# Patient Record
Sex: FEMALE | Race: WHITE | NOT HISPANIC OR LATINO | Employment: OTHER | URBAN - METROPOLITAN AREA
[De-identification: names, ages, dates, MRNs, and addresses within clinical notes are randomized per-mention and may not be internally consistent; named-entity substitution may affect disease eponyms.]

---

## 2017-01-27 RX ORDER — OMEPRAZOLE 20 MG/1
CAPSULE, DELAYED RELEASE ORAL
Qty: 30 CAP | Refills: 0 | Status: SHIPPED | OUTPATIENT
Start: 2017-01-27 | End: 2017-02-26 | Stop reason: SDUPTHER

## 2017-02-09 ENCOUNTER — APPOINTMENT (OUTPATIENT)
Dept: NEUROLOGY | Facility: MEDICAL CENTER | Age: 67
End: 2017-02-09
Payer: MEDICARE

## 2017-02-28 ENCOUNTER — HOSPITAL ENCOUNTER (EMERGENCY)
Facility: MEDICAL CENTER | Age: 67
End: 2017-02-28
Attending: EMERGENCY MEDICINE
Payer: MEDICARE

## 2017-02-28 VITALS
TEMPERATURE: 97.4 F | HEART RATE: 60 BPM | OXYGEN SATURATION: 95 % | WEIGHT: 221.78 LBS | BODY MASS INDEX: 40.81 KG/M2 | SYSTOLIC BLOOD PRESSURE: 137 MMHG | HEIGHT: 62 IN | RESPIRATION RATE: 12 BRPM | DIASTOLIC BLOOD PRESSURE: 75 MMHG

## 2017-02-28 DIAGNOSIS — L73.2 HIDRADENITIS SUPPURATIVA OF LEFT AXILLA: ICD-10-CM

## 2017-02-28 DIAGNOSIS — R42 VERTIGO: ICD-10-CM

## 2017-02-28 PROCEDURE — 99283 EMERGENCY DEPT VISIT LOW MDM: CPT

## 2017-02-28 PROCEDURE — 303977 HCHG I & D

## 2017-02-28 PROCEDURE — A9270 NON-COVERED ITEM OR SERVICE: HCPCS | Performed by: EMERGENCY MEDICINE

## 2017-02-28 PROCEDURE — 700102 HCHG RX REV CODE 250 W/ 637 OVERRIDE(OP): Performed by: EMERGENCY MEDICINE

## 2017-02-28 RX ORDER — MECLIZINE HYDROCHLORIDE 25 MG/1
25 TABLET ORAL ONCE
Status: COMPLETED | OUTPATIENT
Start: 2017-02-28 | End: 2017-02-28

## 2017-02-28 RX ORDER — MECLIZINE HYDROCHLORIDE 25 MG/1
25 TABLET ORAL 3 TIMES DAILY PRN
Qty: 30 TAB | Refills: 0 | Status: SHIPPED | OUTPATIENT
Start: 2017-02-28 | End: 2017-03-23

## 2017-02-28 RX ADMIN — MECLIZINE HYDROCHLORIDE 25 MG: 25 TABLET ORAL at 21:10

## 2017-02-28 NOTE — ED AVS SNAPSHOT
2/28/2017          Thuy Albert  [address]  [facility] [address]    Dear Thuy Wilson:    [facility] wants to ensure your discharge home is safe and you or your loved ones have had all your questions answered regarding your care after you leave the hospital.    You may receive a telephone call within two days of your discharge.  This call is to make certain you understand your discharge instructions as well as ensure we provided you with the best care possible during your stay with us.     The call will only last approximately 3-5 minutes and will be done by a nurse.    Once again, we want to ensure your discharge home is safe and that you have a clear understanding of any next steps in your care.  If you have any questions or concerns, please do not hesitate to contact us, we are here for you.  Thank you for choosing [facility] for your healthcare needs.    Sincerely,    Dima Triplett    [facility]

## 2017-02-28 NOTE — ED AVS SNAPSHOT
Concurrent Thinking Access Code: Activation code not generated  Current Concurrent Thinking Status: Patient Declined    Your email address is not on file at [facility].  Email Addresses are required for you to sign up for Concurrent Thinking, please contact [phone] to verify your personal information and to provide your email address prior to attempting to register for Concurrent Thinking.    Thuy Wilson [place]  [address] [place] [address]    Streemiot  A secure, online tool to manage your health information     [facility]’s Concurrent Thinking® is a secure, online tool that connects you to your personalized health information from the privacy of your home -- day or night - making it very easy for you to manage your healthcare. Once the activation process is completed, you can even access your medical information using the Concurrent Thinking vick, which is available for free in the Apple Vick store or Google Play store.     To learn more about Concurrent Thinking, visit [email or web address]    There are two levels of access available (as shown below):   My Chart Features  [facility] Primary Care Doctor [facility]  Specialists [facility]  Urgent  Care Non-[facility] Primary Care Doctor   Email your healthcare team securely and privately 24/7 X X X    Manage appointments: schedule your next appointment; view details of past/upcoming appointments X      Request prescription refills. X      View recent personal medical records, including lab and immunizations X X X X   View health record, including health history, allergies, medications X X X X   Read reports about your outpatient visits, procedures, consult and ER notes X X X X   See your discharge summary, which is a recap of your hospital and/or ER visit that includes your diagnosis, lab results, and care plan X X  X     How to register for Streemiot:  Once your e-mail address has been verified, follow the following steps to sign up for Streemiot.     1. Go to  [email or web address]  2. Click on the Sign Up Now box, which takes you to  the New Member Sign Up page. You will need to provide the following information:  a. Enter your [email or web address] Access Code exactly as it appears at the top of this page. (You will not need to use this code after you’ve completed the sign-up process. If you do not sign up before the expiration date, you must request a new code.)   b. Enter your date of birth.   c. Enter your home email address.   d. Click Submit, and follow the next screen’s instructions.  3. Create a [email or web address] ID. This will be your [email or web address] login ID and cannot be changed, so think of one that is secure and easy to remember.  4. Create a [email or web address] password. You can change your password at any time.  5. Enter your Password Reset Question and Answer. This can be used at a later time if you forget your password.   6. Enter your e-mail address. This allows you to receive e-mail notifications when new information is available in [email or web address].  7. Click Sign Up. You can now view your health information.    For assistance activating your [email or web address] account, call [phone]

## 2017-02-28 NOTE — ED AVS SNAPSHOT
Home Care Instructions                                                                                                                Thuy Albert   MRN: [id number]    Department:  [facility], Emergency Dept   Date of Visit:  2/28/2017            [facility], Emergency Dept    [address]    [place] [address]    Phone:  [phone]      You were seen by     Justice Francisco M.D.      Your Diagnosis Was     Hidradenitis suppurativa of left axilla     L73.2       These are the medications you received during your hospitalization from 02/28/2017 1750 to 02/28/2017 2124     Date/Time Order Dose Route Action    02/28/2017 2110 meclizine (ANTIVERT) tablet 25 mg 25 mg Oral Given      Follow-up Information     1. Follow up with Lorna Uribe M.D. In 1 day.    Specialty:  Family Medicine    Contact information    [address] [place] [address] [phone]        Medication Information     Review all of your home medications and newly ordered medications with your primary doctor and/or pharmacist as soon as possible. Follow medication instructions as directed by your doctor and/or pharmacist.     Please keep your complete medication list with you and share with your physician. Update the information when medications are discontinued, doses are changed, or new medications (including over-the-counter products) are added; and carry medication information at all times in the event of emergency situations.               Medication List      START taking these medications        Instructions    meclizine 25 MG Tabs   Commonly known as:  ANTIVERT    Take 1 Tab by mouth 3 times a day as needed.   Dose:  25 mg         ASK your doctor about these medications        Instructions    acetaminophen-codeine #3 300-30 MG Tabs   Commonly known as:  TYLENOL #3    Take 1-2 Tabs by mouth every four hours as needed.   Dose:  1-2 Tab       omeprazole 20 MG delayed-release capsule      Commonly known as:  PRILOSEC    Take 1 Cap by mouth every day.   Dose:  20 mg       thyroid 15 MG Tabs   Commonly known as:  ARMOUR THYROID    Take 2 Tabs by mouth every day.   Dose:  30 mg       VENTOLIN  (90 BASE) MCG/ACT Aers inhalation aerosol   Generic drug:  albuterol    INHALE 2 PUFFS BY MOUTH EVERY 6 HOURS AS NEEDED FOR SHORTNESS OF BREATH.                 Discharge Instructions       Hidradenitis Suppurativa  Hidradenitis suppurativa is a long-term (chronic) skin disease that starts with blocked sweat glands or hair follicles. Bacteria may grow in these blocked openings of your skin. Hidradenitis suppurativa is like a severe form of acne that develops in areas of your body where acne would be unusual. It is most likely to affect the areas of your body where skin rubs against skin and becomes moist. This includes your:  · Underarms.  · Groin.  · Genital areas.  · Buttocks.  · Upper thighs.  · Breasts.  Hidradenitis suppurativa may start out with small pimples. The pimples can develop into deep sores that break open (rupture) and drain pus. Over time your skin may thicken and become scarred. Hidradenitis suppurativa cannot be passed from person to person.   CAUSES   The exact cause of hidradenitis suppurativa is not known. This condition may be due to:  · Male and female hormones. The condition is rare before and after puberty.  · An overactive body defense system (immune system). Your immune system may overreact to the blocked hair follicles or sweat glands and cause swelling and pus-filled sores.  RISK FACTORS  You may have a higher risk of hidradenitis suppurativa if you:  · Are a woman.  · Are between ages 11 and 55.  · Have a family history of hidradenitis suppurativa.  · Have a personal history of acne.  · Are overweight.  · Smoke.  · Take the drug lithium.  SIGNS AND SYMPTOMS   The first signs of an outbreak are usually painful skin bumps that look like pimples. As the condition  progresses:  · Skin bumps may get bigger and grow deeper into the skin.  · Bumps under the skin may rupture and drain smelly pus.  · Skin may become itchy and infected.  · Skin may thicken and scar.  · Drainage may continue through tunnels under the skin (fistulas).  · Walking and moving your arms can become painful.  DIAGNOSIS   Your health care provider may diagnose hidradenitis suppurativa based on your medical history and your signs and symptoms. A physical exam will also be done. You may need to see a health care provider who specializes in skin diseases (dermatologist). You may also have tests done to confirm the diagnosis. These can include:  · Swabbing a sample of pus or drainage from your skin so it can be sent to the lab and tested for infection.  · Blood tests to check for infection.  TREATMENT   The same treatment will not work for everybody with hidradenitis suppurativa. Your treatment will depend on how severe your symptoms are. You may need to try several treatments to find what works best for you. Part of your treatment may include cleaning and bandaging (dressing) your wounds. You may also have to take medicines, such as the following:  · Antibiotics.  · Acne medicines.  · Medicines to block or suppress the immune system.  · A diabetes medicine (metformin) is sometimes used to treat this condition.  · For women, birth control pills can sometimes help relieve symptoms.  You may need surgery if you have a severe case of hidradenitis suppurativa that does not respond to medicine. Surgery may involve:   · Using a laser to clear the skin and remove hair follicles.  · Opening and draining deep sores.  · Removing the areas of skin that are diseased and scarred.  HOME CARE INSTRUCTIONS  · Learn as much as you can about your disease, and work closely with your health care providers.  · Take medicines only as directed by your health care provider.  · If you were prescribed an antibiotic medicine, finish it  all even if you start to feel better.  · If you are overweight, losing weight may be very helpful. Try to reach and maintain a healthy weight.  · Do not use any tobacco products, including cigarettes, chewing tobacco, or electronic cigarettes. If you need help quitting, ask your health care provider.  · Do not shave the areas where you get hidradenitis suppurativa.  · Do not wear deodorant.  · Wear loose-fitting clothes.  · Try not to overheat and get sweaty.  · Take a daily bleach bath as directed by your health care provider.  ¨ Fill your bathtub shelter with water.  ¨ Pour in ½ cup of unscented household bleach.  ¨ Soak for 5-10 minutes.  · Cover sore areas with a warm, clean washcloth (compress) for 5-10 minutes.  SEEK MEDICAL CARE IF:   · You have a flare-up of hidradenitis suppurativa.  · You have chills or a fever.  · You are having trouble controlling your symptoms at home.     This information is not intended to replace advice given to you by your health care provider. Make sure you discuss any questions you have with your health care provider.     Document Released: 08/01/2005 Document Revised: 01/08/2016 Document Reviewed: 03/20/2015  Narr8 Interactive Patient Education ©2016 [employer]        Vertigo  Vertigo means you feel like you or your surroundings are moving when they are not. Vertigo can be dangerous if it occurs when you are at work, driving, or performing difficult activities.   CAUSES   Vertigo occurs when there is a conflict of signals sent to your brain from the visual and sensory systems in your body. There are many different causes of vertigo, including:  · Infections, especially in the inner ear.  · A bad reaction to a drug or misuse of alcohol and medicines.  · Withdrawal from drugs or alcohol.  · Rapidly changing positions, such as lying down or rolling over in bed.  · A migraine headache.  · Decreased blood flow to the brain.  · Increased pressure in the brain from a head injury,  infection, tumor, or bleeding.  SYMPTOMS   You may feel as though the world is spinning around or you are falling to the ground. Because your balance is upset, vertigo can cause nausea and vomiting. You may have involuntary eye movements (nystagmus).  DIAGNOSIS   Vertigo is usually diagnosed by physical exam. If the cause of your vertigo is unknown, your caregiver may perform imaging tests, such as an MRI scan (magnetic resonance imaging).  TREATMENT   Most cases of vertigo resolve on their own, without treatment. Depending on the cause, your caregiver may prescribe certain medicines. If your vertigo is related to body position issues, your caregiver may recommend movements or procedures to correct the problem. In rare cases, if your vertigo is caused by certain inner ear problems, you may need surgery.  HOME CARE INSTRUCTIONS   · Follow your caregiver's instructions.  · Avoid driving.  · Avoid operating heavy machinery.  · Avoid performing any tasks that would be dangerous to you or others during a vertigo episode.  · Tell your caregiver if you notice that certain medicines seem to be causing your vertigo. Some of the medicines used to treat vertigo episodes can actually make them worse in some people.  SEEK IMMEDIATE MEDICAL CARE IF:   · Your medicines do not relieve your vertigo or are making it worse.  · You develop problems with talking, walking, weakness, or using your arms, hands, or legs.  · You develop severe headaches.  · Your nausea or vomiting continues or gets worse.  · You develop visual changes.  · A family member notices behavioral changes.  · Your condition gets worse.  MAKE SURE YOU:  · Understand these instructions.  · Will watch your condition.  · Will get help right away if you are not doing well or get worse.     This information is not intended to replace advice given to you by your health care provider. Make sure you discuss any questions you have with your health care provider.     Document  Released: 09/27/2006 Document Revised: 03/11/2013 Document Reviewed: 04/11/2016  Elsevier Interactive Patient Education ©2016 Elsevier Inc.            Patient Information     Patient Information    Following emergency treatment: all patient requiring follow-up care must return either to a private physician or a clinic if your condition worsens before you are able to obtain further medical attention, please return to the emergency room.     Billing Information    At [facility], we work to make the billing process streamlined for our patients.  Our Representatives are here to answer any questions you may have regarding your hospital bill.  If you have insurance coverage and have supplied your insurance information to us, we will submit a claim to your insurer on your behalf.  Should you have any questions regarding your bill, we can be reached online or by phone as follows:  Online: You are able pay your bills online or live chat with our representatives about any billing questions you may have. We are here to help Monday - Friday from 8:00am to 7:30pm and 9:00am - 12:00pm on Saturdays.  Please visit [email or web address]  for more information.   Phone:  [phone] or [phone]    Please note that your emergency physician, surgeon, pathologist, radiologist, anesthesiologist, and other specialists are not employed by [facility] and will therefore bill separately for their services.  Please contact them directly for any questions concerning their bills at the numbers below:     Emergency Physician Services:  [phone]  [facility]:  [phone]  [facility]:  [phone]  [facility]:  [phone]    1. Your final bill may vary from the amount quoted upon discharge if all procedures are not complete at that time, or if your doctor has additional procedures of which we are not aware. You will receive an additional bill if you  return to the Emergency Department at [facility] for suture removal regardless of the facility of which the sutures were placed.     2. Please arrange for settlement of this account at the emergency registration.    3. All self-pay accounts are due in full at the time of treatment.  If you are unable to meet this obligation then payment is expected within 4-5 days.     4. If you have had radiology studies (CT, X-ray, Ultrasound, MRI), you have received a preliminary result during your emergency department visit. Please contact the radiology department [phone] to receive a copy of your final result. Please discuss the Final result with your primary physician or with the follow up physician provided.     Crisis Hotline:  [place] Crisis Hotline:  8-583-MZYNYFN or 1-932.386.9063  [place] Crisis Hotline:    1-845.929.9595 or 679-235-9789         ED Discharge Follow Up Questions    1. In order to provide you with very good care, we would like to follow up with a phone call in the next few days.  May we have your permission to contact you?     YES /  NO    2. What is the best phone number to call you? (       )_____-__________    3. What is the best time to call you?      Morning  /  Afternoon  /  Evening                   Patient Signature:  ____________________________________________________________    Date:  ____________________________________________________________

## 2017-03-01 NOTE — ED NOTES
"Pt states has felt dizzy over the last couple of weeks, stated \"I'm too stubborn to be seen.\"  Pt denies c/o CP or HA at this time.  "

## 2017-03-01 NOTE — ED PROVIDER NOTES
ED Provider Note    CHIEF COMPLAINT  Chief Complaint   Patient presents with   • Abscess     Left axillary   • Dizziness     for a couple of weeks       HPI  Thuy Albert is a 66 y.o. female who presents for evaluation of a painful swelling under the left armpit with some discharge. This been going on for couple of days. She denies fever, she says she's been squeezing at home and it has been draining. No weakness or numbness in the arm distally. Additionally, she reports feeling dizzy which she describes a spinning sensation. Initially she reported to the nursing staff that and present for couple weeks but to me she admits is been well over a year, she's been evaluated by a neurologist and had multiple tests. She was prescribed medication that she states she did not take and she expressed displeasure with the care she received by the neurologist. She states the dizziness is brought on by head movement, she says that she lays still it doesn't occur. It is associated with some visual difficulty as well. No focal weakness or numbness or tingling. She has no other complaints at this time.    REVIEW OF SYSTEMS  Negative for fever, rash, chest pain, dyspnea, abdominal pain.    PAST MEDICAL HISTORY   has a past medical history of GERD (gastroesophageal reflux disease); Arthritis; DVT (deep venous thrombosis) (CMS-HCC) (2003); and Asthma.    SOCIAL HISTORY  Social History     Social History Main Topics   • Smoking status: Former Smoker   • Smokeless tobacco: Not on file      Comment: QUIT 2006,  USED TO TO SMOKE 2-3 PPD FOR 40 YRS.   • Alcohol Use: No   • Drug Use: No   • Sexual Activity: Not on file       SURGICAL HISTORY   has past surgical history that includes other abdominal surgery; appendectomy laparoscopic (8/1/2011); cholecystectomy; and appendectomy.    CURRENT MEDICATIONS  I personally reviewed the medication list in the charting documentation.     ALLERGIES  Allergies   Allergen Reactions   •  "Corticosteroids      Swelling     • Nabumetone      rash   • Other Drug Swelling     ALL STEROIDS    • Prednisone      Swelling     • Synthroid [Levothroid]      Nausea     • Tetracycline Swelling       PHYSICAL EXAM  VITAL SIGNS: /75 mmHg  Pulse 65  Temp(Src) 36.3 °C (97.4 °F)  Resp 15  Ht 1.575 m (5' 2.01\")  Wt 100.6 kg (221 lb 12.5 oz)  BMI 40.55 kg/m2  SpO2 97%  Constitutional: Alert in no apparent distress.  HENT: No signs of trauma.   Eyes: Conjunctiva normal, Non-icteric.   Chest: Normal nonlabored respirations  Skin: No erythema, No rash. There is a 2 cm area of indurated erythematous skin in the left axilla with purulent drainage upon palpation. This is fluctuant.  Musculoskeletal: Good range of motion in all major joints.   Neurologic: Alert, No focal deficits noted.   Psychiatric: Affect normal, Judgment normal.    DIAGNOSTIC STUDIES / PROCEDURES    Incision and Drainage Procedure Note    Indication: axillary abscess    Procedure: The patient was positioned appropriately and the skin over the incision site was prepped with alcohol. Local anesthesia was obtained by infiltration using 1% Lidocaine with epinephrine.  An incision was then made over the apex of the lesion and approximately 1 cc of thick and purulent material was expressed. Loculations were not present. The drainage cavity was then left open.    The patient tolerated the procedure well.    Complications: None      COURSE & MEDICAL DECISION MAKING  Pertinent Labs & Imaging studies reviewed. (See chart for details)    Encounter Summary: This is a 66 y.o. female with left-sided axillary abscess, likely hidradenitis suppurativa, I&D performed. Additionally, she has chronic vertigo, has been under the care of a neurologist and is very unhappy with the care she received, she did not take the medicine prescribed. She is in search of a new neurologist unfortunately we don't have any here on call for us. She will seek out a new neurologist " but in the meantime I'll prescribe her some meclizine and hopefully give her some relief. Otherwise at this time she is stable and appropriate for discharge to strict return instructions given.      DISPOSITION: Discharge Home      FINAL IMPRESSION  1. Hidradenitis suppurativa of left axilla    2. Vertigo        This dictation was created using voice recognition software. The accuracy of the dictation is limited to the abilities of the software. I expect there may be some errors of grammar and possibly content. The nursing notes were reviewed and certain aspects of this information were incorporated into this note.    Electronically signed by: Justice Francisco, 2/28/2017 8:51 PM

## 2017-03-01 NOTE — ED NOTES
Discharged in good condition after discharge instructions reviewed with pt in detail, pt verbalizes understanding of all. Ambulated out with steady gait accompanied by  with follow up instructions in hand.

## 2017-03-01 NOTE — DISCHARGE INSTRUCTIONS
Hidradenitis Suppurativa  Hidradenitis suppurativa is a long-term (chronic) skin disease that starts with blocked sweat glands or hair follicles. Bacteria may grow in these blocked openings of your skin. Hidradenitis suppurativa is like a severe form of acne that develops in areas of your body where acne would be unusual. It is most likely to affect the areas of your body where skin rubs against skin and becomes moist. This includes your:  · Underarms.  · Groin.  · Genital areas.  · Buttocks.  · Upper thighs.  · Breasts.  Hidradenitis suppurativa may start out with small pimples. The pimples can develop into deep sores that break open (rupture) and drain pus. Over time your skin may thicken and become scarred. Hidradenitis suppurativa cannot be passed from person to person.   CAUSES   The exact cause of hidradenitis suppurativa is not known. This condition may be due to:  · Male and female hormones. The condition is rare before and after puberty.  · An overactive body defense system (immune system). Your immune system may overreact to the blocked hair follicles or sweat glands and cause swelling and pus-filled sores.  RISK FACTORS  You may have a higher risk of hidradenitis suppurativa if you:  · Are a woman.  · Are between ages 11 and 55.  · Have a family history of hidradenitis suppurativa.  · Have a personal history of acne.  · Are overweight.  · Smoke.  · Take the drug lithium.  SIGNS AND SYMPTOMS   The first signs of an outbreak are usually painful skin bumps that look like pimples. As the condition progresses:  · Skin bumps may get bigger and grow deeper into the skin.  · Bumps under the skin may rupture and drain smelly pus.  · Skin may become itchy and infected.  · Skin may thicken and scar.  · Drainage may continue through tunnels under the skin (fistulas).  · Walking and moving your arms can become painful.  DIAGNOSIS   Your health care provider may diagnose hidradenitis suppurativa based on your medical  history and your signs and symptoms. A physical exam will also be done. You may need to see a health care provider who specializes in skin diseases (dermatologist). You may also have tests done to confirm the diagnosis. These can include:  · Swabbing a sample of pus or drainage from your skin so it can be sent to the lab and tested for infection.  · Blood tests to check for infection.  TREATMENT   The same treatment will not work for everybody with hidradenitis suppurativa. Your treatment will depend on how severe your symptoms are. You may need to try several treatments to find what works best for you. Part of your treatment may include cleaning and bandaging (dressing) your wounds. You may also have to take medicines, such as the following:  · Antibiotics.  · Acne medicines.  · Medicines to block or suppress the immune system.  · A diabetes medicine (metformin) is sometimes used to treat this condition.  · For women, birth control pills can sometimes help relieve symptoms.  You may need surgery if you have a severe case of hidradenitis suppurativa that does not respond to medicine. Surgery may involve:   · Using a laser to clear the skin and remove hair follicles.  · Opening and draining deep sores.  · Removing the areas of skin that are diseased and scarred.  HOME CARE INSTRUCTIONS  · Learn as much as you can about your disease, and work closely with your health care providers.  · Take medicines only as directed by your health care provider.  · If you were prescribed an antibiotic medicine, finish it all even if you start to feel better.  · If you are overweight, losing weight may be very helpful. Try to reach and maintain a healthy weight.  · Do not use any tobacco products, including cigarettes, chewing tobacco, or electronic cigarettes. If you need help quitting, ask your health care provider.  · Do not shave the areas where you get hidradenitis suppurativa.  · Do not wear deodorant.  · Wear loose-fitting  clothes.  · Try not to overheat and get sweaty.  · Take a daily bleach bath as directed by your health care provider.  ¨ Fill your bathtub MCC with water.  ¨ Pour in ½ cup of unscented household bleach.  ¨ Soak for 5-10 minutes.  · Cover sore areas with a warm, clean washcloth (compress) for 5-10 minutes.  SEEK MEDICAL CARE IF:   · You have a flare-up of hidradenitis suppurativa.  · You have chills or a fever.  · You are having trouble controlling your symptoms at home.     This information is not intended to replace advice given to you by your health care provider. Make sure you discuss any questions you have with your health care provider.     Document Released: 08/01/2005 Document Revised: 01/08/2016 Document Reviewed: 03/20/2015  See[employer] Interactive Patient Education ©2016 [employer]        Vertigo  Vertigo means you feel like you or your surroundings are moving when they are not. Vertigo can be dangerous if it occurs when you are at work, driving, or performing difficult activities.   CAUSES   Vertigo occurs when there is a conflict of signals sent to your brain from the visual and sensory systems in your body. There are many different causes of vertigo, including:  · Infections, especially in the inner ear.  · A bad reaction to a drug or misuse of alcohol and medicines.  · Withdrawal from drugs or alcohol.  · Rapidly changing positions, such as lying down or rolling over in bed.  · A migraine headache.  · Decreased blood flow to the brain.  · Increased pressure in the brain from a head injury, infection, tumor, or bleeding.  SYMPTOMS   You may feel as though the world is spinning around or you are falling to the ground. Because your balance is upset, vertigo can cause nausea and vomiting. You may have involuntary eye movements (nystagmus).  DIAGNOSIS   Vertigo is usually diagnosed by physical exam. If the cause of your vertigo is unknown, your caregiver may perform imaging tests, such as an MRI scan  (magnetic resonance imaging).  TREATMENT   Most cases of vertigo resolve on their own, without treatment. Depending on the cause, your caregiver may prescribe certain medicines. If your vertigo is related to body position issues, your caregiver may recommend movements or procedures to correct the problem. In rare cases, if your vertigo is caused by certain inner ear problems, you may need surgery.  HOME CARE INSTRUCTIONS   · Follow your caregiver's instructions.  · Avoid driving.  · Avoid operating heavy machinery.  · Avoid performing any tasks that would be dangerous to you or others during a vertigo episode.  · Tell your caregiver if you notice that certain medicines seem to be causing your vertigo. Some of the medicines used to treat vertigo episodes can actually make them worse in some people.  SEEK IMMEDIATE MEDICAL CARE IF:   · Your medicines do not relieve your vertigo or are making it worse.  · You develop problems with talking, walking, weakness, or using your arms, hands, or legs.  · You develop severe headaches.  · Your nausea or vomiting continues or gets worse.  · You develop visual changes.  · A family member notices behavioral changes.  · Your condition gets worse.  MAKE SURE YOU:  · Understand these instructions.  · Will watch your condition.  · Will get help right away if you are not doing well or get worse.     This information is not intended to replace advice given to you by your health care provider. Make sure you discuss any questions you have with your health care provider.     Document Released: 09/27/2006 Document Revised: 03/11/2013 Document Reviewed: 04/11/2016  [employer] Interactive Patient Education ©2016 [employer] Inc.

## 2017-03-01 NOTE — ED NOTES
"Chief Complaint   Patient presents with   • Abscess     Left axillary   • Dizziness     for a couple of weeks     /75 mmHg  Pulse 75  Temp(Src) 36.3 °C (97.4 °F)  Resp 16  Ht 1.575 m (5' 2.01\")  Wt 100.6 kg (221 lb 12.5 oz)  BMI 40.55 kg/m2  SpO2 98%    "

## 2017-03-23 ENCOUNTER — OFFICE VISIT (OUTPATIENT)
Dept: MEDICAL GROUP | Age: 67
End: 2017-03-23
Payer: MEDICARE

## 2017-03-23 ENCOUNTER — HOSPITAL ENCOUNTER (OUTPATIENT)
Dept: LAB | Facility: MEDICAL CENTER | Age: 67
End: 2017-03-23
Attending: FAMILY MEDICINE
Payer: MEDICARE

## 2017-03-23 VITALS
SYSTOLIC BLOOD PRESSURE: 124 MMHG | DIASTOLIC BLOOD PRESSURE: 80 MMHG | OXYGEN SATURATION: 96 % | HEART RATE: 72 BPM | BODY MASS INDEX: 41.72 KG/M2 | WEIGHT: 221 LBS | HEIGHT: 61 IN | TEMPERATURE: 98.2 F

## 2017-03-23 DIAGNOSIS — T85.848A DENTAL IMPLANT PAIN, INITIAL ENCOUNTER: ICD-10-CM

## 2017-03-23 DIAGNOSIS — M79.672 FOOT PAIN, BILATERAL: ICD-10-CM

## 2017-03-23 DIAGNOSIS — R42 DIZZINESS: Primary | ICD-10-CM

## 2017-03-23 DIAGNOSIS — L02.414 ABSCESS OF ARM, LEFT: ICD-10-CM

## 2017-03-23 DIAGNOSIS — M79.671 FOOT PAIN, BILATERAL: ICD-10-CM

## 2017-03-23 DIAGNOSIS — R41.3 MEMORY PROBLEM: ICD-10-CM

## 2017-03-23 DIAGNOSIS — E03.8 OTHER SPECIFIED HYPOTHYROIDISM: Chronic | ICD-10-CM

## 2017-03-23 DIAGNOSIS — J30.9 ALLERGIC RHINITIS, UNSPECIFIED ALLERGIC RHINITIS TRIGGER, UNSPECIFIED RHINITIS SEASONALITY: ICD-10-CM

## 2017-03-23 LAB — TSH SERPL DL<=0.005 MIU/L-ACNC: 5.37 UIU/ML (ref 0.3–3.7)

## 2017-03-23 PROCEDURE — 1036F TOBACCO NON-USER: CPT | Performed by: FAMILY MEDICINE

## 2017-03-23 PROCEDURE — 4040F PNEUMOC VAC/ADMIN/RCVD: CPT | Mod: 8P | Performed by: FAMILY MEDICINE

## 2017-03-23 PROCEDURE — 84443 ASSAY THYROID STIM HORMONE: CPT

## 2017-03-23 PROCEDURE — 0518F FALL PLAN OF CARE DOCD: CPT | Mod: 8P | Performed by: FAMILY MEDICINE

## 2017-03-23 PROCEDURE — 36415 COLL VENOUS BLD VENIPUNCTURE: CPT

## 2017-03-23 PROCEDURE — 3014F SCREEN MAMMO DOC REV: CPT | Performed by: FAMILY MEDICINE

## 2017-03-23 PROCEDURE — G8432 DEP SCR NOT DOC, RNG: HCPCS | Performed by: FAMILY MEDICINE

## 2017-03-23 PROCEDURE — 3288F FALL RISK ASSESSMENT DOCD: CPT | Performed by: FAMILY MEDICINE

## 2017-03-23 PROCEDURE — G8484 FLU IMMUNIZE NO ADMIN: HCPCS | Performed by: FAMILY MEDICINE

## 2017-03-23 PROCEDURE — 99214 OFFICE O/P EST MOD 30 MIN: CPT | Performed by: FAMILY MEDICINE

## 2017-03-23 PROCEDURE — 1100F PTFALLS ASSESS-DOCD GE2>/YR: CPT | Performed by: FAMILY MEDICINE

## 2017-03-23 PROCEDURE — G8419 CALC BMI OUT NRM PARAM NOF/U: HCPCS | Performed by: FAMILY MEDICINE

## 2017-03-23 RX ORDER — LORATADINE 10 MG/1
10 TABLET ORAL DAILY
Qty: 30 TAB | Refills: 0 | Status: SHIPPED | OUTPATIENT
Start: 2017-03-23 | End: 2017-04-21 | Stop reason: SDUPTHER

## 2017-03-23 RX ORDER — TRIAMCINOLONE ACETONIDE 40 MG/ML
40 INJECTION, SUSPENSION INTRA-ARTICULAR; INTRAMUSCULAR ONCE
Status: COMPLETED | OUTPATIENT
Start: 2017-03-23 | End: 2017-03-23

## 2017-03-23 RX ADMIN — TRIAMCINOLONE ACETONIDE 40 MG: 40 INJECTION, SUSPENSION INTRA-ARTICULAR; INTRAMUSCULAR at 13:31

## 2017-03-23 NOTE — MR AVS SNAPSHOT
"Thuy Albert   3/23/2017 11:00 AM   Office Visit   MRN: [id number]    Department:  [address] [place] [address]   Dept Phone:  [phone]    Description:  Female : 1950   Provider:  Lorna Uribe M.D.           Reason for Visit     Hypothyroidism wants to know if she still needs to be on medication    Cyst underneath left arm pit that she would like to have checked    Referral Needed to a new neurologist for second opinion, and podiatrist     Dizziness pt has been having dizzy spells since last time she cam in, pt went to [facility] ER and was given meclizine but it is not working  pt has light sensitivity due to dizzy spells       Allergies as of 3/23/2017     Allergen Noted Reactions    Nabumetone 2015       rash    Other Drug 2012   Swelling    ALL STEROIDS     Synthroid [Levothroid] 10/28/2014       Nausea      Tetracycline 2011   Swelling      You were diagnosed with     Other specified hypothyroidism   [3351256]       Abscess of arm, left   [674465]       Memory problem   [987708]       Foot pain, bilateral   [359417]       Allergic rhinitis, unspecified allergic rhinitis trigger, unspecified rhinitis seasonality   [2899569]         Vital Signs     Blood Pressure Pulse Temperature Height Weight Body Mass Index    124/80 mmHg 72 36.8 °C (98.2 °F) 1.549 m (5' 0.98\") 100.245 kg (221 lb) 41.78 kg/m2    Oxygen Saturation Smoking Status                96% Former Smoker          Basic Information     Date Of Birth Sex Race Ethnicity Preferred Language    1950 Female White Non- English      Your appointments     2017 10:20 AM   Established Patient with Lorna Uribe M.D.   [address])    [address] [place] [id number] [phone]           You will be receiving a confirmation call a few days before your appointment from our automated call confirmation system.              Problem List              ICD-10-CM Priority Class Noted - " Resolved    GERD (gastroesophageal reflux disease) K21.9   Unknown - Present    Chronic renal insufficiency, stage III (moderate) N18.3   5/3/2012 - Present    Hypothyroidism (Chronic) E03.9   5/3/2012 - Present    Dyslipidemia E78.5   5/3/2012 - Present    DDD (degenerative disc disease), lumbar M51.36   6/18/2014 - Present    Low back pain with sciatica M54.40   6/18/2014 - Present    Asthma, moderate persistent J45.40   10/28/2014 - Present    Vitamin D deficiency E55.9   1/22/2015 - Present    Elevated C-reactive protein (CRP) R79.82   6/5/2015 - Present    Prediabetes R73.03   7/12/2015 - Present    Memory problem R41.3   7/12/2015 - Present    Gastroesophageal reflux disease without esophagitis K21.9   7/12/2015 - Present    Foot pain, bilateral M79.671, M79.672   7/12/2015 - Present    Visual disturbance H53.9   7/12/2015 - Present    Dizziness R42   7/12/2015 - Present    H/O Hashimoto thyroiditis Z86.39   8/21/2015 - Present    Cataract H26.9   7/31/2016 - Present    Plantar fasciitis, bilateral M72.2   11/4/2016 - Present      Health Maintenance        Date Due Completion Dates    IMM DTaP/Tdap/Td Vaccine (1 - Tdap) 7/30/1969 ---    COLONOSCOPY 7/30/2000 ---    IMM ZOSTER VACCINE 7/30/2010 ---    IMM PNEUMOCOCCAL 65+ (ADULT) LOW/MEDIUM RISK SERIES (1 of 2 - PCV13) 7/30/2015 ---    IMM INFLUENZA (1) 9/1/2016 ---    MAMMOGRAM 4/7/2017 4/7/2016, 8/20/2015 (Done), 7/25/2014, 7/8/2004    Override on 8/20/2015: Done    PAP SMEAR 7/2/2017 7/2/2014, 6/18/2004 (Prv Comp)    Override on 6/18/2004: Previously completed    BONE DENSITY 7/25/2019 7/25/2014            Current Immunizations     No immunizations on file.      Below and/or attached are the medications your provider expects you to take. Review all of your home medications and newly ordered medications with your provider and/or pharmacist. Follow medication instructions as directed by your provider and/or pharmacist. Please keep your medication list with you  and share with your provider. Update the information when medications are discontinued, doses are changed, or new medications (including over-the-counter products) are added; and carry medication information at all times in the event of emergency situations     Allergies:  NABUMETONE - (reactions not documented)     OTHER DRUG - Swelling     SYNTHROID - (reactions not documented)     TETRACYCLINE - Swelling               Medications  Valid as of: March 23, 2017 - 12:29 PM    Generic Name Brand Name Tablet Size Instructions for use    Acetaminophen-Codeine (Tab) TYLENOL #3 300-30 MG Take 1-2 Tabs by mouth every four hours as needed.        Albuterol Sulfate (Aero Soln) VENTOLIN  (90 BASE) MCG/ACT INHALE 2 PUFFS BY MOUTH EVERY 6 HOURS AS NEEDED FOR SHORTNESS OF BREATH.        Loratadine (Tab) CLARITIN 10 MG Take 1 Tab by mouth every day.        Omeprazole (CAPSULE DELAYED RELEASE) PRILOSEC 20 MG Take 1 Cap by mouth every day.        Thyroid (Tab) ARMOUR THYROID 15 MG Take 2 Tabs by mouth every day.        .                 Medicines prescribed today were sent to:     [facility]/PH[facility]ACY #[id number] [address] [id number] [facility] [address]    Phone: [phone] Fax: [phone]    Open 24 Hours?: No      Medication refill instructions:       If your prescription bottle indicates you have medication refills left, it is not necessary to call your provider’s office. Please contact your pharmacy and they will refill your medication.    If your prescription bottle indicates you do not have any refills left, you may request refills at any time through one of the following ways: The online [employer] system (except Urgent Care), by calling your provider’s office, or by asking your pharmacy to contact your provider’s office with a refill request. Medication refills are processed only during regular business hours and may not be available until the next business day. Your provider may request additional  information or to have a follow-up visit with you prior to refilling your medication.   *Please Note: Medication refills are assigned a new Rx number when refilled electronically. Your pharmacy may indicate that no refills were authorized even though a new prescription for the same medication is available at the pharmacy. Please request the medicine by name with the pharmacy before contacting your provider for a refill.        Your To Do List     Future Labs/Procedures Complete By Expires    TSH  As directed 3/24/2018      Referral     A referral request has been sent to our patient care coordination department. Please allow 3-5 business days for us to process this request and contact you either by phone or mail. If you do not hear from us by the 5th business day, please call us at [phone].           MyChart Status: Patient Declined

## 2017-03-24 ENCOUNTER — TELEPHONE (OUTPATIENT)
Dept: MEDICAL GROUP | Age: 67
End: 2017-03-24

## 2017-03-24 DIAGNOSIS — E03.8 OTHER SPECIFIED HYPOTHYROIDISM: ICD-10-CM

## 2017-03-24 RX ORDER — THYROID 15 MG/1
45 TABLET ORAL DAILY
Qty: 90 TAB | Refills: 2 | Status: SHIPPED | OUTPATIENT
Start: 2017-03-24 | End: 2017-07-04 | Stop reason: SDUPTHER

## 2017-03-24 NOTE — TELEPHONE ENCOUNTER
----- Message from Lorna Uribe M.D. sent at 3/24/2017  7:58 AM PDT -----  Please call pt & inform the following:   We need to increase the dose of [place] thyroid as it appears that the current dose is inadequate.    I sent new Rx to your pharmacy, please  and follow instructions on the bottle.   We need to repeat thyroid level in 6 weeks. Please go to any [facility] lab for blood draw in 6 weeks.   Thanks.   Lorna Uribe M.D.

## 2017-03-24 NOTE — TELEPHONE ENCOUNTER
Phone Number Called: [phone]    Message: Spoke with pt's spouse, informed him of Dr. Uribe's note as stated below regarding TSH results and dosage change. He acknowledged.    Left Message for patient to call back: N\A
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
## 2017-03-27 ENCOUNTER — TELEPHONE (OUTPATIENT)
Dept: MEDICAL GROUP | Age: 67
End: 2017-03-27
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
## 2017-03-28 RX ORDER — OMEPRAZOLE 20 MG/1
CAPSULE, DELAYED RELEASE ORAL
Qty: 30 CAP | Refills: 1 | Status: SHIPPED | OUTPATIENT
Start: 2017-03-28 | End: 2017-06-08 | Stop reason: SDUPTHER

## 2017-03-29 NOTE — TELEPHONE ENCOUNTER
Message  Received: Today       Jennifer Ellis, Med Ass't  Chela Will, Med Ass't       Caller: Unspecified (Yesterday, 3:31 PM)                     If the patient can find a provider that will accept her let me know. Those providers are the only ones that will see medicaid. Medicaid doesn't cover patient's over 21 so is hard for patients to be seen.     Thanks   Jennifer

## 2017-03-31 NOTE — TELEPHONE ENCOUNTER
Patient is requesting a new referral to neurology for 2nd opinion Patient would like to see someone outside of [facility]

## 2017-04-17 ENCOUNTER — TELEPHONE (OUTPATIENT)
Dept: MEDICAL GROUP | Age: 67
End: 2017-04-17

## 2017-04-17 NOTE — TELEPHONE ENCOUNTER
Phone Number Called: [phone] (home)     Message: pt notified that referral change request has been forwarded to our referral department.     Left Message for patient to call back: no

## 2017-04-17 NOTE — TELEPHONE ENCOUNTER
----- Message from Cortney Gates sent at 4/14/2017 11:57 AM PDT -----  Contact: [phone]  Pt wanted to give you the 2 [facility] That they would like a referral sent to.      neuro opthalmologic Dr núñez ph. [phone]    pediatrist Dr. Layne ph. [phone]

## 2017-04-25 RX ORDER — LORATADINE 10 MG/1
TABLET ORAL
Qty: 90 TAB | Refills: 1 | Status: SHIPPED | OUTPATIENT
Start: 2017-04-25 | End: 2018-03-29

## 2017-06-09 RX ORDER — OMEPRAZOLE 20 MG/1
20 CAPSULE, DELAYED RELEASE ORAL
Qty: 30 CAP | Refills: 1 | Status: SHIPPED | OUTPATIENT
Start: 2017-06-09 | End: 2017-08-31 | Stop reason: SDUPTHER

## 2017-06-23 ENCOUNTER — HOSPITAL ENCOUNTER (OUTPATIENT)
Dept: RADIOLOGY | Facility: MEDICAL CENTER | Age: 67
End: 2017-06-23
Attending: FAMILY MEDICINE
Payer: MEDICARE

## 2017-06-23 ENCOUNTER — TELEPHONE (OUTPATIENT)
Dept: MEDICAL GROUP | Age: 67
End: 2017-06-23

## 2017-06-23 ENCOUNTER — OFFICE VISIT (OUTPATIENT)
Dept: MEDICAL GROUP | Age: 67
End: 2017-06-23
Payer: MEDICARE

## 2017-06-23 VITALS
HEIGHT: 61 IN | OXYGEN SATURATION: 94 % | BODY MASS INDEX: 41.16 KG/M2 | DIASTOLIC BLOOD PRESSURE: 68 MMHG | HEART RATE: 76 BPM | TEMPERATURE: 98.1 F | SYSTOLIC BLOOD PRESSURE: 117 MMHG | WEIGHT: 218 LBS

## 2017-06-23 DIAGNOSIS — M79.672 FOOT PAIN, BILATERAL: ICD-10-CM

## 2017-06-23 DIAGNOSIS — M79.671 RIGHT FOOT PAIN: ICD-10-CM

## 2017-06-23 DIAGNOSIS — Z12.11 COLON CANCER SCREENING: ICD-10-CM

## 2017-06-23 DIAGNOSIS — E66.01 MORBID OBESITY WITH BMI OF 40.0-44.9, ADULT (HCC): ICD-10-CM

## 2017-06-23 DIAGNOSIS — G43.009 MIGRAINE WITHOUT AURA AND WITHOUT STATUS MIGRAINOSUS, NOT INTRACTABLE: ICD-10-CM

## 2017-06-23 DIAGNOSIS — B35.1 FUNGAL TOENAIL INFECTION: ICD-10-CM

## 2017-06-23 DIAGNOSIS — H53.9 VISUAL DISTURBANCE: ICD-10-CM

## 2017-06-23 DIAGNOSIS — M79.671 FOOT PAIN, BILATERAL: ICD-10-CM

## 2017-06-23 PROCEDURE — 99215 OFFICE O/P EST HI 40 MIN: CPT | Performed by: FAMILY MEDICINE

## 2017-06-23 PROCEDURE — 73630 X-RAY EXAM OF FOOT: CPT | Mod: RT

## 2017-06-23 RX ORDER — AMITRIPTYLINE HYDROCHLORIDE 25 MG/1
25 TABLET, FILM COATED ORAL NIGHTLY PRN
Qty: 90 TAB | Refills: 0 | Status: SHIPPED | OUTPATIENT
Start: 2017-06-23 | End: 2017-07-20

## 2017-06-23 RX ORDER — TERBINAFINE HYDROCHLORIDE 250 MG/1
250 TABLET ORAL DAILY
Qty: 30 TAB | Refills: 3 | Status: SHIPPED | OUTPATIENT
Start: 2017-06-23 | End: 2018-03-29

## 2017-06-23 RX ORDER — MELOXICAM 7.5 MG/1
7.5 TABLET ORAL DAILY
Qty: 30 TAB | Refills: 0 | Status: SHIPPED | OUTPATIENT
Start: 2017-06-23 | End: 2017-07-20 | Stop reason: SDUPTHER

## 2017-06-23 NOTE — Clinical Note
June 26, 2017        Thuy Albert  [address] [facility] [address]  [place] [address]        Dear Thuy Wilson:    Dr. Uribe's office has been unable to reach you. Please call our office at [phone]. Thank you.      If you have any questions or concerns, please don't hesitate to call.        Sincerely,        Jonathan Parsons Ass't      Electronically Signed

## 2017-06-23 NOTE — MR AVS SNAPSHOT
"Thuy Albert   2017 10:20 AM   Office Visit   MRN: [id number]    Department:  [address] [place] [address]   Dept Phone:  [phone]    Description:  Female : 1950   Provider:  Lorna Uribe M.D.           Reason for Visit     Foot Pain Right - x 1 day - Tripped on carpet    Dizziness follow--up      Allergies as of 2017     Allergen Noted Reactions    Nabumetone 2015       rash    Other Drug 2012   Swelling    ALL STEROIDS     Synthroid [Levothroid] 10/28/2014       Nausea      Tetracycline 2011   Swelling      You were diagnosed with     Fungal toenail infection   [144321]       Right foot pain   [774020]       Migraine without aura and without status migrainosus, not intractable   [026872]       Foot pain, bilateral   [197857]       Visual disturbance   [752604]       Morbid obesity with BMI of 40.0-44.9, adult (CMS-[facility])   [045400]       Colon cancer screening   [118219]         Vital Signs     Blood Pressure Pulse Temperature Height Weight Body Mass Index    117/68 mmHg 76 36.7 °C (98.1 °F) 1.537 m (5' 0.51\") 98.884 kg (218 lb) 41.86 kg/m2    Oxygen Saturation Smoking Status                94% Former Smoker          Basic Information     Date Of Birth Sex Race Ethnicity Preferred Language    1950 Female White Non- English      Problem List              ICD-10-CM Priority Class Noted - Resolved    GERD (gastroesophageal reflux disease) K21.9   Unknown - Present    Chronic renal insufficiency, stage III (moderate) N18.3   5/3/2012 - Present    Hypothyroidism (Chronic) E03.9   5/3/2012 - Present    Dyslipidemia E78.5   5/3/2012 - Present    DDD (degenerative disc disease), lumbar M51.36   2014 - Present    Low back pain with sciatica M54.40   2014 - Present    Asthma, moderate persistent J45.40   10/28/2014 - Present    Vitamin D deficiency E55.9   2015 - Present    Elevated C-reactive protein (CRP) R79.82   2015 - Present    Prediabetes " R73.03   7/12/2015 - Present    Memory problem R41.3   7/12/2015 - Present    Foot pain, bilateral M79.671, M79.672   7/12/2015 - Present    Visual disturbance H53.9   7/12/2015 - Present    Dizziness R42   7/12/2015 - Present    H/O Hashimoto thyroiditis Z86.39   8/21/2015 - Present    Cataract H26.9   7/31/2016 - Present    Plantar fasciitis, bilateral M72.2   11/4/2016 - Present    Morbid obesity with BMI of 40.0-44.9, adult ([facility]) E66.01, Z68.41   6/23/2017 - Present      Health Maintenance        Date Due Completion Dates    IMM DTaP/Tdap/Td Vaccine (1 - Tdap) 7/30/1969 ---    COLONOSCOPY 7/30/2000 ---    IMM ZOSTER VACCINE 7/30/2010 ---    IMM PNEUMOCOCCAL 65+ (ADULT) LOW/MEDIUM RISK SERIES (1 of 2 - PCV13) 7/30/2015 ---    MAMMOGRAM 4/7/2017 4/7/2016, 8/20/2015 (Done), 7/25/2014, 7/8/2004    Override on 8/20/2015: Done    PAP SMEAR 7/2/2017 7/2/2014, 6/18/2004 (Prv Comp)    Override on 6/18/2004: Previously completed    BONE DENSITY 7/25/2019 7/25/2014            Current Immunizations     No immunizations on file.      Below and/or attached are the medications your provider expects you to take. Review all of your home medications and newly ordered medications with your provider and/or pharmacist. Follow medication instructions as directed by your provider and/or pharmacist. Please keep your medication list with you and share with your provider. Update the information when medications are discontinued, doses are changed, or new medications (including over-the-counter products) are added; and carry medication information at all times in the event of emergency situations     Allergies:  NABUMETONE - (reactions not documented)     OTHER DRUG - Swelling     SYNTHROID - (reactions not documented)     TETRACYCLINE - Swelling               Medications  Valid as of: June 23, 2017 - 12:26 PM    Generic Name Brand Name Tablet Size Instructions for use    Amitriptyline HCl (Tab) ELAVIL 25 MG Take 1 Tab by mouth at bedtime  as needed for Other.        Loratadine (Tab) CLARITIN 10 MG TAKE 1 TAB BY MOUTH EVERY DAY.        Meloxicam (Tab) MOBIC 7.5 MG Take 1 Tab by mouth every day.        Omeprazole (CAPSULE DELAYED RELEASE) PRILOSEC 20 MG Take 1 Cap by mouth every day. TAKE 1 CAP BY MOUTH EVERY DAY.        Terbinafine HCl (Tab) LAMISIL 250 MG Take 1 Tab by mouth every day.        Thyroid (Tab) ARMOUR THYROID 15 MG Take 3 Tabs by mouth every day.        .                 Medicines prescribed today were sent to:     [id number] - [place], [place] - [address]    [address] [place] [address]    Phone: [phone] Fax: [phone]    Open 24 Hours?: No      Medication refill instructions:       If your prescription bottle indicates you have medication refills left, it is not necessary to call your provider’s office. Please contact your pharmacy and they will refill your medication.    If your prescription bottle indicates you do not have any refills left, you may request refills at any time through one of the following ways: The online [employer] system (except Urgent Care), by calling your provider’s office, or by asking your pharmacy to contact your provider’s office with a refill request. Medication refills are processed only during regular business hours and may not be available until the next business day. Your provider may request additional information or to have a follow-up visit with you prior to refilling your medication.   *Please Note: Medication refills are assigned a new Rx number when refilled electronically. Your pharmacy may indicate that no refills were authorized even though a new prescription for the same medication is available at the pharmacy. Please request the medicine by name with the pharmacy before contacting your provider for a refill.        Your To Do List     Future Labs/Procedures Complete By Expires    COMP METABOLIC PANEL  As directed 6/24/2018    DX-FOOT-COMPLETE 3+ RIGHT  As directed 6/23/2018     OCCULT BLOOD FECES IMMUNOASSAY  As directed 6/23/2018      Referral     A referral request has been sent to our patient care coordination department. Please allow 3-5 business days for us to process this request and contact you either by phone or mail. If you do not hear from us by the 5th business day, please call us at [phone].           Lifeables Access Code: [email or web address]  Expires: 7/23/2017 12:26 PM    Lifeables  A secure, online tool to manage your health information     [employer]’s Lifeables® is a secure, online tool that connects you to your personalized health information from the privacy of your home -- day or night - making it very easy for you to manage your healthcare. Once the activation process is completed, you can even access your medical information using the Lifeables vick, which is available for free in the Apple Vick store or Google Play store.     Lifeables provides the following levels of access (as shown below):   My Chart Features   [facility] Primary Care Doctor [facility]  Specialists [facility]  Urgent  Care Non-[facility]  Primary Care  Doctor   Email your healthcare team securely and privately 24/7 X X X    Manage appointments: schedule your next appointment; view details of past/upcoming appointments X      Request prescription refills. X      View recent personal medical records, including lab and immunizations X X X X   View health record, including health history, allergies, medications X X X X   Read reports about your outpatient visits, procedures, consult and ER notes X X X X   See your discharge summary, which is a recap of your hospital and/or ER visit that includes your diagnosis, lab results, and care plan. X X       How to register for Lifeables:  1. Go to  [email or web address].  2. Click on the Sign Up Now box, which takes you to the New Member Sign Up page. You will need to provide the following information:  a. Enter your Lifeables Access Code exactly as it appears at the top of  this page. (You will not need to use this code after you’ve completed the sign-up process. If you do not sign up before the expiration date, you must request a new code.)   b. Enter your date of birth.   c. Enter your home email address.   d. Click Submit, and follow the next screen’s instructions.  3. Create a to be ID. This will be your to be login ID and cannot be changed, so think of one that is secure and easy to remember.  4. Create a to be password. You can change your password at any time.  5. Enter your Password Reset Question and Answer. This can be used at a later time if you forget your password.   6. Enter your e-mail address. This allows you to receive e-mail notifications when new information is available in to be.  7. Click Sign Up. You can now view your health information.    For assistance activating your to be account, call [phone]

## 2017-06-23 NOTE — PROGRESS NOTES
Subjective:   CC: right foot pain    HPI:     Thuy Albert is a 66 y.o. female, established patient of the clinic, presents with the following concerns:     1. Fungal toenail infection  Pt c/o discoloration and brittleness of the right toenails. Symptoms have been present for quite some time.   Denies hx of trauma to affected toenail or hx of excessive sweating.     2. Right foot pain  Pt has hx of chronic bilateral foot pain. She is being referred to podiatrist but having trouble with referral.   Yesterday, she tripped over the carpet and suffers pain at the dorsum of the right foot.   Pain is described as sharp, 4/10 in severity, non-radiating, worse with ambulation or weight bearing, better with rest.   She is still able to ambulate without assisted device.     3. Chronic dizziness and visual disturbance.   Pt has hx of chronic dizziness and visual disturbance (blurry vision) for quite some time.   Dizziness occur with rapid change of body positions.   She was seen by neurology, Dr. Bender. MRI and EEG of the brain were done. Results were inconclusive.   Dr. Bender recommended Keppra due to concern of possible subtle seizure.   She was also seen by ophthalmology. Her eye exam was normal except for mild cataracts.   However, pt is afraid of side effects from online research, so she did not take Keppra.   A friend of her, who is a physician, thought that she might have occipital neuralgia.   She and her  insist that this diagnosis makes sense. They feel frustrated that none of the specialists agree with them.   They request to be referred to neuro-ophthalmologist.     4. Foot pain, bilateral  Need new referral to Dr. Layne      Current medicines (including changes today)  Current Outpatient Prescriptions   Medication Sig Dispense Refill   • terbinafine (LAMISIL) 250 MG Tab Take 1 Tab by mouth every day. 30 Tab 3   • meloxicam (MOBIC) 7.5 MG Tab Take 1 Tab by mouth every day. 30 Tab 0   • amitriptyline  "(ELAVIL) 25 MG Tab Take 1 Tab by mouth at bedtime as needed for Other. 90 Tab 0   • omeprazole (PRILOSEC) 20 MG delayed-release capsule Take 1 Cap by mouth every day. TAKE 1 CAP BY MOUTH EVERY DAY. 30 Cap 1   • loratadine (CLARITIN) 10 MG Tab TAKE 1 TAB BY MOUTH EVERY DAY. 90 Tab 1   • thyroid (ARMOUR THYROID) 15 MG Tab Take 3 Tabs by mouth every day. 90 Tab 2     No current facility-administered medications for this visit.     She  has a past medical history of GERD (gastroesophageal reflux disease); Arthritis; DVT (deep venous thrombosis) (CMS-[facility]) (2003); and Asthma.    I personally reviewed patient's problem list, allergies, medications, family hx, social hx with patient and update EPIC.     REVIEW OF SYSTEMS:  CONSTITUTIONAL:  Denies night sweats, fatigue, malaise, lethargy, fever or chills.  RESPIRATORY:  Denies cough, wheeze, hemoptysis, or shortness of breath.  CARDIOVASCULAR:  Denies chest pains, palpitations, pedal edema  GASTROINTESTINAL:  Denies abdominal pain, nausea or vomiting, diarrhea, constipation, hematemesis, hematochezia, melena.  GENITOURINARY:  Denies urinary urgency, frequency, dysuria, or hematuria.         Objective:     Blood pressure 117/68, pulse 76, temperature 36.7 °C (98.1 °F), height 1.537 m (5' 0.51\"), weight 98.884 kg (218 lb), SpO2 94 %. Body mass index is 41.86 kg/(m^2).    Physical Exam:  Constitutional: awake, alert, in no distress, morbidly obese.   Skin: Warm, dry, good turgor, no rashes, bruises, ulcers in visible areas.  Eye: conjunctiva clear, lids neg for edema or lesions.  Neck: Trachea midline, no masses, no thyromegaly. No cervical or supraclavicular lymphadenopathy  Respiratory: Unlabored respiratory effort, lungs clear to auscultation, no wheezes, no rhonchi.  Cardiovascular: Normal S1, S2, no murmur, no pedal edema.  Abdomen: Soft, non-tender to palpation, no hernia, no hepatosplenomegaly.  Neuro: CN2-12 grossly intact.     Psych: Oriented x3, affect and mood wnl, " intact judgement and insight.   Foot exam:   Right foot: warm and well perfused, ankle ROM within normal limits, mild tenderness to palpation along the distal 3rd/4th metatarsals.  Trace edema and mild erythema of the same area.   Left foot: 3rd, 4th, 5th toenails are raised, brittle with yellowish discoloration.    Assessment and Plan:   The following treatment plan was discussed    1. Fungal toenail infection  Hx and exam consistent with fungal toenail infection. Plan:   - terbinafine (LAMISIL) 250 MG Tab; Take 1 Tab by mouth every day.  Dispense: 30 Tab; Refill: 3  - COMP METABOLIC PANEL; in 6 weeks    2. Right foot pain  Acute right foot pain after tripping over home carpet 2 days ago. Will do Xray to r/o fracture. Plan:   - DX-FOOT-COMPLETE 3+ LEFT; Future  - meloxicam (MOBIC) 7.5 MG Tab; Take 1 Tab by mouth every day.  Dispense: 30 Tab; Refill: 0  - icing, rest , proper footwear, avoid activities that exacerbate symptoms.     3. Chronic dizziness & visual disturbance  Chronic dizziness with visual disturbance. Dizziness is likely secondary to orthostatic hypotension per history. Pt was seen by neurology, MRI note for age-related atrophy, EEG noted for nonspecific cortical dysfunction. Keppra was prescribed but pt does not take due to fear of side effects. Pt was also seen by ophthalmology, eye exam were normal except for mild cataract.   The cause(s) of her presentation is unclear.  Neurology note reviewed, ddx include but not limited to: subtle seizure, psychogenic, atypical migraine, or TIA.   Pt's barrier to care is the excessive concerns about medication side effects. Pt's  usually does extensive online research about medications & diagnosis. He frequently disputes or challenges medical decisions from PCP/specialists. In the end, pt has never followed through with any treatment plans and usually lost follow up with specialist.   Today, she, however, agrees to try treatment for migraine.   Plan:   -  Trial of amitriptyline/Advil for migraine. Declines triptans.   - referral to neuro-ophthalmology.     4. Foot pain, bilateral  - REFERRAL TO PODIATRY    5. Morbid obesity with BMI of 40.0-44.9, adult (CMS-[facility])  - Patient identified as having weight management issue.  Appropriate orders and counseling given.    6. Colon cancer screening  - OCCULT BLOOD FECES IMMUNOASSAY; Future    Total 40 minutes face-to-face time spent with patient, with greater than 50% of the total time discussing patient's issues and symptoms as listed above in assessment and plan, as well as managing coordination of care for future evaluation and treatment.      Lorna Uribe M.D.      Followup: Return if symptoms worsen or fail to improve.    Please note that this dictation was created using voice recognition software. I have made every reasonable attempt to correct obvious errors, but I expect that there are errors of grammar and possibly content that I did not discover before finalizing the note.

## 2017-06-23 NOTE — TELEPHONE ENCOUNTER
----- Message from Lorna Uribe M.D. sent at 6/23/2017  1:01 PM PDT -----  Please call pt & inform the following:   There is no evidence of fracture on X-ray. Please continue icing, rest, and taking Meloxicam.  Thanks.   Lorna Uribe M.D.

## 2017-06-23 NOTE — TELEPHONE ENCOUNTER
Phone Number Called: [phone] (home)     Message: Left VM for patient to return our call to inform of right foot x-ray results    Left Message for patient to call back: yes

## 2017-06-26 NOTE — TELEPHONE ENCOUNTER
Phone Number Called: [phone] (home)     Message: Left message for the patient to call us back regarding the note below.2nd attempt. Letter mailed to the pt.    Left Message for patient to call back: yes

## 2017-06-29 NOTE — TELEPHONE ENCOUNTER
1. Caller Name: Art                                          Call Back Number: [phone]      Patient approves a detailed voicemail message: N\A    Spoke to Art and gave him message regarding results below.

## 2017-06-29 NOTE — TELEPHONE ENCOUNTER
Phone Number Called: .[phone]    Message: Returned call and LM on  to call back regarding the note below.  Caller called on behalf of patient but did not leave his name.    Left Message for patient to call back: yes

## 2017-07-04 NOTE — Clinical Note
July 12, 2017        Thuy Albert  [address]  [place] [address]        Dear Thuy Wilson:    Dr. Uribe office has been unable to reach you. Please call our office at [phone]. Thank you.    If you have any questions or concerns, please don't hesitate to call.        Sincerely,        Sabrina [facility] , [place]    Electronically Signed

## 2017-07-05 ENCOUNTER — PATIENT OUTREACH (OUTPATIENT)
Dept: HEALTH INFORMATION MANAGEMENT | Facility: OTHER | Age: 67
End: 2017-07-05

## 2017-07-05 NOTE — PROGRESS NOTES
7/5/17  WebIZ Checked & Epic Updated: No records found  HealthConnect Verified: no  Verify PCP: yes    Communication Preference Obtained: yes     Review Care Team: yes    Annual Wellness Visit Scheduling  1. Scheduling Status:Scheduled/ Appt scheduled with PCP because needs to be when partner is available and his schedule doesn't match with nurse schedule.       Care Gap Scheduling (Attempt to Schedule EACH Overdue Care Gap!)     Health Maintenance Due   Topic Date Due   • Annual Wellness Visit  Scheduled   • IMM DTaP/Tdap/Td Vaccine (1 - Tdap) Relative scheduled appt and said that they are going to speak with PCP first about Immunizations   • COLONOSCOPY  Already has a kit for a fit tets   • IMM ZOSTER VACCINE     • IMM PNEUMOCOCCAL 65+ (ADULT) LOW/MEDIUM RISK SERIES (1 of 2 - PCV13)    • MAMMOGRAM  Is going to wait to schedule appt for a Pap Smear and Mammogram   • PAP SMEAR           MyChart Activation: declined

## 2017-07-06 RX ORDER — THYROID,PORK 15 MG
TABLET ORAL
Qty: 90 TAB | Refills: 0 | Status: SHIPPED | OUTPATIENT
Start: 2017-07-06 | End: 2017-08-11 | Stop reason: SDUPTHER

## 2017-07-06 NOTE — TELEPHONE ENCOUNTER
Please call and remind patient to have thyroid level recheck due to recent increase in dose of Levothyroxine. The order is in Epic. Thanks.   Lorna Uribe M.D.

## 2017-07-10 NOTE — TELEPHONE ENCOUNTER
Phone Number Called: [phone] (home)     Message: called pt left message for pt to call back.     Left Message for patient to call back: no

## 2017-07-11 NOTE — TELEPHONE ENCOUNTER
Phone Number Called: [phone] (home)     Message: called pt left message for pt to call back.     Left Message for patient to call back: yes

## 2017-07-13 ENCOUNTER — TELEPHONE (OUTPATIENT)
Dept: MEDICAL GROUP | Age: 67
End: 2017-07-13

## 2017-07-13 NOTE — TELEPHONE ENCOUNTER
ANNUAL WELLNESS VISIT PRE-VISIT PLANNING     1.  Reviewed note from last office visit with PCP: YES    2.  If any orders were placed at last visit, do we have Results/Consult Notes?        •  Labs - Labs were not ordered at last office visit.       •  Imaging - Imaging was not ordered at last office visit.       •  Referrals - Referral ordered, patient was seen and consult notes are in chart. Care Teams updated  YES.    3.  Immunizations were updated in Epic using WebIZ?: No WebIZ record       •  WebIZ Recommendations:        •  Is patient due for Tdap? YES. Patient was notified of copay.       •  Is patient due for Shingles? YES. Patient was notified of copay.     4.  Patient is due for the following Health Maintenance Topics:   Health Maintenance Due   Topic Date Due   • Annual Wellness Visit  1950   • IMM DTaP/Tdap/Td Vaccine (1 - Tdap) 07/30/1969   • COLONOSCOPY  07/30/2000   • IMM ZOSTER VACCINE  07/30/2010   • IMM PNEUMOCOCCAL 65+ (ADULT) LOW/MEDIUM RISK SERIES (1 of 2 - PCV13) 07/30/2015   • MAMMOGRAM  04/07/2017   • PAP SMEAR  07/02/2017           5.  Reviewed/Updated the following with patient:       •   Preferred Pharmacy? YES       •   Preferred Lab? YES       •   Medications? YES. Was Abstract Encounter opened and chart updated? YES       •   Social History? YES. Was Abstract Encounter opened and chart updated? YES       •   Family History? YES. Was Abstract Encounter opened and chart updated? YES    6.  Care Team Updated:       •   DME Company (gait device, O2, CPAP, etc.): N\A       •   Other Specialists (eye doctor, derm, GYN, cardiology, endo, etc): YES    7.  Patient has the following Care Path diagnoses on Problem List:  NONE    8.  Specialty Comments was updated with diagnosis information provided by [facility]: N\A    9.  Patient was advised: “This is a free wellness visit. The provider will screen for medical conditions to help you stay healthy. If you have other concerns to address you may be  asked to discuss these at a separate visit or there may be an additional fee.”     6.  Patient was informed to arrive 15 min prior to their scheduled appointment and bring in their medication bottles.

## 2017-07-14 NOTE — TELEPHONE ENCOUNTER
Phone Number Called: [phone] (home)     Message: called pt left message on machine with information below, pt to call back if she has any questions.     Left Message for patient to call back: no

## 2017-07-14 NOTE — TELEPHONE ENCOUNTER
Message  Call patient  Received: Yesterday       Cortney Will, Med Ass't       Phone Number: [phone]                     pts significant other called wanting to know why you called. They would like you to call them back. They said if you call and they do not  that you can leave a message about why you are calling.

## 2017-07-17 ENCOUNTER — HOSPITAL ENCOUNTER (OUTPATIENT)
Dept: LAB | Facility: MEDICAL CENTER | Age: 67
End: 2017-07-17
Attending: FAMILY MEDICINE
Payer: MEDICARE

## 2017-07-17 DIAGNOSIS — E03.8 OTHER SPECIFIED HYPOTHYROIDISM: ICD-10-CM

## 2017-07-17 LAB — TSH SERPL DL<=0.005 MIU/L-ACNC: 0.79 UIU/ML (ref 0.3–3.7)

## 2017-07-17 PROCEDURE — 84443 ASSAY THYROID STIM HORMONE: CPT

## 2017-07-17 PROCEDURE — 36415 COLL VENOUS BLD VENIPUNCTURE: CPT

## 2017-07-20 ENCOUNTER — OFFICE VISIT (OUTPATIENT)
Dept: MEDICAL GROUP | Age: 67
End: 2017-07-20
Payer: MEDICARE

## 2017-07-20 VITALS
HEART RATE: 74 BPM | WEIGHT: 215 LBS | OXYGEN SATURATION: 96 % | TEMPERATURE: 97.9 F | BODY MASS INDEX: 39.56 KG/M2 | DIASTOLIC BLOOD PRESSURE: 78 MMHG | HEIGHT: 62 IN | SYSTOLIC BLOOD PRESSURE: 118 MMHG

## 2017-07-20 DIAGNOSIS — H53.9 VISUAL DISTURBANCE: ICD-10-CM

## 2017-07-20 DIAGNOSIS — E55.9 VITAMIN D DEFICIENCY: ICD-10-CM

## 2017-07-20 DIAGNOSIS — E66.01 MORBID OBESITY WITH BMI OF 40.0-44.9, ADULT (HCC): ICD-10-CM

## 2017-07-20 DIAGNOSIS — M79.671 FOOT PAIN, BILATERAL: ICD-10-CM

## 2017-07-20 DIAGNOSIS — J45.20 MILD INTERMITTENT ASTHMA WITHOUT COMPLICATION: ICD-10-CM

## 2017-07-20 DIAGNOSIS — R41.3 MEMORY PROBLEM: ICD-10-CM

## 2017-07-20 DIAGNOSIS — M79.672 FOOT PAIN, BILATERAL: ICD-10-CM

## 2017-07-20 DIAGNOSIS — R42 DIZZINESS: ICD-10-CM

## 2017-07-20 DIAGNOSIS — Z23 NEED FOR PNEUMOCOCCAL VACCINATION: ICD-10-CM

## 2017-07-20 DIAGNOSIS — M72.2 PLANTAR FASCIITIS, BILATERAL: ICD-10-CM

## 2017-07-20 DIAGNOSIS — R73.03 PREDIABETES: ICD-10-CM

## 2017-07-20 DIAGNOSIS — Z00.00 MEDICARE ANNUAL WELLNESS VISIT, SUBSEQUENT: ICD-10-CM

## 2017-07-20 DIAGNOSIS — E78.5 DYSLIPIDEMIA: ICD-10-CM

## 2017-07-20 PROCEDURE — 90670 PCV13 VACCINE IM: CPT | Performed by: FAMILY MEDICINE

## 2017-07-20 PROCEDURE — G0009 ADMIN PNEUMOCOCCAL VACCINE: HCPCS | Performed by: FAMILY MEDICINE

## 2017-07-20 PROCEDURE — G0438 PPPS, INITIAL VISIT: HCPCS | Mod: 25 | Performed by: FAMILY MEDICINE

## 2017-07-20 RX ORDER — MELOXICAM 7.5 MG/1
TABLET ORAL
Qty: 60 TAB | Refills: 0 | Status: SHIPPED | OUTPATIENT
Start: 2017-07-20 | End: 2017-09-17 | Stop reason: SDUPTHER

## 2017-07-20 ASSESSMENT — PATIENT HEALTH QUESTIONNAIRE - PHQ9: CLINICAL INTERPRETATION OF PHQ2 SCORE: 0

## 2017-07-20 NOTE — PROGRESS NOTES
Chief Complaint   Patient presents with   • Annual Wellness Visit         HPI:  Thuy Wilson is a 66 y.o. here for Medicare Annual Wellness Visit    Pt is doing well, no specific concerns today.     Patient Active Problem List    Diagnosis Date Noted   • Morbid obesity with BMI of 40.0-44.9, adult (HCC) 06/23/2017   • Plantar fasciitis, bilateral 11/04/2016   • Cataract 07/31/2016   • H/O Hashimoto thyroiditis 08/21/2015   • Prediabetes 07/12/2015   • Memory problem 07/12/2015   • Foot pain, bilateral 07/12/2015   • Visual disturbance 07/12/2015   • Dizziness 07/12/2015   • Elevated C-reactive protein (CRP) 06/05/2015   • Vitamin D deficiency 01/22/2015   • Asthma, moderate persistent 10/28/2014   • DDD (degenerative disc disease), lumbar 06/18/2014   • Low back pain with sciatica 06/18/2014   • Chronic renal insufficiency, stage III (moderate) 05/03/2012   • Hypothyroidism 05/03/2012   • Dyslipidemia 05/03/2012   • GERD (gastroesophageal reflux disease)        Current Outpatient Prescriptions   Medication Sig Dispense Refill   • meloxicam (MOBIC) 7.5 MG Tab TAKE 1 TAB BY MOUTH EVERY DAY. 60 Tab 0   • ARMOUR THYROID 15 MG Tab TAKE 3 TABS BY MOUTH EVERY DAY. 90 Tab 0   • terbinafine (LAMISIL) 250 MG Tab Take 1 Tab by mouth every day. 30 Tab 3   • omeprazole (PRILOSEC) 20 MG delayed-release capsule Take 1 Cap by mouth every day. TAKE 1 CAP BY MOUTH EVERY DAY. 30 Cap 1   • loratadine (CLARITIN) 10 MG Tab TAKE 1 TAB BY MOUTH EVERY DAY. 90 Tab 1   • amitriptyline (ELAVIL) 25 MG Tab Take 1 Tab by mouth at bedtime as needed for Other. (Patient not taking: Reported on 7/20/2017) 90 Tab 0     No current facility-administered medications for this visit.        Patient is taking medications as noted in medication list.  Current supplements as per medication list.     Allergies: Nabumetone; Other drug; Synthroid; and Tetracycline    Current social contact/activities: goes to [employer], goes to park to feed ducks.    Is  patient current with immunizations? No, due for PREVNAR (PCV13) , TDAP and ZOSTAVAX (Shingles). Patient is interested in receiving NONE today.    She  reports that she quit smoking about 11 years ago. Her smoking use included Cigarettes. She has a 60 pack-year smoking history. She has never used smokeless tobacco. She reports that she does not drink alcohol or use illicit drugs.  Counseling given: Not Answered        DPA/Advanced directive: Patient does not have an Advanced Directive.  A packet and workshop information was given on Advanced Directives.    ROS:    Gait: Uses a wheelchair   Ostomy: no   Other tubes: no   Amputations: no   Chronic oxygen use no   Last eye exam 11/2016   Wears hearing aids: no   : Denies incontinence.     Depression Screening    Little interest or pleasure in doing things?  0 - not at all  Feeling down, depressed, or hopeless? 0 - not at all  Patient Health Questionnaire Score:  0  No depressive symptoms identified      Screening for Cognitive Impairment    Three Minute Recall (apple, watch, carol ann)  0/3    Draw clock face with all 12 numbers set to the hand to show 10 minutes past 11 o'clock  0 Unable to draw clock do to dimensioning eye site.  No cognitive concerns identified     Fall Risk Assessment    Has the patient had two or more falls in the last year or any fall with injury in the last year?  No  No fall risk identified       Safety Assessment    Throw rugs on floor.  Yes  Handrails on all stairs.  Yes  Good lighting in all hallways.  Yes  Difficulty hearing.  Yes  Patient counseled about all safety risks that were identified.    Functional Assessment ADLs    Are there any barriers preventing you from cooking for yourself or meeting nutritional needs?  No.    Are there any barriers preventing you from driving safely or obtaining transportation?  Yes. Can't see or hear.  Spouse does all the driving.  Are there any barriers preventing you from using a telephone or calling for  help?  Yes. Has problems seeing. Can only answer phone.  Are there any barriers preventing you from shopping?  No. Spouse does all the shopping.   Are there any barriers preventing you from taking care of your own finances?  No. Spouse taking care of finances.  Are there any barriers preventing you from managing your medications?  No. Spouse keeps track of medication.  Are you currently engaging any exercise or physical activity?  Yes.  Walking stairs everyday.    Health Maintenance Summary                Annual Wellness Visit Overdue 1950     IMM DTaP/Tdap/Td Vaccine Overdue 7/30/1969     COLONOSCOPY Overdue 7/30/2000     IMM ZOSTER VACCINE Overdue 7/30/2010     IMM PNEUMOCOCCAL 65+ (ADULT) LOW/MEDIUM RISK SERIES Overdue 7/30/2015     MAMMOGRAM Overdue 4/7/2017      Done 4/7/2016 MA-SCREEN MAMMO W/CAD-BILAT     Patient has more history with this topic...    PAP SMEAR Overdue 7/2/2017      Done 7/2/2014 PATHOLOGY GYN SPECIMEN     Patient has more history with this topic...    IMM INFLUENZA Next Due 9/1/2017     BONE DENSITY Next Due 7/25/2019      Done 7/25/2014 DS-BONE DENSITY STUDY (DEXA)          Patient Care Team:  Lorna Uribe M.D. as PCP - General (Family Medicine)  Isac Kaiser M.D. as Consulting Physician (Phys Med and Rehab)  Jeremie Layne D.P.M. as Consulting Physician (Podiatry)  Michel Barillas D.O. as Consulting Physician (Ophthalmology)  Davian Jenkins M.D. as Consulting Physician (Ophthalmology)      Social History   Substance Use Topics   • Smoking status: Former Smoker -- 1.50 packs/day for 40 years     Types: Cigarettes     Quit date: 01/01/2006   • Smokeless tobacco: Never Used      Comment: QUIT 2006,  USED TO TO SMOKE 2-3 PPD FOR 40 YRS.   • Alcohol Use: No     Family History   Problem Relation Age of Onset   • Hypertension Sister    • Diabetes Sister    • Heart Disease Sister    • Other Sister      loss vision complete   • Heart Disease Brother 55     MI   • Genetic Mother    •  "Stroke Father      She  has a past medical history of GERD (gastroesophageal reflux disease); Arthritis; DVT (deep venous thrombosis) (CMS-[facility]) (2003); Asthma; and Thyroid disease.   Past Surgical History   Procedure Laterality Date   • Other abdominal surgery       ksenia   • Appendectomy laparoscopic  8/1/2011     Performed by JJ QUINTANILLA at [facility] ORS   • Cholecystectomy     • Appendectomy     • Tonsillectomy           Exam:     Blood pressure 118/78, pulse 74, temperature 36.6 °C (97.9 °F), height 1.562 m (5' 1.5\"), weight 97.523 kg (215 lb), SpO2 96 %. Body mass index is 39.97 kg/(m^2).    Hearing excellent.    Dentition good  Alert, oriented in no acute distress.  Eye contact is good, speech goal directed, affect calm      Assessment and Plan. The following treatment and monitoring plan is recommended:    1. Memory problem:   Chronic, pt was referred to neurology, dale second opionion REFERRAL TO NEUROLOGY   2. Dizziness   Chronic, cause(s) unclear, pt was referred to neurology, dale second opinion REFERRAL TO NEUROLOGY   3. Visual disturbance:   Chronic blurry vision, cause(s) unclear, pt was seen multiple time by ophthalmology, she was referred to ophtho-neurologist, but he declines to see patient.  REFERRAL TO NEUROLOGY   4. Plantar fasciitis, bilateral  REFERRAL TO PODIATRY   5. Foot pain, bilateral  REFERRAL TO PODIATRY   6. Prediabetes:   Discussed dietary modification, exercise, weight loss.  COMP METABOLIC PANEL   7. Dyslipidemia:   Chronic, not on medication, working on dietary modification LIPID PROFILE   8. Vitamin D deficiency  VITAMIN D,25 HYDROXY   9. Mild intermittent asthma without complication:   Chronic, in excellent control, has not needed inhaler for quite some time.     10. Morbid obesity with BMI of 40.0-44.9, adult ([facility])   Discussed dietary modification, exercise, weight loss. Declines meds or [facility]    11. Need for pneumococcal vaccination  PNEUMOCOCCAL CONJUGATE " VACCINE 13-VALENT   12. Medicare annual wellness visit, subsequent  Initial Wellness Visit - Includes PPPS ()         Services suggested: No services needed at this time  Health Care Screening recommendations as per orders if indicated.  Referrals offered: PT/OT/Nutrition counseling/Behavioral Health/Smoking cessation as per orders if indicated.    Discussion today about general wellness and lifestyle habits:    · Prevent falls and reduce trip hazards; Cautioned about securing or removing rugs.  · Have a working fire alarm and carbon monoxide detector;   · Engage in regular physical activity and social activities       Follow-up: Return in about 6 months (around 1/20/2018) for Long, Multiple issues.

## 2017-07-20 NOTE — MR AVS SNAPSHOT
"Thuy Albert   2017 11:00 AM   Office Visit   MRN: [id number]    Department:  [address] [place] [address]   Dept Phone:  [phone]    Description:  Female : 1950   Provider:  Lorna Uribe M.D.; [facility]            Reason for Visit     Annual Wellness Visit           Allergies as of 2017     Allergen Noted Reactions    Nabumetone 2015       rash    Other Drug 2012   Swelling    ALL STEROIDS     Synthroid [Levothroid] 10/28/2014       Nausea      Tetracycline 2011   Swelling      You were diagnosed with     Memory problem   [169782]       Dizziness   [846355]       Visual disturbance   [308709]       Plantar fasciitis, bilateral   [423534]       Foot pain, bilateral   [414853]       Prediabetes   [314276]       Dyslipidemia   [611320]       Vitamin D deficiency   [1389518]       Mild intermittent asthma without complication   [870241]       Morbid obesity with BMI of 40.0-44.9, adult (CMS-[facility])   [610711]       Need for pneumococcal vaccination   [063572]       Medicare annual wellness visit, subsequent   [273579]         Vital Signs     Blood Pressure Pulse Temperature Height Weight Body Mass Index    118/78 mmHg 74 36.6 °C (97.9 °F) 1.562 m (5' 1.5\") 97.523 kg (215 lb) 39.97 kg/m2    Oxygen Saturation Smoking Status                96% Former Smoker          Basic Information     Date Of Birth Sex Race Ethnicity Preferred Language    1950 Female White Non- English      Problem List              ICD-10-CM Priority Class Noted - Resolved    GERD (gastroesophageal reflux disease) K21.9   Unknown - Present    Chronic renal insufficiency, stage III (moderate) N18.3   5/3/2012 - Present    Hypothyroidism (Chronic) E03.9   5/3/2012 - Present    Dyslipidemia E78.5   5/3/2012 - Present    DDD (degenerative disc disease), lumbar M51.36   2014 - Present    Mild intermittent asthma without complication J45.20   10/28/2014 - Present    Vitamin D deficiency " E55.9   1/22/2015 - Present    Elevated C-reactive protein (CRP) R79.82   6/5/2015 - Present    Prediabetes R73.03   7/12/2015 - Present    Memory problem R41.3   7/12/2015 - Present    Foot pain, bilateral M79.671, M79.672   7/12/2015 - Present    Visual disturbance H53.9   7/12/2015 - Present    Dizziness R42   7/12/2015 - Present    Cataract H26.9   7/31/2016 - Present    Plantar fasciitis, bilateral M72.2   11/4/2016 - Present    Morbid obesity with BMI of 40.0-44.9, adult ([facility]) E66.01, Z68.41   6/23/2017 - Present      Health Maintenance        Date Due Completion Dates    IMM DTaP/Tdap/Td Vaccine (1 - Tdap) 7/30/1969 ---    COLONOSCOPY 7/30/2000 ---    IMM ZOSTER VACCINE 7/30/2010 ---    MAMMOGRAM 4/7/2017 4/7/2016, 8/20/2015 (Done), 7/25/2014, 7/8/2004    Override on 8/20/2015: Done    IMM INFLUENZA (1) 9/1/2017 ---    IMM PNEUMOCOCCAL 65+ (ADULT) LOW/MEDIUM RISK SERIES (2 of 2 - PPSV23) 7/20/2018 7/20/2017    BONE DENSITY 7/25/2019 7/25/2014            Current Immunizations     13-VALENT PCV PREVNAR 7/20/2017 12:31 PM      Below and/or attached are the medications your provider expects you to take. Review all of your home medications and newly ordered medications with your provider and/or pharmacist. Follow medication instructions as directed by your provider and/or pharmacist. Please keep your medication list with you and share with your provider. Update the information when medications are discontinued, doses are changed, or new medications (including over-the-counter products) are added; and carry medication information at all times in the event of emergency situations     Allergies:  NABUMETONE - (reactions not documented)     OTHER DRUG - Swelling     SYNTHROID - (reactions not documented)     TETRACYCLINE - Swelling               Medications  Valid as of: July 20, 2017 -  1:56 PM    Generic Name Brand Name Tablet Size Instructions for use    Loratadine (Tab) CLARITIN 10 MG TAKE 1 TAB BY MOUTH EVERY  DAY.        Meloxicam (Tab) MOBIC 7.5 MG TAKE 1 TAB BY MOUTH EVERY DAY.        Omeprazole (CAPSULE DELAYED RELEASE) PRILOSEC 20 MG Take 1 Cap by mouth every day. TAKE 1 CAP BY MOUTH EVERY DAY.        Terbinafine HCl (Tab) LAMISIL 250 MG Take 1 Tab by mouth every day.        Thyroid (Tab) ARMOUR THYROID 15 MG TAKE 3 TABS BY MOUTH EVERY DAY.        .                 Medicines prescribed today were sent to:     [facility]/PHARMACY #[id number] [place], [place] - [address]    [address] [place] [address]    Phone: [phone] Fax: [phone]    Open 24 Hours?: No      Medication refill instructions:       If your prescription bottle indicates you have medication refills left, it is not necessary to call your provider’s office. Please contact your pharmacy and they will refill your medication.    If your prescription bottle indicates you do not have any refills left, you may request refills at any time through one of the following ways: The online [email or web address] system (except Urgent Care), by calling your provider’s office, or by asking your pharmacy to contact your provider’s office with a refill request. Medication refills are processed only during regular business hours and may not be available until the next business day. Your provider may request additional information or to have a follow-up visit with you prior to refilling your medication.   *Please Note: Medication refills are assigned a new Rx number when refilled electronically. Your pharmacy may indicate that no refills were authorized even though a new prescription for the same medication is available at the pharmacy. Please request the medicine by name with the pharmacy before contacting your provider for a refill.        Your To Do List     Future Labs/Procedures Complete By Expires    COMP METABOLIC PANEL  As directed 7/21/2018    LIPID PROFILE  As directed 7/21/2018    VITAMIN D,25 HYDROXY  As directed 7/21/2018      Referral     A referral request has been sent to  our patient care coordination department. Please allow 3-5 business days for us to process this request and contact you either by phone or mail. If you do not hear from us by the 5th business day, please call us at [phone].           SetuServ Access Code: [email or web address]  Expires: 7/23/2017 12:26 PM    SetuServ  A secure, online tool to manage your health information     [employer]’s SetuServ® is a secure, online tool that connects you to your personalized health information from the privacy of your home -- day or night - making it very easy for you to manage your healthcare. Once the activation process is completed, you can even access your medical information using the SetuServ vick, which is available for free in the Apple Vick store or Google Play store.     SetuServ provides the following levels of access (as shown below):   My Chart Features   [facility] Primary Care Doctor [facility]  Specialists [facility]  Urgent  [facility]  Primary Care  Doctor   Email your healthcare team securely and privately 24/7 X X X    Manage appointments: schedule your next appointment; view details of past/upcoming appointments X      Request prescription refills. X      View recent personal medical records, including lab and immunizations X X X X   View health record, including health history, allergies, medications X X X X   Read reports about your outpatient visits, procedures, consult and ER notes X X X X   See your discharge summary, which is a recap of your hospital and/or ER visit that includes your diagnosis, lab results, and care plan. X X       How to register for SetuServ:  1. Go to  [email or web address].  2. Click on the Sign Up Now box, which takes you to the New Member Sign Up page. You will need to provide the following information:  a. Enter your SetuServ Access Code exactly as it appears at the top of this page. (You will not need to use this code after you’ve completed the sign-up process. If you do not sign  up before the expiration date, you must request a new code.)   b. Enter your date of birth.   c. Enter your home email address.   d. Click Submit, and follow the next screen’s instructions.  3. Create a [employer] Lynk ID. This will be your [employer] Lynk login ID and cannot be changed, so think of one that is secure and easy to remember.  4. Create a PlusFourSixt password. You can change your password at any time.  5. Enter your Password Reset Question and Answer. This can be used at a later time if you forget your password.   6. Enter your e-mail address. This allows you to receive e-mail notifications when new information is available in [employer]k.  7. Click Sign Up. You can now view your health information.    For assistance activating your [employer] account, call [phone]

## 2017-08-09 RX ORDER — THYROID,PORK 15 MG
TABLET ORAL
Refills: 0 | OUTPATIENT
Start: 2017-08-09

## 2017-08-11 RX ORDER — THYROID 15 MG/1
45 TABLET ORAL DAILY
Qty: 180 TAB | Refills: 1 | Status: SHIPPED | OUTPATIENT
Start: 2017-08-11 | End: 2017-12-04 | Stop reason: SDUPTHER

## 2017-08-11 RX ORDER — THYROID,PORK 15 MG
TABLET ORAL
Refills: 0 | OUTPATIENT
Start: 2017-08-11

## 2017-08-11 RX ORDER — THYROID 90 MG/1
90 TABLET ORAL DAILY
Qty: 90 TAB | Refills: 1 | Status: SHIPPED | OUTPATIENT
Start: 2017-08-11 | End: 2017-08-11 | Stop reason: SDUPTHER

## 2017-08-31 RX ORDER — OMEPRAZOLE 20 MG/1
20 CAPSULE, DELAYED RELEASE ORAL
Qty: 30 CAP | Refills: 0 | Status: SHIPPED | OUTPATIENT
Start: 2017-08-31 | End: 2017-09-30 | Stop reason: SDUPTHER

## 2017-09-18 DIAGNOSIS — G43.009 MIGRAINE WITHOUT AURA AND WITHOUT STATUS MIGRAINOSUS, NOT INTRACTABLE: ICD-10-CM

## 2017-09-20 RX ORDER — MELOXICAM 7.5 MG/1
TABLET ORAL
Qty: 60 TAB | Refills: 0 | Status: SHIPPED | OUTPATIENT
Start: 2017-09-20 | End: 2018-03-29

## 2017-09-20 RX ORDER — AMITRIPTYLINE HYDROCHLORIDE 25 MG/1
25 TABLET, FILM COATED ORAL NIGHTLY PRN
Qty: 90 TAB | Refills: 0 | Status: SHIPPED | OUTPATIENT
Start: 2017-09-20 | End: 2018-03-29

## 2017-09-20 NOTE — TELEPHONE ENCOUNTER
Phone Number Called: [phone] (home)     Message: LM for pt stating her medication was sent to her pharmacy.    Left Message for patient to call back: no

## 2017-10-03 RX ORDER — OMEPRAZOLE 20 MG/1
20 CAPSULE, DELAYED RELEASE ORAL
Qty: 30 CAP | Refills: 2 | Status: SHIPPED | OUTPATIENT
Start: 2017-10-03 | End: 2018-01-26 | Stop reason: SDUPTHER

## 2017-10-10 ENCOUNTER — TELEPHONE (OUTPATIENT)
Dept: MEDICAL GROUP | Age: 67
End: 2017-10-10

## 2017-11-17 ENCOUNTER — HOSPITAL ENCOUNTER (OUTPATIENT)
Dept: LAB | Facility: MEDICAL CENTER | Age: 67
End: 2017-11-17
Attending: PSYCHIATRY & NEUROLOGY
Payer: MEDICARE

## 2017-11-17 LAB
ALBUMIN SERPL BCP-MCNC: 3.3 G/DL (ref 3.2–4.9)
ALBUMIN/GLOB SERPL: 0.8 G/DL
ALP SERPL-CCNC: 132 U/L (ref 30–99)
ALT SERPL-CCNC: 23 U/L (ref 2–50)
AMMONIA PLAS-SCNC: 33 UMOL/L (ref 11–45)
ANION GAP SERPL CALC-SCNC: 13 MMOL/L (ref 0–11.9)
AST SERPL-CCNC: 27 U/L (ref 12–45)
BASOPHILS # BLD AUTO: 0.6 % (ref 0–1.8)
BASOPHILS # BLD: 0.05 K/UL (ref 0–0.12)
BILIRUB SERPL-MCNC: 0.5 MG/DL (ref 0.1–1.5)
BUN SERPL-MCNC: 12 MG/DL (ref 8–22)
CALCIUM SERPL-MCNC: 9.5 MG/DL (ref 8.5–10.5)
CHLORIDE SERPL-SCNC: 103 MMOL/L (ref 96–112)
CO2 SERPL-SCNC: 23 MMOL/L (ref 20–33)
CREAT SERPL-MCNC: 0.88 MG/DL (ref 0.5–1.4)
CRP SERPL HS-MCNC: 3.02 MG/DL (ref 0–0.75)
EOSINOPHIL # BLD AUTO: 0.1 K/UL (ref 0–0.51)
EOSINOPHIL NFR BLD: 1.2 % (ref 0–6.9)
ERYTHROCYTE [DISTWIDTH] IN BLOOD BY AUTOMATED COUNT: 44.5 FL (ref 35.9–50)
ERYTHROCYTE [SEDIMENTATION RATE] IN BLOOD BY WESTERGREN METHOD: 30 MM/HOUR (ref 0–30)
FOLATE SERPL-MCNC: 4.7 NG/ML
GFR SERPL CREATININE-BSD FRML MDRD: >60 ML/MIN/1.73 M 2
GLOBULIN SER CALC-MCNC: 4.1 G/DL (ref 1.9–3.5)
GLUCOSE SERPL-MCNC: 102 MG/DL (ref 65–99)
HCT VFR BLD AUTO: 41.8 % (ref 37–47)
HGB BLD-MCNC: 13.8 G/DL (ref 12–16)
IMM GRANULOCYTES # BLD AUTO: 0.03 K/UL (ref 0–0.11)
IMM GRANULOCYTES NFR BLD AUTO: 0.4 % (ref 0–0.9)
LYMPHOCYTES # BLD AUTO: 2.8 K/UL (ref 1–4.8)
LYMPHOCYTES NFR BLD: 34.4 % (ref 22–41)
MCH RBC QN AUTO: 30 PG (ref 27–33)
MCHC RBC AUTO-ENTMCNC: 33 G/DL (ref 33.6–35)
MCV RBC AUTO: 90.9 FL (ref 81.4–97.8)
MONOCYTES # BLD AUTO: 0.44 K/UL (ref 0–0.85)
MONOCYTES NFR BLD AUTO: 5.4 % (ref 0–13.4)
NEUTROPHILS # BLD AUTO: 4.73 K/UL (ref 2–7.15)
NEUTROPHILS NFR BLD: 58 % (ref 44–72)
NRBC # BLD AUTO: 0 K/UL
NRBC BLD AUTO-RTO: 0 /100 WBC
PLATELET # BLD AUTO: 247 K/UL (ref 164–446)
PMV BLD AUTO: 9.2 FL (ref 9–12.9)
POTASSIUM SERPL-SCNC: 3.7 MMOL/L (ref 3.6–5.5)
PROT SERPL-MCNC: 7.4 G/DL (ref 6–8.2)
RBC # BLD AUTO: 4.6 M/UL (ref 4.2–5.4)
SODIUM SERPL-SCNC: 139 MMOL/L (ref 135–145)
T4 FREE SERPL-MCNC: 0.71 NG/DL (ref 0.53–1.43)
TSH SERPL DL<=0.005 MIU/L-ACNC: 2.12 UIU/ML (ref 0.3–3.7)
VIT B12 SERPL-MCNC: 280 PG/ML (ref 211–911)
WBC # BLD AUTO: 8.2 K/UL (ref 4.8–10.8)

## 2017-11-17 PROCEDURE — 82746 ASSAY OF FOLIC ACID SERUM: CPT

## 2017-11-17 PROCEDURE — 85025 COMPLETE CBC W/AUTO DIFF WBC: CPT

## 2017-11-17 PROCEDURE — 84445 ASSAY OF TSI GLOBULIN: CPT

## 2017-11-17 PROCEDURE — 36415 COLL VENOUS BLD VENIPUNCTURE: CPT

## 2017-11-17 PROCEDURE — 82140 ASSAY OF AMMONIA: CPT

## 2017-11-17 PROCEDURE — 86140 C-REACTIVE PROTEIN: CPT

## 2017-11-17 PROCEDURE — 80053 COMPREHEN METABOLIC PANEL: CPT

## 2017-11-17 PROCEDURE — 85652 RBC SED RATE AUTOMATED: CPT

## 2017-11-17 PROCEDURE — 84443 ASSAY THYROID STIM HORMONE: CPT

## 2017-11-17 PROCEDURE — 82607 VITAMIN B-12: CPT

## 2017-11-17 PROCEDURE — 84439 ASSAY OF FREE THYROXINE: CPT

## 2017-11-17 PROCEDURE — 82164 ANGIOTENSIN I ENZYME TEST: CPT

## 2017-11-19 LAB — ACE SERPL-CCNC: 52 U/L (ref 9–67)

## 2017-11-21 LAB — TSI ACT/NOR SER: 93 %

## 2017-12-04 ENCOUNTER — HOSPITAL ENCOUNTER (OUTPATIENT)
Dept: RADIOLOGY | Facility: MEDICAL CENTER | Age: 67
End: 2017-12-04
Attending: PSYCHIATRY & NEUROLOGY
Payer: MEDICARE

## 2017-12-04 DIAGNOSIS — F02.82 DEMENTIA OF THE ALZHEIMER'S TYPE, WITH EARLY ONSET, WITH DELUSIONS (HCC): ICD-10-CM

## 2017-12-04 DIAGNOSIS — G30.0 DEMENTIA OF THE ALZHEIMER'S TYPE, WITH EARLY ONSET, WITH DELUSIONS (HCC): ICD-10-CM

## 2017-12-04 PROCEDURE — 700117 HCHG RX CONTRAST REV CODE 255: Performed by: PSYCHIATRY & NEUROLOGY

## 2017-12-04 PROCEDURE — A9579 GAD-BASE MR CONTRAST NOS,1ML: HCPCS | Performed by: PSYCHIATRY & NEUROLOGY

## 2017-12-04 PROCEDURE — 70553 MRI BRAIN STEM W/O & W/DYE: CPT

## 2017-12-04 RX ADMIN — GADODIAMIDE 20 ML: 287 INJECTION INTRAVENOUS at 08:52

## 2017-12-05 RX ORDER — THYROID,PORK 15 MG
TABLET ORAL
Qty: 180 TAB | Refills: 2 | Status: SHIPPED | OUTPATIENT
Start: 2017-12-05 | End: 2018-05-02 | Stop reason: SDUPTHER

## 2017-12-10 ENCOUNTER — APPOINTMENT (OUTPATIENT)
Dept: RADIOLOGY | Facility: MEDICAL CENTER | Age: 67
End: 2017-12-10
Attending: EMERGENCY MEDICINE
Payer: MEDICARE

## 2017-12-10 ENCOUNTER — HOSPITAL ENCOUNTER (EMERGENCY)
Facility: MEDICAL CENTER | Age: 67
End: 2017-12-10
Attending: EMERGENCY MEDICINE
Payer: MEDICARE

## 2017-12-10 VITALS
HEIGHT: 62 IN | WEIGHT: 212.08 LBS | DIASTOLIC BLOOD PRESSURE: 68 MMHG | RESPIRATION RATE: 16 BRPM | SYSTOLIC BLOOD PRESSURE: 132 MMHG | OXYGEN SATURATION: 94 % | TEMPERATURE: 99.2 F | BODY MASS INDEX: 39.03 KG/M2 | HEART RATE: 74 BPM

## 2017-12-10 DIAGNOSIS — L03.113 CELLULITIS OF RIGHT UPPER EXTREMITY: ICD-10-CM

## 2017-12-10 PROCEDURE — 700102 HCHG RX REV CODE 250 W/ 637 OVERRIDE(OP): Performed by: EMERGENCY MEDICINE

## 2017-12-10 PROCEDURE — 73130 X-RAY EXAM OF HAND: CPT | Mod: RT

## 2017-12-10 PROCEDURE — 99284 EMERGENCY DEPT VISIT MOD MDM: CPT

## 2017-12-10 PROCEDURE — A9270 NON-COVERED ITEM OR SERVICE: HCPCS | Performed by: EMERGENCY MEDICINE

## 2017-12-10 RX ORDER — CEPHALEXIN 250 MG/1
500 CAPSULE ORAL ONCE
Status: COMPLETED | OUTPATIENT
Start: 2017-12-10 | End: 2017-12-10

## 2017-12-10 RX ORDER — CEPHALEXIN 500 MG/1
500 CAPSULE ORAL 4 TIMES DAILY
Qty: 28 CAP | Refills: 0 | Status: SHIPPED | OUTPATIENT
Start: 2017-12-10 | End: 2018-03-29

## 2017-12-10 RX ADMIN — CEPHALEXIN 500 MG: 250 CAPSULE ORAL at 17:10

## 2017-12-10 ASSESSMENT — PAIN SCALES - GENERAL
PAINLEVEL_OUTOF10: 5
PAINLEVEL_OUTOF10: 5

## 2017-12-10 NOTE — ED NOTES
"Chief Complaint   Patient presents with   • Hand Pain     Pt bumped right hand on wooden shelf last night, thinks she has a splinter. Increased swelling and redness.      CMS intact    /63   Pulse 87   Temp 37.3 °C (99.2 °F)   Resp 18   Ht 1.575 m (5' 2\")   Wt 96.2 kg (212 lb 1.3 oz)   SpO2 95%   BMI 38.79 kg/m²     "

## 2017-12-11 NOTE — ED PROVIDER NOTES
ED Provider Note    CHIEF COMPLAINT  Chief Complaint   Patient presents with   • Hand Pain     Pt bumped right hand on wooden shelf last night, thinks she has a splinter. Increased swelling and redness.        HPI  Thuy Albert is a 67 y.o. female who presentsFor evaluation of hand pain. Last night she was at the grocery store and bumped her right hand. She doesn't know she has a splinter in there or not but states that the area is starting to get red and more painful. She's had no fevers.    REVIEW OF SYSTEMS  See HPI for further details. All other systems are negative.     PAST MEDICAL HISTORY  Past Medical History:   Diagnosis Date   • Arthritis    • Asthma    • DVT (deep venous thrombosis) (CMS-[facility]) 2003    DVT LEFT LEG-- STATES SHE HAD TAKEN COUMADIN FOR 8 WEEKS IN 2003.   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   • Alcohol use No   • Drug use: No   • Sexual activity: No     Other Topics Concern   • Not on file     Social History Narrative   • No narrative on file       SURGICAL HISTORY  Past Surgical History:   Procedure Laterality Date   • APPENDECTOMY LAPAROSCOPIC  8/1/2011    Performed by JJ [facility]ILLA at [facility]  "[facility]   • APPENDECTOMY     • CHOLECYSTECTOMY     • OTHER ABDOMINAL SURGERY      ksenia   • TONSILLECTOMY         CURRENT MEDICATIONS  Home Medications    **Home medications have not yet been reviewed for this encounter**         ALLERGIES  Allergies   Allergen Reactions   • Nabumetone      rash   • Other Drug Swelling     ALL STEROIDS    • Synthroid [Levothroid]      Nausea     • Tetracycline Swelling       PHYSICAL EXAM  VITAL SIGNS: /63   Pulse 87   Temp 37.3 °C (99.2 °F)   Resp 18   Ht 1.575 m (5' 2\")   Wt 96.2 kg (212 lb 1.3 oz)   SpO2 95%   BMI 38.79 kg/m²     Constitutional: Well developed, Well nourished, No acute distress, Non-toxic appearance.   HENT: Normocephalic, Atraumatic.   Cardiovascular: Normal heart rate.   Thorax & Lungs:No respiratory distress.  Skin: Warm, Dry.   Musculoskeletal: Right hand exam demonstrates erythema and swelling to the ulnar aspect starting at the distal 5th metacarpal region and extending proximally. I see no puncture wounds. I do not appreciate any foreign body. She does have some diffuse tenderness.  Neurologic: Awake alert.    RADIOLOGY/PROCEDURES  DX-HAND 3+ RIGHT    (Results Pending)         COURSE & MEDICAL DECISION MAKING  Pertinent Labs & Imaging studies reviewed. (See chart for details)  This is a 67-year-old here for evaluation of right hand pain. She didn't shelf yesterday and is concerned she might have a splinter. On exam I see no puncture wound cannot palpate any sort of splinter or foreign body. X-rays obtained and shows no evidence of foreign body. I explained to the patient that I do not think I would be able to localize this or even if she had one that in her hand. At this point I will treat her for a cellulitis and start her on Keflex. She is taken that in the past and has had no difficulties with it. I will have her follow-up with her primary care provider. She is reevaluated sooner if she is having fevers or she has extension of the " redness up her arm or if she is just not well. She is given a discharge instruction sheet on cellulitis. She is discharged home in stable condition.    FINAL IMPRESSION  1. Right hand cellulitis  2.   3.         Electronically signed by: Jamar Giordano, 12/10/2017 4:12 PM

## 2017-12-27 ENCOUNTER — HOSPITAL ENCOUNTER (EMERGENCY)
Facility: MEDICAL CENTER | Age: 67
End: 2017-12-27
Attending: EMERGENCY MEDICINE
Payer: MEDICARE

## 2017-12-27 VITALS
WEIGHT: 217.15 LBS | SYSTOLIC BLOOD PRESSURE: 143 MMHG | DIASTOLIC BLOOD PRESSURE: 65 MMHG | TEMPERATURE: 98.5 F | HEART RATE: 62 BPM | BODY MASS INDEX: 41 KG/M2 | RESPIRATION RATE: 18 BRPM | OXYGEN SATURATION: 97 % | HEIGHT: 61 IN

## 2017-12-27 DIAGNOSIS — R21 RASH: ICD-10-CM

## 2017-12-27 PROCEDURE — 99283 EMERGENCY DEPT VISIT LOW MDM: CPT

## 2017-12-28 ENCOUNTER — PATIENT OUTREACH (OUTPATIENT)
Dept: HEALTH INFORMATION MANAGEMENT | Facility: OTHER | Age: 67
End: 2017-12-28

## 2017-12-28 ASSESSMENT — ENCOUNTER SYMPTOMS
HEADACHES: 0
SHORTNESS OF BREATH: 0
BACK PAIN: 0
BRUISES/BLEEDS EASILY: 0
FEVER: 0
NERVOUS/ANXIOUS: 1
ABDOMINAL PAIN: 0
NECK PAIN: 0

## 2017-12-28 ASSESSMENT — LIFESTYLE VARIABLES: SUBSTANCE_ABUSE: 0

## 2017-12-28 NOTE — DISCHARGE INSTRUCTIONS
The rash in her arm appears most consistent with a bruise. However her continue have this reassessed within the next 1-2 weeks. If you develop significant swelling of your arm, weakness in her arm, shortness of breath, chest pains, high fevers please return to the emergency department. At this time it does not appear consistent with a skin infection. I do not recommend antibiotics. If the rash itches you can take Benadryl. We have called our schedulers and they will contact you for an appointment to have your arm reexamined.    Rash  A rash is a change in the color or texture of the skin. There are many different types of rashes. You may have other problems that accompany your rash.  CAUSES   · Infections.  · Allergic reactions. This can include allergies to pets or foods.  · Certain medicines.  · Exposure to certain chemicals, soaps, or cosmetics.  · Heat.  · Exposure to poisonous plants.  · Tumors, both cancerous and noncancerous.  SYMPTOMS   · Redness.  · Scaly skin.  · Itchy skin.  · Dry or cracked skin.  · Bumps.  · Blisters.  · Pain.  DIAGNOSIS   Your caregiver may do a physical exam to determine what type of rash you have. A skin sample (biopsy) may be taken and examined under a microscope.  TREATMENT   Treatment depends on the type of rash you have. Your caregiver may prescribe certain medicines. For serious conditions, you may need to see a skin doctor (dermatologist).  HOME CARE INSTRUCTIONS   · Avoid the substance that caused your rash.  · Do not scratch your rash. This can cause infection.  · You may take cool baths to help stop itching.  · Only take over-the-counter or prescription medicines as directed by your caregiver.  · Keep all follow-up appointments as directed by your caregiver.  SEEK IMMEDIATE MEDICAL CARE IF:  · You have increasing pain, swelling, or redness.  · You have a fever.  · You have new or severe symptoms.  · You have body aches, diarrhea, or vomiting.  · Your rash is not better after  3 days.  MAKE SURE YOU:  · Understand these instructions.  · Will watch your condition.  · Will get help right away if you are not doing well or get worse.     This information is not intended to replace advice given to you by your health care provider. Make sure you discuss any questions you have with your health care provider.     Document Released: 12/08/2003 Document Revised: 01/08/2016 Document Reviewed: 10/01/2012  [employer] Interactive Patient Education ©2016 [employer]

## 2017-12-28 NOTE — ED NOTES
Dc instructions given-pt and spouse aware of pending call from pcp for f/u appointment. To return for worsening s/s

## 2017-12-28 NOTE — ED PROVIDER NOTES
ED Provider Note    12/27/2017  7:49 PM    Means of Arrival: walk in  History obtained by: patient and   Limitations: none    CHIEF COMPLAINT  Chief Complaint   Patient presents with   • Rash       HPI  Thuy Albert is a 67 y.o. female who presents with concerns of rash at right upper extremity. She says she was treated with Keflex on 12/10/17. She believes there was a splinter stuck in her hand. She reports xray being normal. That redness improved but over the past   week she has noticed small areas of redness on anterior arm. These areas 1cm diameter, red, not raised. Not tender. Approximately 3 lesions on forearm. Changing color over past few days. She is concerned a splinter is still stuck in her hand. No fevers. No weakness.     REVIEW OF SYSTEMS  Review of Systems   Constitutional: Negative for fever and malaise/fatigue.   Respiratory: Negative for shortness of breath.    Cardiovascular: Negative for chest pain.   Gastrointestinal: Negative for abdominal pain.   Musculoskeletal: Negative for back pain and neck pain.   Skin: Positive for rash. Negative for itching.   Neurological: Negative for headaches.   Endo/Heme/Allergies: Does not bruise/bleed easily.   Psychiatric/Behavioral: Negative for substance abuse. The patient is nervous/anxious.    All other systems reviewed and are negative.    See HPI for further details.     PAST MEDICAL HISTORY   has a past medical history of Arthritis; Asthma; DVT (deep venous thrombosis) (CMS-HCC) (2003); GERD (gastroesophageal reflux disease); and Thyroid disease.    SOCIAL HISTORY  Social History     Social History Main Topics   • Smoking status: Former Smoker     Packs/day: 1.50     Years: 40.00     Types: Cigarettes     Quit date: 1/1/2006   • Smokeless tobacco: Never Used      Comment: QUIT 2006,  USED TO TO SMOKE 2-3 PPD FOR 40 YRS.   • Alcohol use No   • Drug use: No   • Sexual activity: No       SURGICAL HISTORY   has a past surgical history that  "includes other abdominal surgery; appendectomy laparoscopic (8/1/2011); cholecystectomy; appendectomy; and tonsillectomy.    CURRENT MEDICATIONS  Home Medications    **Home medications have not yet been reviewed for this encounter**         ALLERGIES  Allergies   Allergen Reactions   • Nabumetone      rash   • Other Drug Swelling     ALL STEROIDS    • Synthroid [Levothroid]      Nausea     • Tetracycline Swelling       PHYSICAL EXAM  VITAL SIGNS: /44   Pulse 75   Temp 36.9 °C (98.5 °F)   Resp 18   Ht 1.549 m (5' 1\")   Wt 98.5 kg (217 lb 2.5 oz)   SpO2 97%   BMI 41.03 kg/m²    Pulse ox interpretation: I interpret this pulse ox as normal.  Constitutional: Alert in no apparent distress. Well nourished 67 year old woman.   HENT: Normocephalic, Atraumatic, Bilateral external ears normal. Nose normal.    Eyes: Pupils are equal and reactive. Conjunctiva normal, non-icteric.   Heart: Regular rate and rythm, no murmurs.  No peripheral edema.   Lungs: Clear to auscultation bilaterally.  Skin: Warm, Dry, No erythema, At right anterior forearm there are three areas of concern. Each area is approximately 1cm diameter area of nonblanching, nonpalpable redness. Different stages. One clearly looks like resolving bruise. No similar lesions on torso or lower extremities. She also points out areas of \"blue.\" Multiple spider veins. She did not notice that she has these on multiple other areas of skin. I  Neurologic: Alert, Grossly non-focal.   Psychiatric: Affect normal, Judgment normal, Mood normal, Appears appropriate and not intoxicated.           COURSE & MEDICAL DECISION MAKING  Pertinent Labs & Imaging studies reviewed. (See chart for details)    7:49 PM This is an emergent evaluation of a 67 y.o., female who presents with rash at anterior right forearm and the differential diagnosis includes but is not limited to traumatic bruises, insect bite, erythema nodosum, not consistent with abscess or cellulitis.  I used " linear high frequency probe at bedside to look at arm and there were no signs of FB or abscess. I do not recommend antibiotics. I recommend if any change in appearance, worsening, other symptoms to return for evaluation. Lesions seem very benign at this point.       FINAL IMPRESSION  1. Rash

## 2017-12-28 NOTE — PROGRESS NOTES
Placed discharge outreach phone call to patient s/p ER discharge 12/27/17.  Left voicemail providing my contact information and instructions to call with any questions or concerns.

## 2017-12-29 ENCOUNTER — HOSPITAL ENCOUNTER (OUTPATIENT)
Dept: LAB | Facility: MEDICAL CENTER | Age: 67
End: 2017-12-29
Attending: PSYCHIATRY & NEUROLOGY
Payer: MEDICARE

## 2017-12-29 LAB — AMMONIA PLAS-SCNC: 29 UMOL/L (ref 11–45)

## 2017-12-29 PROCEDURE — 82140 ASSAY OF AMMONIA: CPT

## 2017-12-29 PROCEDURE — 36415 COLL VENOUS BLD VENIPUNCTURE: CPT

## 2018-01-28 RX ORDER — OMEPRAZOLE 20 MG/1
20 CAPSULE, DELAYED RELEASE ORAL
Qty: 30 CAP | Refills: 0 | Status: SHIPPED | OUTPATIENT
Start: 2018-01-28 | End: 2018-03-05 | Stop reason: SDUPTHER

## 2018-03-06 RX ORDER — OMEPRAZOLE 20 MG/1
20 CAPSULE, DELAYED RELEASE ORAL
Qty: 90 CAP | Refills: 0 | Status: SHIPPED | OUTPATIENT
Start: 2018-03-06 | End: 2018-05-04 | Stop reason: SDUPTHER

## 2018-03-29 ENCOUNTER — OFFICE VISIT (OUTPATIENT)
Dept: MEDICAL GROUP | Age: 68
End: 2018-03-29
Payer: MEDICARE

## 2018-03-29 VITALS
SYSTOLIC BLOOD PRESSURE: 116 MMHG | HEIGHT: 61 IN | WEIGHT: 198 LBS | OXYGEN SATURATION: 96 % | TEMPERATURE: 98 F | BODY MASS INDEX: 37.38 KG/M2 | HEART RATE: 70 BPM | DIASTOLIC BLOOD PRESSURE: 74 MMHG

## 2018-03-29 DIAGNOSIS — E66.01 MORBID OBESITY WITH BMI OF 40.0-44.9, ADULT (HCC): ICD-10-CM

## 2018-03-29 DIAGNOSIS — Z12.31 ENCOUNTER FOR SCREENING MAMMOGRAM FOR BREAST CANCER: ICD-10-CM

## 2018-03-29 DIAGNOSIS — H53.9 VISUAL DISTURBANCE: ICD-10-CM

## 2018-03-29 DIAGNOSIS — E03.8 OTHER SPECIFIED HYPOTHYROIDISM: Chronic | ICD-10-CM

## 2018-03-29 DIAGNOSIS — R73.03 PREDIABETES: ICD-10-CM

## 2018-03-29 DIAGNOSIS — Z12.11 COLON CANCER SCREENING: ICD-10-CM

## 2018-03-29 DIAGNOSIS — M72.2 PLANTAR FASCIITIS, BILATERAL: ICD-10-CM

## 2018-03-29 DIAGNOSIS — E78.2 MIXED HYPERLIPIDEMIA: ICD-10-CM

## 2018-03-29 DIAGNOSIS — E55.9 VITAMIN D DEFICIENCY: ICD-10-CM

## 2018-03-29 DIAGNOSIS — G31.84 MCI (MILD COGNITIVE IMPAIRMENT): ICD-10-CM

## 2018-03-29 PROCEDURE — 20550 NJX 1 TENDON SHEATH/LIGAMENT: CPT | Performed by: FAMILY MEDICINE

## 2018-03-29 PROCEDURE — 99214 OFFICE O/P EST MOD 30 MIN: CPT | Mod: 25 | Performed by: FAMILY MEDICINE

## 2018-03-29 RX ORDER — DONEPEZIL HYDROCHLORIDE 5 MG/1
5 TABLET, FILM COATED ORAL EVERY EVENING
Qty: 90 TAB | Refills: 0
Start: 2018-03-29 | End: 2019-02-22

## 2018-03-29 RX ORDER — DONEPEZIL HYDROCHLORIDE 5 MG/1
5 TABLET, FILM COATED ORAL
Refills: 4 | COMMUNITY
Start: 2018-03-05 | End: 2018-03-29

## 2018-03-29 NOTE — PROGRESS NOTES
Subjective:   CC: Plantar fasciitis follow-up    HPI:     Thuy Albert is a 67 y.o. female, established patient of the clinic, presents with the following concerns:     1. Plantar fasciitis, bilateral  Chronic, right worse than left, affecting gait and ambulation, status post steroid injection 20+ years ago, does not remember if steroid injection worked. Patient is interested in steroid injection again today. Patient is working on dietary modification to lose weight. However, she complains of difficulty with exercise due to chronic heel pain. Patient does not do stretching exercises or wear splints at night.    2. hypothyroidism  Chronic, taking [place] thyroid 15 mg 3 times a day as directed. Denies heat/cold intolerance, abnormal weight gain, hair loss, chest palpitation. She has normal thyroid function tests in November 2017.    3. Mixed hyperlipidemia  Chronic, diet control, resistant to medication, 10 year CVD risks is 6.1%, patient has not had lipid panel done since 2016.    Current medicines (including changes today)  Current Outpatient Prescriptions   Medication Sig Dispense Refill   • donepezil (ARICEPT) 5 MG Tab Take 1 Tab by mouth every evening. 90 Tab 0   • omeprazole (PRILOSEC) 20 MG delayed-release capsule TAKE 1 CAP BY MOUTH EVERY DAY. 90 Cap 0   • ARMOUR THYROID 15 MG Tab TAKE 3 TABLETS BY MOUTH EVERY  Tab 2     No current facility-administered medications for this visit.      She  has a past medical history of Arthritis; Asthma; DVT (deep venous thrombosis) (CMS-[facility]) (2003); GERD (gastroesophageal reflux disease); and Thyroid disease.    I personally reviewed patient's problem list, allergies, medications, family hx, social hx with patient and update EPIC.     REVIEW OF SYSTEMS:  CONSTITUTIONAL:  Denies night sweats, fatigue, malaise, lethargy, fever or chills.  RESPIRATORY:  Denies cough, wheeze, hemoptysis, or shortness of breath.  CARDIOVASCULAR:  Denies chest pains, palpitations,  "pedal edema     Objective:     Blood pressure 116/74, pulse 70, temperature 36.7 °C (98 °F), height 1.549 m (5' 1\"), weight 89.8 kg (198 lb), SpO2 96 %. Body mass index is 37.41 kg/m².    Physical Exam:  Constitutional: awake, alert, in no distress, obese.  Skin: Warm, dry, good turgor, no rashes, bruises, ulcers in visible areas.  Neck: Trachea midline, no masses, no thyromegaly. No cervical or supraclavicular lymphadenopathy  Respiratory: Unlabored respiratory effort, lungs clear to auscultation, no wheezes, no rhonchi, no rales.  Cardiovascular: Normal S1, S2, no murmur, no pedal edema.  Psych: Oriented x3, affect and mood wnl, intact judgement and insight.   Foot exam: feet are warm and well perfused, negative concerning lesions or e/o trauma, ankle ROM within normal limits, point tenderness to palpation of the origin of the plantar fascia on both feet, worse with dorsiflexion, intact neuromuscular exam, pes planus noted bilaterally.     Assessment and Plan:   The following treatment plan was discussed    1. Plantar fasciitis, bilateral  Chronic, right worse than left, s/p steroid injection 20+ years ago, pain is interfering with qualify of life and weight gain. Plans:   - stretching exercise  - weight loss  - NSAID PRN for pain  - night splint  - REFERRAL TO PODIATRY  - pt is interested in steroid injection of the right foot, see procedure note below    2. hypothyroidism  Chronic, unable to tolerated Levothyroxine, pt is taking [place] thyroid 15 mg TiD, denies any side effects, normal TSH couple months ago. Plans:   - continue [place] thyroid 15 mg TiD.     3. Mixed hyperlipidemia  Chronic, diet control, resistant to medication. Pt is working on dietary modification. She is having trouble with exercise due to chronic bilateral foot pain. Plans:   - LIPID PROFILE; Future  - discussed dietary modification, regular exercise as tolerated.     4. Morbid obesity with BMI of 40.0-44.9, adult (CMS-HCC)  - Patient " identified as having weight management issue.  Appropriate orders and counseling given.    5. Colon cancer screening  - COLOGUARD (FIT DNA)    6. Encounter for screening mammogram for breast cancer  - MA-SCREEN MAMMO W/CAD-BILAT; Future    Plantar Fasciitis Procedure note:   Indication: plantar fasciitis, failed non-invasive conservative management.   Location: R foot  Solution: 2 ml of 1% lidocaine w/o EPI + 1ml of 40 mg/ml Solu-Medrol     PARQ obtained. Pt consents to the procedure after thorough review risks/benefits with provider.     Insertion site was identified and cleansed with alcohol swab. Local anesthesia was achieved by using topical vapocoolant spray. Position the needle and syringe perpendicular to the skin. Using the no-touch technique, the needle is advanced to the origin of plantar fascia, and the steroid-lidocaine mixture was injected without resistance. The needle is then withdrawn. Sterile adhesive bandage is applied. Pt tolerates the procedure well, no immediate complications noted. Pt endorses symptom relief after 5 minutes.     Lorna Uribe M.D.      Followup: Return in about 6 months (around 9/29/2018) for Annual wellness visit.    Please note that this dictation was created using voice recognition software. I have made every reasonable attempt to correct obvious errors, but I expect that there are errors of grammar and possibly content that I did not discover before finalizing the note.

## 2018-05-03 RX ORDER — THYROID,PORK 15 MG
TABLET ORAL
Qty: 180 TAB | Refills: 2 | Status: SHIPPED | OUTPATIENT
Start: 2018-05-03 | End: 2018-11-14 | Stop reason: SDUPTHER

## 2018-06-12 ENCOUNTER — TELEPHONE (OUTPATIENT)
Dept: MEDICAL GROUP | Age: 68
End: 2018-06-12

## 2018-06-12 NOTE — TELEPHONE ENCOUNTER
Phone Number Called: [phone] (home)       Message: Called LVM for patient on home and cell.   Dr. Uribe received form from [facility] patient has not performed test.   Does patient want to still have cologuard done?     Left Message for patient to call back: yes

## 2018-06-13 ENCOUNTER — TELEPHONE (OUTPATIENT)
Dept: MEDICAL GROUP | Age: 68
End: 2018-06-13

## 2018-06-13 NOTE — LETTER
June 13, 2018        Thuy Albert      We have been trying to get in contact with you at our office. Please call the office at your nearest convience for cologaurd questions. Monday thru Friday 8 am to 5pm                          Thank you    Viviane Chopra

## 2018-06-14 NOTE — TELEPHONE ENCOUNTER
Phone Number Called: [phone] (home)       Message: please call office   Need to know if patient still wants cologaurd test preformed    Left Message for patient to call back: yes

## 2018-06-15 NOTE — TELEPHONE ENCOUNTER
Phone Number Called:[phone]    Message: Spoke with patient  Art, provided Cologuard number to patient.   They are wanting to do test, will contact company so they can send out kit to patient.     Left Message for patient to call back: N\A

## 2018-06-18 ENCOUNTER — HOSPITAL ENCOUNTER (OUTPATIENT)
Dept: RADIOLOGY | Facility: MEDICAL CENTER | Age: 68
End: 2018-06-18
Attending: PSYCHIATRY & NEUROLOGY
Payer: MEDICARE

## 2018-06-18 DIAGNOSIS — H47.611: ICD-10-CM

## 2018-06-18 DIAGNOSIS — H47.612: ICD-10-CM

## 2018-06-18 PROCEDURE — A9521 TC99M EXAMETAZIME: HCPCS

## 2018-08-13 ENCOUNTER — APPOINTMENT (OUTPATIENT)
Dept: RADIOLOGY | Facility: MEDICAL CENTER | Age: 68
End: 2018-08-13
Attending: EMERGENCY MEDICINE
Payer: MEDICARE

## 2018-08-13 ENCOUNTER — HOSPITAL ENCOUNTER (EMERGENCY)
Facility: MEDICAL CENTER | Age: 68
End: 2018-08-14
Attending: EMERGENCY MEDICINE
Payer: MEDICARE

## 2018-08-13 DIAGNOSIS — M77.8 TENDINITIS OF LEFT SHOULDER: ICD-10-CM

## 2018-08-13 LAB — EKG IMPRESSION: NORMAL

## 2018-08-13 PROCEDURE — 93005 ELECTROCARDIOGRAM TRACING: CPT

## 2018-08-13 PROCEDURE — 93005 ELECTROCARDIOGRAM TRACING: CPT | Performed by: EMERGENCY MEDICINE

## 2018-08-13 PROCEDURE — 99284 EMERGENCY DEPT VISIT MOD MDM: CPT

## 2018-08-13 PROCEDURE — 73030 X-RAY EXAM OF SHOULDER: CPT | Mod: LT

## 2018-08-13 RX ORDER — IBUPROFEN 600 MG/1
600 TABLET ORAL ONCE
Status: COMPLETED | OUTPATIENT
Start: 2018-08-14 | End: 2018-08-14

## 2018-08-13 RX ORDER — OXYCODONE HYDROCHLORIDE AND ACETAMINOPHEN 5; 325 MG/1; MG/1
1 TABLET ORAL ONCE
Status: COMPLETED | OUTPATIENT
Start: 2018-08-14 | End: 2018-08-14

## 2018-08-13 ASSESSMENT — PAIN SCALES - GENERAL: PAINLEVEL_OUTOF10: 10

## 2018-08-14 VITALS
HEART RATE: 88 BPM | BODY MASS INDEX: 39.92 KG/M2 | WEIGHT: 211.42 LBS | TEMPERATURE: 98.3 F | OXYGEN SATURATION: 96 % | HEIGHT: 61 IN | RESPIRATION RATE: 20 BRPM | DIASTOLIC BLOOD PRESSURE: 71 MMHG | SYSTOLIC BLOOD PRESSURE: 144 MMHG

## 2018-08-14 PROCEDURE — A9270 NON-COVERED ITEM OR SERVICE: HCPCS | Performed by: EMERGENCY MEDICINE

## 2018-08-14 PROCEDURE — 700102 HCHG RX REV CODE 250 W/ 637 OVERRIDE(OP): Performed by: EMERGENCY MEDICINE

## 2018-08-14 RX ADMIN — IBUPROFEN 600 MG: 600 TABLET ORAL at 00:03

## 2018-08-14 RX ADMIN — OXYCODONE HYDROCHLORIDE AND ACETAMINOPHEN 1 TABLET: 5; 325 TABLET ORAL at 00:03

## 2018-08-14 NOTE — ED NOTES
Chief Complaint   Patient presents with   • Shoulder Pain     Left shoulder pain for about 1 month; worse tonight     Pt BIB  via wheelchair, with c/o left shoulder pain for 1 month that is worse tonight. Denies any trauma.

## 2018-08-14 NOTE — ED NOTES
Pt dc'd home with instructions for arm pain, encouraged to f/u with ortho, provided list of clinics as pt's  expressed difficulty in getting appointments with their established pcp, pt ambulated out of ed with steady gait with .

## 2018-08-14 NOTE — ED PROVIDER NOTES
"ED Provider Note    CHIEF COMPLAINT  Chief Complaint   Patient presents with   • Shoulder Pain     Left shoulder pain for about 1 month; worse tonight       HPI  Thuy Albert is a 68 y.o. female who presents with left shoulder pain. The patient states she's had periodic pain and left shoulder for approximately a month. Over the last 24 hours and seems to be acutely worse. It is worse with movement at the left shoulder. She does not have any elbow or wrist pain. She does not have any functional loss of the arm but does have severe pain with any movement at the shoulder. The patient does not have any associated chest pain or difficulty breathing. Besides utilizing the arm she is unaware of any exacerbating factors. She has been taking some Aleve with mild relief.    REVIEW OF SYSTEMS  No other muscle skeletal complaints, no fevers    PHYSICAL EXAM  VITAL SIGNS: /71   Pulse 85   Temp 36.8 °C (98.3 °F)   Resp 19   Ht 1.549 m (5' 1\")   Wt 95.9 kg (211 lb 6.7 oz)   SpO2 94%   BMI 39.95 kg/m²   In general the patient does not appear toxic  Extremities the patient has diffuse tenderness throughout the left shoulder without obvious deformities. She does have full range of motion although significant discomfort. She has a normal left elbow and left wrist exam.  Skin no erythema nor induration  Neurovascular examination is intact left upper extremity  Pulmonary chest clear to auscultation bilaterally  Cardiovascular S1 and S2 with a regular rate and rhythm      EKG interpretation  EKG performed in triage via protocol shows a normal sinus rhythm with ventricular rate 89, normal QRS, normal intervals, no ST segment elevation or depression, normal T waves.    RADIOLOGY/PROCEDURES  DX-SHOULDER 2+ LEFT   Final Result      1.  No acute findings.      2.  Mild degenerative change in the left acromioclavicular joint.            COURSE & MEDICAL DECISION MAKING  Pertinent Labs & Imaging studies reviewed. (See " chart for details)  This a 68-year-old female who presents with left shoulder discomfort. The patient has reproducible pain and I suspect this is from a tendinitis. The patient will receive one half a tablet of Percocet for acute pain control as well as Motrin. She'll start taking Motrin every 8 hours. I'll place the patient in a sling and have her follow-up with [facility] in 48-72 hours.    FINAL IMPRESSION  1. Left shoulder pain suspect rotator cuff tendinitis    Disposition  The patient will be discharged in stable condition      Electronically signed by: Darrell Jarvis, 8/13/2018 10:52 PM

## 2018-08-15 ENCOUNTER — PATIENT OUTREACH (OUTPATIENT)
Dept: HEALTH INFORMATION MANAGEMENT | Facility: OTHER | Age: 68
End: 2018-08-15

## 2018-08-15 NOTE — PROGRESS NOTES
Placed discharge outreach phone call to pt s/p ER discharge 8/14/18.  Left voicemail providing my contact information and instructions to call with any questions or concerns.

## 2018-08-28 RX ORDER — OMEPRAZOLE 20 MG/1
CAPSULE, DELAYED RELEASE ORAL
Qty: 90 CAP | Refills: 0 | Status: SHIPPED | OUTPATIENT
Start: 2018-08-28 | End: 2018-11-14 | Stop reason: SDUPTHER

## 2018-09-12 RX ORDER — LORATADINE 10 MG/1
TABLET ORAL
Qty: 90 TAB | Refills: 1 | Status: SHIPPED | OUTPATIENT
Start: 2018-09-12 | End: 2019-02-22

## 2018-10-01 ENCOUNTER — HOSPITAL ENCOUNTER (OUTPATIENT)
Dept: LAB | Facility: MEDICAL CENTER | Age: 68
End: 2018-10-01
Attending: FAMILY MEDICINE
Payer: MEDICARE

## 2018-10-01 DIAGNOSIS — R73.03 PREDIABETES: ICD-10-CM

## 2018-10-01 DIAGNOSIS — E55.9 VITAMIN D DEFICIENCY: ICD-10-CM

## 2018-10-01 DIAGNOSIS — E78.2 MIXED HYPERLIPIDEMIA: ICD-10-CM

## 2018-10-01 LAB
ALBUMIN SERPL BCP-MCNC: 4.2 G/DL (ref 3.2–4.9)
ALBUMIN/GLOB SERPL: 1.3 G/DL
ALP SERPL-CCNC: 133 U/L (ref 30–99)
ALT SERPL-CCNC: 31 U/L (ref 2–50)
ANION GAP SERPL CALC-SCNC: 9 MMOL/L (ref 0–11.9)
AST SERPL-CCNC: 22 U/L (ref 12–45)
BILIRUB SERPL-MCNC: 0.5 MG/DL (ref 0.1–1.5)
BUN SERPL-MCNC: 20 MG/DL (ref 8–22)
CALCIUM SERPL-MCNC: 9.4 MG/DL (ref 8.5–10.5)
CHLORIDE SERPL-SCNC: 100 MMOL/L (ref 96–112)
CHOLEST SERPL-MCNC: 228 MG/DL (ref 100–199)
CO2 SERPL-SCNC: 27 MMOL/L (ref 20–33)
CREAT SERPL-MCNC: 1.23 MG/DL (ref 0.5–1.4)
EST. AVERAGE GLUCOSE BLD GHB EST-MCNC: 134 MG/DL
FASTING STATUS PATIENT QL REPORTED: NORMAL
GLOBULIN SER CALC-MCNC: 3.3 G/DL (ref 1.9–3.5)
GLUCOSE SERPL-MCNC: 98 MG/DL (ref 65–99)
HBA1C MFR BLD: 6.3 % (ref 0–5.6)
HDLC SERPL-MCNC: 56 MG/DL
LDLC SERPL CALC-MCNC: 145 MG/DL
POTASSIUM SERPL-SCNC: 4.6 MMOL/L (ref 3.6–5.5)
PROT SERPL-MCNC: 7.5 G/DL (ref 6–8.2)
SODIUM SERPL-SCNC: 136 MMOL/L (ref 135–145)
TRIGL SERPL-MCNC: 133 MG/DL (ref 0–149)

## 2018-10-01 PROCEDURE — 82306 VITAMIN D 25 HYDROXY: CPT

## 2018-10-01 PROCEDURE — 80061 LIPID PANEL: CPT

## 2018-10-01 PROCEDURE — 80053 COMPREHEN METABOLIC PANEL: CPT

## 2018-10-01 PROCEDURE — 36415 COLL VENOUS BLD VENIPUNCTURE: CPT

## 2018-10-01 PROCEDURE — 83036 HEMOGLOBIN GLYCOSYLATED A1C: CPT | Mod: GA

## 2018-10-02 ENCOUNTER — TELEPHONE (OUTPATIENT)
Dept: MEDICAL GROUP | Age: 68
End: 2018-10-02

## 2018-10-02 LAB — 25(OH)D3 SERPL-MCNC: 10 NG/ML (ref 30–100)

## 2018-10-02 NOTE — TELEPHONE ENCOUNTER
Phone Number Called: [phone] (home)       Message: Called LVM for patient to inform, provider will discuss the results of her labs with patient at upcoming appointment on 10/4/18 with patient,     Left Message for patient to call back: no and N\A

## 2018-10-02 NOTE — TELEPHONE ENCOUNTER
----- Message from Lorna Uribe M.D. sent at 10/2/2018  7:02 AM PDT -----  Please call patient and advise her that I reviewed the results of her recent blood tests.  I will discuss them with you at follow-up appointment on October 4, 2018.

## 2018-10-04 ENCOUNTER — OFFICE VISIT (OUTPATIENT)
Dept: MEDICAL GROUP | Age: 68
End: 2018-10-04
Payer: MEDICARE

## 2018-10-04 VITALS
OXYGEN SATURATION: 97 % | WEIGHT: 204 LBS | BODY MASS INDEX: 38.51 KG/M2 | HEIGHT: 61 IN | DIASTOLIC BLOOD PRESSURE: 62 MMHG | TEMPERATURE: 97.2 F | HEART RATE: 61 BPM | SYSTOLIC BLOOD PRESSURE: 118 MMHG

## 2018-10-04 DIAGNOSIS — E78.2 MIXED HYPERLIPIDEMIA: ICD-10-CM

## 2018-10-04 DIAGNOSIS — G31.84 MCI (MILD COGNITIVE IMPAIRMENT): ICD-10-CM

## 2018-10-04 DIAGNOSIS — Z23 NEED FOR INFLUENZA VACCINATION: ICD-10-CM

## 2018-10-04 DIAGNOSIS — M72.2 PLANTAR FASCIITIS, BILATERAL: ICD-10-CM

## 2018-10-04 DIAGNOSIS — H25.9 AGE-RELATED CATARACT OF BOTH EYES, UNSPECIFIED AGE-RELATED CATARACT TYPE: ICD-10-CM

## 2018-10-04 DIAGNOSIS — Z12.11 COLON CANCER SCREENING: ICD-10-CM

## 2018-10-04 DIAGNOSIS — J45.20 MILD INTERMITTENT ASTHMA WITHOUT COMPLICATION: ICD-10-CM

## 2018-10-04 DIAGNOSIS — R73.03 PREDIABETES: ICD-10-CM

## 2018-10-04 DIAGNOSIS — E66.9 OBESITY (BMI 30-39.9): ICD-10-CM

## 2018-10-04 DIAGNOSIS — K21.9 GASTROESOPHAGEAL REFLUX DISEASE WITHOUT ESOPHAGITIS: ICD-10-CM

## 2018-10-04 DIAGNOSIS — E55.9 VITAMIN D DEFICIENCY: ICD-10-CM

## 2018-10-04 DIAGNOSIS — M79.672 FOOT PAIN, BILATERAL: ICD-10-CM

## 2018-10-04 DIAGNOSIS — M79.671 FOOT PAIN, BILATERAL: ICD-10-CM

## 2018-10-04 DIAGNOSIS — Z00.00 MEDICARE ANNUAL WELLNESS VISIT, SUBSEQUENT: ICD-10-CM

## 2018-10-04 DIAGNOSIS — H53.9 VISUAL DISTURBANCE: ICD-10-CM

## 2018-10-04 DIAGNOSIS — Z23 NEED FOR PNEUMOCOCCAL VACCINATION: ICD-10-CM

## 2018-10-04 DIAGNOSIS — N18.30 CHRONIC RENAL INSUFFICIENCY, STAGE III (MODERATE) (HCC): ICD-10-CM

## 2018-10-04 DIAGNOSIS — M51.36 DDD (DEGENERATIVE DISC DISEASE), LUMBAR: ICD-10-CM

## 2018-10-04 DIAGNOSIS — Z12.31 ENCOUNTER FOR SCREENING MAMMOGRAM FOR BREAST CANCER: ICD-10-CM

## 2018-10-04 DIAGNOSIS — E03.8 OTHER SPECIFIED HYPOTHYROIDISM: Chronic | ICD-10-CM

## 2018-10-04 PROBLEM — E66.01 MORBID OBESITY WITH BMI OF 40.0-44.9, ADULT (HCC): Status: RESOLVED | Noted: 2017-06-23 | Resolved: 2018-10-04

## 2018-10-04 PROCEDURE — 99214 OFFICE O/P EST MOD 30 MIN: CPT | Mod: 25 | Performed by: FAMILY MEDICINE

## 2018-10-04 PROCEDURE — G0009 ADMIN PNEUMOCOCCAL VACCINE: HCPCS | Performed by: FAMILY MEDICINE

## 2018-10-04 PROCEDURE — G0439 PPPS, SUBSEQ VISIT: HCPCS | Mod: 25 | Performed by: FAMILY MEDICINE

## 2018-10-04 PROCEDURE — 90732 PPSV23 VACC 2 YRS+ SUBQ/IM: CPT | Performed by: FAMILY MEDICINE

## 2018-10-04 PROCEDURE — 90662 IIV NO PRSV INCREASED AG IM: CPT | Performed by: FAMILY MEDICINE

## 2018-10-04 PROCEDURE — G0008 ADMIN INFLUENZA VIRUS VAC: HCPCS | Performed by: FAMILY MEDICINE

## 2018-10-04 RX ORDER — IBUPROFEN 200 MG
400 TABLET ORAL ONCE
Status: COMPLETED | OUTPATIENT
Start: 2018-10-04 | End: 2018-10-04

## 2018-10-04 RX ORDER — ERGOCALCIFEROL 1.25 MG/1
50000 CAPSULE ORAL
Qty: 12 CAP | Refills: 0 | Status: SHIPPED | OUTPATIENT
Start: 2018-10-04 | End: 2019-02-22

## 2018-10-04 RX ADMIN — Medication 400 MG: at 14:52

## 2018-10-04 ASSESSMENT — PATIENT HEALTH QUESTIONNAIRE - PHQ9
5. POOR APPETITE OR OVEREATING: 0 - NOT AT ALL
CLINICAL INTERPRETATION OF PHQ2 SCORE: 1
SUM OF ALL RESPONSES TO PHQ QUESTIONS 1-9: 3

## 2018-10-04 ASSESSMENT — ACTIVITIES OF DAILY LIVING (ADL): BATHING_REQUIRES_ASSISTANCE: 1

## 2018-10-04 ASSESSMENT — ENCOUNTER SYMPTOMS: GENERAL WELL-BEING: POOR

## 2018-10-06 NOTE — PROGRESS NOTES
Chief Complaint   Patient presents with   • Annual Exam     HPI:  Thuy Albert is a 68 y.o. here for Medicare Annual Wellness Visit     Patient Active Problem List    Diagnosis Date Noted   • Obesity (BMI 30-39.9) 10/04/2018   • Plantar fasciitis, bilateral 11/04/2016   • Cataract 07/31/2016   • Prediabetes 07/12/2015   • MCI (mild cognitive impairment) 07/12/2015   • Foot pain, bilateral 07/12/2015   • Visual disturbance 07/12/2015   • Elevated C-reactive protein (CRP) 06/05/2015   • Vitamin D deficiency 01/22/2015   • Mild intermittent asthma without complication 10/28/2014   • DDD (degenerative disc disease), lumbar 06/18/2014   • Chronic renal insufficiency, stage III (moderate) (HCC) 05/03/2012   • Hypothyroidism 05/03/2012   • Mixed hyperlipidemia 05/03/2012   • GERD (gastroesophageal reflux disease)        Current Outpatient Prescriptions   Medication Sig Dispense Refill   • vitamin D, Ergocalciferol, (DRISDOL) 58456 units Cap capsule Take 1 Cap by mouth every 7 days. 12 Cap 0   • omeprazole (PRILOSEC) 20 MG delayed-release capsule TAKE 1 CAPSULE BY MOUTH EVERY DAY 90 Cap 0   • ARMOUR THYROID 15 MG Tab TAKE 3 TABLETS BY MOUTH EVERY  Tab 2   • donepezil (ARICEPT) 5 MG Tab Take 1 Tab by mouth every evening. 90 Tab 0   • loratadine (CLARITIN) 10 MG Tab TAKE 1 TAB BY MOUTH EVERY DAY. 90 Tab 1     No current facility-administered medications for this visit.             Current supplements as per medication list.       Allergies: Cephalexin; Nabumetone; Other drug; Synthroid [levothroid]; and Tetracycline    Current social contact/activities: Lives with partner, goes out frequently with friends and family    She  reports that she quit smoking about 12 years ago. Her smoking use included Cigarettes. She has a 60.00 pack-year smoking history. She has never used smokeless tobacco. She reports that she does not drink alcohol or use drugs.  Counseling given: Not Answered      DPA/Advanced Directive:   Patient does not have an Advanced Directive.  A packet and workshop information was given on Advanced Directives.    ROS:    Gait: Uses no assistive device  Ostomy: No  Other tubes: No  Amputations: No  Chronic oxygen use: No  Last eye exam: 2018   Wears hearing aids: No   : Denies any urinary leakage during the last 6 months    Screening:  Depression Screening    Little interest or pleasure in doing things?  0 - not at all  Feeling down, depressed , or hopeless? 1 - several days  Trouble falling or staying asleep, or sleeping too much?  0 - not at all  Feeling tired or having little energy?  1 - several days  Poor appetite or overeating?  0 - not at all  Feeling bad about yourself - or that you are a failure or have let yourself or your family down? 0 - not at all  Trouble concentrating on things, such as reading the newspaper or watching television? 1 - several days  Moving or speaking so slowly that other people could have noticed.  Or the opposite - being so fidgety or restless that you have been moving around a lot more than usual?  0 - not at all  Thoughts that you would be better off dead, or of hurting yourself?  0 - not at all  Patient Health Questionnaire Score: 3    No depressive symptoms identified     Screening for Cognitive Impairment    Three Minute Recall (leader, season, table)  /3 0/3  Stephane clock face with all 12 numbers and set the hands to show 10 past 11.     Patient has visual impairment, unable to participate on the exam  Patient has history of mild cognitive impairment.    Fall Risk Assessment    Has the patient had two or more falls in the last year or any fall with injury in the last year?  Yes    Safety Assessment    Throw rugs on floor.  Yes  Handrails on all stairs.  Yes  Good lighting in all hallways.  Yes  Difficulty hearing.  Yes  Patient counseled about all safety risks that were identified.    Functional Assessment ADLs    Are there any barriers preventing you from cooking for  yourself or meeting nutritional needs?  Yes.    Are there any barriers preventing you from driving safely or obtaining transportation?  Yes.    Are there any barriers preventing you from using a telephone or calling for help?  Yes.    Are there any barriers preventing you from shopping?  Yes.    Are there any barriers preventing you from taking care of your own finances?  Yes.    Are there any barriers preventing you from managing your medications?  Yes.  manages her meds  Are there any barriers preventing you from showering, bathing or dressing yourself? Yes.    Are you currently engaging in any exercise or physical activity?  No.     What is your perception of your health?  Poor.      Health Maintenance Summary                IMM DTaP/Tdap/Td Vaccine Overdue 7/30/1969     COLOGUARD STOOL DNA Overdue 7/30/2000     IMM ZOSTER VACCINES Overdue 7/30/2000     MAMMOGRAM Overdue 4/7/2017      Done 4/7/2016 MA-SCREEN MAMMO W/CAD-BILAT     Patient has more history with this topic...    BONE DENSITY Next Due 7/25/2019      Done 7/25/2014 DS-BONE DENSITY STUDY (DEXA)    Annual Wellness Visit Next Due 10/5/2019      Done 10/4/2018 Visit Dx: Medicare annual wellness visit, subsequent     Patient has more history with this topic...          Patient Care Team:  Lorna Uribe M.D. as PCP - General (Family Medicine)  Isac Kaiser M.D. as Consulting Physician (Phys Med and Rehab)  Michel Barillas D.O. as Consulting Physician (Ophthalmology)  Davian Jenkins M.D. as Consulting Physician (Ophthalmology)      Social History   Substance Use Topics   • Smoking status: Former Smoker     Packs/day: 1.50     Years: 40.00     Types: Cigarettes     Quit date: 1/1/2006   • Smokeless tobacco: Never Used      Comment: QUIT 2006,  USED TO TO SMOKE 2-3 PPD FOR 40 YRS.   • Alcohol use No     Family History   Problem Relation Age of Onset   • Hypertension Sister    • Diabetes Sister    • Heart Disease Sister    • Other Sister          "loss vision complete   • Heart Disease Brother 55        MI   • Genetic Mother    • Stroke Father      She  has a past medical history of Arthritis; Asthma; Dementia; DVT (deep venous thrombosis) ([facility]) (2003); GERD (gastroesophageal reflux disease); and Thyroid disease.   Past Surgical History:   Procedure Laterality Date   • APPENDECTOMY LAPAROSCOPIC  8/1/2011    Performed by JJ QUINTANILLA at [facility]   • APPENDECTOMY     • CHOLECYSTECTOMY     • OTHER ABDOMINAL SURGERY      ksenia   • TONSILLECTOMY         Exam:   Blood pressure 118/62, pulse 61, temperature 36.2 °C (97.2 °F), temperature source Temporal, height 1.549 m (5' 1\"), weight 92.5 kg (204 lb), SpO2 97 %, not currently breastfeeding. Body mass index is 38.55 kg/m².    Hearing excellent.    Dentition good  Alert, oriented in no acute distress.  Eye contact is good, speech goal directed, affect calm    Assessment and Plan. The following treatment and monitoring plan is recommended:     1. Need for influenza vaccination  - INFLUENZA VACCINE, HIGH DOSE (65+ ONLY)    2. Plantar fasciitis, bilateral  History of chronic bilateral plantar fasciitis, status post steroid injection of the right heel by myself in March 2018.  Patient endorses significant symptom relief with steroid injection.  However, patient still has significant other foot problems and was referred to podiatry for evaluation.  Unfortunately, patient is not able to afford co-pay for office visits.  Therefore, patient has been managing her problem with conservative management and taking over-the-counter pain medication as needed.    3. Prediabetes  Chronic elevated A1c.  Patient consumes large quantity of snacks, soda, and chocolate daily.  Patient is active, but does not exercises regularly due to chronic bilateral foot pain.  Patient is not interested in pharmacotherapy due to pill eversion.  I again emphasized the need of dietary modification modification and gentle exercise as " tolerated.  We will continue to monitor.    4. DDD (degenerative disc disease), lumbar  Chronic, mild, patient is asymptomatic.  Will monitor    5. Other specified hypothyroidism  Chronic, on [place] Thyroid, unable to tolerate levothyroxine.  She is tolerating [place] thyroid well, denies any side effects.  Patient is due for labs.  - TSH; Future    6. MCI (mild cognitive impairment)  Patient was diagnosed with mild cognitive impairment by her neuro-ophthalmologist.  Patient is currently taking Aricept.  She has been on Aricept for couple months, she and her partner do not notice any improvement at this time.  Plans:  -Follow-up with neuro-ophthalmologist as directed  - recommended regularly exercise, medication compliance  - Involve in mentally stimulated activities such as new hobbies, reading, or solving crossword puzzles.  - Stay involved socially, attend community activities, Yazidi, support groups, adult day care, senior center.    7. Mild intermittent asthma without complication  Chronic, stable, responding well to albuterol.  Patient denies history of asthma exacerbation requiring oral prednisone for the past 12 months.  She denies any active symptoms today.  We will continue albuterol as needed for shortness of breath.    8. Mixed hyperlipidemia  Chronic, not interested in pharmacotherapy.  Patient was counseled on lifestyle modification and weight loss.  We will continue to monitor    9. Chronic renal insufficiency, stage III (moderate) (HCC)  Chronic, stable, will monitor    10. Obesity (BMI 30-39.9)  - Patient identified as having weight management issue.  Appropriate orders and counseling given.    11. Gastroesophageal reflux disease without esophagitis  Chronic, currently taking omeprazole 20 mg daily.  Patient is asymptomatic.  Recommended gradual weaning off omeprazole with lifestyle and dietary modification.  Patient can take omeprazole as needed.  - continue dietary and behavioral modification:  weight loss, avoid loose fitting, elevated the head of the bed, avoid common food triggers (fatty or fried foods, tomato sauce, alcohol, chocolate, mint, garlic, onion, and caffeine), smoking cessation, avoid excessive alcohol consumption.   - discussed increased risks of vitamin/mineral deficiency, GI infection, and bone loss.     12. Foot pain, bilateral  Chronic, was referred to podiatry, but unable to afford co-pay, currently managing her symptoms with conservative management.    13. Visual disturbance  Chronic visual disturbance, has been working with neuro-ophthalmologist for quite some time, no abnormality found.  Plans:  -Follow-up with neuro-ophthalmologist as directed    15. Vitamin D deficiency  Recent blood test was notable for vitamin D deficiency.  Plans  - vitamin D, Ergocalciferol, (DRISDOL) 96068 units Cap capsule; Take 1 Cap by mouth every 7 days.  Dispense: 12 Cap; Refill: 0    16. Age-related cataract of both eyes, unspecified age-related cataract type  Chronic, mild, currently being moderate by neuro-ophthalmologist.    17. Colon cancer screening  - COLOGUARD (FIT DNA)    18. Encounter for screening mammogram for breast cancer  - MA-SCREEN MAMMO W/CAD-BILAT; Future    19. Medicare annual wellness visit, subsequent  - Annual Wellness Visit - Includes PPPS Subsequent ()    20. Need for pneumococcal vaccination  - Pneumococal Polysaccharide Vaccine 23-Valent =>1YO SQ/IM       Services suggested: No services needed at this time  Health Care Screening: Age-appropriate preventive services recommended by USPTF and ACIP covered by Medicare were discussed today. Services ordered if indicated and agreed upon by the patient.  Referrals offered: Community-based lifestyle interventions to reduce health risks and promote self-management and wellness, fall prevention, nutrition, physical activity, tobacco-use cessation, weight loss, and mental health services as per orders if indicated.    Discussion  today about general wellness and lifestyle habits:    · Prevent falls and reduce trip hazards; Cautioned about securing or removing rugs.  · Have a working fire alarm and carbon monoxide detector;   · Engage in regular physical activity and social activities     Follow-up: Return in about 6 months (around 4/4/2019).

## 2018-10-06 NOTE — PROGRESS NOTES
"Patient in her partner left the office for approximately 45 minutes then returned to the office with urgent concern.  Her partner states he took patient to local fast food restaurant for lunch.  Patient took couple bites of food then developed acute dizzy, lightheadedness, nausea, right eye pressure.  Her partner thought that patient symptoms was secondary to influenza vaccine administered during office visit.  He drove patient back to the clinic and call out for help.  Dr. Forman was graciously examined patient in the car.  The only recommended ER visit, however, patient and her partner declined.  Patient was then wheeled into the office for evaluation.  I spent approximately 30 minutes with patient and her partner.  I discussed possible diagnoses related to her symptoms.  It is unlikely that her symptoms are related to influenza vaccine.  Patient was given Coca-Cola during office visit due to history of fasting of more than 15-hours.  She felt much better after drinking soda and was laughing/joking during the conversation.  However, she continued to experience persistent right eye pressure/pain.  She stated that she felt as if she was \"punched in the eye\".  Given significant visual problems, I again recommended ER evaluation.  However, patient and her  again declined.  Patient was allowed to rest and observed in the clinic for another 60 minutes.  I intermittently checked in with her.  Patient eventually left the clinic after feeling much better and eye symptoms were improving.  Patient was advised to schedule appointment to follow-up with her neuro-ophthalmologist. The clinic manager was notified of the incident.  "

## 2018-10-08 ENCOUNTER — HOSPITAL ENCOUNTER (EMERGENCY)
Facility: MEDICAL CENTER | Age: 68
End: 2018-10-08
Attending: EMERGENCY MEDICINE
Payer: MEDICARE

## 2018-10-08 ENCOUNTER — APPOINTMENT (OUTPATIENT)
Dept: RADIOLOGY | Facility: MEDICAL CENTER | Age: 68
End: 2018-10-08
Attending: EMERGENCY MEDICINE
Payer: MEDICARE

## 2018-10-08 VITALS
HEART RATE: 60 BPM | OXYGEN SATURATION: 94 % | RESPIRATION RATE: 18 BRPM | HEIGHT: 61 IN | WEIGHT: 209.44 LBS | DIASTOLIC BLOOD PRESSURE: 72 MMHG | BODY MASS INDEX: 39.54 KG/M2 | SYSTOLIC BLOOD PRESSURE: 135 MMHG | TEMPERATURE: 97.3 F

## 2018-10-08 DIAGNOSIS — S60.211A CONTUSION OF RIGHT WRIST, INITIAL ENCOUNTER: ICD-10-CM

## 2018-10-08 DIAGNOSIS — S60.221A CONTUSION OF RIGHT HAND, INITIAL ENCOUNTER: ICD-10-CM

## 2018-10-08 PROCEDURE — 73110 X-RAY EXAM OF WRIST: CPT | Mod: RT

## 2018-10-08 PROCEDURE — 99284 EMERGENCY DEPT VISIT MOD MDM: CPT

## 2018-10-08 PROCEDURE — 73130 X-RAY EXAM OF HAND: CPT | Mod: RT

## 2018-10-09 ENCOUNTER — PATIENT OUTREACH (OUTPATIENT)
Dept: HEALTH INFORMATION MANAGEMENT | Facility: OTHER | Age: 68
End: 2018-10-09

## 2018-10-09 NOTE — ED TRIAGE NOTES
Patient BiB significant other after a wood panel fell onto her hand at [facility]. Patient has R wrist and hand pain.

## 2018-10-09 NOTE — ED NOTES
Went to place velcro splint on pt. Pt handed me her L wrist. Reminded pt it was her R wrist she was complaining of. Splint placed on R wrist. Pt D/C to home. D/C instructions  given. Pt v/u. Pt leaves ED with no acute changes, complaints or concerns.

## 2018-10-09 NOTE — ED PROVIDER NOTES
"ED Provider Note    CHIEF COMPLAINT  Chief Complaint   Patient presents with   • Hand Pain   • Wrist Pain       HPI  Thuy Albert is a 68 y.o. female who presents for evaluation of her right hand and wrist injury [place], she reports reaching underneath the sink in the bathroom when a panel fell down and hit her hand.  No weakness or numbness, pain is isolated to the dorsum of the hand as well as the wrist.  No elbow pain, no other complaints    REVIEW OF SYSTEMS  Negative for fever, weakness, numbness.    PAST MEDICAL HISTORY   has a past medical history of Arthritis; Asthma; Dementia; DVT (deep venous thrombosis) ([facility]) (2003); GERD (gastroesophageal reflux disease); and Thyroid disease.    SOCIAL HISTORY  Social History     Social History Main Topics   • Smoking status: Former Smoker     Packs/day: 1.50     Years: 40.00     Types: Cigarettes     Quit date: 1/1/2006   • Smokeless tobacco: Never Used      Comment: QUIT 2006,  USED TO TO SMOKE 2-3 PPD FOR 40 YRS.   • Alcohol use No   • Drug use: No   • Sexual activity: No       SURGICAL HISTORY   has a past surgical history that includes other abdominal surgery; appendectomy laparoscopic (8/1/2011); cholecystectomy; appendectomy; and tonsillectomy.    CURRENT MEDICATIONS  I personally reviewed the medication list in the charting documentation.     ALLERGIES  Allergies   Allergen Reactions   • Cephalexin Rash     rash   • Nabumetone      rash   • Other Drug Swelling     ALL STEROIDS    • Synthroid [Levothroid]      Nausea     • Tetracycline Swelling       PHYSICAL EXAM  VITAL SIGNS: /80   Pulse (!) 52   Temp 36.3 °C (97.3 °F)   Resp 18   Ht 1.549 m (5' 1\")   Wt 95 kg (209 lb 7 oz)   SpO2 95%   BMI 39.57 kg/m²   Constitutional: Alert in no apparent distress.  HENT: No signs of trauma.   Eyes: Conjunctiva normal, Non-icteric.   Chest: Normal nonlabored respirations  Skin: No erythema, No rash.   Musculoskeletal: " Inspection of the right hand reveals a very small area of ecchymosis dorsally with associated generalized tenderness but no deformity appreciated, the skin is otherwise intact and neurovascularly intact distally.  Neurologic: Alert, No focal deficits noted.   Psychiatric: Affect normal, Judgment normal.    DIAGNOSTIC STUDIES / PROCEDURES    DX-WRIST-COMPLETE 3+ RIGHT   Final Result      No evidence of fracture or dislocation.      DX-HAND 3+ RIGHT   Final Result      No evidence of fracture or dislocation.            COURSE & MEDICAL DECISION MAKING  Pertinent Labs & Imaging studies reviewed. (See chart for details)    Encounter Summary: This is a 68 y.o. female with right wrist and hand injury, x-rays negative for fracture or dislocation, will place in a splint, she will follow-up with her PCP, neurovascularly intact.      DISPOSITION: Discharge Home      FINAL IMPRESSION  1. Contusion of right hand, initial encounter    2. Contusion of right wrist, initial encounter        This dictation was created using voice recognition software. The accuracy of the dictation is limited to the abilities of the software. I expect there may be some errors of grammar and possibly content. The nursing notes were reviewed and certain aspects of this information were incorporated into this note.    Electronically signed by: Justice Francisco, 10/8/2018 8:05 PM

## 2018-10-09 NOTE — PROGRESS NOTES
Placed discharge outreach phone call to pt s/p ER discharge 10/8/18.  Left voicemail providing my contact information and instructions to call with any questions or concerns.

## 2018-10-24 ENCOUNTER — HOSPITAL ENCOUNTER (OUTPATIENT)
Dept: LAB | Facility: MEDICAL CENTER | Age: 68
End: 2018-10-24
Attending: PSYCHIATRY & NEUROLOGY
Payer: MEDICARE

## 2018-10-24 LAB
ALBUMIN SERPL BCP-MCNC: 4.2 G/DL (ref 3.2–4.9)
ALBUMIN/GLOB SERPL: 1.2 G/DL
ALP SERPL-CCNC: 118 U/L (ref 30–99)
ALT SERPL-CCNC: 19 U/L (ref 2–50)
ANION GAP SERPL CALC-SCNC: 8 MMOL/L (ref 0–11.9)
AST SERPL-CCNC: 17 U/L (ref 12–45)
BASOPHILS # BLD AUTO: 0.4 % (ref 0–1.8)
BASOPHILS # BLD: 0.05 K/UL (ref 0–0.12)
BILIRUB SERPL-MCNC: 0.6 MG/DL (ref 0.1–1.5)
BUN SERPL-MCNC: 16 MG/DL (ref 8–22)
CALCIUM SERPL-MCNC: 10.1 MG/DL (ref 8.5–10.5)
CHLORIDE SERPL-SCNC: 101 MMOL/L (ref 96–112)
CO2 SERPL-SCNC: 29 MMOL/L (ref 20–33)
CREAT SERPL-MCNC: 1.14 MG/DL (ref 0.5–1.4)
EOSINOPHIL # BLD AUTO: 0.06 K/UL (ref 0–0.51)
EOSINOPHIL NFR BLD: 0.5 % (ref 0–6.9)
ERYTHROCYTE [DISTWIDTH] IN BLOOD BY AUTOMATED COUNT: 46.5 FL (ref 35.9–50)
GLOBULIN SER CALC-MCNC: 3.4 G/DL (ref 1.9–3.5)
GLUCOSE SERPL-MCNC: 100 MG/DL (ref 65–99)
HCT VFR BLD AUTO: 46.1 % (ref 37–47)
HGB BLD-MCNC: 15.1 G/DL (ref 12–16)
IMM GRANULOCYTES # BLD AUTO: 0.03 K/UL (ref 0–0.11)
IMM GRANULOCYTES NFR BLD AUTO: 0.3 % (ref 0–0.9)
LYMPHOCYTES # BLD AUTO: 4.34 K/UL (ref 1–4.8)
LYMPHOCYTES NFR BLD: 36.6 % (ref 22–41)
MCH RBC QN AUTO: 29.9 PG (ref 27–33)
MCHC RBC AUTO-ENTMCNC: 32.8 G/DL (ref 33.6–35)
MCV RBC AUTO: 91.3 FL (ref 81.4–97.8)
MONOCYTES # BLD AUTO: 0.7 K/UL (ref 0–0.85)
MONOCYTES NFR BLD AUTO: 5.9 % (ref 0–13.4)
NEUTROPHILS # BLD AUTO: 6.68 K/UL (ref 2–7.15)
NEUTROPHILS NFR BLD: 56.3 % (ref 44–72)
NRBC # BLD AUTO: 0 K/UL
NRBC BLD-RTO: 0 /100 WBC
PLATELET # BLD AUTO: 274 K/UL (ref 164–446)
PMV BLD AUTO: 9.2 FL (ref 9–12.9)
POTASSIUM SERPL-SCNC: 4 MMOL/L (ref 3.6–5.5)
PROT SERPL-MCNC: 7.6 G/DL (ref 6–8.2)
RBC # BLD AUTO: 5.05 M/UL (ref 4.2–5.4)
SODIUM SERPL-SCNC: 138 MMOL/L (ref 135–145)
WBC # BLD AUTO: 11.9 K/UL (ref 4.8–10.8)

## 2018-10-24 PROCEDURE — 85025 COMPLETE CBC W/AUTO DIFF WBC: CPT

## 2018-10-24 PROCEDURE — 36415 COLL VENOUS BLD VENIPUNCTURE: CPT

## 2018-10-24 PROCEDURE — 80053 COMPREHEN METABOLIC PANEL: CPT

## 2018-11-15 RX ORDER — THYROID,PORK 15 MG
TABLET ORAL
Qty: 180 TAB | Refills: 2 | Status: SHIPPED | OUTPATIENT
Start: 2018-11-15 | End: 2019-05-13 | Stop reason: SDUPTHER

## 2018-11-15 RX ORDER — OMEPRAZOLE 20 MG/1
CAPSULE, DELAYED RELEASE ORAL
Qty: 90 CAP | Refills: 1 | Status: SHIPPED | OUTPATIENT
Start: 2018-11-15 | End: 2019-04-05

## 2019-02-19 ENCOUNTER — HOSPITAL ENCOUNTER (EMERGENCY)
Facility: MEDICAL CENTER | Age: 69
End: 2019-02-19
Attending: EMERGENCY MEDICINE
Payer: MEDICARE

## 2019-02-19 VITALS
BODY MASS INDEX: 37.24 KG/M2 | OXYGEN SATURATION: 93 % | SYSTOLIC BLOOD PRESSURE: 142 MMHG | HEART RATE: 61 BPM | TEMPERATURE: 98.1 F | HEIGHT: 62 IN | DIASTOLIC BLOOD PRESSURE: 72 MMHG | WEIGHT: 202.38 LBS | RESPIRATION RATE: 18 BRPM

## 2019-02-19 DIAGNOSIS — L03.313 CELLULITIS OF CHEST WALL: ICD-10-CM

## 2019-02-19 LAB
APPEARANCE UR: CLEAR
BILIRUB UR QL STRIP.AUTO: NEGATIVE
COLOR UR: YELLOW
GLUCOSE UR STRIP.AUTO-MCNC: NEGATIVE MG/DL
KETONES UR STRIP.AUTO-MCNC: NEGATIVE MG/DL
LEUKOCYTE ESTERASE UR QL STRIP.AUTO: NEGATIVE
MICRO URNS: NORMAL
NITRITE UR QL STRIP.AUTO: NEGATIVE
PH UR STRIP.AUTO: 5 [PH]
PROT UR QL STRIP: NEGATIVE MG/DL
RBC UR QL AUTO: NEGATIVE
SP GR UR STRIP.AUTO: 1.02

## 2019-02-19 PROCEDURE — 81003 URINALYSIS AUTO W/O SCOPE: CPT

## 2019-02-19 PROCEDURE — A9270 NON-COVERED ITEM OR SERVICE: HCPCS | Performed by: EMERGENCY MEDICINE

## 2019-02-19 PROCEDURE — 99284 EMERGENCY DEPT VISIT MOD MDM: CPT

## 2019-02-19 PROCEDURE — 700102 HCHG RX REV CODE 250 W/ 637 OVERRIDE(OP): Performed by: EMERGENCY MEDICINE

## 2019-02-19 RX ORDER — AMOXICILLIN AND CLAVULANATE POTASSIUM 875; 125 MG/1; MG/1
1 TABLET, FILM COATED ORAL ONCE
Status: COMPLETED | OUTPATIENT
Start: 2019-02-19 | End: 2019-02-19

## 2019-02-19 RX ORDER — AMOXICILLIN AND CLAVULANATE POTASSIUM 875; 125 MG/1; MG/1
1 TABLET, FILM COATED ORAL 2 TIMES DAILY
Qty: 14 TAB | Refills: 0 | Status: SHIPPED | OUTPATIENT
Start: 2019-02-19 | End: 2019-02-26

## 2019-02-19 RX ADMIN — AMOXICILLIN AND CLAVULANATE POTASSIUM 1 TABLET: 875; 125 TABLET, FILM COATED ORAL at 19:49

## 2019-02-20 NOTE — ED NOTES
Pt and  given written and oral discharge instructions. Pt and  verbalized understanding of all instructions given. All questions answered. VSS. Pt and  instructed on where to  prescription. Pt given f/u instructions and educated on s/s of when to return to the ER. Pt ambulating independently upon time of discharge in stable condition.

## 2019-02-20 NOTE — ED NOTES
Per ED tech, pt assisted to bathroom to provide urine specimen. ED tech reports pt urine is cloudy with a few drops of blood. ERP notified.

## 2019-02-20 NOTE — ED PROVIDER NOTES
ED Provider Note    ER Provider Note         CHIEF COMPLAINT  Chief Complaint   Patient presents with   • Lump     Right breast, started 4-5 days ago. Pt reports mild erythema. Denies fever.        HPI  Thuy Albert is a 68 y.o. female who presents to the Emergency Department with a lump on the very uppermost part of the patient's breast with the breast meets the chest wall.  This is been noticed for about 4-5 days.  She says it is painful.  She denies any fever.  She denies any nausea or vomiting.  She denies any confusion.  She does mention that she has been urinating more frequently lately.  She denies any masses deep inside of her breast that she can feel.  She describes the pain as dull.    REVIEW OF SYSTEMS  See HPI for further details. All other systems are negative.     PAST MEDICAL HISTORY   has a past medical history of Arthritis; Asthma; Dementia; DVT (deep venous thrombosis) ([facility]) (2003); GERD (gastroesophageal reflux disease); and Thyroid disease.    SURGICAL HISTORY   has a past surgical history that includes other abdominal surgery; appendectomy laparoscopic (8/1/2011); cholecystectomy; appendectomy; and tonsillectomy.    SOCIAL HISTORY  Social History   Substance Use Topics   • Smoking status: Former Smoker     Packs/day: 1.50     Years: 40.00     Types: Cigarettes     Quit date: 1/1/2006   • Smokeless tobacco: Never Used      Comment: QUIT 2006,  USED TO TO SMOKE 2-3 PPD FOR 40 YRS.   • Alcohol use No      History   Drug Use No       FAMILY HISTORY  Family History   Problem Relation Age of Onset   • Hypertension Sister    • Diabetes Sister    • Heart Disease Sister    • Other Sister         loss vision complete   • Heart Disease Brother 55        MI   • Genetic Mother    • Stroke Father        CURRENT MEDICATIONS  Home Medications    **Home medications have not yet been reviewed for this encounter**         ALLERGIES  Allergies   Allergen Reactions   • Cephalexin Rash     rash   •  "Nabumetone      rash   • Other Drug Swelling     ALL STEROIDS    • Synthroid [Levothroid]      Nausea     • Tetracycline Swelling       PHYSICAL EXAM  VITAL SIGNS: /72   Pulse 61   Temp 36.7 °C (98.1 °F) (Oral)   Resp 18   Ht 1.575 m (5' 2\")   Wt 91.8 kg (202 lb 6.1 oz)   SpO2 93%   BMI 37.02 kg/m²      Constitutional: Alert in no apparent distress.  HENT: No signs of trauma, Bilateral external ears normal, Nose normal.   Eyes: Pupils are equal and reactive, Conjunctiva normal, Non-icteric.   Neck: Normal range of motion, No tenderness, Supple, No stridor.   Lymphatic: No lymphadenopathy noted.   Cardiovascular: Regular rate and rhythm, no murmurs.   Thorax & Lungs: Normal breath sounds, No respiratory distress, No wheezing, No chest tenderness.  7 mm in diameter elevation of skin with surrounding erythema that goes around 2 cm in diameter.  It is tender to palpation.  There is no fluctuance.  Abdomen: Bowel sounds normal, Soft, No tenderness, No masses, No pulsatile masses. No peritoneal signs.  Skin: Warm, Dry, No erythema, No rash.   Back: No bony tenderness, No CVA tenderness.   Extremities: Intact distal pulses, No edema, No tenderness, No cyanosis.  Musculoskeletal: Good range of motion in all major joints. No tenderness to palpation or major deformities noted.   Neurologic: Alert , Normal motor function, Normal sensory function, No focal deficits noted.   Psychiatric: Affect normal, Judgment normal, Mood normal.     DIAGNOSTIC STUDIES / PROCEDURES           LABS  Labs Reviewed   URINALYSIS,CULTURE IF INDICATED     All labs reviewed by me.      COURSE & MEDICAL DECISION MAKING  Pertinent Labs & Imaging studies reviewed. (See chart for details)    This is a 68 y.o. female that presents with what appears to be cellulitis on her breast.  There is no evidence of abscess.  At this time I cannot rule out breast cancer therefore at the end of this encounter I will have the patient follow-up for a " mammogram with [facility] and then follow-up with her primary care physician.  In addition the patient is having some urinary frequency therefore I will obtain a urinalysis.    7:10 PM - Patient seen and examined at bedside.    The patient was found to have no infection of her urine.  I will give her antibiotics for the cellulitis.  However follow-up with renal diagnostic to obtain a mammogram.  Strict return precautions were given and follow-up was arranged.        FINAL IMPRESSION  1. Cellulitis of chest wall              Electronically signed by: Isidro Johnson, 2/19/2019

## 2019-02-22 ENCOUNTER — HOSPITAL ENCOUNTER (OUTPATIENT)
Dept: LAB | Facility: MEDICAL CENTER | Age: 69
End: 2019-02-22
Attending: FAMILY MEDICINE
Payer: MEDICARE

## 2019-02-22 ENCOUNTER — OFFICE VISIT (OUTPATIENT)
Dept: MEDICAL GROUP | Age: 69
End: 2019-02-22
Payer: MEDICARE

## 2019-02-22 ENCOUNTER — HOSPITAL ENCOUNTER (OUTPATIENT)
Facility: MEDICAL CENTER | Age: 69
End: 2019-02-22
Attending: FAMILY MEDICINE
Payer: MEDICARE

## 2019-02-22 VITALS
DIASTOLIC BLOOD PRESSURE: 64 MMHG | WEIGHT: 204 LBS | SYSTOLIC BLOOD PRESSURE: 120 MMHG | TEMPERATURE: 97.7 F | BODY MASS INDEX: 38.51 KG/M2 | HEIGHT: 61 IN | OXYGEN SATURATION: 94 % | HEART RATE: 60 BPM

## 2019-02-22 DIAGNOSIS — G31.84 MCI (MILD COGNITIVE IMPAIRMENT): ICD-10-CM

## 2019-02-22 DIAGNOSIS — N18.30 CHRONIC RENAL INSUFFICIENCY, STAGE III (MODERATE) (HCC): ICD-10-CM

## 2019-02-22 DIAGNOSIS — E03.8 OTHER SPECIFIED HYPOTHYROIDISM: Chronic | ICD-10-CM

## 2019-02-22 DIAGNOSIS — L08.9 SKIN INFECTION: Primary | ICD-10-CM

## 2019-02-22 DIAGNOSIS — E55.9 VITAMIN D DEFICIENCY: ICD-10-CM

## 2019-02-22 DIAGNOSIS — R73.03 PREDIABETES: ICD-10-CM

## 2019-02-22 DIAGNOSIS — Z12.31 ENCOUNTER FOR SCREENING MAMMOGRAM FOR BREAST CANCER: ICD-10-CM

## 2019-02-22 DIAGNOSIS — R35.0 URINARY FREQUENCY: ICD-10-CM

## 2019-02-22 DIAGNOSIS — E78.2 MIXED HYPERLIPIDEMIA: ICD-10-CM

## 2019-02-22 DIAGNOSIS — L71.9 ROSACEA: ICD-10-CM

## 2019-02-22 DIAGNOSIS — Z12.11 COLON CANCER SCREENING: ICD-10-CM

## 2019-02-22 LAB
25(OH)D3 SERPL-MCNC: 18 NG/ML (ref 30–100)
ALBUMIN SERPL BCP-MCNC: 4.3 G/DL (ref 3.2–4.9)
ALBUMIN/GLOB SERPL: 1.4 G/DL
ALP SERPL-CCNC: 102 U/L (ref 30–99)
ALT SERPL-CCNC: 14 U/L (ref 2–50)
ANION GAP SERPL CALC-SCNC: 9 MMOL/L (ref 0–11.9)
APPEARANCE UR: CLEAR
AST SERPL-CCNC: 19 U/L (ref 12–45)
BASOPHILS # BLD AUTO: 0.7 % (ref 0–1.8)
BASOPHILS # BLD: 0.07 K/UL (ref 0–0.12)
BILIRUB SERPL-MCNC: 0.6 MG/DL (ref 0.1–1.5)
BILIRUB UR QL STRIP.AUTO: NEGATIVE
BILIRUB UR QL STRIP.AUTO: NEGATIVE
BILIRUB UR STRIP-MCNC: NEGATIVE MG/DL
BUN SERPL-MCNC: 14 MG/DL (ref 8–22)
CALCIUM SERPL-MCNC: 9.4 MG/DL (ref 8.5–10.5)
CHLORIDE SERPL-SCNC: 103 MMOL/L (ref 96–112)
CHOLEST SERPL-MCNC: 196 MG/DL (ref 100–199)
CO2 SERPL-SCNC: 25 MMOL/L (ref 20–33)
COLOR UR AUTO: YELLOW
COLOR UR: YELLOW
COLOR UR: YELLOW
CREAT SERPL-MCNC: 0.99 MG/DL (ref 0.5–1.4)
EOSINOPHIL # BLD AUTO: 0.08 K/UL (ref 0–0.51)
EOSINOPHIL NFR BLD: 0.8 % (ref 0–6.9)
ERYTHROCYTE [DISTWIDTH] IN BLOOD BY AUTOMATED COUNT: 45.7 FL (ref 35.9–50)
EST. AVERAGE GLUCOSE BLD GHB EST-MCNC: 123 MG/DL
FASTING STATUS PATIENT QL REPORTED: NORMAL
GLOBULIN SER CALC-MCNC: 3.1 G/DL (ref 1.9–3.5)
GLUCOSE SERPL-MCNC: 103 MG/DL (ref 65–99)
GLUCOSE UR STRIP.AUTO-MCNC: NEGATIVE MG/DL
HBA1C MFR BLD: 5.9 % (ref 0–5.6)
HCT VFR BLD AUTO: 45.3 % (ref 37–47)
HDLC SERPL-MCNC: 49 MG/DL
HGB BLD-MCNC: 14.4 G/DL (ref 12–16)
IMM GRANULOCYTES # BLD AUTO: 0.03 K/UL (ref 0–0.11)
IMM GRANULOCYTES NFR BLD AUTO: 0.3 % (ref 0–0.9)
KETONES UR STRIP.AUTO-MCNC: NEGATIVE MG/DL
LDLC SERPL CALC-MCNC: 119 MG/DL
LEUKOCYTE ESTERASE UR QL STRIP.AUTO: NEGATIVE
LYMPHOCYTES # BLD AUTO: 3.11 K/UL (ref 1–4.8)
LYMPHOCYTES NFR BLD: 31.5 % (ref 22–41)
MCH RBC QN AUTO: 29.1 PG (ref 27–33)
MCHC RBC AUTO-ENTMCNC: 31.8 G/DL (ref 33.6–35)
MCV RBC AUTO: 91.7 FL (ref 81.4–97.8)
MICRO URNS: NORMAL
MICRO URNS: NORMAL
MONOCYTES # BLD AUTO: 0.55 K/UL (ref 0–0.85)
MONOCYTES NFR BLD AUTO: 5.6 % (ref 0–13.4)
NEUTROPHILS # BLD AUTO: 6.03 K/UL (ref 2–7.15)
NEUTROPHILS NFR BLD: 61.1 % (ref 44–72)
NITRITE UR QL STRIP.AUTO: NEGATIVE
NRBC # BLD AUTO: 0 K/UL
NRBC BLD-RTO: 0 /100 WBC
PH UR STRIP.AUTO: 5.5 [PH]
PH UR STRIP.AUTO: 5.5 [PH] (ref 5–8)
PH UR STRIP.AUTO: 8 [PH]
PLATELET # BLD AUTO: 244 K/UL (ref 164–446)
PMV BLD AUTO: 9.6 FL (ref 9–12.9)
POTASSIUM SERPL-SCNC: 3.8 MMOL/L (ref 3.6–5.5)
PROT SERPL-MCNC: 7.4 G/DL (ref 6–8.2)
PROT UR QL STRIP: NEGATIVE MG/DL
RBC # BLD AUTO: 4.94 M/UL (ref 4.2–5.4)
RBC UR QL AUTO: NEGATIVE
RBC UR QL AUTO: NEGATIVE
RBC UR QL AUTO: NORMAL
SODIUM SERPL-SCNC: 137 MMOL/L (ref 135–145)
SP GR UR STRIP.AUTO: 1
SP GR UR STRIP.AUTO: 1.01
SP GR UR STRIP.AUTO: 1.01
TRIGL SERPL-MCNC: 139 MG/DL (ref 0–149)
TSH SERPL DL<=0.005 MIU/L-ACNC: 1.25 UIU/ML (ref 0.38–5.33)
UROBILINOGEN UR STRIP-MCNC: 0.2 MG/DL
UROBILINOGEN UR STRIP.AUTO-MCNC: 0.2 MG/DL
UROBILINOGEN UR STRIP.AUTO-MCNC: 0.2 MG/DL
WBC # BLD AUTO: 9.9 K/UL (ref 4.8–10.8)

## 2019-02-22 PROCEDURE — 81003 URINALYSIS AUTO W/O SCOPE: CPT | Mod: 91

## 2019-02-22 PROCEDURE — 83036 HEMOGLOBIN GLYCOSYLATED A1C: CPT | Mod: GA

## 2019-02-22 PROCEDURE — 81003 URINALYSIS AUTO W/O SCOPE: CPT

## 2019-02-22 PROCEDURE — 80053 COMPREHEN METABOLIC PANEL: CPT

## 2019-02-22 PROCEDURE — 82306 VITAMIN D 25 HYDROXY: CPT

## 2019-02-22 PROCEDURE — 99000 SPECIMEN HANDLING OFFICE-LAB: CPT | Performed by: FAMILY MEDICINE

## 2019-02-22 PROCEDURE — 84443 ASSAY THYROID STIM HORMONE: CPT

## 2019-02-22 PROCEDURE — 85025 COMPLETE CBC W/AUTO DIFF WBC: CPT

## 2019-02-22 PROCEDURE — 80061 LIPID PANEL: CPT

## 2019-02-22 PROCEDURE — 99214 OFFICE O/P EST MOD 30 MIN: CPT | Performed by: FAMILY MEDICINE

## 2019-02-22 PROCEDURE — 36415 COLL VENOUS BLD VENIPUNCTURE: CPT

## 2019-02-22 PROCEDURE — 81002 URINALYSIS NONAUTO W/O SCOPE: CPT | Performed by: FAMILY MEDICINE

## 2019-02-22 RX ORDER — MEMANTINE HYDROCHLORIDE 10 MG/1
TABLET ORAL
Refills: 6 | COMMUNITY
Start: 2018-12-31 | End: 2020-08-07 | Stop reason: SDUPTHER

## 2019-02-22 RX ORDER — DONEPEZIL HYDROCHLORIDE 10 MG/1
10 TABLET, FILM COATED ORAL
Refills: 6 | COMMUNITY
Start: 2019-02-02 | End: 2020-08-07 | Stop reason: SDUPTHER

## 2019-02-22 RX ORDER — METRONIDAZOLE 7.5 MG/G
GEL TOPICAL
Qty: 1 TUBE | Refills: 2 | Status: SHIPPED | OUTPATIENT
Start: 2019-02-22 | End: 2021-02-05

## 2019-02-22 RX ORDER — NITROFURANTOIN 25; 75 MG/1; MG/1
100 CAPSULE ORAL EVERY 12 HOURS
Qty: 10 CAP | Refills: 0 | Status: SHIPPED | OUTPATIENT
Start: 2019-02-22 | End: 2019-02-27

## 2019-02-22 ASSESSMENT — PATIENT HEALTH QUESTIONNAIRE - PHQ9: CLINICAL INTERPRETATION OF PHQ2 SCORE: 0

## 2019-02-22 NOTE — PROGRESS NOTES
Subjective:   CC: Infection of the right breast    HPI:     Thuy Albert is a 68 y.o. female who is here as an established patient and presents with the following concerns:    Patient presents complaining of a small, red, tender mass present on her right breast onset one week ago. Patient was evaluated in the ED on 2/19/19 and was diagnosed with breast skin infection. She was directed to have a follow-up mammogram, prescribed a 7-day course of Augmentin and discharged.  Today, patient states that the tenderness and redness are improving.  She denies nipple bleeding, discharge, lumps or bumps within the breast tissue.  Negative personal or familial history of breast cancer.  Patient is due for mammogram.    Patient also presents complaining of a facial redness with pimple-like papules affecting central face, onset approximately two months ago. Today she reports that her rash has slightly improved but is still painful. Patient notes that her rash subsequently developed after she began taking memantine on 12/31/18. Patient was prescribed memantine by Dr. Barillas, Neurology, to treat her mild cognitive impairment. She has a follow-up appointment with him on 2/27/19. She continues to take the donepezil 10 mg QD.     Patient's  reports that she has been experiencing urinary frequency. She denies any dysuria, urinary urgency, fever, chills, nausea, vomiting, flank pain. UA performed on 2/19/19 while she was in the ED was normal.     Patient has not yet completed a Cologuard test for colon cancer screening. She reports that she never received the test.     Current medicines (including changes today)  Current Outpatient Prescriptions   Medication Sig Dispense Refill   • donepezil (ARICEPT) 10 MG tablet Take 10 mg by mouth.  6   • memantine (NAMENDA) 10 MG Tab TAKE 1 TABLET BY MOUTH TWICE A DAY  6   • metronidazole (METROGEL) 0.75 % gel Apply to face twice daily 1 Tube 2   • nitrofurantoin monohydr macro  "(MACROBID) 100 MG Cap Take 1 Cap by mouth every 12 hours for 5 days. 10 Cap 0   • amoxicillin-clavulanate (AUGMENTIN) 875-125 MG Tab Take 1 Tab by mouth 2 times a day for 7 days. 14 Tab 0   • ARMOUR THYROID 15 MG Tab TAKE 3 TABLETS BY MOUTH EVERY  Tab 2   • omeprazole (PRILOSEC) 20 MG delayed-release capsule TAKE 1 CAPSULE BY MOUTH EVERY DAY 90 Cap 1     No current facility-administered medications for this visit.      She  has a past medical history of Arthritis; Asthma; Dementia; DVT (deep venous thrombosis) ([facility]) (2003); GERD (gastroesophageal reflux disease); and Thyroid disease.    I personally reviewed patient's problem list, allergies, medications, family hx, social hx with patient and update EPIC.     REVIEW OF SYSTEMS:  CONSTITUTIONAL:  Denies night sweats, fatigue, malaise, lethargy, fever or chills.  RESPIRATORY:  Denies cough, wheeze, hemoptysis, or shortness of breath.  CARDIOVASCULAR:  Denies chest pains, palpitations, pedal edema     Objective:     Blood pressure 120/64, pulse 60, temperature 36.5 °C (97.7 °F), temperature source Temporal, height 1.549 m (5' 1\"), weight 92.5 kg (204 lb), SpO2 94 %, not currently breastfeeding. Body mass index is 38.55 kg/m².     Physical Exam:  Constitutional: awake, alert, in no distress.  Skin: Warm, dry, good turgor, no bruises, ulcers in visible area  -0.8 cm, dry, tender, mildly erythematous papule approximately 10 cm superior to the right nipple  -Mild to moderate erythema of central face with multiple acne-like papules  Eye: conjunctiva clear, lids neg for edema or lesions.  Neck: Trachea midline, no masses, no thyromegaly. No cervical or supraclavicular lymphadenopathy     Respiratory: Unlabored respiratory effort, lungs clear to auscultation, no wheezes, no rales.  Cardiovascular: Normal S1, S2, no murmur, no pedal edema.  Psych: Oriented x3, affect and mood wnl, intact judgement and insight.       Assessment and Plan:   The following treatment plan " was discussed    1. Skin infection, right breast  History and exam consistent with focal skin infection affecting the right breast.  Patient is on Augmentin prescribed by ER, which appeared to help with the infection.  Plans:  -Continue Augmentin  -Patient is due for breast mammogram, will order mammogram for breast cancer screening    2. Rosacea  History and exams a consistent with rosacea.  Plans:  - metronidazole (METROGEL) 0.75 % gel; Apply to face twice daily  Dispense: 1 Tube; Refill: 2  -Alleviating and exacerbating factors discussed with patient    3. Encounter for screening mammogram for breast cancer  - MA-SCREEN MAMMO W/CAD-BILAT; Future    4. Urinary frequency  Patient complains of urinary frequency without dysuria, urinary urgency, fever, chills, nausea, vomiting, hematuria, flank pain, pelvic pain, abnormal vaginal bleeding or discharge.  Point-of-care urinalysis was positive for trace blood.  Will treat presumptively for bladder infection and will send urine for analysis and culture if needed.  - Urinalysis, Culture if Indicated; Future  - nitrofurantoin monohydr macro (MACROBID) 100 MG Cap; Take 1 Cap by mouth every 12 hours for 5 days.  Dispense: 10 Cap; Refill: 0    5. MCI (mild cognitive impairment)  Patient was diagnosed with mild cognitive impairment by Dr. Barillas.  Patient is currently taking donepezil and memantine.  She is not sure if there is any improvement in her symptoms.  She had appointment to follow-up with Dr. Barillas in a few days.  -Continue donepezil and memantine as directed  -Follow-up with neuro-ophthalmologist as directed    6. Colon cancer screening  - OCCULT BLOOD FECES IMMUNOASSAY (FIT); Future    Lorna Uribe M.D.    Followup: Return in about 3 months (around 5/22/2019) for Annual wellness visit.    Please note that this dictation was created using voice recognition software and/or scribes. I have made every reasonable attempt to correct obvious errors, but I expect that  there are errors of grammar and possibly content that I did not discover before finalizing the note.     I, Homero Gomez (Scribe), am scribing for, and in the presence of, Lorna Uribe M.D.    Electronically signed by: Homero Gomez (Scribfady), 2/22/2019    Lorna ANGELES M.D. personally performed the services described in this documentation, as scribed by Homero Gomez in my presence, and it is both accurate and complete.

## 2019-02-25 ENCOUNTER — HOSPITAL ENCOUNTER (OUTPATIENT)
Dept: RADIOLOGY | Facility: MEDICAL CENTER | Age: 69
End: 2019-02-25
Attending: FAMILY MEDICINE
Payer: MEDICARE

## 2019-02-25 DIAGNOSIS — Z12.31 ENCOUNTER FOR SCREENING MAMMOGRAM FOR BREAST CANCER: ICD-10-CM

## 2019-02-25 PROCEDURE — 77063 BREAST TOMOSYNTHESIS BI: CPT

## 2019-02-28 ENCOUNTER — TELEPHONE (OUTPATIENT)
Dept: MEDICAL GROUP | Age: 69
End: 2019-02-28

## 2019-02-28 NOTE — TELEPHONE ENCOUNTER
The metronidazole gel was prescribed for rosacea (it's for the papules on her face. The results of her blood tests were sent to their home address. I will discuss the results at follow up visit.   Lorna Uribe M.D.

## 2019-03-01 ENCOUNTER — TELEPHONE (OUTPATIENT)
Dept: MEDICAL GROUP | Age: 69
End: 2019-03-01

## 2019-03-01 NOTE — TELEPHONE ENCOUNTER
Phone Number Called: [phone] (home)       Message: Left VM stating lab results were mailed and to call back for additional information.       Left Message for patient to call back: yes

## 2019-04-05 ENCOUNTER — OFFICE VISIT (OUTPATIENT)
Dept: MEDICAL GROUP | Age: 69
End: 2019-04-05
Payer: MEDICARE

## 2019-04-05 VITALS
DIASTOLIC BLOOD PRESSURE: 62 MMHG | WEIGHT: 197 LBS | BODY MASS INDEX: 37.19 KG/M2 | TEMPERATURE: 97.2 F | SYSTOLIC BLOOD PRESSURE: 128 MMHG | HEART RATE: 55 BPM | HEIGHT: 61 IN | OXYGEN SATURATION: 97 %

## 2019-04-05 DIAGNOSIS — M72.2 PLANTAR FASCIITIS, BILATERAL: ICD-10-CM

## 2019-04-05 DIAGNOSIS — N18.30 CHRONIC RENAL INSUFFICIENCY, STAGE III (MODERATE) (HCC): ICD-10-CM

## 2019-04-05 DIAGNOSIS — E66.9 OBESITY (BMI 30-39.9): ICD-10-CM

## 2019-04-05 DIAGNOSIS — E78.2 MIXED HYPERLIPIDEMIA: ICD-10-CM

## 2019-04-05 DIAGNOSIS — R73.03 PREDIABETES: ICD-10-CM

## 2019-04-05 DIAGNOSIS — L71.9 ROSACEA: ICD-10-CM

## 2019-04-05 PROCEDURE — 99214 OFFICE O/P EST MOD 30 MIN: CPT | Mod: 25 | Performed by: FAMILY MEDICINE

## 2019-04-05 PROCEDURE — 20550 NJX 1 TENDON SHEATH/LIGAMENT: CPT | Performed by: FAMILY MEDICINE

## 2019-04-05 RX ORDER — METHYLPREDNISOLONE SODIUM SUCCINATE 40 MG/ML
40 INJECTION, POWDER, LYOPHILIZED, FOR SOLUTION INTRAMUSCULAR; INTRAVENOUS ONCE
Status: COMPLETED | OUTPATIENT
Start: 2019-04-05 | End: 2019-04-05

## 2019-04-05 RX ADMIN — METHYLPREDNISOLONE SODIUM SUCCINATE 40 MG: 40 INJECTION, POWDER, LYOPHILIZED, FOR SOLUTION INTRAMUSCULAR; INTRAVENOUS at 14:27

## 2019-04-05 NOTE — PROGRESS NOTES
Subjective:   CC: plantar fasciitis     HPI:     Thuy Albert is a 68 y.o. female who is an established patient of the clinic, presents with the following concerns:     Overall, the patient is doing well today. She is here for follow up of labs completed on 02/22/19. She has a history of chronic prediabetes and hyperlipidemia which are managed with diet and exercise. She reports a seven pounds weight loss since her last office visit in 02/2019. She is not currently on Metformin, insulin or a statin. She has chronic renal insufficiency stage III. She has a history of GERD and has discontinued daily use of Omeprazole. She experiences heartburn every few days and will treat her symptoms with Peptobismol. She has a chronic history of rosacea and states her skin is clearing up with Metrogel.     Hx of bilateral plantar fasciitis, s/p steroid injection on the right and did relatively well. She is requesting administration of a steroid injection for recurrent right plantar fasciitis. Her pain significantly improved after a steroid injection administered in 03/2018 and states she was able to start dancing again. She attributes weight loss to resumption of physical activities from improvement of plantar fasciitis.     Her significant other is concerned for worsening dementia and vision loss. He is working part time and cannot work full time secondary to being her caregiver. He is requesting documentation to present to the state indicating that he is her caregiver. In addition, she is followed by Neurology for memory issues.      Current medicines (including changes today)  Current Outpatient Prescriptions   Medication Sig Dispense Refill   • donepezil (ARICEPT) 10 MG tablet Take 10 mg by mouth.  6   • memantine (NAMENDA) 10 MG Tab TAKE 1 TABLET BY MOUTH TWICE A DAY  6   • metronidazole (METROGEL) 0.75 % gel Apply to face twice daily 1 Tube 2   • ARMOUR THYROID 15 MG Tab TAKE 3 TABLETS BY MOUTH EVERY  Tab 2  "    Current Facility-Administered Medications   Medication Dose Route Frequency Provider Last Rate Last Dose   • methylPREDNISolone (SOLU-MEDROL) 40 MG injection 40 mg  40 mg Intramuscular Once Ly EMILY Uribe M.D.         She  has a past medical history of Arthritis; Asthma; Dementia; DVT (deep venous thrombosis) ([facility]) (2003); GERD (gastroesophageal reflux disease); and Thyroid disease.    I personally reviewed patient's problem list, allergies, medications, family hx, social hx with patient and update EPIC.     REVIEW OF SYSTEMS:  CONSTITUTIONAL:  Denies night sweats, fatigue, malaise, lethargy, fever or chills.  RESPIRATORY:  Denies cough, wheeze, hemoptysis or shortness of breath.  CARDIOVASCULAR:  Denies chest pains, palpitations or pedal edema.    See HPI for further details.         Objective:     /62 (BP Location: Left arm, Patient Position: Sitting, BP Cuff Size: Adult long)   Pulse (!) 55   Temp 36.2 °C (97.2 °F) (Temporal)   Ht 1.549 m (5' 1\")   Wt 89.4 kg (197 lb)   SpO2 97%  Body mass index is 37.22 kg/m².    Physical Exam:  Constitutional: awake, alert, in no distress.  Skin: Warm, dry, good turgor, no rashes, bruises, ulcers in visible areas.  Eye: conjunctiva clear, lids neg for edema or lesions.  ENMT: Lips without lesions.  Neck: Trachea midline, no masses, no thyromegaly. No cervical or supraclavicular lymphadenopathy  Respiratory: Unlabored respiratory effort, lungs clear to auscultation, no wheezes, no rales.  Cardiovascular: Normal S1, S2, no murmur, no pedal edema.  Musculoskeletal: tenderness to palpation at the origin of the right plantar fascia, the rest of right foot exam was unremarkable.   Psych: Oriented x3, affect and mood wnl, intact judgement and insight.       Assessment and Plan:   The following treatment plan was discussed:    1. Plantar fasciitis, bilateral  Chronic, s/p right plantar fascia injection in 3/2018 with significant improvement of symptoms. She was able to " dance after the injection. She c/o worsening of heel pain again and request another injection. She was referred to podiatry in the past, but unable to follow up with specialist due to insurance problems. Plans:   - steroid injection, see procedure note below: methylPREDNISolone (SOLU-MEDROL) 40 MG injection 40 mg; 1 mL by Intramuscular route Once.  - icing, splint, OTC analgesics PRN for pain.     2. Obesity (BMI 30-39.9)  Pt is obese, lost 7 lbs over the past few month due to increase physical activities and diet.   - Patient was counseled on dietary modification and exercises. Topic includes: portion control, restricted calories to less than 5956-7437 daily, low-carb/low-fat diet, healthy eating habits, food choices, eating pattern, exercises for weight loss and to promote physical and mental health.      3. Rosacea  Chronic, improving with metronidazole gel, will monitor.     4. Prediabetes  Chronic, A1C improves from 6.3 to 5.9 from diet, exercises, and weight loss.   - Pt was counseled on dietary modification, weight loss   - Recommended moderate intensity exercise at least 30 minutes per day x 5 days per week.  - Follow up in 6 months.     5. Mixed hyperlipidemia  Chronic, improving with diet, exercise, and weight loss.   Will continue routine monitoring.     6. Chronic renal insufficiency, stage III (moderate) (HCC)  Chronic, improving per recent blood tests, will continue to monitor.      Plantar Fasciitis Procedure note:   Indication: plantar fasciitis, failed non-invasive conservative management.   Location: Right foot  Solution: 2 ml of 1% lidocaine w/o EPI + 40 mg of Solumedrol    PARQ obtained. Pt consents to the procedure after thorough review risks/benefits with provider.     Insertion site was identified and cleansed with alcohol swab. Local anesthesia was achieved by using topical vapocoolant spray. Position the needle and syringe perpendicular to the skin. Using the no-touch technique, the needle  is advanced to the origin of plantar fascia, and the steroid-lidocaine mixture was injected without resistance. The needle is then withdrawn. Sterile adhesive bandage is applied. Pt tolerates the procedure well, no immediate complications noted. Pt endorses improvement of symptoms after 5-10 minutes.     Lorna Uribe M.D.    Follow up: Return in about 6 months (around 10/5/2019).    Please note that this dictation was created using voice recognition software and/or scribes. I have made every reasonable attempt to correct obvious errors, but I expect that there are errors of grammar and possibly content that I did not discover before finalizing the note.     Tameka ANGELES (Scribe), am scribing for, and in the presence of, Lorna Uribe M.D.    Electronically signed by: Tameka Gay (Scribe), 4/5/2019    Lorna ANGELES M.D. personally performed the services described in this documentation, as scribed by Tameka Gay in my presence, and it is both accurate and complete.

## 2019-04-05 NOTE — LETTER
April 5, 2019        Re: Thuy Albert    To whom it may concern,    My name is Lorna Uribe MD. I am taking care of Thuy Albert, who is suffering cognitive impairment and poor vision. She is not able to care for herself. She requires supervision and assistance with activities of daily livings.     If you have any questions, please do not hesitate to call my office.     Best regards,                   Lorna Uribe

## 2019-04-26 NOTE — TELEPHONE ENCOUNTER
Phone Number Called: [phone] (home)       Message: Left vm asking pt to call back to confirm or deny medication refill per provider.     Left Message for patient to call back: yes

## 2019-05-02 NOTE — TELEPHONE ENCOUNTER
1. Caller Name: Thuy Albert                                           Call Back Number: [phone] (home)         Patient  called requesting the medication be filled.  state patient is starting to get heart burn again.

## 2019-05-03 RX ORDER — OMEPRAZOLE 20 MG/1
CAPSULE, DELAYED RELEASE ORAL
Qty: 90 CAP | Refills: 1 | Status: SHIPPED | OUTPATIENT
Start: 2019-05-03 | End: 2019-11-06 | Stop reason: SDUPTHER

## 2019-05-17 ENCOUNTER — OFFICE VISIT (OUTPATIENT)
Dept: MEDICAL GROUP | Age: 69
End: 2019-05-17
Payer: MEDICARE

## 2019-05-17 VITALS
TEMPERATURE: 97.9 F | BODY MASS INDEX: 37.57 KG/M2 | OXYGEN SATURATION: 96 % | DIASTOLIC BLOOD PRESSURE: 60 MMHG | HEIGHT: 61 IN | WEIGHT: 199 LBS | SYSTOLIC BLOOD PRESSURE: 124 MMHG | HEART RATE: 53 BPM

## 2019-05-17 DIAGNOSIS — M72.2 PLANTAR FASCIITIS, BILATERAL: ICD-10-CM

## 2019-05-17 DIAGNOSIS — D17.24 LIPOMA OF LEFT THIGH: ICD-10-CM

## 2019-05-17 DIAGNOSIS — M25.512 ACUTE PAIN OF LEFT SHOULDER: ICD-10-CM

## 2019-05-17 PROCEDURE — 20610 DRAIN/INJ JOINT/BURSA W/O US: CPT | Performed by: FAMILY MEDICINE

## 2019-05-17 PROCEDURE — 99214 OFFICE O/P EST MOD 30 MIN: CPT | Mod: 25 | Performed by: FAMILY MEDICINE

## 2019-05-17 RX ORDER — METHYLPREDNISOLONE SODIUM SUCCINATE 40 MG/ML
40 INJECTION, POWDER, LYOPHILIZED, FOR SOLUTION INTRAMUSCULAR; INTRAVENOUS ONCE
Status: COMPLETED | OUTPATIENT
Start: 2019-05-17 | End: 2019-05-17

## 2019-05-17 RX ADMIN — METHYLPREDNISOLONE SODIUM SUCCINATE 40 MG: 40 INJECTION, POWDER, LYOPHILIZED, FOR SOLUTION INTRAMUSCULAR; INTRAVENOUS at 16:35

## 2019-05-17 NOTE — PATIENT INSTRUCTIONS
[email or web address]

## 2019-05-17 NOTE — PROGRESS NOTES
Subjective:   CC: shoulder pain     HPI:     Thuy Albert is a 68 y.o. female who is an established patient of the clinic, presents with the following concerns:     She has a chronic history of bilateral plantar fasciitis, status post steroid injection to her right foot on 04/05/19. Her pain improved for about one week after the injection. She complains of recurrent pain to her bilateral heels and arches. The pain varies in severity and states her pain is mild today. Pt was seen by podiatry multiple times in the past, but unable to afford the cost of the treatment including surgery. She continues to do home stretching exercises.     She developed a tender mass to her left medial thigh approximately three weeks ago.  She noted a new ecchymosis in this area at that time. She denies any lower extremity erythema or edema.      Additionally, she complains of exacerbated chronic shoulder pain. She localizes her pain to her left anterior and lateral shoulder and is associated with limited range of motion secondary to pain. She was evaluated by an Orthopaedic and believed to have osteoarthritis and a possible injury to her rotator cuff, but she is not sure. Her pain improved with a steroid injection and physical therapy. She is no longer completing the physical therapy exercises at home. No recent trauma or injury to her shoulder.      Current medicines (including changes today)  Current Outpatient Prescriptions   Medication Sig Dispense Refill   • ARMOUR THYROID 15 MG Tab TAKE 3 TABLETS BY MOUTH EVERY  Tab 2   • omeprazole (PRILOSEC) 20 MG delayed-release capsule TAKE 1 CAPSULE BY MOUTH EVERY DAY 90 Cap 1   • donepezil (ARICEPT) 10 MG tablet Take 10 mg by mouth.  6   • memantine (NAMENDA) 10 MG Tab TAKE 1 TABLET BY MOUTH TWICE A DAY  6   • metronidazole (METROGEL) 0.75 % gel Apply to face twice daily 1 Tube 2     No current facility-administered medications for this visit.      She  has a past medical  "history of Arthritis; Asthma; Dementia; DVT (deep venous thrombosis) ([facility]) (2003); GERD (gastroesophageal reflux disease); and Thyroid disease.    I personally reviewed patient's problem list, allergies, medications, family hx, social hx with patient and update EPIC.     REVIEW OF SYSTEMS:  CONSTITUTIONAL:  Denies night sweats, fatigue, malaise, lethargy, fever or chills.  RESPIRATORY:  Denies cough, wheeze, hemoptysis or shortness of breath.  CARDIOVASCULAR:  Denies chest pain, palpitations or pedal edema.  MUSCULOSKELETAL: Denies trauma, falls, extremity erythema or edema.    See HPI for further details.         Objective:     /60 (BP Location: Left arm, Patient Position: Sitting, BP Cuff Size: Adult)   Pulse (!) 53   Temp 36.6 °C (97.9 °F) (Temporal)   Ht 1.549 m (5' 1\")   Wt 90.3 kg (199 lb)   SpO2 96%  Body mass index is 37.6 kg/m².    Physical Exam:  Constitutional: Obese, awake, alert, in no distress, morbidly obese.   Skin: Warm, dry, good turgor, no rashes, bruises, ulcers in visible areas.  - 1 cm, mildly tender, soft mass palpable at the left medial thigh  Eye: conjunctiva clear, lids neg for edema or lesions.  ENMT: Lips without lesions, good dentition.  Neck: Trachea midline, no masses, no thyromegaly. No cervical or supraclavicular lymphadenopathy  Respiratory: Unlabored respiratory effort, lungs clear to auscultation, no wheezes, no rales.  Cardiovascular: Normal S1, S2, no murmur, no pedal edema.  Psych: Oriented x3, affect and mood wnl, intact judgement and insight.   Musculoskeletal:   L Shoulder exam:  No visible deformity or asymmetry. No joint effusion, bruises, hematoma. No crepitus.   Neuro: Normal sensation lateral shoulder and normal DTRs UE.  PROM: wnl   AROM: wnl  Apley’s scratch test: neg  Cross-body test: neg   Drop arm test: neg   Empty can test: pos  Infraspinatus/teres minor exam: neg   Lift-off test: neg   Neer’s impingement test: pos .   Yergason’s test: neg   Sulcus " sign: neg   Relocation: neg    Assessment and Plan:   The following treatment plan was discussed:    1. Plantar fasciitis, bilateral  Chronic bilateral plantar fasciitis, was seen by multiple podiatrist in the past, but in general not happy with their services. Pt also c/o high cost associated with podiatry services. Pt s/p steroid injection once in the past with good relief of symptoms for approximately 1 year. She recently received another injection on the right foot, which is helping but not as effective as the first one. She declined further treatment or evaluation at this time.   - regular rehab exercise   - OTC analgesics PRN for pain  - heel lift  - night splint   - appropriate footwear     2. Lipoma of left thigh  Exam consistent with small tender lipoma, recommended conservative management. Pt was reassured that this is not blood clot.     3. Acute pain of left shoulder  Chronic left shoulder pain, likely secondary to OA and impingement syndrome.   - steroid injection, see procedure note below: methylPREDNISolone (SOLU-MEDROL) 40 MG injection 40 mg; 1 mL by Intramuscular route Once.     Shoulder Injection Procedure Note  Indication: chronic shoulder pain with acute exacerbation  Location: L     PARQ: PARQ conference held including but not limited to the risk of steroid flare, hypopigmentation, infection, bleeding, and tendon/cartilage damage. Patient expressed understanding and wished to proceed. Verbal consent obtained.    Procedure:   Pt seated upright, arms hanging to side, small amount of traction to flexed elbow.  Injection site was identified and cleansed thoroughly with alcohol. Using lateral approach, a mixture of 1ml of 40 mg/ml Solumedrol + 4 ml of 1% Lidocaine solution was injected into pt's L shoulder w/o resistance. The injected site was dressed with sterile bandage. Passive gentle ROM was done to distribute the medicine. Pt endorses relief of symptoms.   Pt tolerates the procedure well. No  immediate complications noted. Aftercare instructions provided.        Lorna Uribe M.D.    Follow up: Return in about 6 months (around 11/17/2019) for Multiple issues.    Please note that this dictation was created using voice recognition software and/or scribes. I have made every reasonable attempt to correct obvious errors, but I expect that there are errors of grammar and possibly content that I did not discover before finalizing the note.    ITameka (Scribe), am scribing for, and in the presence of, Lorna Uribe M.D.    Electronically signed by: Tameka Gay (Scribe), 5/17/2019    ILorna M.D. personally performed the services described in this documentation, as scribed by Tameka Gay in my presence, and it is both accurate and complete.

## 2019-06-13 ENCOUNTER — HOSPITAL ENCOUNTER (OUTPATIENT)
Dept: LAB | Facility: MEDICAL CENTER | Age: 69
End: 2019-06-13
Attending: PSYCHIATRY & NEUROLOGY
Payer: MEDICARE

## 2019-06-13 LAB
ALBUMIN SERPL BCP-MCNC: 4.1 G/DL (ref 3.2–4.9)
ALBUMIN/GLOB SERPL: 1.3 G/DL
ALP SERPL-CCNC: 105 U/L (ref 30–99)
ALT SERPL-CCNC: 12 U/L (ref 2–50)
ANION GAP SERPL CALC-SCNC: 7 MMOL/L (ref 0–11.9)
AST SERPL-CCNC: 15 U/L (ref 12–45)
BASOPHILS # BLD AUTO: 0.4 % (ref 0–1.8)
BASOPHILS # BLD: 0.04 K/UL (ref 0–0.12)
BILIRUB SERPL-MCNC: 0.5 MG/DL (ref 0.1–1.5)
BUN SERPL-MCNC: 16 MG/DL (ref 8–22)
CALCIUM SERPL-MCNC: 9.4 MG/DL (ref 8.5–10.5)
CHLORIDE SERPL-SCNC: 105 MMOL/L (ref 96–112)
CO2 SERPL-SCNC: 27 MMOL/L (ref 20–33)
CREAT SERPL-MCNC: 1.09 MG/DL (ref 0.5–1.4)
EOSINOPHIL # BLD AUTO: 0.13 K/UL (ref 0–0.51)
EOSINOPHIL NFR BLD: 1.2 % (ref 0–6.9)
ERYTHROCYTE [DISTWIDTH] IN BLOOD BY AUTOMATED COUNT: 49 FL (ref 35.9–50)
GLOBULIN SER CALC-MCNC: 3.2 G/DL (ref 1.9–3.5)
GLUCOSE SERPL-MCNC: 103 MG/DL (ref 65–99)
HCT VFR BLD AUTO: 43.7 % (ref 37–47)
HGB BLD-MCNC: 13.7 G/DL (ref 12–16)
IMM GRANULOCYTES # BLD AUTO: 0.05 K/UL (ref 0–0.11)
IMM GRANULOCYTES NFR BLD AUTO: 0.4 % (ref 0–0.9)
LYMPHOCYTES # BLD AUTO: 3.85 K/UL (ref 1–4.8)
LYMPHOCYTES NFR BLD: 34.5 % (ref 22–41)
MCH RBC QN AUTO: 29.9 PG (ref 27–33)
MCHC RBC AUTO-ENTMCNC: 31.4 G/DL (ref 33.6–35)
MCV RBC AUTO: 95.4 FL (ref 81.4–97.8)
MONOCYTES # BLD AUTO: 0.7 K/UL (ref 0–0.85)
MONOCYTES NFR BLD AUTO: 6.3 % (ref 0–13.4)
NEUTROPHILS # BLD AUTO: 6.4 K/UL (ref 2–7.15)
NEUTROPHILS NFR BLD: 57.2 % (ref 44–72)
NRBC # BLD AUTO: 0 K/UL
NRBC BLD-RTO: 0 /100 WBC
PLATELET # BLD AUTO: 266 K/UL (ref 164–446)
PMV BLD AUTO: 9.5 FL (ref 9–12.9)
POTASSIUM SERPL-SCNC: 4.1 MMOL/L (ref 3.6–5.5)
PROT SERPL-MCNC: 7.3 G/DL (ref 6–8.2)
RBC # BLD AUTO: 4.58 M/UL (ref 4.2–5.4)
SODIUM SERPL-SCNC: 139 MMOL/L (ref 135–145)
WBC # BLD AUTO: 11.2 K/UL (ref 4.8–10.8)

## 2019-06-13 PROCEDURE — 85025 COMPLETE CBC W/AUTO DIFF WBC: CPT

## 2019-06-13 PROCEDURE — 80053 COMPREHEN METABOLIC PANEL: CPT

## 2019-06-13 PROCEDURE — 36415 COLL VENOUS BLD VENIPUNCTURE: CPT

## 2019-10-04 ENCOUNTER — APPOINTMENT (OUTPATIENT)
Dept: MEDICAL GROUP | Age: 69
End: 2019-10-04
Payer: MEDICARE

## 2019-10-07 ENCOUNTER — APPOINTMENT (OUTPATIENT)
Dept: MEDICAL GROUP | Age: 69
End: 2019-10-07
Payer: MEDICARE

## 2019-10-07 ENCOUNTER — OFFICE VISIT (OUTPATIENT)
Dept: MEDICAL GROUP | Age: 69
End: 2019-10-07
Payer: MEDICARE

## 2019-10-07 VITALS
DIASTOLIC BLOOD PRESSURE: 74 MMHG | SYSTOLIC BLOOD PRESSURE: 118 MMHG | OXYGEN SATURATION: 97 % | HEIGHT: 61 IN | WEIGHT: 195.8 LBS | BODY MASS INDEX: 36.97 KG/M2 | HEART RATE: 53 BPM | TEMPERATURE: 98 F

## 2019-10-07 DIAGNOSIS — N18.30 STAGE 3 CHRONIC KIDNEY DISEASE (HCC): ICD-10-CM

## 2019-10-07 DIAGNOSIS — Z23 NEED FOR VACCINATION: ICD-10-CM

## 2019-10-07 DIAGNOSIS — Z00.00 MEDICARE ANNUAL WELLNESS VISIT, SUBSEQUENT: ICD-10-CM

## 2019-10-07 DIAGNOSIS — M72.2 PLANTAR FASCIITIS, BILATERAL: ICD-10-CM

## 2019-10-07 DIAGNOSIS — H25.9 AGE-RELATED CATARACT OF BOTH EYES, UNSPECIFIED AGE-RELATED CATARACT TYPE: ICD-10-CM

## 2019-10-07 DIAGNOSIS — J45.20 MILD INTERMITTENT ASTHMA WITHOUT COMPLICATION: ICD-10-CM

## 2019-10-07 DIAGNOSIS — F02.82 DEMENTIA OF THE ALZHEIMER'S TYPE, WITH EARLY ONSET, WITH DELUSIONS (HCC): ICD-10-CM

## 2019-10-07 DIAGNOSIS — M51.36 DDD (DEGENERATIVE DISC DISEASE), LUMBAR: ICD-10-CM

## 2019-10-07 DIAGNOSIS — K21.9 GASTROESOPHAGEAL REFLUX DISEASE WITHOUT ESOPHAGITIS: ICD-10-CM

## 2019-10-07 DIAGNOSIS — H53.9 VISUAL DISTURBANCE: ICD-10-CM

## 2019-10-07 DIAGNOSIS — L71.9 ROSACEA: ICD-10-CM

## 2019-10-07 DIAGNOSIS — E03.9 ACQUIRED HYPOTHYROIDISM: Chronic | ICD-10-CM

## 2019-10-07 DIAGNOSIS — M79.672 FOOT PAIN, BILATERAL: ICD-10-CM

## 2019-10-07 DIAGNOSIS — G30.0 DEMENTIA OF THE ALZHEIMER'S TYPE, WITH EARLY ONSET, WITH DELUSIONS (HCC): ICD-10-CM

## 2019-10-07 DIAGNOSIS — E66.9 OBESITY (BMI 30-39.9): ICD-10-CM

## 2019-10-07 DIAGNOSIS — E78.2 MIXED HYPERLIPIDEMIA: ICD-10-CM

## 2019-10-07 DIAGNOSIS — M79.671 FOOT PAIN, BILATERAL: ICD-10-CM

## 2019-10-07 DIAGNOSIS — Z78.0 MENOPAUSE: ICD-10-CM

## 2019-10-07 PROCEDURE — G0439 PPPS, SUBSEQ VISIT: HCPCS | Performed by: FAMILY MEDICINE

## 2019-10-07 RX ORDER — THYROID 15 MG/1
45 TABLET ORAL
Qty: 270 TAB | Refills: 1 | Status: SHIPPED | OUTPATIENT
Start: 2019-10-07 | End: 2020-05-05

## 2019-10-07 RX ORDER — THYROID 15 MG/1
45 TABLET ORAL
Qty: 270 TAB | Refills: 0 | Status: SHIPPED | OUTPATIENT
Start: 2019-10-07 | End: 2019-10-07 | Stop reason: SDUPTHER

## 2019-10-07 ASSESSMENT — ACTIVITIES OF DAILY LIVING (ADL): BATHING_REQUIRES_ASSISTANCE: 1

## 2019-10-07 ASSESSMENT — ENCOUNTER SYMPTOMS: GENERAL WELL-BEING: GOOD

## 2019-10-07 ASSESSMENT — PAIN SCALES - GENERAL: PAINLEVEL: NO PAIN

## 2019-10-07 ASSESSMENT — PATIENT HEALTH QUESTIONNAIRE - PHQ9: CLINICAL INTERPRETATION OF PHQ2 SCORE: 0

## 2019-10-07 NOTE — PROGRESS NOTES
Chief Complaint   Patient presents with   • Annual Wellness Visit              HPI:  Thuy Albert is a 69 y.o. here for Medicare Annual Wellness Visit       She has a chronic history of bilateral plantar fasciitis, status post steroid injection to her right foot on 04/05/19. Her pain improved for about one week after the injection. She has occasional pain, but when she gets a flare is usually mild. Patient has able to walk now, and rarely uses her scooter to ambulate.    Patient additionally has left shoulder pain. She reports improvement of her pain and now has increased range of motion. Her pain improved with a steroid injection and physical therapy. She is no longer completing the physical therapy exercises at home. No recent trauma or injury to her shoulder.    Patient has a history of cataracts bilaterally. She was seen by neurology, who believed corrective surgery not indicated. Denies any acute visual changes today. She see Dr. Barillas regularly.     She has a history of thyroid disorder. She has been taking armour thyroid 45mg qd without side effects. The patient denies any new fatigue, cold/heat intolerance, weight gain/weight loss, diarrhea/constipation, dry skin, myalgia, depressed mood, palpitations, tremmor, hair loss, and no goiter.    She denies having any issues with her asthma for the past 2 years.     Patient has a history of rosacea on her face. She has been applying the metrogel to her face, but reports scratching at her face frequently due to poor habits.       Patient Active Problem List    Diagnosis Date Noted   • Dementia of the Alzheimer's type, with early onset, with delusions ([facility])_Dr. Barillas  10/07/2019   • Rosacea 04/05/2019   • Obesity (BMI 30-39.9) 10/04/2018   • Plantar fasciitis, bilateral 11/04/2016   • Cataract, mild  07/31/2016   • Prediabetes 07/12/2015   • Foot pain, bilateral 07/12/2015   • Visual disturbance 07/12/2015   • Mild intermittent asthma without  complication_controlled w/o meds  10/28/2014   • DDD (degenerative disc disease), lumbar_rarely symptomatic  06/18/2014   • Stage 3 chronic kidney disease (HCC) 05/03/2012   • Hypothyroidism 05/03/2012   • Mixed hyperlipidemia 05/03/2012   • GERD (gastroesophageal reflux disease)        Current Outpatient Medications   Medication Sig Dispense Refill   • thyroid (ARMOUR THYROID) 15 MG Tab Take 3 Tabs by mouth every day. 270 Tab 1   • omeprazole (PRILOSEC) 20 MG delayed-release capsule TAKE 1 CAPSULE BY MOUTH EVERY DAY 90 Cap 1   • donepezil (ARICEPT) 10 MG tablet Take 10 mg by mouth.  6   • memantine (NAMENDA) 10 MG Tab TAKE 1 TABLET BY MOUTH TWICE A DAY  6   • metronidazole (METROGEL) 0.75 % gel Apply to face twice daily 1 Tube 2     No current facility-administered medications for this visit.             Current supplements as per medication list.       Allergies: Cephalexin; Nabumetone; Other drug; Synthroid [levothroid]; and Tetracycline    Current social contact/activities: goes to [employer] and people watch, spends time with friends,      She  reports that she quit smoking about 13 years ago. Her smoking use included cigarettes. She has a 60.00 pack-year smoking history. She has never used smokeless tobacco. She reports that she does not drink alcohol or use drugs.  Counseling given: Not Answered  Comment: QUIT 2006,  USED TO TO SMOKE 2-3 PPD FOR 40 YRS.      DPA/Advanced Directive:  Patient has Advanced Directive, but it is not on file. Instructed to bring in a copy to scan into their chart.    ROS:    Gait: Uses no assistive device,has scooter but it breaks down frequently  Ostomy: No  Other tubes: No  Amputations: No  Chronic oxygen use: No  Last eye exam: 2019  Wears hearing aids: No   : Denies any urinary leakage during the last 6 months    Screening:  Depression Screening    Little interest or pleasure in doing things?  0 - not at all  Feeling down, depressed , or hopeless? 0 - not at  all  Interpretation of PHQ-9 Total Score 0    Screening for Cognitive Impairment    Three Minute Recall (village, kitchen, baby) 0/3    Stephane clock face with all 12 numbers and set the hands to show 10 past 10.  No Has neurological issues that effect vision, unable to draw clock    Fall Risk Assessment    Has the patient had two or more falls in the last year or any fall with injury in the last year?  No    Safety Assessment    Throw rugs on floor.  Yes  Handrails on all stairs.  Yes  Good lighting in all hallways.  Yes  Difficulty hearing.  Yes  Patient counseled about all safety risks that were identified.    Functional Assessment ADLs    Are there any barriers preventing you from cooking for yourself or meeting nutritional needs?  Yes. Has neurological issues that effect vision  Are there any barriers preventing you from driving safely or obtaining transportation?  Yes.  Has neurological issues that effect vision  Are there any barriers preventing you from using a telephone or calling for help?  No.    Are there any barriers preventing you from shopping?  Yes. Has neurological issues that effect vision  Are there any barriers preventing you from taking care of your own finances?  Yes. Has neurological issues that effect vision  Are there any barriers preventing you from managing your medications?  Yes. Has neurological issues that effect vision  Are there any barriers preventing you from showering, bathing or dressing yourself? Yes. Has neurological issues that effect vision  Are you currently engaging in any exercise or physical activity?  No.     What is your perception of your health?  Good.      Health Maintenance Summary                HEPATITIS C SCREENING Overdue 1950     IMM DTaP/Tdap/Td Vaccine Overdue 7/30/1969     IMM ZOSTER VACCINES Overdue 7/30/2000     BONE DENSITY Overdue 7/25/2019      Done 7/25/2014 DS-BONE DENSITY STUDY (DEXA)    IMM INFLUENZA Overdue 9/1/2019      Done 10/4/2018 Imm Admin:  "Influenza Vaccine Adult HD    Annual Wellness Visit Overdue 10/5/2019      Done 10/4/2018 Visit Dx: Medicare annual wellness visit, subsequent     Patient has more history with this topic...    MAMMOGRAM Next Due 2020      Done 2019 MA-SCREENING MAMMO BILAT W/TOMOSYNTHESIS W/CAD     Patient has more history with this topic...          Patient Care Team:  Lorna Uribe M.D. as PCP - General (Family Medicine)  Isac Kaiser M.D. as Consulting Physician (Phys Med and Rehab)  Michel Barillas D.O. as Consulting Physician (Ophthalmology)  Davian Jenkins M.D. as Consulting Physician (Ophthalmology)      Social History     Tobacco Use   • Smoking status: Former Smoker     Packs/day: 1.50     Years: 40.00     Pack years: 60.00     Types: Cigarettes     Last attempt to quit: 2006     Years since quittin.7   • Smokeless tobacco: Never Used   • Tobacco comment: QUIT ,  USED TO TO SMOKE 2-3 PPD FOR 40 YRS.   Substance Use Topics   • Alcohol use: No     Alcohol/week: 0.0 oz   • Drug use: No     Family History   Problem Relation Age of Onset   • Hypertension Sister    • Diabetes Sister    • Heart Disease Sister    • Other Sister         loss vision complete   • Heart Disease Brother 55        MI   • Genetic Disorder Mother    • Stroke Father      She  has a past medical history of Arthritis, Asthma, Dementia (HCC), DVT (deep venous thrombosis) (HCC) (), GERD (gastroesophageal reflux disease), and Thyroid disease.   Past Surgical History:   Procedure Laterality Date   • APPENDECTOMY LAPAROSCOPIC  2011    Performed by JJ QUINTANILLA at [facility]   • APPENDECTOMY     • CHOLECYSTECTOMY     • OTHER ABDOMINAL SURGERY      [place]   • TONSILLECTOMY         Exam:   /74 (BP Location: Left arm, Patient Position: Sitting, BP Cuff Size: Adult)   Pulse (!) 53   Temp 36.7 °C (98 °F)   Ht 1.549 m (5' 1\")   Wt 88.8 kg (195 lb 12.8 oz)   SpO2 97%  Body mass index is 37 kg/m².    Hearing " excellent.    Dentition good  Alert, oriented in no acute distress.  Eye contact is good, speech goal directed, affect calm    Assessment and Plan. The following treatment and monitoring plan is recommended:      1. Visual disturbance  2. Age-related cataract of both eyes, unspecified age-related cataract type  Unclear etiology of visual problems, f/u with Dr. Barillas, symptoms are stable. Cataracts are mild, does not require surgery.   - f/u with Dr. Barillas as directed.     3. Obesity (BMI 30-39.9)  Pt lost 4-5 lbs since last OV secondary to activity modification and increased physical activity with improvement of foot pain.   - Patient identified as having weight management issue.  Appropriate orders and counseling given.    4. Rosacea  Controlled with Metronidazole gel 0.75%, so s/e reported, will continue    5. Plantar fasciitis, bilateral  Improving with steroid injection. Pt is rarely symptomatic. She is able to ambulate and exercises. Will continue to monitor  - weight loss  - regular exercises  - OTC analgesics PRN for pain     6. Acquired hypothyroidism  Chronic, controlled with [place] thyroid 45 mg daily, no s/e reported, will conitnue.   - thyroid (ARMOUR THYROID) 15 MG Tab; Take 3 Tabs by mouth every day.  Dispense: 270 Tab; Refill: 1  - CBC WITH DIFFERENTIAL; Future  - Comp Metabolic Panel; Future  - TSH; Future    7. Gastroesophageal reflux disease without esophagitis  Chronic, controlled with Omeprazole 20 mg qd, no s/e reported, will continue.   -Appropriate counseling regarding GERD discussed with patient. Topic might include: weight loss, avoid loose fitting, elevated the head of the bed, avoid common food triggers (fatty or fried foods, tomato sauce, alcohol, chocolate, mint, garlic, onion, and caffeine), smoking cessation, avoid excessive alcohol consumption. Increased risks of vitamin/mineral deficiency, GI infection, and bone loss associated with PPI was also discussed.      8. Stage 3  chronic kidney disease (HCC)  Chronic, stable, will continue to monitor.     9. DDD (degenerative disc disease), lumbar  Rarely symptomatic, does not require treatment, will monitor.     10. Mild intermittent asthma without complication  Rarely symptomatic, no respiratory symptoms for the past 2 years, will continue to monitor.     11. Foot pain, bilateral  Resolved.     12. Dementia of the Alzheimer's type, with early onset, with delusions (HCC)  Chronic, currently taking Memantine and Donepezil, no s/e reported.   - f/u with Dr. Barillas     13. Menopause  - DS-BONE DENSITY STUDY (DEXA); Future    14. Mixed hyperlipidemia  Chronic, mild, recommended lifestyle modification and weight loss.   - Lipid Profile; Future    15. Need for vaccination  - INFLUENZA VACCINE, HIGH DOSE (65+ ONLY)    16. Medicare annual wellness visit, subsequent  - Subsequent Annual Wellness Visit - Includes PPPS ()       Services suggested: No services needed at this time  Health Care Screening: Age-appropriate preventive services recommended by USPTF and ACIP covered by Medicare were discussed today. Services ordered if indicated and agreed upon by the patient.  Referrals offered: Community-based lifestyle interventions to reduce health risks and promote self-management and wellness, fall prevention, nutrition, physical activity, tobacco-use cessation, weight loss, and mental health services as per orders if indicated.    Discussion today about general wellness and lifestyle habits:    · Prevent falls and reduce trip hazards; Cautioned about securing or removing rugs.  · Have a working fire alarm and carbon monoxide detector;   · Engage in regular physical activity and social activities     Follow-up: Return in about 6 months (around 4/7/2020).

## 2019-11-07 RX ORDER — OMEPRAZOLE 20 MG/1
CAPSULE, DELAYED RELEASE ORAL
Qty: 90 CAP | Refills: 1 | Status: SHIPPED | OUTPATIENT
Start: 2019-11-07 | End: 2020-04-09 | Stop reason: SDUPTHER

## 2020-04-09 ENCOUNTER — OFFICE VISIT (OUTPATIENT)
Dept: MEDICAL GROUP | Age: 70
End: 2020-04-09
Payer: MEDICARE

## 2020-04-09 VITALS — WEIGHT: 179 LBS | BODY MASS INDEX: 33.79 KG/M2 | TEMPERATURE: 98 F | HEIGHT: 61 IN

## 2020-04-09 DIAGNOSIS — N18.30 STAGE 3 CHRONIC KIDNEY DISEASE: ICD-10-CM

## 2020-04-09 DIAGNOSIS — G30.0 DEMENTIA OF THE ALZHEIMER'S TYPE, WITH EARLY ONSET, WITH DELUSIONS (HCC): ICD-10-CM

## 2020-04-09 DIAGNOSIS — E03.9 ACQUIRED HYPOTHYROIDISM: Chronic | ICD-10-CM

## 2020-04-09 DIAGNOSIS — F02.82 DEMENTIA OF THE ALZHEIMER'S TYPE, WITH EARLY ONSET, WITH DELUSIONS (HCC): ICD-10-CM

## 2020-04-09 DIAGNOSIS — N32.81 OAB (OVERACTIVE BLADDER): Primary | ICD-10-CM

## 2020-04-09 DIAGNOSIS — K21.9 GASTROESOPHAGEAL REFLUX DISEASE WITHOUT ESOPHAGITIS: ICD-10-CM

## 2020-04-09 PROCEDURE — 99214 OFFICE O/P EST MOD 30 MIN: CPT | Performed by: FAMILY MEDICINE

## 2020-04-09 RX ORDER — OMEPRAZOLE 20 MG/1
20 CAPSULE, DELAYED RELEASE ORAL
Qty: 90 CAP | Refills: 1 | Status: SHIPPED | OUTPATIENT
Start: 2020-04-09 | End: 2020-12-17

## 2020-04-09 ASSESSMENT — PATIENT HEALTH QUESTIONNAIRE - PHQ9: CLINICAL INTERPRETATION OF PHQ2 SCORE: 0

## 2020-04-09 ASSESSMENT — FIBROSIS 4 INDEX: FIB4 SCORE: 1.12

## 2020-04-09 NOTE — PROGRESS NOTES
Telemedicine Visit: Established Patient     This encounter was conducted via Zoom .   Verbal consent was obtained. Patient's identity was verified.    Subjective:   CC: urinary frequency   Thuy Albert is a 69 y.o. female presenting for evaluation and management of:    Patient has history of GERD, currently taking propranolol 20 mg daily.  Her symptoms are under excellent control.  Patient tolerated medication well, no side effect reported.  Patient also history of stage III chronic kidney disease.  She is due for repeat blood test, however, she forgot to do the test prior to visit.  She also has history of hypothyroidism and Alzheimer's dementia.  She currently taking Aricept 10 mg daily and memantine 10 mg twice daily.  Her symptoms are stable.  Her  denies any visual changes.  This condition is currently being managed by Dr. Barillas.  She is taking 45 mg of [place] Thyroid daily.  She denies any side effects from medications.  She is due to have repeat thyroid after that, but forgot to do the test prior to office visit.    Her 's concerns are frequent urination.  He states that she goes to the bathroom about 15 times per day.  She denies fever, chills, hematuria, diarrhea, flank pain.  She states that she drinks high volume of water daily.  There is no changes in her mental status. Denies urinary leakage with cough or valsalva.     ROS   Denies any recent fevers or chills. No nausea or vomiting. No chest pains or shortness of breath.     Allergies   Allergen Reactions   • Cephalexin Rash     rash   • Nabumetone      rash   • Other Drug Swelling     ALL STEROIDS    • Synthroid [Levothroid]      Nausea     • Tetracycline Swelling       Current medicines (including changes today)  Current Outpatient Medications   Medication Sig Dispense Refill   • omeprazole (PRILOSEC) 20 MG delayed-release capsule Take 1 Cap by mouth every day. 90 Cap 1   • thyroid (ARMOUR THYROID) 15 MG Tab Take 3 Tabs by  "mouth every day. 270 Tab 1   • donepezil (ARICEPT) 10 MG tablet Take 10 mg by mouth.  6   • memantine (NAMENDA) 10 MG Tab TAKE 1 TABLET BY MOUTH TWICE A DAY  6   • metronidazole (METROGEL) 0.75 % gel Apply to face twice daily 1 Tube 2     No current facility-administered medications for this visit.        Patient Active Problem List    Diagnosis Date Noted   • Dementia of the Alzheimer's type, with early onset, with delusions ([facility])_Dr. Barillas  10/07/2019   • Rosacea 04/05/2019   • Obesity (BMI 30-39.9) 10/04/2018   • Plantar fasciitis, bilateral 11/04/2016   • Cataract, mild  07/31/2016   • Prediabetes 07/12/2015   • Foot pain, bilateral 07/12/2015   • Visual disturbance 07/12/2015   • Mild intermittent asthma without complication_controlled w/o meds  10/28/2014   • DDD (degenerative disc disease), lumbar_rarely symptomatic  06/18/2014   • Stage 3 chronic kidney disease ([facility]) 05/03/2012   • Hypothyroidism 05/03/2012   • Mixed hyperlipidemia 05/03/2012   • GERD (gastroesophageal reflux disease)        Family History   Problem Relation Age of Onset   • Hypertension Sister    • Diabetes Sister    • Heart Disease Sister    • Other Sister         loss vision complete   • Heart Disease Brother 55        MI   • Genetic Disorder Mother    • Stroke Father        She  has a past medical history of Arthritis, Asthma, Dementia ([facility]), DVT (deep venous thrombosis) ([facility]) (2003), GERD (gastroesophageal reflux disease), and Thyroid disease.  She  has a past surgical history that includes other abdominal surgery; appendectomy laparoscopic (8/1/2011); cholecystectomy; appendectomy; and tonsillectomy.       Objective:   Vitals obtained by patient:  Temp 36.7 °C (98 °F) (Oral) Comment: pt stated  Ht 1.549 m (5' 1\")   Wt 81.2 kg (179 lb) Comment: pt stated  BMI 33.82 kg/m²      Physical Exam:  Constitutional: Alert, no distress, well-groomed.  Skin: No rashes in visible areas.  Eye: Round. Conjunctiva clear, lids normal. No icterus. "   ENMT: Lips pink without lesions, good dentition, moist mucous membranes. Phonation normal.  Neck: No masses, no thyromegaly. Moves freely without pain.  Respiratory: Unlabored respiratory effort, no cough or audible wheeze  Psych: Alert and oriented x3, normal affect and mood.       Assessment and Plan:   The following treatment plan was discussed:     1. OAB (overactive bladder)  Hx is concern for possible OAB. Will do urinalysis to r/o UTI. Pt will have the test done tomorrow morning. I will contact her for treatment plans once the results are available.   - URINALYSIS; Future    2. Acquired hypothyroidism  Current, currently taking [place] Thyroid 45 mg daily.  Patient is due for repeat blood test.  However, she will get your blood tests done prior to the visit.  Patient was advised to have blood tests done tomorrow morning. I will contact them to discuss treatment plans.     3. Gastroesophageal reflux disease without esophagitis  Chronic, controlled with omeprazole 20 mg qd, no s/e reported, will continue.    - omeprazole (PRILOSEC) 20 MG delayed-release capsule; Take 1 Cap by mouth every day.  Dispense: 90 Cap; Refill: 1  -Appropriate counseling regarding GERD discussed with patient. Topic might include: weight loss, avoid loose fitting, elevated the head of the bed, avoid common food triggers (fatty or fried foods, tomato sauce, alcohol, chocolate, mint, garlic, onion, and caffeine), smoking cessation, avoid excessive alcohol consumption. Increased risks of vitamin/mineral deficiency, GI infection, and bone loss associated with PPI was also discussed.      4. Stage 3 chronic kidney disease (HCC)  Chronic, stable per blood tests done in 6/2019. She is due for repeat blood tests which she will have done tomorrow morning.    Discussed hydration and avoidance of nephrotoxic drugs.       5. Dementia of the Alzheimer's type  Chronic, stable, currently taking Memantine 10 mg BID and Donepezil 10 mg qd managed by   Eran, no s/e reported, will cont.   - f/u with Dr. Barillas as directed.   - dementia counseling provided.     Follow-up: Return in about 6 months (around 10/9/2020) for Multiple issues.

## 2020-04-10 ENCOUNTER — HOSPITAL ENCOUNTER (OUTPATIENT)
Dept: LAB | Facility: MEDICAL CENTER | Age: 70
End: 2020-04-10
Attending: FAMILY MEDICINE
Payer: MEDICARE

## 2020-04-10 DIAGNOSIS — N32.81 OAB (OVERACTIVE BLADDER): ICD-10-CM

## 2020-04-10 DIAGNOSIS — E03.9 ACQUIRED HYPOTHYROIDISM: Chronic | ICD-10-CM

## 2020-04-10 DIAGNOSIS — E78.2 MIXED HYPERLIPIDEMIA: ICD-10-CM

## 2020-04-10 LAB
ALBUMIN SERPL BCP-MCNC: 4.4 G/DL (ref 3.2–4.9)
ALBUMIN/GLOB SERPL: 1.4 G/DL
ALP SERPL-CCNC: 142 U/L (ref 30–99)
ALT SERPL-CCNC: 15 U/L (ref 2–50)
ANION GAP SERPL CALC-SCNC: 14 MMOL/L (ref 7–16)
APPEARANCE UR: ABNORMAL
AST SERPL-CCNC: 18 U/L (ref 12–45)
BACTERIA #/AREA URNS HPF: NEGATIVE /HPF
BASOPHILS # BLD AUTO: 0.5 % (ref 0–1.8)
BASOPHILS # BLD: 0.05 K/UL (ref 0–0.12)
BILIRUB SERPL-MCNC: 0.4 MG/DL (ref 0.1–1.5)
BILIRUB UR QL STRIP.AUTO: NEGATIVE
BUN SERPL-MCNC: 14 MG/DL (ref 8–22)
CALCIUM SERPL-MCNC: 9.4 MG/DL (ref 8.5–10.5)
CHLORIDE SERPL-SCNC: 100 MMOL/L (ref 96–112)
CHOLEST SERPL-MCNC: 232 MG/DL (ref 100–199)
CO2 SERPL-SCNC: 26 MMOL/L (ref 20–33)
COLOR UR: YELLOW
CREAT SERPL-MCNC: 1 MG/DL (ref 0.5–1.4)
EOSINOPHIL # BLD AUTO: 0.13 K/UL (ref 0–0.51)
EOSINOPHIL NFR BLD: 1.2 % (ref 0–6.9)
EPI CELLS #/AREA URNS HPF: ABNORMAL /HPF
ERYTHROCYTE [DISTWIDTH] IN BLOOD BY AUTOMATED COUNT: 46 FL (ref 35.9–50)
GLOBULIN SER CALC-MCNC: 3.1 G/DL (ref 1.9–3.5)
GLUCOSE SERPL-MCNC: 93 MG/DL (ref 65–99)
GLUCOSE UR STRIP.AUTO-MCNC: NEGATIVE MG/DL
HCT VFR BLD AUTO: 44.3 % (ref 37–47)
HDLC SERPL-MCNC: 43 MG/DL
HGB BLD-MCNC: 14 G/DL (ref 12–16)
HYALINE CASTS #/AREA URNS LPF: ABNORMAL /LPF
IMM GRANULOCYTES # BLD AUTO: 0.04 K/UL (ref 0–0.11)
IMM GRANULOCYTES NFR BLD AUTO: 0.4 % (ref 0–0.9)
KETONES UR STRIP.AUTO-MCNC: NEGATIVE MG/DL
LDLC SERPL CALC-MCNC: 146 MG/DL
LEUKOCYTE ESTERASE UR QL STRIP.AUTO: ABNORMAL
LYMPHOCYTES # BLD AUTO: 3.37 K/UL (ref 1–4.8)
LYMPHOCYTES NFR BLD: 31.5 % (ref 22–41)
MCH RBC QN AUTO: 28.9 PG (ref 27–33)
MCHC RBC AUTO-ENTMCNC: 31.6 G/DL (ref 33.6–35)
MCV RBC AUTO: 91.5 FL (ref 81.4–97.8)
MICRO URNS: ABNORMAL
MONOCYTES # BLD AUTO: 0.67 K/UL (ref 0–0.85)
MONOCYTES NFR BLD AUTO: 6.3 % (ref 0–13.4)
NEUTROPHILS # BLD AUTO: 6.45 K/UL (ref 2–7.15)
NEUTROPHILS NFR BLD: 60.1 % (ref 44–72)
NITRITE UR QL STRIP.AUTO: NEGATIVE
NRBC # BLD AUTO: 0 K/UL
NRBC BLD-RTO: 0 /100 WBC
PH UR STRIP.AUTO: 5.5 [PH] (ref 5–8)
PLATELET # BLD AUTO: 250 K/UL (ref 164–446)
PMV BLD AUTO: 9.4 FL (ref 9–12.9)
POTASSIUM SERPL-SCNC: 3.9 MMOL/L (ref 3.6–5.5)
PROT SERPL-MCNC: 7.5 G/DL (ref 6–8.2)
PROT UR QL STRIP: NEGATIVE MG/DL
RBC # BLD AUTO: 4.84 M/UL (ref 4.2–5.4)
RBC # URNS HPF: ABNORMAL /HPF
RBC UR QL AUTO: NEGATIVE
SODIUM SERPL-SCNC: 140 MMOL/L (ref 135–145)
SP GR UR STRIP.AUTO: 1.02
TRIGL SERPL-MCNC: 215 MG/DL (ref 0–149)
TSH SERPL DL<=0.005 MIU/L-ACNC: 2.34 UIU/ML (ref 0.38–5.33)
UROBILINOGEN UR STRIP.AUTO-MCNC: 0.2 MG/DL
WBC # BLD AUTO: 10.7 K/UL (ref 4.8–10.8)
WBC #/AREA URNS HPF: ABNORMAL /HPF

## 2020-04-10 PROCEDURE — 80061 LIPID PANEL: CPT

## 2020-04-10 PROCEDURE — 81001 URINALYSIS AUTO W/SCOPE: CPT

## 2020-04-10 PROCEDURE — 80053 COMPREHEN METABOLIC PANEL: CPT

## 2020-04-10 PROCEDURE — 36415 COLL VENOUS BLD VENIPUNCTURE: CPT

## 2020-04-10 PROCEDURE — 84443 ASSAY THYROID STIM HORMONE: CPT

## 2020-04-10 PROCEDURE — 85025 COMPLETE CBC W/AUTO DIFF WBC: CPT

## 2020-04-21 ENCOUNTER — TELEPHONE (OUTPATIENT)
Dept: MEDICAL GROUP | Age: 70
End: 2020-04-21

## 2020-04-21 NOTE — TELEPHONE ENCOUNTER
Phone Number Called: [phone] (home)       Call outcome: Spoke to patient regarding message below.    Message: pt would like to know the results of their recent labs.

## 2020-05-05 DIAGNOSIS — E03.9 ACQUIRED HYPOTHYROIDISM: Chronic | ICD-10-CM

## 2020-05-05 RX ORDER — THYROID,PORK 15 MG
TABLET ORAL
Qty: 270 TAB | Refills: 1 | Status: SHIPPED | OUTPATIENT
Start: 2020-05-05 | End: 2020-12-17

## 2020-08-07 ENCOUNTER — OFFICE VISIT (OUTPATIENT)
Dept: MEDICAL GROUP | Age: 70
End: 2020-08-07
Payer: MEDICARE

## 2020-08-07 VITALS
TEMPERATURE: 97.9 F | BODY MASS INDEX: 38.71 KG/M2 | WEIGHT: 205 LBS | SYSTOLIC BLOOD PRESSURE: 110 MMHG | OXYGEN SATURATION: 97 % | DIASTOLIC BLOOD PRESSURE: 60 MMHG | HEART RATE: 48 BPM | HEIGHT: 61 IN

## 2020-08-07 DIAGNOSIS — E66.9 OBESITY (BMI 30-39.9): ICD-10-CM

## 2020-08-07 DIAGNOSIS — G30.0 DEMENTIA OF THE ALZHEIMER'S TYPE, WITH EARLY ONSET, WITH DELUSIONS (HCC): ICD-10-CM

## 2020-08-07 DIAGNOSIS — F02.82 DEMENTIA OF THE ALZHEIMER'S TYPE, WITH EARLY ONSET, WITH DELUSIONS (HCC): ICD-10-CM

## 2020-08-07 DIAGNOSIS — E78.2 MIXED HYPERLIPIDEMIA: ICD-10-CM

## 2020-08-07 DIAGNOSIS — N32.81 OAB (OVERACTIVE BLADDER): Primary | ICD-10-CM

## 2020-08-07 DIAGNOSIS — E03.9 ACQUIRED HYPOTHYROIDISM: Chronic | ICD-10-CM

## 2020-08-07 DIAGNOSIS — M72.2 PLANTAR FASCIITIS, BILATERAL: ICD-10-CM

## 2020-08-07 DIAGNOSIS — Z12.31 ENCOUNTER FOR SCREENING MAMMOGRAM FOR BREAST CANCER: ICD-10-CM

## 2020-08-07 DIAGNOSIS — N18.30 STAGE 3 CHRONIC KIDNEY DISEASE: ICD-10-CM

## 2020-08-07 PROBLEM — L71.9 ROSACEA: Status: RESOLVED | Noted: 2019-04-05 | Resolved: 2020-08-07

## 2020-08-07 LAB
APPEARANCE UR: CLEAR
BILIRUB UR STRIP-MCNC: NEGATIVE MG/DL
COLOR UR AUTO: YELLOW
GLUCOSE UR STRIP.AUTO-MCNC: NEGATIVE MG/DL
KETONES UR STRIP.AUTO-MCNC: NEGATIVE MG/DL
LEUKOCYTE ESTERASE UR QL STRIP.AUTO: NORMAL
NITRITE UR QL STRIP.AUTO: NEGATIVE
PH UR STRIP.AUTO: 5.5 [PH] (ref 5–8)
PROT UR QL STRIP: NEGATIVE MG/DL
RBC UR QL AUTO: NEGATIVE
SP GR UR STRIP.AUTO: 1.02
UROBILINOGEN UR STRIP-MCNC: NORMAL MG/DL

## 2020-08-07 PROCEDURE — 99215 OFFICE O/P EST HI 40 MIN: CPT | Performed by: FAMILY MEDICINE

## 2020-08-07 PROCEDURE — 81002 URINALYSIS NONAUTO W/O SCOPE: CPT | Performed by: FAMILY MEDICINE

## 2020-08-07 RX ORDER — DONEPEZIL HYDROCHLORIDE 10 MG/1
10 TABLET, FILM COATED ORAL DAILY
Qty: 30 TAB | Refills: 5 | Status: SHIPPED | OUTPATIENT
Start: 2020-08-07 | End: 2021-03-16

## 2020-08-07 RX ORDER — TRIAMCINOLONE ACETONIDE 40 MG/ML
40 INJECTION, SUSPENSION INTRA-ARTICULAR; INTRAMUSCULAR ONCE
Status: COMPLETED | OUTPATIENT
Start: 2020-08-07 | End: 2020-08-07

## 2020-08-07 RX ORDER — ATORVASTATIN CALCIUM 20 MG/1
20 TABLET, FILM COATED ORAL
Qty: 90 TAB | Refills: 3 | Status: SHIPPED | OUTPATIENT
Start: 2020-08-07 | End: 2020-09-18

## 2020-08-07 RX ORDER — MEMANTINE HYDROCHLORIDE 10 MG/1
10 TABLET ORAL 2 TIMES DAILY
Qty: 60 TAB | Refills: 5 | Status: SHIPPED | OUTPATIENT
Start: 2020-08-07 | End: 2021-03-17

## 2020-08-07 RX ADMIN — TRIAMCINOLONE ACETONIDE 40 MG: 40 INJECTION, SUSPENSION INTRA-ARTICULAR; INTRAMUSCULAR at 15:34

## 2020-08-07 ASSESSMENT — FIBROSIS 4 INDEX: FIB4 SCORE: 1.3

## 2020-08-07 NOTE — PROGRESS NOTES
Subjective:   CC: Hypothyroidism follow-up    HPI:     Thuy Albert is a 70 y.o. female, established patient of the clinic, presents with the following concerns:     Patient has history of Alzheimer's dementia.  She is currently taking memantine and donepezil.  Her symptoms are stable per caregiver.  She does not have behavioral changes.  Caregiver states that she does get angry at times when she does not get her way.  However, for the most part, her behaviors are okay.    She has hypothyroidism, currently taking [place] Thyroid 45 mg daily.  She is not able to tolerate levothyroxine.  Her last thyroid function test in April 2020 was normal.    Her most recent blood test showed worsening total cholesterol and LDL.  Caregiver states that her diet is poor.  She consumes large quantities of junk food and drinks about 3 to 5 cans of soda daily.  Patient is active, but rarely exercises.  She had more weight gain during the pandemic lockdown.  She is extremely resistant to exercise.    Patient has history of bilateral plantar fasciitis.  Status post injections couple months ago.  She has been doing well.  However, her right foot is getting worse.  During office visit today, she initially wanted to get steroid injection of the right foot.  However, she changed her mind again as he states that her symptoms are tolerable.    Patient complains of frequent urination every 30 minutes.  Symptoms are bothersome and affect quality of life.  She sometimes cannot make it to the bathroom. Patient is afraid to get out of the house because of symptoms.  She denies dysuria, hematuria, flank pain, fever, chills.  She drinks 5 cans of Coke per day.  She also drinks lots of water.      Current medicines (including changes today)  Current Outpatient Medications   Medication Sig Dispense Refill   • atorvastatin (LIPITOR) 20 MG Tab Take 1 Tab by mouth every bedtime. 90 Tab 3   • Mirabegron ER 25 MG TABLET SR 24 HR Take 25 mg by mouth  "every day. 90 Tab 1   • donepezil (ARICEPT) 10 MG tablet Take 1 Tab by mouth every day. 30 Tab 5   • memantine (NAMENDA) 10 MG Tab Take 1 Tab by mouth 2 times a day. 60 Tab 5   • ARMOUR THYROID 15 MG Tab TAKE 3 TABLETS BY MOUTH EVERY  Tab 1   • omeprazole (PRILOSEC) 20 MG delayed-release capsule Take 1 Cap by mouth every day. 90 Cap 1     No current facility-administered medications for this visit.      She  has a past medical history of Arthritis, Asthma, Dementia ([facility]), DVT (deep venous thrombosis) ([facility]) (2003), GERD (gastroesophageal reflux disease), and Thyroid disease.    I personally reviewed patient's problem list, allergies, medications, family hx, social hx with patient and update EPIC.     REVIEW OF SYSTEMS:  CONSTITUTIONAL:  Denies night sweats, fatigue, malaise, lethargy, fever or chills.  RESPIRATORY:  Denies cough, wheeze, hemoptysis, or shortness of breath.  CARDIOVASCULAR:  Denies chest pains, palpitations, pedal edema     Objective:     /60 (BP Location: Right arm, Patient Position: Sitting, BP Cuff Size: Adult long)   Pulse (!) 48   Temp 36.6 °C (97.9 °F) (Temporal)   Ht 1.549 m (5' 1\")   Wt 93 kg (205 lb)   SpO2 97%  Body mass index is 38.73 kg/m².    Physical Exam:  Constitutional: awake, alert, in no distress, obese.  Skin: Warm, dry, good turgor, no rashes, bruises, ulcers in visible areas.  Eye: conjunctiva clear, lids neg for edema or lesions.  ENMT: TM and auditory canals wnl. Oral and nasal mucosa wnl. Lips without lesions, good dentition, oropharynx clear.  Neck: Trachea midline, no masses, no thyromegaly. No cervical or supraclavicular lymphadenopathy  Respiratory: Unlabored respiratory effort, lungs clear to auscultation, no wheezes, no rales.  Cardiovascular: Normal S1, S2, no murmur, no pedal edema.  Psych: Oriented x3, affect and mood wnl, intact judgement and insight.       Assessment and Plan:   The following treatment plan was discussed    1. Dementia of the " Alzheimer's type, with early onset, with delusions (HCC)_Dr. Barillas   Chronic, stable, currently working with Dr. Barillas.  However, her appointment has been canceled multiple times.  Patient is running out of medications.  She request refill donepezil and memantine.  She tolerates medication well, no side effect reported.  - donepezil (ARICEPT) 10 MG tablet; Take 1 Tab by mouth every day.  Dispense: 30 Tab; Refill: 5  - memantine (NAMENDA) 10 MG Tab; Take 1 Tab by mouth 2 times a day.  Dispense: 60 Tab; Refill: 5    2. Acquired hypothyroidism  Chronic, controlled with [place] Thyroid 45 mg daily, no s/e reported, will continue.      3. Mixed hyperlipidemia  - atorvastatin (LIPITOR) 20 MG Tab; Take 1 Tab by mouth every bedtime.  Dispense: 90 Tab; Refill: 3  - CBC WITH DIFFERENTIAL; Future  - Comp Metabolic Panel; Future  - Lipid Profile; Future  - recommended dietary modification, exercise, and weight loss.   - avoid alcohol, drugs, tobacco products     4. Obesity (BMI 30-39.9)  - Patient identified as having weight management issue.  Appropriate orders and counseling given.     5. Stage 3 chronic kidney disease (HCC)  Chronic, stable, will monitor.   Discussed hydration and avoidance of nephrotoxic drugs.      6. Plantar fasciitis, bilateral  Patient has history of bilateral plantar fasciitis.  She experienced significant relief after steroid injection couple months ago.  The right foot is getting slightly worse.  She initially wanted to have injection in the right foot.  However, she eventually changed her mind and declined steroid injection.  Unfortunately, MA documented the injection in epic and was not able to delete the order.  -Conservative management recommended and discussed    7. Encounter for screening mammogram for breast cancer  - MA-SCREENING MAMMO BILAT W/CAD; Future    8. OAB (overactive bladder)  History is concerning for overactive bladder.  Urinalysis done in the clinic is negative for  infection.  - POCT Urinalysis  - Mirabegron ER 25 MG TABLET SR 24 HR; Take 25 mg by mouth every day.  Dispense: 90 Tab; Refill: 1    Patient was seen for 40 minutes face to face of which greater than 50% of appointment time was spent on counseling and coordination of care regarding the above        Ly EMILY Uribe M.D.      Followup: Return in about 6 months (around 2/7/2021) for Annual wellness visit.    Please note that this dictation was created using voice recognition software. I have made every reasonable attempt to correct obvious errors, but I expect that there are errors of grammar and possibly content that I did not discover before finalizing the note.

## 2020-09-16 ENCOUNTER — IMMUNIZATION (OUTPATIENT)
Dept: SOCIAL WORK | Facility: CLINIC | Age: 70
End: 2020-09-16
Payer: MEDICARE

## 2020-09-16 DIAGNOSIS — Z23 NEED FOR VACCINATION: ICD-10-CM

## 2020-09-16 PROCEDURE — G0008 ADMIN INFLUENZA VIRUS VAC: HCPCS | Performed by: REGISTERED NURSE

## 2020-09-16 PROCEDURE — 90662 IIV NO PRSV INCREASED AG IM: CPT | Performed by: REGISTERED NURSE

## 2020-09-18 ENCOUNTER — OFFICE VISIT (OUTPATIENT)
Dept: MEDICAL GROUP | Age: 70
End: 2020-09-18
Payer: MEDICARE

## 2020-09-18 VITALS
WEIGHT: 199.08 LBS | HEIGHT: 61 IN | BODY MASS INDEX: 37.59 KG/M2 | DIASTOLIC BLOOD PRESSURE: 56 MMHG | TEMPERATURE: 97.6 F | OXYGEN SATURATION: 97 % | HEART RATE: 56 BPM | SYSTOLIC BLOOD PRESSURE: 112 MMHG

## 2020-09-18 DIAGNOSIS — R53.81 PHYSICAL DECONDITIONING: ICD-10-CM

## 2020-09-18 DIAGNOSIS — N32.81 OAB (OVERACTIVE BLADDER): ICD-10-CM

## 2020-09-18 DIAGNOSIS — M70.62 TROCHANTERIC BURSITIS OF LEFT HIP: ICD-10-CM

## 2020-09-18 DIAGNOSIS — R26.89 SHUFFLING GAIT: Primary | ICD-10-CM

## 2020-09-18 DIAGNOSIS — F02.82 DEMENTIA OF THE ALZHEIMER'S TYPE, WITH EARLY ONSET, WITH DELUSIONS (HCC): ICD-10-CM

## 2020-09-18 DIAGNOSIS — G30.0 DEMENTIA OF THE ALZHEIMER'S TYPE, WITH EARLY ONSET, WITH DELUSIONS (HCC): ICD-10-CM

## 2020-09-18 DIAGNOSIS — Z91.81 AT RISK FOR FALLING: ICD-10-CM

## 2020-09-18 PROCEDURE — 99215 OFFICE O/P EST HI 40 MIN: CPT | Performed by: FAMILY MEDICINE

## 2020-09-18 ASSESSMENT — FIBROSIS 4 INDEX: FIB4 SCORE: 1.3

## 2020-09-20 NOTE — PROGRESS NOTES
Subjective:   CC: gait problems     HPI:     Thuy Albert is a 70 y.o. female, established patient of the clinic, presents with the following concerns:     Pt has hx of Alzheimer's dementia. She is currently taking Memantine 10 mg BID and Donepezil 10 mg qd. She has consistent f/u with Dr. Barillas neuro-ophthalmologist. Her memory is overall stable. She has not behavioral problems except for intermittent bout of rage (but rare) when she is upset. She presents with caregiver who is domestic partner for couple decades to discuss new problems with her gait. Caregiver states that pt is physically deconditioned. She is morbidly obese secondary to sedentary lifestyle. She has bilateral foot pain. Therefore, she does not want to walk due to fear of worsening foot pain. A few days ago, caregiver found her lying near the bathroom at night. Pt denies hx of fall. She told caregiver that she prefers to lie on the floor as it was cooler. However, she is not able to get up on her own. Caregiver had to assist her back to bed. Due to large body habitus, it took almost 1 hour to drag her across the distance of about 2-3 meters. Caregiver also c/o shuffling gait. She takes small steps and is not able to lift her foot off the floor. She is unstable and falls easily. No known familial hx of Parkinson disease.     She has been complaining of focal tenderness at left lateral thigh near the trochanter. Pain is only bothersome if she applies pressure to it or when she lies on the L side. At times, she also c/o the pain while sitting. Her symptoms precede the above-mentioned event and appears unchanged after the event. She has a hard time localize the pain. Neg hx of fall or trauma to the affected area.     She has been suffering OAB. She was prescribed Mirabegron ER 25 mg qd at previous OV, however, caregiver is extremely reluctant giving pt this medication after reading the s/e handout provided by the pharmacy.     Current  "medicines (including changes today)  Current Outpatient Medications   Medication Sig Dispense Refill   • Diclofenac Sodium 1 % Gel Apply to 2-4 gram to affected area 4 times daily 100 g 1   • Mirabegron ER 25 MG TABLET SR 24 HR Take 25 mg by mouth every day. 90 Tab 1   • donepezil (ARICEPT) 10 MG tablet Take 1 Tab by mouth every day. 30 Tab 5   • memantine (NAMENDA) 10 MG Tab Take 1 Tab by mouth 2 times a day. 60 Tab 5   • ARMOUR THYROID 15 MG Tab TAKE 3 TABLETS BY MOUTH EVERY  Tab 1   • omeprazole (PRILOSEC) 20 MG delayed-release capsule Take 1 Cap by mouth every day. 90 Cap 1     No current facility-administered medications for this visit.      She  has a past medical history of Arthritis, Asthma, Dementia ([facility]), DVT (deep venous thrombosis) (formerly [facility]) (2003), GERD (gastroesophageal reflux disease), and Thyroid disease.    I personally reviewed patient's problem list, allergies, medications, family hx, social hx with patient and update EPIC.     REVIEW OF SYSTEMS:  CONSTITUTIONAL:  Denies night sweats, fatigue, malaise, lethargy, fever or chills.  RESPIRATORY:  Denies cough, wheeze, hemoptysis, or shortness of breath.  CARDIOVASCULAR:  Denies chest pains, palpitations, pedal edema     Objective:     /56 (BP Location: Right arm, Patient Position: Sitting, BP Cuff Size: Adult long)   Pulse (!) 56   Temp 36.4 °C (97.6 °F) (Temporal)   Ht 1.549 m (5' 1\")   Wt 90.3 kg (199 lb 1.2 oz)   SpO2 97%  Body mass index is 37.62 kg/m².    Physical Exam:  Constitutional: awake, alert, in no distress.  Skin: Warm, dry, good turgor, no rashes, bruises, ulcers in visible areas.  Eye: conjunctiva clear, lids neg for edema or lesions.  ENMT: TM and auditory canals wnl. Oral and nasal mucosa wnl. Lips without lesions, good dentition, oropharynx clear.  Neck: Trachea midline, no masses, no thyromegaly. No cervical or supraclavicular lymphadenopathy  Respiratory: Unlabored respiratory effort, lungs clear to auscultation, no " wheezes, no rales.  Cardiovascular: Normal S1, S2, no murmur, no pedal edema.  Abdomen: Soft, non-tender to palpation, active BS, no hernia, no hepatosplenomegaly, negative rebound or guarding.   Neuro:  - unable to stand up with arms crossed, shuffling/unstable/slow gait, poor foot clearance, reduced arm swing bilaterally, discontinuous turn, truncal rigidity, trouble with finger tapping on the L, index fingers and thumbs fidgeting at rest, masked faces, L wrist catches at times. No truncal tremor.   MSK:   Focal tenderness to palpation at the L trochanteric bursa, the rest of L hip exam wnl.     Assessment and Plan:   The following treatment plan was discussed    1. Trochanteric bursitis of left hip  - Diclofenac Sodium 1 % Gel; Apply to 2-4 gram to affected area 4 times daily  Dispense: 100 g; Refill: 1  - pt declined any other treatment at this time.     2. Physical deconditioning  - REFERRAL TO PHYSICAL THERAPY Reason for Therapy: Eval/Treat/Report    3. Shuffling gait  Pt displays a number of Parkinsonism symptoms: slow/shuffling gait, truncal rigidity, poor emotional expression, ongoing dementia...   She has been working with Dr. Barillas for dementia. She has f/u appointment with Dr. Barillas in near future.   Caregiver will discuss these symptoms with Dr. Barillas.   - REFERRAL TO PHYSICAL THERAPY Reason for Therapy: Eval/Treat/Report    4. At risk for falling  - REFERRAL TO PHYSICAL THERAPY Reason for Therapy: Eval/Treat/Report    5. OAB (overactive bladder)  - Mirabegron 25 mg qd  - f/u in 3-6 months.     6. Dementia of the Alzheimer's type, with early onset, with delusions ([facility])_Dr. Barillas   Chronic, currently working with Dr. Barillas. She is taking Memantine and Donepezil. No behavioral disturbance reported by caregiver except for intermittent, rare episodes of rage.   - cont Memantine and Donepezil PRN  - f/u with Dr. Barillas as directed.     Patient was seen for 40 minutes face to face of which  greater than 50% of appointment time was spent on counseling and coordination of care regarding the above     Ly EMILY Uribe M.D.      Followup: Return in about 3 months (around 12/18/2020) for Multiple issues.    Please note that this dictation was created using voice recognition software. I have made every reasonable attempt to correct obvious errors, but I expect that there are errors of grammar and possibly content that I did not discover before finalizing the note.

## 2020-12-16 DIAGNOSIS — E03.9 ACQUIRED HYPOTHYROIDISM: Chronic | ICD-10-CM

## 2020-12-16 DIAGNOSIS — K21.9 GASTROESOPHAGEAL REFLUX DISEASE WITHOUT ESOPHAGITIS: ICD-10-CM

## 2020-12-17 RX ORDER — OMEPRAZOLE 20 MG/1
CAPSULE, DELAYED RELEASE ORAL
Qty: 90 CAP | Refills: 1 | Status: SHIPPED | OUTPATIENT
Start: 2020-12-17 | End: 2021-06-14

## 2020-12-17 RX ORDER — THYROID,PORK 15 MG
TABLET ORAL
Qty: 270 TAB | Refills: 1 | Status: SHIPPED | OUTPATIENT
Start: 2020-12-17 | End: 2021-06-16

## 2021-01-15 DIAGNOSIS — Z23 NEED FOR VACCINATION: ICD-10-CM

## 2021-02-04 ENCOUNTER — HOSPITAL ENCOUNTER (OUTPATIENT)
Dept: LAB | Facility: MEDICAL CENTER | Age: 71
End: 2021-02-04
Attending: FAMILY MEDICINE
Payer: MEDICARE

## 2021-02-04 DIAGNOSIS — E78.2 MIXED HYPERLIPIDEMIA: ICD-10-CM

## 2021-02-04 LAB
ALBUMIN SERPL BCP-MCNC: 4.2 G/DL (ref 3.2–4.9)
ALBUMIN/GLOB SERPL: 1.4 G/DL
ALP SERPL-CCNC: 141 U/L (ref 30–99)
ALT SERPL-CCNC: 17 U/L (ref 2–50)
ANION GAP SERPL CALC-SCNC: 10 MMOL/L (ref 7–16)
AST SERPL-CCNC: 22 U/L (ref 12–45)
BASOPHILS # BLD AUTO: 0.5 % (ref 0–1.8)
BASOPHILS # BLD: 0.04 K/UL (ref 0–0.12)
BILIRUB SERPL-MCNC: 0.5 MG/DL (ref 0.1–1.5)
BUN SERPL-MCNC: 15 MG/DL (ref 8–22)
CALCIUM SERPL-MCNC: 9.4 MG/DL (ref 8.5–10.5)
CHLORIDE SERPL-SCNC: 104 MMOL/L (ref 96–112)
CHOLEST SERPL-MCNC: 190 MG/DL (ref 100–199)
CO2 SERPL-SCNC: 26 MMOL/L (ref 20–33)
CREAT SERPL-MCNC: 0.86 MG/DL (ref 0.5–1.4)
EOSINOPHIL # BLD AUTO: 0.1 K/UL (ref 0–0.51)
EOSINOPHIL NFR BLD: 1.1 % (ref 0–6.9)
ERYTHROCYTE [DISTWIDTH] IN BLOOD BY AUTOMATED COUNT: 46.9 FL (ref 35.9–50)
GLOBULIN SER CALC-MCNC: 3 G/DL (ref 1.9–3.5)
GLUCOSE SERPL-MCNC: 103 MG/DL (ref 65–99)
HCT VFR BLD AUTO: 44.5 % (ref 37–47)
HDLC SERPL-MCNC: 36 MG/DL
HGB BLD-MCNC: 13.9 G/DL (ref 12–16)
IMM GRANULOCYTES # BLD AUTO: 0.03 K/UL (ref 0–0.11)
IMM GRANULOCYTES NFR BLD AUTO: 0.3 % (ref 0–0.9)
LDLC SERPL CALC-MCNC: 109 MG/DL
LYMPHOCYTES # BLD AUTO: 3.08 K/UL (ref 1–4.8)
LYMPHOCYTES NFR BLD: 35.2 % (ref 22–41)
MCH RBC QN AUTO: 29.9 PG (ref 27–33)
MCHC RBC AUTO-ENTMCNC: 31.2 G/DL (ref 33.6–35)
MCV RBC AUTO: 95.7 FL (ref 81.4–97.8)
MONOCYTES # BLD AUTO: 0.55 K/UL (ref 0–0.85)
MONOCYTES NFR BLD AUTO: 6.3 % (ref 0–13.4)
NEUTROPHILS # BLD AUTO: 4.96 K/UL (ref 2–7.15)
NEUTROPHILS NFR BLD: 56.6 % (ref 44–72)
NRBC # BLD AUTO: 0 K/UL
NRBC BLD-RTO: 0 /100 WBC
PLATELET # BLD AUTO: 222 K/UL (ref 164–446)
PMV BLD AUTO: 10.3 FL (ref 9–12.9)
POTASSIUM SERPL-SCNC: 3.7 MMOL/L (ref 3.6–5.5)
PROT SERPL-MCNC: 7.2 G/DL (ref 6–8.2)
RBC # BLD AUTO: 4.65 M/UL (ref 4.2–5.4)
SODIUM SERPL-SCNC: 140 MMOL/L (ref 135–145)
TRIGL SERPL-MCNC: 226 MG/DL (ref 0–149)
WBC # BLD AUTO: 8.8 K/UL (ref 4.8–10.8)

## 2021-02-04 PROCEDURE — 80053 COMPREHEN METABOLIC PANEL: CPT

## 2021-02-04 PROCEDURE — 36415 COLL VENOUS BLD VENIPUNCTURE: CPT

## 2021-02-04 PROCEDURE — 80061 LIPID PANEL: CPT

## 2021-02-04 PROCEDURE — 85025 COMPLETE CBC W/AUTO DIFF WBC: CPT

## 2021-02-04 NOTE — PROGRESS NOTES
ESTABLISHED PATIENT PRE-VISIT PLANNING     02/04/21@10:50AM Called & spoke to patient's significant other Robbie Man who is listed in patient's contacts under demographics tab, with permissions to discuss both treatment and billing.     Offered Virtual Visit-declined. Advised OV scheduled Friday, 02/05/21@3:00PM -[address] [place] [address]. Patient's significant other states pt will complete Labs today at [facility].    Patient was contacted to complete PVP.     Note: Patient will not be contacted if there is no indication to call.     1.  Reviewed notes from the last few office visits within the medical group: Yes    2.  If any orders were placed at last visit or intended to be done for this visit (i.e. 6 mos follow-up), do we have Results/Consult Notes?         •  Labs - Labs ordered, NOT completed. Patient advised to complete prior to next appointment.  Note: If patient appointment is for lab review and patient did not complete labs, check with provider if OK to reschedule patient until labs completed.    Patient's significant other states pt will complete Labs today at [facility].         •  Imaging - Imaging ordered, NOT completed. Patient advised to complete prior to next appointment.       •  Referrals - Referral ordered, patient has NOT been seen.     Referral to Physical Therapy ordered on 09/18/20 is Closed Status. Pt did not complete.    3. Is this appointment scheduled as a Hospital Follow-Up? No    4.  Immunizations were updated in Epic using Reconcile Outside Information activity? Yes    5.  Patient is due for the following Health Maintenance Topics:   Health Maintenance Due   Topic Date Due   • COVID-19 Vaccine (1 of 2) 07/30/1966   • IMM DTaP/Tdap/Td Vaccine (1 - Tdap) 07/30/1969   • IMM ZOSTER VACCINES (1 of 2) 07/30/2000   • BONE DENSITY  07/25/2019   • MAMMOGRAM  02/25/2020   • Annual Wellness Visit  10/07/2020       6.  AHA (Pulse[employer]) form printed for Provider? N/A

## 2021-02-05 ENCOUNTER — OFFICE VISIT (OUTPATIENT)
Dept: MEDICAL GROUP | Age: 71
End: 2021-02-05
Payer: MEDICARE

## 2021-02-05 VITALS
HEART RATE: 53 BPM | WEIGHT: 197 LBS | SYSTOLIC BLOOD PRESSURE: 116 MMHG | DIASTOLIC BLOOD PRESSURE: 56 MMHG | BODY MASS INDEX: 37.19 KG/M2 | TEMPERATURE: 98.1 F | OXYGEN SATURATION: 97 % | HEIGHT: 61 IN

## 2021-02-05 DIAGNOSIS — Z00.00 MEDICARE ANNUAL WELLNESS VISIT, SUBSEQUENT: Primary | ICD-10-CM

## 2021-02-05 DIAGNOSIS — G30.0 DEMENTIA OF THE ALZHEIMER'S TYPE, WITH EARLY ONSET, WITH DELUSIONS (HCC): ICD-10-CM

## 2021-02-05 DIAGNOSIS — M51.36 DDD (DEGENERATIVE DISC DISEASE), LUMBAR: ICD-10-CM

## 2021-02-05 DIAGNOSIS — N32.81 OAB (OVERACTIVE BLADDER): ICD-10-CM

## 2021-02-05 DIAGNOSIS — Z12.31 ENCOUNTER FOR SCREENING MAMMOGRAM FOR BREAST CANCER: ICD-10-CM

## 2021-02-05 DIAGNOSIS — J45.20 MILD INTERMITTENT ASTHMA WITHOUT COMPLICATION: ICD-10-CM

## 2021-02-05 DIAGNOSIS — H25.9 AGE-RELATED CATARACT OF BOTH EYES, UNSPECIFIED AGE-RELATED CATARACT TYPE: ICD-10-CM

## 2021-02-05 DIAGNOSIS — F02.82 DEMENTIA OF THE ALZHEIMER'S TYPE, WITH EARLY ONSET, WITH DELUSIONS (HCC): ICD-10-CM

## 2021-02-05 DIAGNOSIS — E03.9 ACQUIRED HYPOTHYROIDISM: Chronic | ICD-10-CM

## 2021-02-05 DIAGNOSIS — E66.9 OBESITY (BMI 30-39.9): ICD-10-CM

## 2021-02-05 DIAGNOSIS — R73.03 PREDIABETES: ICD-10-CM

## 2021-02-05 DIAGNOSIS — N18.31 STAGE 3A CHRONIC KIDNEY DISEASE: ICD-10-CM

## 2021-02-05 DIAGNOSIS — E78.2 MIXED HYPERLIPIDEMIA: ICD-10-CM

## 2021-02-05 DIAGNOSIS — M72.2 PLANTAR FASCIITIS, BILATERAL: ICD-10-CM

## 2021-02-05 PROCEDURE — G0439 PPPS, SUBSEQ VISIT: HCPCS | Performed by: FAMILY MEDICINE

## 2021-02-05 ASSESSMENT — ENCOUNTER SYMPTOMS: GENERAL WELL-BEING: GOOD

## 2021-02-05 ASSESSMENT — FIBROSIS 4 INDEX: FIB4 SCORE: 1.68

## 2021-02-05 ASSESSMENT — ACTIVITIES OF DAILY LIVING (ADL): BATHING_REQUIRES_ASSISTANCE: 1

## 2021-02-05 ASSESSMENT — PATIENT HEALTH QUESTIONNAIRE - PHQ9: CLINICAL INTERPRETATION OF PHQ2 SCORE: 0

## 2021-02-05 NOTE — PROGRESS NOTES
Chief Complaint   Patient presents with   • Annual Exam     AWV         HPI:  Stephanie is a 70 y.o. here for Medicare Annual Wellness Visit    Pt is doing well.  She takes all medication as directed.  She tolerates them well, no side effect reported.  Patient is interested in getting COVID-19 vaccine.  However, she does not have Internet or computer at home.  She does not have my chart account.  Patient needs assistant to schedule the appointment for COVID-19 vaccines.    Patient Active Problem List    Diagnosis Date Noted   • OAB (overactive bladder) 09/18/2020   • Dementia of the Alzheimer's type, with early onset, with delusions ([facility])_Dr. Barillas  10/07/2019   • Obesity (BMI 30-39.9) 10/04/2018   • Plantar fasciitis, bilateral 11/04/2016   • Cataract, mild  07/31/2016   • Prediabetes 07/12/2015   • Mild intermittent asthma without complication_controlled w/o meds  10/28/2014   • DDD (degenerative disc disease), lumbar_rarely symptomatic  06/18/2014   • Stage 3 chronic kidney disease 05/03/2012   • Hypothyroidism 05/03/2012   • Mixed hyperlipidemia 05/03/2012   • GERD (gastroesophageal reflux disease)        Current Outpatient Medications   Medication Sig Dispense Refill   • omeprazole (PRILOSEC) 20 MG delayed-release capsule TAKE 1 CAPSULE BY MOUTH EVERY DAY 90 Cap 1   • ARMOUR THYROID 15 MG Tab TAKE 3 TABLETS BY MOUTH EVERY  Tab 1   • donepezil (ARICEPT) 10 MG tablet Take 1 Tab by mouth every day. 30 Tab 5   • memantine (NAMENDA) 10 MG Tab Take 1 Tab by mouth 2 times a day. 60 Tab 5     No current facility-administered medications for this visit.         Patient is taking medications as noted in medication list.  Current supplements as per medication list.     Allergies: Cephalexin, Nabumetone, Other drug, Synthroid [levothroid], and Tetracycline    Current social contact/activities: pt states none     Is patient current with immunizations? Yes.    She  reports that she quit smoking about 15 years ago. Her  smoking use included cigarettes. She has a 60.00 pack-year smoking history. She has never used smokeless tobacco. She reports that she does not drink alcohol or use drugs.  Counseling given: Not Answered  Comment: QUIT 2006,  USED TO TO SMOKE 2-3 PPD FOR 40 YRS.        DPA/Advanced directive: Patient does not have an Advanced Directive.  A packet and workshop information was given on Advanced Directives.    ROS:    Gait: Uses  hand   Ostomy: No   Other tubes: No   Amputations: No   Chronic oxygen use No   Last eye exam 2 years ago   Wears hearing aids: No   : Denies any urinary leakage during the last 6 months    Screening:    Depression Screening    Little interest or pleasure in doing things?  0 - not at all  Feeling down, depressed, or hopeless? 0 - not at all  Trouble falling or staying asleep, or sleeping too much?     Feeling tired or having little energy?     Poor appetite or overeating?     Feeling bad about yourself - or that you are a failure or have let yourself or your family down?    Trouble concentrating on things, such as reading the newspaper or watching television?    Moving or speaking so slowly that other people could have noticed.  Or the opposite - being so fidgety or restless that you have been moving around a lot more than usual?     Thoughts that you would be better off dead, or of hurting yourself?     Patient Health Questionnaire Score:        If depressive symptoms identified deferred to follow up visit unless specifically addressed in assessment and plan.    Interpretation of PHQ-9 Total Score   Score Severity   1-4 No Depression   5-9 Mild Depression   10-14 Moderate Depression   15-19 Moderately Severe Depression   20-27 Severe Depression      Screening for Cognitive Impairment    Three Minute Recall (river, sarah, finger)  0/3    Draw clock face with all 12 numbers and set the hands to show 10 past 11.  No    If cognitive concerns identified, deferred for follow up unless  specifically addressed in assessment and plan.    Fall Risk Assessment    Has the patient had two or more falls in the last year or any fall with injury in the last year?  No  If fall risk identified, deferred for follow up unless specifically addressed in assessment and plan.      Safety Assessment    Throw rugs on floor.  Yes  Handrails on all stairs.  Yes  Good lighting in all hallways.  Yes  Difficulty hearing.  No  Patient counseled about all safety risks that were identified.    Functional Assessment ADLs    Are there any barriers preventing you from cooking for yourself or meeting nutritional needs?  Yes.    Are there any barriers preventing you from driving safely or obtaining transportation?  Yes.    Are there any barriers preventing you from using a telephone or calling for help?  No.    Are there any barriers preventing you from shopping?  Yes.    Are there any barriers preventing you from taking care of your own finances?  Yes.    Are there any barriers preventing you from managing your medications?    Yes.    Are there any barriers preventing you from showering, bathing or dressing yourself?  Yes. Dressing self    Are you currently engaging in any exercise or physical activity?  No.     What is your perception of your health?  Good.    Health Maintenance Summary                COVID-19 Vaccine Overdue 7/30/1966     MAMMOGRAM Overdue 2/25/2020      Done 2/25/2019 MA-SCREENING MAMMO BILAT W/TOMOSYNTHESIS W/CAD     Patient has more history with this topic...    IMM ZOSTER VACCINES Postponed 7/5/2021 Originally 7/30/2000. Patient Refused    BONE DENSITY Postponed 2/5/2022 Originally 7/25/2019. Patient Refused     Done 7/25/2014 DS-BONE DENSITY STUDY (DEXA)    IMM DTaP/Tdap/Td Vaccine Postponed 2/5/2022 Originally 7/30/1969. Patient Refused    Annual Wellness Visit Next Due 2/6/2022      Done 2/5/2021 Visit Dx: Medicare annual wellness visit, subsequent     Patient has more history with this topic...     "      Patient Care Team:  Lorna Uribe M.D. as PCP - General (Family Medicine)  Isac Kaiser M.D. as Consulting Physician (Phys Med and Rehab)  Michel Barillas D.O. as Consulting Physician (Ophthalmology)  Davian Jenkins M.D. as Consulting Physician (Ophthalmology)      Social History     Tobacco Use   • Smoking status: Former Smoker     Packs/day: 1.50     Years: 40.00     Pack years: 60.00     Types: Cigarettes     Quit date: 1/1/2006     Years since quitting: 15.1   • Smokeless tobacco: Never Used   • Tobacco comment: QUIT 2006,  USED TO TO SMOKE 2-3 PPD FOR 40 YRS.   Substance Use Topics   • Alcohol use: No     Alcohol/week: 0.0 oz   • Drug use: No     Family History   Problem Relation Age of Onset   • Hypertension Sister    • Diabetes Sister    • Heart Disease Sister    • Other Sister         loss vision complete   • Heart Disease Brother 55        MI   • Genetic Disorder Mother    • Stroke Father    • No Known Problems Maternal Grandmother    • No Known Problems Maternal Grandfather    • No Known Problems Paternal Grandmother    • No Known Problems Paternal Grandfather      She  has a past medical history of Arthritis, Asthma, Dementia ([facility]), DVT (deep venous thrombosis) ([facility]) (2003), GERD (gastroesophageal reflux disease), and Thyroid disease.   Past Surgical History:   Procedure Laterality Date   • APPENDECTOMY LAPAROSCOPIC  8/1/2011    Performed by [facility]ILLA at [facility]   • APPENDECTOMY     • CHOLECYSTECTOMY     • OTHER ABDOMINAL SURGERY      ksenia   • TONSILLECTOMY           Exam:     /56 (BP Location: Right arm, Patient Position: Sitting, BP Cuff Size: Adult)   Pulse (!) 53   Temp 36.7 °C (98.1 °F) (Temporal)   Ht 1.549 m (5' 1\")   Wt 89.4 kg (197 lb)   SpO2 97%  Body mass index is 37.22 kg/m².    Hearing excellent.    Dentition good  Alert, oriented in no acute distress.  Eye contact is good, speech goal directed, affect calm      Assessment and Plan. The following " treatment and monitoring plan is recommended:      1. Dementia of the Alzheimer's type, with early onset, with delusions (HCC)_Dr. Eran Roque, diagnosed by neuro-ophthalmologist, currently taking Memantine 10 mg BID. Her memory and cognition is stable according to partner. She lives with her long-term partner who is primary caregiver.   - cont Memantine and f/u with Neuro-ophthalmologist as directed.     2. Obesity (BMI 30-39.9)  - Patient identified as having weight management issue.  Appropriate orders and counseling given.    3. Stage 3a chronic kidney disease  Chronic, stable, will monitor.   Discussed hydration and avoidance of nephrotoxic drugs.    - CBC WITH DIFFERENTIAL; Future  - Comp Metabolic Panel; Future    4. Encounter for screening mammogram for breast cancer  - MA-SCREENING MAMMO BILAT W/CAD; Future    5. Prediabetes  - Dietary/lifestyle modification and weight loss    - HEMOGLOBIN A1C; Future    6. Acquired hypothyroidism  Chronic, taking [place] thyroid 45 mg qd. She tolerates meds well, no s/e reported.   - cont [place] thyroid 45 mg qd.   - TSH; Future  - FREE THYROXINE; Future    7. Mixed hyperlipidemia  Chronic, declined take pharmacotherapy. She has been working on dietary modification to improve this condition. Her most recent labs showed improvement of lipid profile. Pt is active, but does not exercise regularly due to chronic bilateral foot pain and plantar fasciitis.   - Lipid Profile; Future  - dietary modification, exercise, and weight loss.   - avoid alcohol, drugs, tobacco products     8. Age-related cataract of both eyes, unspecified age-related cataract type  Mild, does not need surgical intervention.   No visual changes. F/u with Ophthalmology as directed.     9. DDD (degenerative disc disease), lumbar_rarely symptomatic   Asymptomatic, doing well.   Weight loss, regular exercises   Tylenol PRN     10. Mild intermittent asthma without complication_controlled w/o meds   Mild,  controlled w/o meds.   Denies asthma exacerbation since last OV.   Neg acute respiratory symptoms today.     11. OAB (overactive bladder)  Mild, tolerable. She is not interested in treatment due to concerns of s/e.   Will cont to monitor. Behavioral modification recommended.     12. Plantar fasciitis, bilateral  S/p steroid injections in the past.   Doing very well now, no pain.   -Over-the-counter analgesics PRN for pain  -Night splint  -Weight loss  -Stretching exercise  -Icing, activity modification  -Appropriate footwear  -Heel lift      13. Medicare annual wellness visit, subsequent  - Subsequent Annual Wellness Visit - Includes PPPS ()   - I help pt and her partner setting up [employer] accounts to allow them to schedule appointment for Covid 19 vaccines.     Services suggested: No services needed at this time  Health Care Screening recommendations as per orders if indicated.  Referrals offered: PT/OT/Nutrition counseling/Behavioral Health/Smoking cessation as per orders if indicated.    Discussion today about general wellness and lifestyle habits:    · Prevent falls and reduce trip hazards; Cautioned about securing or removing rugs.  · Have a working fire alarm and carbon monoxide detector;   · Engage in regular physical activity and social activities       Follow-up: Return in about 6 months (around 8/5/2021) for Multiple issues.

## 2021-03-16 DIAGNOSIS — F02.82 DEMENTIA OF THE ALZHEIMER'S TYPE, WITH EARLY ONSET, WITH DELUSIONS (HCC): ICD-10-CM

## 2021-03-16 DIAGNOSIS — G30.0 DEMENTIA OF THE ALZHEIMER'S TYPE, WITH EARLY ONSET, WITH DELUSIONS (HCC): ICD-10-CM

## 2021-03-16 RX ORDER — DONEPEZIL HYDROCHLORIDE 10 MG/1
TABLET, FILM COATED ORAL
Qty: 90 TABLET | Refills: 1 | Status: SHIPPED | OUTPATIENT
Start: 2021-03-16 | End: 2021-09-08

## 2021-03-17 RX ORDER — MEMANTINE HYDROCHLORIDE 10 MG/1
TABLET ORAL
Qty: 180 TABLET | Refills: 1 | Status: SHIPPED | OUTPATIENT
Start: 2021-03-17 | End: 2021-09-16

## 2021-04-14 ENCOUNTER — HOSPITAL ENCOUNTER (OUTPATIENT)
Dept: RADIOLOGY | Facility: MEDICAL CENTER | Age: 71
End: 2021-04-14
Attending: FAMILY MEDICINE
Payer: MEDICARE

## 2021-04-14 DIAGNOSIS — Z12.31 ENCOUNTER FOR SCREENING MAMMOGRAM FOR BREAST CANCER: ICD-10-CM

## 2021-04-14 PROCEDURE — 77063 BREAST TOMOSYNTHESIS BI: CPT

## 2021-06-13 DIAGNOSIS — K21.9 GASTROESOPHAGEAL REFLUX DISEASE WITHOUT ESOPHAGITIS: ICD-10-CM

## 2021-06-15 RX ORDER — OMEPRAZOLE 20 MG/1
CAPSULE, DELAYED RELEASE ORAL
Qty: 90 CAPSULE | Refills: 3 | Status: SHIPPED | OUTPATIENT
Start: 2021-06-15 | End: 2022-02-24 | Stop reason: SDUPTHER

## 2021-06-16 DIAGNOSIS — E03.9 ACQUIRED HYPOTHYROIDISM: Chronic | ICD-10-CM

## 2021-06-16 RX ORDER — THYROID,PORK 15 MG
TABLET ORAL
Qty: 270 TABLET | Refills: 0 | Status: SHIPPED | OUTPATIENT
Start: 2021-06-16 | End: 2021-07-29

## 2021-07-27 ENCOUNTER — HOSPITAL ENCOUNTER (OUTPATIENT)
Dept: LAB | Facility: MEDICAL CENTER | Age: 71
End: 2021-07-27
Attending: FAMILY MEDICINE
Payer: MEDICARE

## 2021-07-27 DIAGNOSIS — R73.03 PREDIABETES: ICD-10-CM

## 2021-07-27 DIAGNOSIS — N18.31 STAGE 3A CHRONIC KIDNEY DISEASE: ICD-10-CM

## 2021-07-27 DIAGNOSIS — E78.2 MIXED HYPERLIPIDEMIA: ICD-10-CM

## 2021-07-27 DIAGNOSIS — E03.9 ACQUIRED HYPOTHYROIDISM: Chronic | ICD-10-CM

## 2021-07-27 LAB
ALBUMIN SERPL BCP-MCNC: 4.2 G/DL (ref 3.2–4.9)
ALBUMIN/GLOB SERPL: 1.4 G/DL
ALP SERPL-CCNC: 150 U/L (ref 30–99)
ALT SERPL-CCNC: 19 U/L (ref 2–50)
ANION GAP SERPL CALC-SCNC: 14 MMOL/L (ref 7–16)
AST SERPL-CCNC: 23 U/L (ref 12–45)
BILIRUB SERPL-MCNC: 0.4 MG/DL (ref 0.1–1.5)
BUN SERPL-MCNC: 17 MG/DL (ref 8–22)
CALCIUM SERPL-MCNC: 9.2 MG/DL (ref 8.5–10.5)
CHLORIDE SERPL-SCNC: 104 MMOL/L (ref 96–112)
CHOLEST SERPL-MCNC: 205 MG/DL (ref 100–199)
CO2 SERPL-SCNC: 24 MMOL/L (ref 20–33)
CREAT SERPL-MCNC: 0.95 MG/DL (ref 0.5–1.4)
FASTING STATUS PATIENT QL REPORTED: NORMAL
GLOBULIN SER CALC-MCNC: 2.9 G/DL (ref 1.9–3.5)
GLUCOSE SERPL-MCNC: 105 MG/DL (ref 65–99)
HDLC SERPL-MCNC: 36 MG/DL
LDLC SERPL CALC-MCNC: 124 MG/DL
POTASSIUM SERPL-SCNC: 4 MMOL/L (ref 3.6–5.5)
PROT SERPL-MCNC: 7.1 G/DL (ref 6–8.2)
SODIUM SERPL-SCNC: 142 MMOL/L (ref 135–145)
T4 FREE SERPL-MCNC: 0.74 NG/DL (ref 0.93–1.7)
TRIGL SERPL-MCNC: 227 MG/DL (ref 0–149)
TSH SERPL DL<=0.005 MIU/L-ACNC: 3.4 UIU/ML (ref 0.38–5.33)

## 2021-07-27 PROCEDURE — 84439 ASSAY OF FREE THYROXINE: CPT

## 2021-07-27 PROCEDURE — 83036 HEMOGLOBIN GLYCOSYLATED A1C: CPT | Mod: GA

## 2021-07-27 PROCEDURE — 85025 COMPLETE CBC W/AUTO DIFF WBC: CPT

## 2021-07-27 PROCEDURE — 80053 COMPREHEN METABOLIC PANEL: CPT

## 2021-07-27 PROCEDURE — 80061 LIPID PANEL: CPT

## 2021-07-27 PROCEDURE — 36415 COLL VENOUS BLD VENIPUNCTURE: CPT

## 2021-07-27 PROCEDURE — 84443 ASSAY THYROID STIM HORMONE: CPT

## 2021-07-28 ENCOUNTER — TELEPHONE (OUTPATIENT)
Dept: MEDICAL GROUP | Age: 71
End: 2021-07-28

## 2021-07-28 LAB
BASOPHILS # BLD AUTO: 0.4 % (ref 0–1.8)
BASOPHILS # BLD: 0.04 K/UL (ref 0–0.12)
EOSINOPHIL # BLD AUTO: 0.09 K/UL (ref 0–0.51)
EOSINOPHIL NFR BLD: 0.9 % (ref 0–6.9)
ERYTHROCYTE [DISTWIDTH] IN BLOOD BY AUTOMATED COUNT: 46.9 FL (ref 35.9–50)
EST. AVERAGE GLUCOSE BLD GHB EST-MCNC: 103 MG/DL
HBA1C MFR BLD: 5.2 % (ref 4–5.6)
HCT VFR BLD AUTO: 44.1 % (ref 37–47)
HGB BLD-MCNC: 13.8 G/DL (ref 12–16)
IMM GRANULOCYTES # BLD AUTO: 0.04 K/UL (ref 0–0.11)
IMM GRANULOCYTES NFR BLD AUTO: 0.4 % (ref 0–0.9)
LYMPHOCYTES # BLD AUTO: 3.67 K/UL (ref 1–4.8)
LYMPHOCYTES NFR BLD: 35.6 % (ref 22–41)
MCH RBC QN AUTO: 29.9 PG (ref 27–33)
MCHC RBC AUTO-ENTMCNC: 31.3 G/DL (ref 33.6–35)
MCV RBC AUTO: 95.7 FL (ref 81.4–97.8)
MONOCYTES # BLD AUTO: 0.64 K/UL (ref 0–0.85)
MONOCYTES NFR BLD AUTO: 6.2 % (ref 0–13.4)
NEUTROPHILS # BLD AUTO: 5.82 K/UL (ref 2–7.15)
NEUTROPHILS NFR BLD: 56.5 % (ref 44–72)
NRBC # BLD AUTO: 0 K/UL
NRBC BLD-RTO: 0 /100 WBC
PLATELET # BLD AUTO: 248 K/UL (ref 164–446)
PMV BLD AUTO: 10.3 FL (ref 9–12.9)
RBC # BLD AUTO: 4.61 M/UL (ref 4.2–5.4)
WBC # BLD AUTO: 10.3 K/UL (ref 4.8–10.8)

## 2021-07-28 NOTE — TELEPHONE ENCOUNTER
ESTABLISHED PATIENT PRE-VISIT PLANNING   Wednesday, 07/28/2021@11:50AM:    Patient was NOT contacted to complete PVP.     Note: Patient will not be contacted if there is no indication to call.     1.  Reviewed notes from the last few office visits within the medical group: Yes    2.  If any orders were placed at last visit or intended to be done for this visit (i.e. 6 mos follow-up), do we have Results/Consult Notes?         •  Labs - Labs ordered, completed on 07/27/2021 and results are in chart.  Note: If patient appointment is for lab review and patient did not complete labs, check with provider if OK to reschedule patient until labs completed.       •  Imaging - Imaging ordered, completed and results are in chart.       •  Referrals - No referrals were ordered at last office visit.    3. Is this appointment scheduled as a Hospital Follow-Up? No    4.  Immunizations were updated in Epic using Reconcile Outside Information activity? NO, Web-[employer] request failed: Registry unable to satisfactorily match patient     5.  Patient is due for the following Health Maintenance Topics:   Health Maintenance Due   Topic Date Due   • IMM ZOSTER VACCINES (1 of 2) Never done       6.  AHA (Pulse[employer]) form printed for Provider? N/A

## 2021-07-29 ENCOUNTER — OFFICE VISIT (OUTPATIENT)
Dept: MEDICAL GROUP | Age: 71
End: 2021-07-29
Payer: MEDICARE

## 2021-07-29 VITALS
DIASTOLIC BLOOD PRESSURE: 60 MMHG | HEIGHT: 61 IN | BODY MASS INDEX: 36.25 KG/M2 | OXYGEN SATURATION: 95 % | SYSTOLIC BLOOD PRESSURE: 102 MMHG | HEART RATE: 60 BPM | TEMPERATURE: 97.9 F | WEIGHT: 192 LBS

## 2021-07-29 DIAGNOSIS — F02.82 DEMENTIA OF THE ALZHEIMER'S TYPE, WITH EARLY ONSET, WITH DELUSIONS (HCC): ICD-10-CM

## 2021-07-29 DIAGNOSIS — G30.0 DEMENTIA OF THE ALZHEIMER'S TYPE, WITH EARLY ONSET, WITH DELUSIONS (HCC): ICD-10-CM

## 2021-07-29 DIAGNOSIS — R73.03 PREDIABETES: ICD-10-CM

## 2021-07-29 DIAGNOSIS — N18.31 STAGE 3A CHRONIC KIDNEY DISEASE: ICD-10-CM

## 2021-07-29 DIAGNOSIS — E03.9 ACQUIRED HYPOTHYROIDISM: Primary | Chronic | ICD-10-CM

## 2021-07-29 DIAGNOSIS — E66.9 OBESITY (BMI 30-39.9): ICD-10-CM

## 2021-07-29 PROBLEM — N32.81 OAB (OVERACTIVE BLADDER): Status: RESOLVED | Noted: 2020-09-18 | Resolved: 2021-07-29

## 2021-07-29 PROCEDURE — 99214 OFFICE O/P EST MOD 30 MIN: CPT | Performed by: FAMILY MEDICINE

## 2021-07-29 RX ORDER — THYROID 60 MG/1
60 TABLET ORAL DAILY
Qty: 90 TABLET | Refills: 1 | Status: SHIPPED | OUTPATIENT
Start: 2021-07-29 | End: 2021-09-30

## 2021-07-29 ASSESSMENT — FIBROSIS 4 INDEX: FIB4 SCORE: 1.49

## 2021-07-29 NOTE — PROGRESS NOTES
"Subjective:   CC: Obesity follow-up    HPI:     Thuy Albert is a 70 y.o. female, established patient of the clinic.     Patient has chronic acquired hypothyroidism, CKD stage III, prediabetes, obesity, and dementia.  Patient is taking all medication as directed.  She continues to follow-up with neurology for management of Alzheimer dementia.  Caregiver, her partner, states that her symptoms are stable.  She has been working on dietary modification for weight loss.  She is able to lose approximately 7 pounds over the past few months by cutting down daily consumption of soft drinks.  She feels well.  She is motivated to lose more weight.    Current medicines (including changes today)  Current Outpatient Medications   Medication Sig Dispense Refill   • thyroid (ARMOUR THYROID) 60 MG Tab Take 1 tablet by mouth every day. 90 tablet 1   • omeprazole (PRILOSEC) 20 MG delayed-release capsule TAKE 1 CAPSULE BY MOUTH EVERY DAY 90 capsule 3   • memantine (NAMENDA) 10 MG Tab TAKE 1 TABLET BY MOUTH TWICE A  tablet 1   • donepezil (ARICEPT) 10 MG tablet TAKE 1 TABLET BY MOUTH EVERY DAY 90 tablet 1     No current facility-administered medications for this visit.     She  has a past medical history of Arthritis, Asthma, Dementia ([facility]), DVT (deep venous thrombosis) ([facility]) (2003), GERD (gastroesophageal reflux disease), and Thyroid disease.    I personally reviewed patient's problem list, allergies, medications, family hx, social hx with patient and update EPIC.     REVIEW OF SYSTEMS:  CONSTITUTIONAL:  Denies night sweats, fatigue, malaise, lethargy, fever or chills.  RESPIRATORY:  Denies cough, wheeze, hemoptysis, or shortness of breath.  CARDIOVASCULAR:  Denies chest pains, palpitations, pedal edema     Objective:     /60 (BP Location: Right arm, Patient Position: Sitting, BP Cuff Size: Adult)   Pulse 60   Temp 36.6 °C (97.9 °F) (Temporal)   Ht 1.549 m (5' 1\")   Wt 87.1 kg (192 lb)   SpO2 95%  Body mass " index is 36.28 kg/m².    Physical Exam:  Constitutional: awake, alert, in no distress.  Skin: Warm, dry, good turgor, no rashes, bruises, ulcers in visible areas.  Eye: conjunctiva clear, lids neg for edema or lesions.  Neck: Trachea midline, no masses, no thyromegaly. No cervical or supraclavicular lymphadenopathy  Respiratory: Unlabored respiratory effort, lungs clear to auscultation, no wheezes, no rales.  Cardiovascular: Normal S1, S2, no murmur, no pedal edema.  Psych: Oriented x3, affect and mood wnl, intact judgement and insight.       Assessment and Plan:   The following treatment plan was discussed    1. Acquired hypothyroidism  Chronic, currently taking 45 mg of [place] thyroid. Her most recent labs showed normal TSH with low T4. Plans:   - thyroid (ARMOUR THYROID) 60 MG Tab; Take 1 tablet by mouth every day.  Dispense: 90 tablet; Refill: 1  - TSH; Future  - FREE THYROXINE; Future    2. Stage 3a chronic kidney disease (HCC)  Chronic, stable, will monitor.   Discussed hydration and avoidance of nephrotoxic drugs.    - CBC WITH DIFFERENTIAL; Future  - Comp Metabolic Panel; Future  - VITAMIN D,25 HYDROXY; Future    3. Prediabetes  Resolved with weight loss and reduction of daily consumption of soft drinks.   - Dietary/lifestyle modification and weight loss    - HEMOGLOBIN A1C; Future    4. Obesity (BMI 30-39.9)  Lost 7 lbs with dietary modification.   Recommended graded increase in physical activity.   Dietary counseling provided.   Will follow up in 6 months.     5. Dementia of the Alzheimer's type, with early onset, with delusions (HCC)_Dr. Eran Montes De Oca, doing well with Memantine and Donepezil.   Follow up with neurology as directed.       Lorna Uribe M.D.      Followup: Return in about 6 months (around 1/29/2022) for Multiple issues.    Please note that this dictation was created using voice recognition software. I have made every reasonable attempt to correct obvious errors, but I expect that there  are errors of grammar and possibly content that I did not discover before finalizing the note.

## 2021-09-08 DIAGNOSIS — F02.82 DEMENTIA OF THE ALZHEIMER'S TYPE, WITH EARLY ONSET, WITH DELUSIONS (HCC): ICD-10-CM

## 2021-09-08 DIAGNOSIS — G30.0 DEMENTIA OF THE ALZHEIMER'S TYPE, WITH EARLY ONSET, WITH DELUSIONS (HCC): ICD-10-CM

## 2021-09-09 RX ORDER — DONEPEZIL HYDROCHLORIDE 10 MG/1
TABLET, FILM COATED ORAL
Qty: 90 TABLET | Refills: 3 | Status: SHIPPED | OUTPATIENT
Start: 2021-09-09 | End: 2022-08-25 | Stop reason: SDUPTHER

## 2021-09-15 DIAGNOSIS — G30.0 DEMENTIA OF THE ALZHEIMER'S TYPE, WITH EARLY ONSET, WITH DELUSIONS (HCC): ICD-10-CM

## 2021-09-15 DIAGNOSIS — F02.82 DEMENTIA OF THE ALZHEIMER'S TYPE, WITH EARLY ONSET, WITH DELUSIONS (HCC): ICD-10-CM

## 2021-09-17 RX ORDER — MEMANTINE HYDROCHLORIDE 10 MG/1
TABLET ORAL
Qty: 180 TABLET | Refills: 3 | Status: SHIPPED | OUTPATIENT
Start: 2021-09-17 | End: 2022-08-25 | Stop reason: SDUPTHER

## 2021-09-28 DIAGNOSIS — L71.9 ROSACEA: ICD-10-CM

## 2021-09-28 DIAGNOSIS — E03.9 ACQUIRED HYPOTHYROIDISM: Chronic | ICD-10-CM

## 2021-09-28 NOTE — TELEPHONE ENCOUNTER
Received request via: Patient    Was the patient seen in the last year in this department? Yes    Does the patient have an active prescription (recently filled or refills available) for medication(s) requested? yes but pt requesting to go back to the brand of thyriod when she was taking 15 mg because the brand that she is currently taking right now is causing her to break out. Pt had this problem in the past. pt requesting refill of cream for break out until new brand of thyroid medication is sent over to pharmacy

## 2021-09-29 RX ORDER — THYROID 60 MG/1
45 TABLET ORAL
Refills: 0 | Status: CANCELLED | OUTPATIENT
Start: 2021-09-29

## 2021-09-30 RX ORDER — THYROID 15 MG/1
60 TABLET ORAL DAILY
Qty: 360 TABLET | Refills: 1 | Status: SHIPPED | OUTPATIENT
Start: 2021-09-30 | End: 2022-02-24 | Stop reason: SDUPTHER

## 2021-09-30 RX ORDER — METRONIDAZOLE 7.5 MG/G
GEL TOPICAL
Qty: 45 G | Refills: 1 | Status: SHIPPED | OUTPATIENT
Start: 2021-09-30 | End: 2022-08-25

## 2021-09-30 NOTE — TELEPHONE ENCOUNTER
"Sanna,   Could you explain to patient that she needs to be on [place] thyroid 60 mg/day. If she is not able to tolerate 60 mg tablets and wants to be on 15 mg tablets, she has to take 4 of them. Rx sent.     I sent prescription for Metronidazole cream to her pharmacy.     The \"break out\" might have nothing to do with [place] thyroid BTW.     She should have repeat labs prior to her next appointment.     Lorna Uribe M.D.     "

## 2021-09-30 NOTE — TELEPHONE ENCOUNTER
Called and spoke to pt's significant other, Art. He is aware of situation with the thyroid medication and skin breakouts and realizes it may not be related. He is agreeable to have Stephanie take 4 tabs of the 15mg armour thyroid daily and a new RX has been sent in to the pharmacy. He will also  the RX for the metronidazole for her breakouts. Encouraged to call and schedule an appt if the breakouts continue after switching medications and using the facial gel. He verbalized understanding to all.

## 2021-12-16 ENCOUNTER — TELEPHONE (OUTPATIENT)
Dept: MEDICAL GROUP | Age: 71
End: 2021-12-16

## 2021-12-16 NOTE — TELEPHONE ENCOUNTER
DOCUMENTATION OF PAR STATUS:    1. Name of Medication & Dose: armour thyroid     2. Name of Prescription Coverage Company & phone #: [place] Medicaid    3. Date Prior Auth Submitted: 12/16/21      4. What information was given to obtain insurance decision? ICD-10    5. Prior Auth Status? Pending    6. Patient Notified: yes

## 2021-12-22 NOTE — TELEPHONE ENCOUNTER
DOCUMENTATION OF PAR STATUS:    1. Name of Medication & Dose: [place] Thyroid     2. Name of Prescription Coverage Company & phone #: [employer]    3. Date Prior Auth Submitted: 12/22/2021    4. What information was given to obtain insurance decision? DRUG;DOSE;NPI    5. Prior Auth Status? Pending    6. Patient Notified: yes

## 2021-12-23 NOTE — TELEPHONE ENCOUNTER
FINAL PRIOR AUTHORIZATION STATUS:    1.  Name of Medication & Dose: armour thryroid     2. Prior Auth Status: Approved through 12/31/22     3. Action Taken: Pharmacy Notified: no Patient Notified: no

## 2022-01-24 ENCOUNTER — HOSPITAL ENCOUNTER (OUTPATIENT)
Dept: LAB | Facility: MEDICAL CENTER | Age: 72
End: 2022-01-24
Attending: FAMILY MEDICINE
Payer: MEDICARE

## 2022-01-24 DIAGNOSIS — R73.03 PREDIABETES: ICD-10-CM

## 2022-01-24 DIAGNOSIS — N18.31 STAGE 3A CHRONIC KIDNEY DISEASE: ICD-10-CM

## 2022-01-24 DIAGNOSIS — E03.9 ACQUIRED HYPOTHYROIDISM: Chronic | ICD-10-CM

## 2022-01-24 LAB
25(OH)D3 SERPL-MCNC: 30 NG/ML (ref 30–100)
ALBUMIN SERPL BCP-MCNC: 4.3 G/DL (ref 3.2–4.9)
ALBUMIN/GLOB SERPL: 1.6 G/DL
ALP SERPL-CCNC: 133 U/L (ref 30–99)
ALT SERPL-CCNC: 20 U/L (ref 2–50)
ANION GAP SERPL CALC-SCNC: 13 MMOL/L (ref 7–16)
AST SERPL-CCNC: 22 U/L (ref 12–45)
BASOPHILS # BLD AUTO: 0.3 % (ref 0–1.8)
BASOPHILS # BLD: 0.03 K/UL (ref 0–0.12)
BILIRUB SERPL-MCNC: 0.4 MG/DL (ref 0.1–1.5)
BUN SERPL-MCNC: 19 MG/DL (ref 8–22)
CALCIUM SERPL-MCNC: 9.5 MG/DL (ref 8.5–10.5)
CHLORIDE SERPL-SCNC: 103 MMOL/L (ref 96–112)
CO2 SERPL-SCNC: 24 MMOL/L (ref 20–33)
CREAT SERPL-MCNC: 1.01 MG/DL (ref 0.5–1.4)
EOSINOPHIL # BLD AUTO: 0.14 K/UL (ref 0–0.51)
EOSINOPHIL NFR BLD: 1.2 % (ref 0–6.9)
ERYTHROCYTE [DISTWIDTH] IN BLOOD BY AUTOMATED COUNT: 44.1 FL (ref 35.9–50)
EST. AVERAGE GLUCOSE BLD GHB EST-MCNC: 114 MG/DL
FASTING STATUS PATIENT QL REPORTED: NORMAL
GLOBULIN SER CALC-MCNC: 2.7 G/DL (ref 1.9–3.5)
GLUCOSE SERPL-MCNC: 90 MG/DL (ref 65–99)
HBA1C MFR BLD: 5.6 % (ref 4–5.6)
HCT VFR BLD AUTO: 41.8 % (ref 37–47)
HGB BLD-MCNC: 13.6 G/DL (ref 12–16)
IMM GRANULOCYTES # BLD AUTO: 0.03 K/UL (ref 0–0.11)
IMM GRANULOCYTES NFR BLD AUTO: 0.3 % (ref 0–0.9)
LYMPHOCYTES # BLD AUTO: 4.75 K/UL (ref 1–4.8)
LYMPHOCYTES NFR BLD: 41.9 % (ref 22–41)
MCH RBC QN AUTO: 30 PG (ref 27–33)
MCHC RBC AUTO-ENTMCNC: 32.5 G/DL (ref 33.6–35)
MCV RBC AUTO: 92.1 FL (ref 81.4–97.8)
MONOCYTES # BLD AUTO: 0.76 K/UL (ref 0–0.85)
MONOCYTES NFR BLD AUTO: 6.7 % (ref 0–13.4)
NEUTROPHILS # BLD AUTO: 5.62 K/UL (ref 2–7.15)
NEUTROPHILS NFR BLD: 49.6 % (ref 44–72)
NRBC # BLD AUTO: 0 K/UL
NRBC BLD-RTO: 0 /100 WBC
PLATELET # BLD AUTO: 224 K/UL (ref 164–446)
PMV BLD AUTO: 9.5 FL (ref 9–12.9)
POTASSIUM SERPL-SCNC: 4 MMOL/L (ref 3.6–5.5)
PROT SERPL-MCNC: 7 G/DL (ref 6–8.2)
RBC # BLD AUTO: 4.54 M/UL (ref 4.2–5.4)
SODIUM SERPL-SCNC: 140 MMOL/L (ref 135–145)
T4 FREE SERPL-MCNC: 0.88 NG/DL (ref 0.93–1.7)
TSH SERPL DL<=0.005 MIU/L-ACNC: 0.31 UIU/ML (ref 0.38–5.33)
WBC # BLD AUTO: 11.3 K/UL (ref 4.8–10.8)

## 2022-01-24 PROCEDURE — 82306 VITAMIN D 25 HYDROXY: CPT

## 2022-01-24 PROCEDURE — 84443 ASSAY THYROID STIM HORMONE: CPT

## 2022-01-24 PROCEDURE — 80053 COMPREHEN METABOLIC PANEL: CPT

## 2022-01-24 PROCEDURE — 85025 COMPLETE CBC W/AUTO DIFF WBC: CPT

## 2022-01-24 PROCEDURE — 84439 ASSAY OF FREE THYROXINE: CPT

## 2022-01-24 PROCEDURE — 83036 HEMOGLOBIN GLYCOSYLATED A1C: CPT | Mod: GA

## 2022-01-24 PROCEDURE — 36415 COLL VENOUS BLD VENIPUNCTURE: CPT

## 2022-02-24 ENCOUNTER — HOSPITAL ENCOUNTER (OUTPATIENT)
Facility: MEDICAL CENTER | Age: 72
End: 2022-02-24
Attending: FAMILY MEDICINE
Payer: MEDICARE

## 2022-02-24 ENCOUNTER — OFFICE VISIT (OUTPATIENT)
Dept: MEDICAL GROUP | Age: 72
End: 2022-02-24
Payer: MEDICARE

## 2022-02-24 VITALS
SYSTOLIC BLOOD PRESSURE: 130 MMHG | WEIGHT: 186 LBS | HEART RATE: 61 BPM | HEIGHT: 61 IN | RESPIRATION RATE: 16 BRPM | BODY MASS INDEX: 35.12 KG/M2 | DIASTOLIC BLOOD PRESSURE: 80 MMHG | TEMPERATURE: 98 F | OXYGEN SATURATION: 94 %

## 2022-02-24 DIAGNOSIS — Z12.11 COLON CANCER SCREENING: ICD-10-CM

## 2022-02-24 DIAGNOSIS — Z12.12 SCREENING FOR COLORECTAL CANCER: ICD-10-CM

## 2022-02-24 DIAGNOSIS — E66.9 OBESITY (BMI 30-39.9): ICD-10-CM

## 2022-02-24 DIAGNOSIS — Z12.11 SCREENING FOR COLORECTAL CANCER: ICD-10-CM

## 2022-02-24 DIAGNOSIS — F02.82 DEMENTIA OF THE ALZHEIMER'S TYPE, WITH EARLY ONSET, WITH DELUSIONS (HCC): ICD-10-CM

## 2022-02-24 DIAGNOSIS — Z78.0 ASYMPTOMATIC POSTMENOPAUSAL STATE: ICD-10-CM

## 2022-02-24 DIAGNOSIS — K21.9 GASTROESOPHAGEAL REFLUX DISEASE WITHOUT ESOPHAGITIS: ICD-10-CM

## 2022-02-24 DIAGNOSIS — E03.9 ACQUIRED HYPOTHYROIDISM: Chronic | ICD-10-CM

## 2022-02-24 DIAGNOSIS — J06.9 UPPER RESPIRATORY TRACT INFECTION, UNSPECIFIED TYPE: ICD-10-CM

## 2022-02-24 DIAGNOSIS — N18.31 STAGE 3A CHRONIC KIDNEY DISEASE: ICD-10-CM

## 2022-02-24 DIAGNOSIS — G30.0 DEMENTIA OF THE ALZHEIMER'S TYPE, WITH EARLY ONSET, WITH DELUSIONS (HCC): ICD-10-CM

## 2022-02-24 DIAGNOSIS — R73.03 PREDIABETES: ICD-10-CM

## 2022-02-24 LAB
FLUAV+FLUBV AG SPEC QL IA: NEGATIVE
INT CON NEG: NEGATIVE
INT CON POS: POSITIVE

## 2022-02-24 PROCEDURE — U0005 INFEC AGEN DETEC AMPLI PROBE: HCPCS

## 2022-02-24 PROCEDURE — 99214 OFFICE O/P EST MOD 30 MIN: CPT | Performed by: FAMILY MEDICINE

## 2022-02-24 PROCEDURE — U0003 INFECTIOUS AGENT DETECTION BY NUCLEIC ACID (DNA OR RNA); SEVERE ACUTE RESPIRATORY SYNDROME CORONAVIRUS 2 (SARS-COV-2) (CORONAVIRUS DISEASE [COVID-19]), AMPLIFIED PROBE TECHNIQUE, MAKING USE OF HIGH THROUGHPUT TECHNOLOGIES AS DESCRIBED BY CMS-2020-01-R: HCPCS

## 2022-02-24 PROCEDURE — 87804 INFLUENZA ASSAY W/OPTIC: CPT | Performed by: FAMILY MEDICINE

## 2022-02-24 RX ORDER — THYROID 15 MG/1
60 TABLET ORAL DAILY
Qty: 360 TABLET | Refills: 0 | Status: SHIPPED | OUTPATIENT
Start: 2022-02-24 | End: 2022-06-10

## 2022-02-24 RX ORDER — OMEPRAZOLE 20 MG/1
20 CAPSULE, DELAYED RELEASE ORAL
Qty: 90 CAPSULE | Refills: 3 | Status: SHIPPED | OUTPATIENT
Start: 2022-02-24 | End: 2022-11-17 | Stop reason: SDUPTHER

## 2022-02-24 ASSESSMENT — PATIENT HEALTH QUESTIONNAIRE - PHQ9: CLINICAL INTERPRETATION OF PHQ2 SCORE: 0

## 2022-02-24 ASSESSMENT — FIBROSIS 4 INDEX: FIB4 SCORE: 1.56

## 2022-02-24 NOTE — PROGRESS NOTES
Subjective:   CC: viral syndrome     HPI:     Thuy Albert is a 71 y.o. female, established patient of the clinic.     Patient has chronic prediabetes, hypothyroidism, GERD, Alzheimer's dementia, CKD stage IIIa, obesity.  She is taking all medication as directed.  She continues to follow-up with her neurologist for management of Alzheimer's dementia.  Her symptoms are stable according to caregiver.  There is no behavioral changes.    Patient was in her normal state of health until approximately 6 days ago when she developed acute cough, congestion, mucus production.  Her temperature was 99 degree initially.  The following day, her temperature was 100.2.  Caregiver has been giving her Tylenol as needed for fever.  Over the course of the past few days, her cough, congestion, mucus production have improved significantly without Tylenol.  She is feeling well.  Currently, she does not have any fever, chills, nausea, vomiting, sore throat, weakness, rash, shortness of breath.  She still has mild lingering cough that is not bothersome. She is vaccinated with Covid 19.     Current medicines (including changes today)  Current Outpatient Medications   Medication Sig Dispense Refill   • thyroid (ARMOUR THYROID) 15 MG Tab Take 4 Tablets by mouth every day. 360 Tablet 0   • omeprazole (PRILOSEC) 20 MG delayed-release capsule Take 1 Capsule by mouth every day. 90 Capsule 3   • metronidazole (METROGEL) 0.75 % gel Apply to face twice daily 45 g 1   • memantine (NAMENDA) 10 MG Tab TAKE 1 TABLET BY MOUTH TWICE A  Tablet 3   • donepezil (ARICEPT) 10 MG tablet TAKE 1 TABLET BY MOUTH EVERY DAY 90 Tablet 3     No current facility-administered medications for this visit.     She  has a past medical history of Arthritis, Asthma, Dementia ([facility]), DVT (deep venous thrombosis) ([facility]) (2003), GERD (gastroesophageal reflux disease), and Thyroid disease.    I reviewed patient's problem list, allergies, medications, family hx,  "social hx with patient and update EPIC.        Objective:     /80 (BP Location: Right arm, Patient Position: Sitting, BP Cuff Size: Adult)   Pulse 61   Temp 36.7 °C (98 °F) (Temporal)   Resp 16   Ht 1.549 m (5' 1\")   Wt 84.4 kg (186 lb)   SpO2 94%  Body mass index is 35.14 kg/m².    Physical Exam:  Constitutional: awake, alert, in no distress.  Skin: Warm, dry, good turgor, no rashes, bruises, ulcers in visible areas.  Eye: conjunctiva clear, lids neg for edema or lesions.  ENMT: TM and auditory canals wnl. Oral and nasal mucosa wnl. Lips without lesions, good dentition, oropharynx clear.  Neck: Trachea midline, no masses, no thyromegaly. No cervical or supraclavicular lymphadenopathy  Respiratory: Unlabored respiratory effort, lungs clear to auscultation, no wheezes, no rales.  Cardiovascular: Normal S1, S2, no murmur, no pedal edema.  Psych: Oriented x3, affect and mood wnl, intact judgement and insight.       Assessment and Plan:   The following treatment plan was discussed    1. Upper respiratory tract infection, unspecified type  Mild upper respiratory symptoms that are resolving with conservative treatment.   She is vaccinated against Covid 19, but not flu vaccine. Patient wants to be tested for Covid and flu.   - conservative treatment recommended  - POCT Influenza A/B: negative   - SARS-CoV-2, PCR (In-House): Collect NP OR nasal swab in VTM; Future    2. Acquired hypothyroidism  Chronic, currently taking Lesage Thyroid 60 mg daily.  Her most recent labs show TSH of 0.31 and free T4 of 0.88.  Patient is taking biotin which could alter thyroid function test.  Patient was advised to have repeat thyroid function test with temporary cessation of biotin for reassessment.  - TSH; Future  - FREE THYROXINE; Future  - thyroid (ARMOUR THYROID) 15 MG Tab; Take 4 Tablets by mouth every day.  Dispense: 360 Tablet; Refill: 0    3. Prediabetes  resolved    4. Gastroesophageal reflux disease without " esophagitis  Chronic, controlled with Omeprazole 20 mg qd, no s/e reported, will continue.    - omeprazole (PRILOSEC) 20 MG delayed-release capsule; Take 1 Capsule by mouth every day.  Dispense: 90 Capsule; Refill: 3    5. Dementia of the Alzheimer's type, with early onset, with delusions (HCC)  Chronic, stable, no behavioral changes.  Continue donepezil 10 mg daily and memantine 10 mg twice daily  Follow-up with neurology as directed.    6. Stage 3a chronic kidney disease (HCC)  Chronic, stable, will monitor.   Discussed hydration and avoidance of nephrotoxic drugs.        7. Colon cancer screening  - COLOGUARD (FIT DNA)    8. Obesity (BMI 30-39.9)  - Patient identified as having weight management issue.  Appropriate orders and counseling given.    9. Asymptomatic postmenopausal state  - DS-BONE DENSITY STUDY (DEXA); Future      Ly EMILY Uribe M.D.      Followup: Return in about 6 months (around 8/24/2022) for Multiple issues.    Please note that this dictation was created using voice recognition software. I have made every reasonable attempt to correct obvious errors, but I expect that there are errors of grammar and possibly content that I did not discover before finalizing the note.

## 2022-02-25 DIAGNOSIS — J06.9 UPPER RESPIRATORY TRACT INFECTION, UNSPECIFIED TYPE: ICD-10-CM

## 2022-02-25 LAB
COVID ORDER STATUS COVID19: NORMAL
SARS-COV-2 RNA RESP QL NAA+PROBE: NOTDETECTED
SPECIMEN SOURCE: NORMAL

## 2022-03-17 ENCOUNTER — APPOINTMENT (OUTPATIENT)
Dept: RADIOLOGY | Facility: MEDICAL CENTER | Age: 72
End: 2022-03-17
Attending: EMERGENCY MEDICINE
Payer: MEDICARE

## 2022-03-17 ENCOUNTER — HOSPITAL ENCOUNTER (EMERGENCY)
Facility: MEDICAL CENTER | Age: 72
End: 2022-03-17
Attending: EMERGENCY MEDICINE
Payer: MEDICARE

## 2022-03-17 VITALS
SYSTOLIC BLOOD PRESSURE: 133 MMHG | HEIGHT: 61 IN | DIASTOLIC BLOOD PRESSURE: 53 MMHG | WEIGHT: 185.19 LBS | BODY MASS INDEX: 34.96 KG/M2 | OXYGEN SATURATION: 98 % | TEMPERATURE: 97.3 F | HEART RATE: 61 BPM | RESPIRATION RATE: 18 BRPM

## 2022-03-17 DIAGNOSIS — R07.9 CHEST PAIN, UNSPECIFIED TYPE: ICD-10-CM

## 2022-03-17 DIAGNOSIS — R10.9 ABDOMINAL PAIN OF MULTIPLE SITES: ICD-10-CM

## 2022-03-17 LAB
ALBUMIN SERPL BCP-MCNC: 4.3 G/DL (ref 3.2–4.9)
ALBUMIN/GLOB SERPL: 1.3 G/DL
ALP SERPL-CCNC: 139 U/L (ref 30–99)
ALT SERPL-CCNC: 20 U/L (ref 2–50)
ANION GAP SERPL CALC-SCNC: 13 MMOL/L (ref 7–16)
AST SERPL-CCNC: 26 U/L (ref 12–45)
BASOPHILS # BLD AUTO: 0.4 % (ref 0–1.8)
BASOPHILS # BLD: 0.04 K/UL (ref 0–0.12)
BILIRUB SERPL-MCNC: 0.5 MG/DL (ref 0.1–1.5)
BUN SERPL-MCNC: 13 MG/DL (ref 8–22)
CALCIUM SERPL-MCNC: 9.7 MG/DL (ref 8.4–10.2)
CHLORIDE SERPL-SCNC: 102 MMOL/L (ref 96–112)
CO2 SERPL-SCNC: 25 MMOL/L (ref 20–33)
CREAT SERPL-MCNC: 1.05 MG/DL (ref 0.5–1.4)
EKG IMPRESSION: NORMAL
EOSINOPHIL # BLD AUTO: 0.11 K/UL (ref 0–0.51)
EOSINOPHIL NFR BLD: 1.2 % (ref 0–6.9)
ERYTHROCYTE [DISTWIDTH] IN BLOOD BY AUTOMATED COUNT: 45.9 FL (ref 35.9–50)
GFR SERPLBLD CREATININE-BSD FMLA CKD-EPI: 57 ML/MIN/1.73 M 2
GLOBULIN SER CALC-MCNC: 3.2 G/DL (ref 1.9–3.5)
GLUCOSE SERPL-MCNC: 109 MG/DL (ref 65–99)
HCT VFR BLD AUTO: 44.1 % (ref 37–47)
HGB BLD-MCNC: 13.9 G/DL (ref 12–16)
IMM GRANULOCYTES # BLD AUTO: 0.02 K/UL (ref 0–0.11)
IMM GRANULOCYTES NFR BLD AUTO: 0.2 % (ref 0–0.9)
LIPASE SERPL-CCNC: 29 U/L (ref 7–58)
LYMPHOCYTES # BLD AUTO: 3.72 K/UL (ref 1–4.8)
LYMPHOCYTES NFR BLD: 40.1 % (ref 22–41)
MCH RBC QN AUTO: 29.1 PG (ref 27–33)
MCHC RBC AUTO-ENTMCNC: 31.5 G/DL (ref 33.6–35)
MCV RBC AUTO: 92.3 FL (ref 81.4–97.8)
MONOCYTES # BLD AUTO: 0.56 K/UL (ref 0–0.85)
MONOCYTES NFR BLD AUTO: 6 % (ref 0–13.4)
NEUTROPHILS # BLD AUTO: 4.82 K/UL (ref 2–7.15)
NEUTROPHILS NFR BLD: 52.1 % (ref 44–72)
NRBC # BLD AUTO: 0 K/UL
NRBC BLD-RTO: 0 /100 WBC
PLATELET # BLD AUTO: 214 K/UL (ref 164–446)
PMV BLD AUTO: 9.2 FL (ref 9–12.9)
POTASSIUM SERPL-SCNC: 3.9 MMOL/L (ref 3.6–5.5)
PROT SERPL-MCNC: 7.5 G/DL (ref 6–8.2)
RBC # BLD AUTO: 4.78 M/UL (ref 4.2–5.4)
SODIUM SERPL-SCNC: 140 MMOL/L (ref 135–145)
TROPONIN T SERPL-MCNC: 9 NG/L (ref 6–19)
WBC # BLD AUTO: 9.3 K/UL (ref 4.8–10.8)

## 2022-03-17 PROCEDURE — 85025 COMPLETE CBC W/AUTO DIFF WBC: CPT

## 2022-03-17 PROCEDURE — 93005 ELECTROCARDIOGRAM TRACING: CPT | Performed by: EMERGENCY MEDICINE

## 2022-03-17 PROCEDURE — 80053 COMPREHEN METABOLIC PANEL: CPT

## 2022-03-17 PROCEDURE — 83690 ASSAY OF LIPASE: CPT

## 2022-03-17 PROCEDURE — 36415 COLL VENOUS BLD VENIPUNCTURE: CPT

## 2022-03-17 PROCEDURE — 99285 EMERGENCY DEPT VISIT HI MDM: CPT

## 2022-03-17 PROCEDURE — 84484 ASSAY OF TROPONIN QUANT: CPT

## 2022-03-17 PROCEDURE — 71045 X-RAY EXAM CHEST 1 VIEW: CPT

## 2022-03-17 ASSESSMENT — FIBROSIS 4 INDEX: FIB4 SCORE: 1.56

## 2022-03-17 ASSESSMENT — PAIN DESCRIPTION - PAIN TYPE: TYPE: ACUTE PAIN

## 2022-03-18 NOTE — ED NOTES
Discharge instructions reviewed with patient & patient's spouse. AVS signed by patient. No PIV present. No new prescriptions. Patient & spouse understand need for follow-up appointment with primary care provider and to return to ED for worsening symptoms. All questions answered at this time. Patient ambulated to exit with belongings & spouse. Patient in stable condition with no signs of distress. Patient agreeable to discharge instructions.

## 2022-03-18 NOTE — ED TRIAGE NOTES
"Pt BIB   C/o L abdomen pain that started around 4 this afternoon  \"out of know ware\"   Pt here now c/o severe L side CP  No Hx of such    EKG now being done in triage   "

## 2022-03-18 NOTE — ED PROVIDER NOTES
ED Provider Note    ED Provider Note    Scribed for Alison Bravo MD by Alison Bravo M.D.. 3/17/2022, 7:51 PM.    Primary care provider: Lorna Uribe M.D.  Means of arrival: Private  History obtained from: Patient and spouse  History limited by: None    CHIEF COMPLAINT  Chief Complaint   Patient presents with   • Abdominal Pain     L side of abdomen pain    no Hx of of this    • Chest Pain     Started about this afternoon around  L L abdomen area that has now moved up into her chest area        HPI  Thuy Albert is a 71 y.o. female who presents to the Emergency Department for evaluation of discomfort to the left upper quadrant of the abdomen as well as the left chest.  This started a few minutes of eating cortes this afternoon.  Pain initially sharp and severe and now essentially resolved, though having been severe for only a few minutes and lasted overall about 2 to 3 hours.  No dyspnea, no nausea, no vomiting.  She notes no diaphoresis, there is no exertional component.  No pleuritic component, pain reproducible on exam localized to the epigastrium and left upper quadrant of the abdomen.  She is a 40 had any similar such symptoms and as such she came to be assessed.  Thankfully she has no history of heart disease or she aware of coronary artery disease in immediate family.    REVIEW OF SYSTEMS  Pertinent positives include anterior abdominal pain to left upper quadrant and left chest after eating; nonproductive cough for the last several weeks. Pertinent negatives include no fever, no dyspnea, no exertional component, no syncope, no diaphoresis, no vomiting.  All other systems reviewed and negative.    PAST MEDICAL HISTORY   has a past medical history of Arthritis, Asthma, Dementia ([facility]), DVT (deep venous thrombosis) ([facility]) (2003), GERD (gastroesophageal reflux disease), and Thyroid disease.    SURGICAL HISTORY   has a past surgical history that includes other abdominal surgery;  "appendectomy laparoscopic (2011); cholecystectomy; appendectomy; and tonsillectomy.    SOCIAL HISTORY  Social History     Tobacco Use   • Smoking status: Former Smoker     Packs/day: 1.50     Years: 40.00     Pack years: 60.00     Types: Cigarettes     Quit date: 2006     Years since quittin.2   • Smokeless tobacco: Never Used   • Tobacco comment: QUIT ,  USED TO TO SMOKE 2-3 PPD FOR 40 YRS.   Vaping Use   • Vaping Use: Never used   Substance Use Topics   • Alcohol use: No     Alcohol/week: 0.0 oz   • Drug use: No      Social History     Substance and Sexual Activity   Drug Use No       FAMILY HISTORY  Family History   Problem Relation Age of Onset   • Hypertension Sister    • Diabetes Sister    • Heart Disease Sister    • Other Sister         loss vision complete   • Heart Disease Brother 55        MI   • Genetic Disorder Mother    • Stroke Father    • No Known Problems Maternal Grandmother    • No Known Problems Maternal Grandfather    • No Known Problems Paternal Grandmother    • No Known Problems Paternal Grandfather        CURRENT MEDICATIONS  Home Medications    **Home medications have not yet been reviewed for this encounter**         ALLERGIES  Allergies   Allergen Reactions   • Cephalexin Rash     rash   • Nabumetone      rash   • Other Drug Swelling     ALL STEROIDS    • Synthroid [Levothroid]      Nausea     • Tetracycline Swelling       PHYSICAL EXAM  VITAL SIGNS: /53   Pulse 61   Temp 36.3 °C (97.3 °F) (Tympanic)   Resp 18   Ht 1.549 m (5' 1\")   Wt 84 kg (185 lb 3 oz)   SpO2 98%   BMI 34.99 kg/m²     General: Alert, no acute distress  Skin: Warm, dry, normal for ethnicity  Head: Normocephalic, atraumatic  Neck: Trachea midline, no tenderness  Eye: PERRL, normal conjunctiva  ENMT: Oral mucosa moist, no pharyngeal erythema or exudate  Cardiovascular: Regular rate and rhythm, No murmur, Normal peripheral perfusion.  Reproducible tenderness left chest, no step-off, no " crepitus.  Respiratory: Lungs CTA, respirations are non-labored, breath sounds are equal  Gastrointestinal: Soft, mild tenderness at the epigastrium and the periumbilical region of the left upper quadrant, no guarding, no rebound, no rigidity.  Bowel sounds are normal active.  Musculoskeletal: No swelling, no deformity  Neurological: Alert and oriented to person, place, time, and situation  Lymphatics: No lymphadenopathy  Psychiatric: Cooperative, appropriate mood & affect      DIAGNOSTIC STUDIES/PROCEDURES    LABS  Results for orders placed or performed during the hospital encounter of 03/17/22   CBC with Differential   Result Value Ref Range    WBC 9.3 4.8 - 10.8 K/uL    RBC 4.78 4.20 - 5.40 M/uL    Hemoglobin 13.9 12.0 - 16.0 g/dL    Hematocrit 44.1 37.0 - 47.0 %    MCV 92.3 81.4 - 97.8 fL    MCH 29.1 27.0 - 33.0 pg    MCHC 31.5 (L) 33.6 - 35.0 g/dL    RDW 45.9 35.9 - 50.0 fL    Platelet Count 214 164 - 446 K/uL    MPV 9.2 9.0 - 12.9 fL    Neutrophils-Polys 52.10 44.00 - 72.00 %    Lymphocytes 40.10 22.00 - 41.00 %    Monocytes 6.00 0.00 - 13.40 %    Eosinophils 1.20 0.00 - 6.90 %    Basophils 0.40 0.00 - 1.80 %    Immature Granulocytes 0.20 0.00 - 0.90 %    Nucleated RBC 0.00 /100 WBC    Neutrophils (Absolute) 4.82 2.00 - 7.15 K/uL    Lymphs (Absolute) 3.72 1.00 - 4.80 K/uL    Monos (Absolute) 0.56 0.00 - 0.85 K/uL    Eos (Absolute) 0.11 0.00 - 0.51 K/uL    Baso (Absolute) 0.04 0.00 - 0.12 K/uL    Immature Granulocytes (abs) 0.02 0.00 - 0.11 K/uL    NRBC (Absolute) 0.00 K/uL   Complete Metabolic Panel (CMP)   Result Value Ref Range    Sodium 140 135 - 145 mmol/L    Potassium 3.9 3.6 - 5.5 mmol/L    Chloride 102 96 - 112 mmol/L    Co2 25 20 - 33 mmol/L    Anion Gap 13.0 7.0 - 16.0    Glucose 109 (H) 65 - 99 mg/dL    Bun 13 8 - 22 mg/dL    Creatinine 1.05 0.50 - 1.40 mg/dL    Calcium 9.7 8.4 - 10.2 mg/dL    AST(SGOT) 26 12 - 45 U/L    ALT(SGPT) 20 2 - 50 U/L    Alkaline Phosphatase 139 (H) 30 - 99 U/L    Total  Bilirubin 0.5 0.1 - 1.5 mg/dL    Albumin 4.3 3.2 - 4.9 g/dL    Total Protein 7.5 6.0 - 8.2 g/dL    Globulin 3.2 1.9 - 3.5 g/dL    A-G Ratio 1.3 g/dL   Troponin   Result Value Ref Range    Troponin T 9 6 - 19 ng/L   LIPASE   Result Value Ref Range    Lipase 29 7 - 58 U/L   ESTIMATED GFR   Result Value Ref Range    GFR (CKD-EPI) 57 (A) >60 mL/min/1.73 m 2   EKG   Result Value Ref Range    Report       [facility] Emergency Dept.    Test Date:  2022  Pt Name:    DARYA SANTOS Department: [facility]  MRN:        [id number]                      Room:       [facility]  Gender:     Female                       Technician: MARYANN  :        1950                   Requested By:ER TRIAGE PROTOCOL  Order #:    [id number]                    Reading MD: MATT MARSH MD    Measurements  Intervals                                Axis  Rate:       55                           P:          37  IL:         165                          QRS:        28  QRSD:       83                           T:          59  QT:         428  QTc:        410    Interpretive Statements  Sinus rhythm  Probable left atrial enlargement  Low voltage, precordial leads  Baseline wander in lead(s) I,II,aVR  Compared to ECG 2018 22:43:16  Low QRS voltage now present  Electronically Signed On 3- 20:54:20 PDT by MATT MARSH MD       All labs reviewed by me.    EKG  12 Lead EKG obtained at 1940 and interpreted by me to show:  Rhythm: Sinus bradycardia  Rate: 55  Axis: Normal  Intervals: Normal  Q Waves: Normal  No diagnostic ST segment elevation    Clinical Impression: Normal EKG  Compared to 2018, no significant change    RADIOLOGY  DX-CHEST-PORTABLE (1 VIEW)   Final Result      No evidence of acute cardiopulmonary process.        The radiologist's interpretation of all radiological studies have been reviewed by me.    COURSE & MEDICAL DECISION MAKING  Pertinent Labs & Imaging  "studies reviewed. (See chart for details)    7:40 PM - Patient seen and examined at bedside.  Ordered cardiac/metabolic to evaluate her symptoms. The differential diagnoses include but are not limited to: ACS, gastritis, pancreatitis, musculoskeletal pain, anxiety    1952: Patient reassessed, updated with thankfully unremarkable work-up.  Her blood pressure is improving without antihypertensive intervention.    Patient Vitals for the past 24 hrs:   BP Temp Temp src Pulse Resp SpO2 Height Weight   03/17/22 1958 133/53 36.3 °C (97.3 °F) Tympanic 61 18 98 % -- --   03/17/22 1719 153/77 36.6 °C (97.9 °F) Temporal (!) 58 18 96 % -- --   03/17/22 1715 -- -- -- -- -- -- 1.549 m (5' 1\") 84 kg (185 lb 3 oz)         Decision Making:  This is a 71 y.o. year old female who presents with pain to the left upper abdomen after eating some cortes and then migration of the pain to her chest.  Thankfully this is self resolved several hours ago.  She is well-appearing and nontoxic.  Pain reproducible on exam.  EKG is nonischemic and troponin is unrevealing.  Heart score is 3, her only real risk is her age.  Doubt ACS as such.  Patient's pain primarily is to the abdomen and she does have some mild tenderness on exam.  She is already status post appendectomy and cholecystectomy, given otherwise unremarkable studies including lipase, transaminases, and renal function and given no leukocytosis is no indication for CT of the abdomen at this time, no surgical abdominal signs noted.  She has no leukocytosis, no fever, no tachycardia, no hypotension, no evidence of sepsis.  No indication for inpatient management at this time.  The patient will return for new or worsening symptoms and is stable at the time of discharge.    Patient has had high blood pressure while in the emergency department, felt likely secondary to medical condition. Counseled patient to monitor blood pressure at home and follow up with primary care physician. "     DISPOSITION:  Patient will be discharged home in stable condition.    FOLLOW UP:  Lorna Uribe M.D.  [address] [place] [address] [address] [place] [id number] [phone]    Schedule an appointment as soon as possible for a visit in 2 days        OUTPATIENT MEDICATIONS:  Discharge Medication List as of 3/17/2022  8:00 PM            FINAL IMPRESSION  1. Chest pain, unspecified type    2. Abdominal pain of multiple sites          IAlison M.D. (Tobin), am scribing for, and in the presence of, Alison Bravo MD.    Electronically signed by: Alison Bravo M.D. (Rubiaibe), 3/17/2022    IAlison MD personally performed the services described in this documentation, as scribed by Alison Bravo M.D. in my presence, and it is both accurate and complete    The note accurately reflects work and decisions made by me.  Alison Bravo M.D.  3/17/2022  8:58 PM

## 2022-04-19 ENCOUNTER — OFFICE VISIT (OUTPATIENT)
Dept: MEDICAL GROUP | Age: 72
End: 2022-04-19
Payer: MEDICARE

## 2022-04-19 VITALS
OXYGEN SATURATION: 97 % | HEART RATE: 50 BPM | HEIGHT: 61 IN | DIASTOLIC BLOOD PRESSURE: 66 MMHG | RESPIRATION RATE: 12 BRPM | BODY MASS INDEX: 34.1 KG/M2 | WEIGHT: 180.6 LBS | SYSTOLIC BLOOD PRESSURE: 118 MMHG | TEMPERATURE: 97.7 F

## 2022-04-19 DIAGNOSIS — R10.32 LLQ PAIN: ICD-10-CM

## 2022-04-19 DIAGNOSIS — Z12.31 ENCOUNTER FOR SCREENING MAMMOGRAM FOR BREAST CANCER: ICD-10-CM

## 2022-04-19 DIAGNOSIS — G30.0 DEMENTIA OF THE ALZHEIMER'S TYPE, WITH EARLY ONSET, WITH DELUSIONS (HCC): ICD-10-CM

## 2022-04-19 DIAGNOSIS — Z09 HOSPITAL DISCHARGE FOLLOW-UP: Primary | ICD-10-CM

## 2022-04-19 DIAGNOSIS — F02.82 DEMENTIA OF THE ALZHEIMER'S TYPE, WITH EARLY ONSET, WITH DELUSIONS (HCC): ICD-10-CM

## 2022-04-19 DIAGNOSIS — K21.9 GASTROESOPHAGEAL REFLUX DISEASE WITHOUT ESOPHAGITIS: ICD-10-CM

## 2022-04-19 PROCEDURE — 99214 OFFICE O/P EST MOD 30 MIN: CPT | Performed by: FAMILY MEDICINE

## 2022-04-19 ASSESSMENT — FIBROSIS 4 INDEX: FIB4 SCORE: 1.93

## 2022-04-19 NOTE — PROGRESS NOTES
Subjective:   CC: hospital discharge follow up     HPI:     Thuy Albert is a 71 y.o. female with history of GERD, dementia, obesity, hypothyroidism.  Patient was in her normal state of health until March 17, 2022 when she developed acute left lower quadrant pain after eating cortes for breakfast.  Pain was so severe that she was transported to ER by her partner.  She does not have any fever, chills, nausea, vomiting, hematochezia, melena.  Patient is status post appendectomy and cholecystectomy.  Work-up done in the ER including CBC, CMP, lipase, troponin, COVID were unremarkable.  Her symptoms resolved spontaneously during the course of work-ups.  Low clinical concern for ACS.  Patient was discharged in stable condition and was advised to follow-up with PCP.  Since discharge, patient did not have any relapse of symptoms.  She continues to do well and takes all medication as directed.  Patient was told that she had high blood pressure during ER visit.  Home blood pressure has been within normal limits after discharge.  Her blood pressure was 118/66 during office visit today.    Patient has been seeing Dr. Barillas for dementia.  She is taking donezepil and memantine which might or might not changes her symptoms according to her partner.  However, she tolerated both medication well, no side effects reported.  She requests referral to new neurologist as Dr. Barillas is retiring.      Current medicines (including changes today)  Current Outpatient Medications   Medication Sig Dispense Refill   • thyroid (ARMOUR THYROID) 15 MG Tab Take 4 Tablets by mouth every day. 360 Tablet 0   • omeprazole (PRILOSEC) 20 MG delayed-release capsule Take 1 Capsule by mouth every day. 90 Capsule 3   • memantine (NAMENDA) 10 MG Tab TAKE 1 TABLET BY MOUTH TWICE A  Tablet 3   • donepezil (ARICEPT) 10 MG tablet TAKE 1 TABLET BY MOUTH EVERY DAY 90 Tablet 3   • metronidazole (METROGEL) 0.75 % gel Apply to face twice daily 45 g  "1     No current facility-administered medications for this visit.     She  has a past medical history of Arthritis, Asthma, Dementia ([facility]), DVT (deep venous thrombosis) ([facility]) (2003), GERD (gastroesophageal reflux disease), and Thyroid disease.    I reviewed patient's problem list, allergies, medications, family hx, social hx with patient and update EPIC.        Objective:     /66   Pulse (!) 50   Temp 36.5 °C (97.7 °F) (Temporal)   Resp 12   Ht 1.549 m (5' 1\")   Wt 81.9 kg (180 lb 9.6 oz)   SpO2 97%  Body mass index is 34.12 kg/m².    Physical Exam:  Constitutional: awake, alert, in no distress.  Skin: Warm, dry, good turgor, no rashes, bruises, ulcers in visible areas.  Eye: conjunctiva clear, lids neg for edema or lesions.  Neck: Trachea midline, no masses, no thyromegaly. No cervical or supraclavicular lymphadenopathy  Respiratory: Unlabored respiratory effort, lungs clear to auscultation, no wheezes, no rales.  Cardiovascular: Normal S1, S2, no murmur, no pedal edema.  Abdomen: Soft, non-tender to palpation, active BS, no hernia, no hepatosplenomegaly, negative rebound or guarding.   Psych: Oriented x3, affect and mood wnl, intact judgement and insight.       Assessment and Plan:   The following treatment plan was discussed    1. LLQ pain  2. Gastroesophageal reflux disease without esophagitis  3. Hospital discharge follow-up  Acute left lower quadrant pain after eating breakfast with cortes.  Patient was seen by ER visit on 3/17/2022. No other GI or systemic symptoms reported.  Work-up was unremarkable.  Low clinical concern for acute intra-abdominal processes, CT abdomen was deferred.  Low ACS concerns per ER note.  Pain resolved during the course of work-up in the ER.  Patient was discharged in stable condition and has not had any further symptoms since.  She continues to do well after discharge.  She is able to tolerate oral hydration and fluids without any problems.  Causes of her symptoms " remains unclear at this point.  I am pleased that she is doing better.  She was advised to continue to monitor symptoms and follow-up as needed.    4. Encounter for screening mammogram for breast cancer  - MA-SCREENING MAMMO BILAT W/CAD; Future    5. Dementia of the Alzheimer's type, with early onset, with delusions (HCC)_Dr. Barillas   Chronic, stable, tolerating donepezil and memantine well.  No behavioral concerns according to her partner.  Patient needs to establish care with new neurologist as her current one is retiring.  - Referral to Neurology      Lonra Uribe M.D.      Followup: Return in about 6 months (around 10/19/2022) for Annual wellness visit.    Please note that this dictation was created using voice recognition software. I have made every reasonable attempt to correct obvious errors, but I expect that there are errors of grammar and possibly content that I did not discover before finalizing the note.

## 2022-04-28 ENCOUNTER — HOSPITAL ENCOUNTER (OUTPATIENT)
Dept: RADIOLOGY | Facility: MEDICAL CENTER | Age: 72
End: 2022-04-28
Attending: FAMILY MEDICINE
Payer: MEDICARE

## 2022-04-28 DIAGNOSIS — Z78.0 ASYMPTOMATIC POSTMENOPAUSAL STATE: ICD-10-CM

## 2022-04-28 DIAGNOSIS — Z12.31 ENCOUNTER FOR SCREENING MAMMOGRAM FOR BREAST CANCER: ICD-10-CM

## 2022-04-28 PROCEDURE — 77080 DXA BONE DENSITY AXIAL: CPT

## 2022-04-28 PROCEDURE — 77063 BREAST TOMOSYNTHESIS BI: CPT

## 2022-04-29 PROBLEM — M85.89 OSTEOPENIA OF MULTIPLE SITES: Status: ACTIVE | Noted: 2022-04-29

## 2022-07-21 ENCOUNTER — OFFICE VISIT (OUTPATIENT)
Dept: NEUROLOGY | Facility: MEDICAL CENTER | Age: 72
End: 2022-07-21
Attending: PSYCHIATRY & NEUROLOGY
Payer: MEDICARE

## 2022-07-21 VITALS
RESPIRATION RATE: 14 BRPM | SYSTOLIC BLOOD PRESSURE: 120 MMHG | OXYGEN SATURATION: 95 % | HEART RATE: 73 BPM | DIASTOLIC BLOOD PRESSURE: 68 MMHG | HEIGHT: 61 IN | BODY MASS INDEX: 32.47 KG/M2 | WEIGHT: 171.96 LBS

## 2022-07-21 DIAGNOSIS — F03.90 NEURODEGENERATIVE DEMENTIA WITHOUT BEHAVIORAL DISTURBANCE (HCC): ICD-10-CM

## 2022-07-21 DIAGNOSIS — F03.B0 MODERATE DEMENTIA WITHOUT BEHAVIORAL DISTURBANCE (HCC): ICD-10-CM

## 2022-07-21 DIAGNOSIS — H54.3 VISUAL LOSS, BOTH EYES: ICD-10-CM

## 2022-07-21 DIAGNOSIS — H54.8 LEGALLY BLIND: ICD-10-CM

## 2022-07-21 PROCEDURE — 99205 OFFICE O/P NEW HI 60 MIN: CPT | Performed by: PSYCHIATRY & NEUROLOGY

## 2022-07-21 PROCEDURE — 99212 OFFICE O/P EST SF 10 MIN: CPT | Performed by: PSYCHIATRY & NEUROLOGY

## 2022-07-21 ASSESSMENT — MONTREAL COGNITIVE ASSESSMENT (MOCA)
10. [FLUENCY] NAME WORDS STARTING WITH DESIGNATED LETTER: 0/1
6. READ LIST OF DIGITS [FORWARD/BACKWARD]: 1/2
WHAT IS THE TOTAL SCORE (OUT OF 30): 1
11. FOR EACH PAIR OF WORDS, WHAT CATEGORY DO THEY BELONG TO (OUT OF 2): 0/2
9. REPEAT EACH SENTENCE: 0/2
7. [VIGILENCE] TAP WHEN HEARING DESIGNATED LETTER: 0/1
WHAT IS THE VERSION OF MOCA ADMINISTERED: 7.1
2. COPY DRAWING: 0/1
ORIENTATION SUBSCORE: 0/6
8. SERIAL SUBTRACTION OF 7S: 0/5
DELAYED RECALL SUBSCORE: 0/5
CATEGORY CUE (IF APPLICABLE): 4
5. MEMORY TRIALS: SECOND TRIAL
ADD 1 POINT IF LESS THAN OR EQUAL TO 12 YR EDUCATION LEVEL: 0

## 2022-07-21 ASSESSMENT — FIBROSIS 4 INDEX: FIB4 SCORE: 1.93

## 2022-07-21 NOTE — LETTER
July 21, 2022        Re:  Thuy Albert   1950         To Whom This Concerns,    Chantell Albert saw me today with her partner Artin attendance.    A recent Neuro Opthalmologic examination done in March 2022 at [facility] supported advanced and severe vision loss and given that note and my own findings today support that Chantell is legally blind in both eyes or involving the visual pathways of both eyes.    She also has a moderate stage of a neurodegenerative dementia which is limiting and precluding her from making reasonable and reliable medical and financial decisions for herself.    Sincerely,                                Kaushal Armenta M.D.  Electronically Signed

## 2022-07-21 NOTE — PROGRESS NOTES
"Reason for Neurology Consult:  Dementia    History of present illness:    Thuy Albert 71 y.o.right handed woman who is here with her  of 28 years. She grew up in [facility] and graduated High School. She went to [employer]. She was a Beautician for many years. She has been  x 2 and is here with her long term partner (Art)    She had been followed over the years by Dr. Barillas (Neuro Opthalmology) and has developed catarcts in both eyes.  His note is in the Media section of the EMR which I reviewed.  (it stated she has a dense left homonymous hemianopia and right superior quadrantanopia due to posterior cortical atrophy with superimposed cataracts.    About or around 6542-6323 the  started to notice short term memory difficulties that has very slowly evolved over the last 6-7 years.  Usually and commonly in the last several years she will forget information told to her within \"seconds to minutes\". She will frequently say something to her  and will repeat the same statements over again several times.    Intellectual ability has slowly progressed over the last 4 years.    Her  has clearly noticed she has problems finishing sentences for well over 1 year now; sometimes the  can not figure out the point Chantell was or is trying to make.    No REM Sleep Behavioral Symptoms in the last 6 months or so.    No involuntary movements have occurred or softening of the voice has occurred in the last 6 months or so.     has to prepare her food and helps her gets dressed.    Her bedroom is directly across from the bathroom and she can easily get lost in these short distances.    No involuntary movements have been noticed in the last 3-6 months.    No significant alcohol or tobacco use in adult life.    No seizure(s) or seizure events have been noticed by Chantell or her  in the recent years.      Patient Active Problem List    Diagnosis Date Noted   • " Osteopenia of multiple sites, advanced 2022   • Dementia of the Alzheimer's type, with early onset, with delusions ([facility])_Dr. Barillas  10/07/2019   • Obesity (BMI 30-39.9) 10/04/2018   • Plantar fasciitis, bilateral 2016   • Cataract, mild  2016   • Prediabetes 2015   • Mild intermittent asthma without complication_controlled w/o meds  10/28/2014   • DDD (degenerative disc disease), lumbar_rarely symptomatic  2014   • Stage 3 chronic kidney disease (HCC) 2012   • Hypothyroidism 2012   • Mixed hyperlipidemia 2012   • GERD (gastroesophageal reflux disease)        Past medical history:   Past Medical History:   Diagnosis Date   • Arthritis    • Asthma    • Dementia ([facility])    • DVT (deep venous thrombosis) ([facility])     DVT LEFT LEG-- STATES SHE HAD TAKEN COUMADIN FOR 8 WEEKS IN .   • GERD (gastroesophageal reflux disease)    • Thyroid disease     hypothyroidism       Past surgical history:   Past Surgical History:   Procedure Laterality Date   • APPENDECTOMY LAPAROSCOPIC  2011    Performed by [facility] QUINT[facility]ILLA at [facility]   • APPENDECTOMY     • CHOLECYSTECTOMY     • OTHER ABDOMINAL SURGERY      ksenia   • TONSILLECTOMY           Social history:   Social History     Socioeconomic History   • Marital status:      Spouse name: Not on file   • Number of children: Not on file   • Years of education: Not on file   • Highest education level: Not on file   Occupational History   • Occupation: other     Comment:  and , 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   Vaping Use   • Vaping Use: Never used   Substance and Sexual Activity   • Alcohol use: No     Alcohol/week: 0.0 oz   •  "Drug use: No   • Sexual activity: Never   Other Topics Concern   • Not on file   Social History Narrative   • Not on file     Social Determinants of Health     Financial Resource Strain: Not on file   Food Insecurity: Not on file   Transportation Needs: Not on file   Physical Activity: Not on file   Stress: Not on file   Social Connections: Not on file   Intimate Partner Violence: Not on file   Housing Stability: Not on file       Family history:   Family History   Problem Relation Age of Onset   • Hypertension Sister    • Diabetes Sister    • Heart Disease Sister    • Other Sister         loss vision complete   • Heart Disease Brother 55        MI   • Genetic Disorder Mother    • Stroke Father    • No Known Problems Maternal Grandmother    • No Known Problems Maternal Grandfather    • No Known Problems Paternal Grandmother    • No Known Problems Paternal Grandfather          Current medications:   Current Outpatient Medications   Medication   • ARMOUR THYROID 15 MG Tab   • omeprazole (PRILOSEC) 20 MG delayed-release capsule   • memantine (NAMENDA) 10 MG Tab   • donepezil (ARICEPT) 10 MG tablet   • metronidazole (METROGEL) 0.75 % gel     No current facility-administered medications for this visit.       Medication Allergy:  Allergies   Allergen Reactions   • Cephalexin Rash     rash   • Nabumetone      rash   • Other Drug Swelling     ALL STEROIDS    • Synthroid [Levothroid]      Nausea     • Tetracycline Swelling           Physical examination:   Vitals:    07/21/22 1619   BP: 120/68   BP Location: Right arm   Patient Position: Sitting   BP Cuff Size: Adult   Pulse: 73   Resp: 14   SpO2: 95%   Weight: 78 kg (171 lb 15.3 oz)   Height: 1.549 m (5' 1\")       Normal cephalic atraumatic.  There is full range of movement around the neck in all directions without restrictions or discrete pain evoked triggers.  No lower extremity edema.      Neurological  Exam:      Oscar Cognitive Assessment (MOCA) Version 7.1    Years " of Education: High School Education.    TOTAL SCORE: under 10 /30  (to be scanned into the MEDIA section in the E.M.R.)    Severe vision loss limits doing several parts of MOCA-> executive function and naming of animals.          Mental status: Awake, alert and fully oriented to person and situation. Normal attention and concentration.  Did not appear/act combative,irritable,anxious,paranoid/delusional or aggressive to or with me.    Speech and language: Speech is fluent without errors and clear.     Follows 3 step motor commands in sequence without significant delay and correctly.    Cranial nerve exam:  II: Pupils are equally round and reactive to light. Visual fields are intact by confrontation.  III, IV, VI: EOMI, no diplopia, no ptosis.  Pupils- 4>3 mm bilaterally  No APD.  Acuity- 20/400-800 bilaterally.  Left sided field cut > unable to count fingers.  At mid vision (looking ahead)- she identified by hand and a pen in front of her vision.  V: Sensation to light touch is normal over V1-3 distributions bilaterally.  .  VII: Facial movements are symmetrical. There is no facial droop. .  VIII: Hearing intact to soft speech and finger rub bilaterally  IX: Palate elevates symmetrically, uvula is midline. Dysarthria is not present.  XI: Shoulder shrug are symmetrical and strong.   XII: Tongue protrudes midline.      Motor exam:  Muscle tone is normal in all 4 limbs. and No abnormal movements appreciated.    Muscle strength:    Neck Flexors/Extensors: 5/5       Right  Left  Deltoid   5/5  5/5      Biceps   5/5  5/5  Triceps  5/5  5/5   Wrist extensors 5/5  5/5  Wrist flexors  5/5  5/5     5/5  5/5  Interossei  5/5  5/5  Thenar (APB)  5/5  5/5   Hip flexors  5/5  5/5  Quadriceps  5/5  5/5    Hamstrings  5/5  5/5  Dorsiflexors  5/5  5/5  Plantarflexors  5/5  5/5  Toe extension  5/5  5/5      Reflexes:       Right  Left  Biceps   2/4  2/4  Triceps  2/4  2/4  Brachioradialis 2/4  2/4  Knee jerk  2/4  2/4  Ankle  jerk  2/4  2/4     Frontal release signs are absent    bilaterally toes are downgoing to plantar stimulation..    Coordination (finger-to-nose, heel/knee/shin, rapid alternating movements) was normal.     There was no ataxia, no tremors, and no dysmetria.     Station and gait.    Slight cautiousness but seems steady overall    No shuffling.    Easily stands up from exam chair without retropulsion,veering,leaning,swaying (to either side).       Labs and Tests:    2022: Vitamin D- 30, A1C% of 5.6; CMP: glucose of 133     NEUROIMAGIN/2018:      FINDINGS:  There is symmetric distribution of activity throughout the brain. There is no evidence of a focal area of increased or decreased activity.     IMPRESSION:     Normal nuclear medicine brain scan.        Brain MRI from 2017    IMPRESSION:     1.  Mild generalized cerebral atrophy. No disproportionate temporal lobe or hippocampal atrophy.  2.  Mild supratentorial white matter disease most consistent with microvascular ischemic change.  3.  Moderate pontine ischemic gliosis.  4.  No evidence of acute cerebral infarction, hemorrhage, or enhancing mass lesion. No imaging evidence of specific neurodegenerative disease diagnosis.             Exam Ended: 17                Impression/Plans/Recommendations:    1.  Moderate to Severe Dementia- Neurodegenerative.    Part of MOCA score was not able to be performed due to severe and chronic vision disturbances (characterized by Dr. Barillas- Neuro Opthamologist in 3/2022).    MOCA score under 10.    FAQ score of 29/30 (Very abnormal).    Global Deterioration Score in the 5 to 6 range.    Posterior Cortical Atrophy is very suspicious at this time.    We had a long conversation about using vs not using cognitive enhancers given the stage/degree of impairments. I do not feel at this point using dual cognitive enhancer Rx is necessary and I have suggested coming off Donepezil and remaining on Memantine 10 mg PO BID (20  mg a day).      2. Visual Disturbance- multi year- legally blind.    Known and prior left hemianopsia     She can see movements and some forms but is 20/400-800 by Snellen Card.    3. Gait Disturbance- mild and chronic;  likely due to cautiousness and limitations due to the degree of vision impairment.    She has not had any falls at home in the last 6 months or so.    There is no evidence for a peripheral neuropathy,myelopathy, polyneuropathy or parkinsonism.    4. At this point I do not feel that Chantell can make consistent and reliable medical decisions for herself (only very simple ones) or related to financial issues.      Plans:    A, After a long discussion today will hold off on additional Brain imaging (another Brain MRI or even doing a FDG PET Scan).    B. I did recommend Chantell get some blood work and Dr. Uribe has already ordered some lab tests and I added a few more tests too (Vitamin B levels).    C. Alzheimer's Connect Form to be filled out for caregiver education.    D. Chantell will taper off Donezepil and continue on Memantine (10 mg PO BID> 20 mg a day).      I have performed  a history and physical exam and a directed /focused  ROS today.    Total time spent today or this patient's care was 65 minutes  and included reviewing  the diagnostic workup to date (such as labs and imaging as well as interpreting such tests relevant to this patient's neurological condition),  reviewing/obtaining separately obtained history (from patient and/or accompanying ffamily member)  for today's neurological problem(s) ,counseling and educating the patient and family member on issues related to cognition/memory and cognitive health factors and documenting  the clinical information in the EMR.    Follow up PRN at this time.        Kaushal Armenta MD  [facility].   [facility]

## 2022-07-22 ENCOUNTER — APPOINTMENT (OUTPATIENT)
Dept: RADIOLOGY | Facility: MEDICAL CENTER | Age: 72
End: 2022-07-22
Attending: EMERGENCY MEDICINE
Payer: MEDICARE

## 2022-07-22 ENCOUNTER — HOSPITAL ENCOUNTER (EMERGENCY)
Facility: MEDICAL CENTER | Age: 72
End: 2022-07-22
Attending: EMERGENCY MEDICINE
Payer: MEDICARE

## 2022-07-22 VITALS
HEART RATE: 52 BPM | HEIGHT: 61 IN | WEIGHT: 171 LBS | RESPIRATION RATE: 16 BRPM | BODY MASS INDEX: 32.28 KG/M2 | OXYGEN SATURATION: 95 % | SYSTOLIC BLOOD PRESSURE: 113 MMHG | DIASTOLIC BLOOD PRESSURE: 53 MMHG | TEMPERATURE: 97.3 F

## 2022-07-22 DIAGNOSIS — S92.515A CLOSED NONDISPLACED FRACTURE OF PROXIMAL PHALANX OF LESSER TOE OF LEFT FOOT, INITIAL ENCOUNTER: ICD-10-CM
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
## 2022-07-22 PROCEDURE — 99285 EMERGENCY DEPT VISIT HI MDM: CPT

## 2022-07-22 PROCEDURE — 72170 X-RAY EXAM OF PELVIS: CPT

## 2022-07-22 PROCEDURE — 96374 THER/PROPH/DIAG INJ IV PUSH: CPT

## 2022-07-22 PROCEDURE — 700111 HCHG RX REV CODE 636 W/ 250 OVERRIDE (IP): Performed by: EMERGENCY MEDICINE

## 2022-07-22 PROCEDURE — 73660 X-RAY EXAM OF TOE(S): CPT | Mod: RT

## 2022-07-22 PROCEDURE — 73552 X-RAY EXAM OF FEMUR 2/>: CPT | Mod: RT

## 2022-07-22 RX ORDER — HYDROCODONE BITARTRATE AND ACETAMINOPHEN 5; 325 MG/1; MG/1
1 TABLET ORAL EVERY 4 HOURS PRN
Qty: 6 TABLET | Refills: 0 | Status: SHIPPED | OUTPATIENT
Start: 2022-07-22 | End: 2022-07-24

## 2022-07-22 RX ADMIN — FENTANYL CITRATE 50 MCG: 50 INJECTION, SOLUTION INTRAMUSCULAR; INTRAVENOUS at 21:47

## 2022-07-22 ASSESSMENT — PAIN DESCRIPTION - PAIN TYPE
TYPE: ACUTE PAIN
TYPE: ACUTE PAIN

## 2022-07-22 ASSESSMENT — FIBROSIS 4 INDEX: FIB4 SCORE: 1.93

## 2022-07-23 NOTE — ED NOTES
Tech and this nurse assist patient in getting dress.      IV discontinued with cathlon intact    Discharge home with instructions, follow up care and rxn reviewed and given to patient with verbal understanding.      Transferred out of the ED via wheel chair

## 2022-07-23 NOTE — ED PROVIDER NOTES
ED Provider Note    CHIEF COMPLAINT  Chief Complaint   Patient presents with   • Fall   • Hip Pain   • Toe Pain     Patient BIB from the [facility] report she had a mechanical ground level fall down one step and now has right hip pain and right toe pain.  No shortening or rotation noted to the right leg. Primary caretaker at bedside with patient. Hx of dementia       HPI  Thuy Albert is a 71 y.o. female who presents to the emergency department after mechanical fall. Past medical history as documented below. Patient is legally blind. Tonight was at the [facility] with her  and unfortunately she fell down a few stairs primarily landing on her buttocks. Now complaining of bilateral hip pain as well as left knee and toe pain. She did not hit her head. No lost conscious. No neck or back pain. No chest abdominal pain. Pain is currently moderate special with any tender movement in joints of the lower extremities.    REVIEW OF SYSTEMS  See HPI for further details. All other systems are negative.     PAST MEDICAL HISTORY   has a past medical history of Arthritis, Asthma, Dementia ([facility]), DVT (deep venous thrombosis) ([facility]) (), GERD (gastroesophageal reflux disease), and Thyroid disease.    SOCIAL HISTORY  Social History     Tobacco Use   • Smoking status: Former Smoker     Packs/day: 1.50     Years: 40.00     Pack years: 60.00     Types: Cigarettes     Quit date: 2006     Years since quittin.5   • Smokeless tobacco: Never Used   • Tobacco comment: QUIT ,  USED TO TO SMOKE 2-3 PPD FOR 40 YRS.   Vaping Use   • Vaping Use: Never used   Substance and Sexual Activity   • Alcohol use: No     Alcohol/week: 0.0 oz   • Drug use: No   • Sexual activity: Never       SURGICAL HISTORY   has a past surgical history that includes other abdominal surgery; appendectomy laparoscopic (2011); cholecystectomy; appendectomy; and tonsillectomy.    CURRENT MEDICATIONS  Home Medications     Reviewed by  "Elena Crane R.N. (Registered Nurse) on 07/22/22 at 2114  Med List Status: Not Addressed   Medication Last Dose Status   ARMOUR THYROID 15 MG Tab  Active   donepezil (ARICEPT) 10 MG tablet  Active   memantine (NAMENDA) 10 MG Tab  Active   metronidazole (METROGEL) 0.75 % gel  Active   omeprazole (PRILOSEC) 20 MG delayed-release capsule  Active                ALLERGIES  Allergies   Allergen Reactions   • Cephalexin Rash     rash   • Nabumetone      rash   • Other Drug Swelling     ALL STEROIDS    • Synthroid [Levothroid]      Nausea     • Tetracycline Swelling       PHYSICAL EXAM  VITAL SIGNS: /53   Pulse (!) 52   Temp 36.3 °C (97.3 °F) (Temporal)   Resp 16   Ht 1.549 m (5' 1\")   Wt 77.6 kg (171 lb)   SpO2 95%   BMI 32.31 kg/m²  @LUDMILA[[id number]::@   Pulse ox interpretation: I interpret this pulse ox as normal.  Constitutional: Alert in no apparent distress.  HENT: No signs of trauma, Bilateral external ears normal, Nose normal.   Eyes: Pupils are equal and reactive, legally blind  Neck: Normal range of motion, No tenderness, Supple  Cardiovascular: Regular rate and rhythm, no murmurs.   Thorax & Lungs: Normal breath sounds, No respiratory distress, No wheezing, No chest tenderness.   Abdomen: Bowel sounds normal, Soft, No tenderness  pelvis: bilateral hip tenderness right greater than left.  Skin: Warm, Dry, No erythema, No rash.   Back: No bony tenderness  Extremities: Intact distal pulses  Musculoskeletal: Good range of motion in all major joints. Left lower extremity: tender left hip as well as left knee and left great toe. Right lower extremity: tender right hip but right knee and ankle are nontender.  Bilateral upper extremities: no traumatic findings are nontender with full range of motion.  Neurologic: Alert , Normal motor function, Normal sensory function, No focal deficits noted.   Psychiatric: Affect normal, Judgment normal, Mood normal.       DIAGNOSTIC STUDIES / " PROCEDURES        RADIOLOGY  DX-FEMUR-2+ RIGHT   Final Result         1.  No acute traumatic bony injury.   2.  Atherosclerosis      DX-PELVIS-1 OR 2 VIEWS   Final Result         1.  No acute traumatic bony injury.   2.  Atherosclerosis      DX-TOE(S) 2+ RIGHT   Final Result         1.  Possible subtle fracture of the neck of the fourth toe proximal phalanx, otherwise no visualized fracture, subluxation, or dislocation appreciated.          In prescribing controlled substances to this patient, I certify that I have obtained and reviewed the medical history of Thuy Albert. I have also made a good jozef effort to obtain applicable records from other providers who have treated the patient and records did not demonstrate any increased risk of substance abuse that would prevent me from prescribing controlled substances.     I have conducted a physical exam and documented it. I have reviewed Ms. Albert’s prescription history as maintained by the [place] Prescription Monitoring Program.     I have assessed the patient’s risk for abuse, dependency, and addiction using the validated Opioid Risk Tool available at https://www.mdcalc.com/vxybnx-ovgw-fnhx-ort-narcotic-abuse.     Given the above, I believe the benefits of controlled substance therapy outweigh the risks. The reasons for prescribing controlled substances include non-narcotic, oral analgesic alternatives have been inadequate for pain control. Accordingly, I have discussed the risk and benefits, treatment plan, and alternative therapies with the patient.     COURSE & MEDICAL DECISION MAKING  Pertinent Labs & Imaging studies reviewed. (See chart for details)  71-year-old female presented emergency room after mechanical fall. Virtually no acute pelvic her hip fracture. She did have a fourth toe fracture. She was placed in a heart sold walking she would was ambulatory with this without significant difficulty. This point will discharged home with ongoing home  care understood by patient and . He is also understanding that if they were completed to cumbersome they can body tape without. They been provided with orthopedic follow-up for outpatient care.  The patient will return for worsening symptoms and is stable at the time of discharge. The patient verbalizes understanding and will comply.    FINAL IMPRESSION  1. Fall, initial encounter    2. Closed nondisplaced fracture of proximal phalanx of lesser toe of left foot, initial encounter            Electronically signed by: Rob Franco M.D., 7/22/2022 10:10 PM

## 2022-07-23 NOTE — ED TRIAGE NOTES
"71 yr old female to room 8B  Chief Complaint   Patient presents with   • Fall   • Hip Pain   • Toe Pain     Patient BIB from the [facility] report she had a mechanical ground level fall down one step and now has right hip pain and right toe pain.  No shortening or rotation noted to the right leg. Primary caretaker at bedside with patient. Hx of dementia     BP (!) 147/58   Pulse (!) 53   Temp 36.2 °C (97.2 °F) (Temporal)   Resp 18   Ht 1.549 m (5' 1\")   Wt 77.6 kg (171 lb)   SpO2 96%   BMI 32.31 kg/m²     "

## 2022-07-23 NOTE — ED NOTES
Walking boot applied and patient ambulated slowly in the hallway with assistance x 2.  Md notified and MD at bedside talking to the caretaker about the plan of care and disposition

## 2022-07-26 ENCOUNTER — TELEPHONE (OUTPATIENT)
Dept: MEDICAL GROUP | Age: 72
End: 2022-07-26
Payer: MEDICARE

## 2022-07-26 NOTE — TELEPHONE ENCOUNTER
Patient should keep appointment with me in August 2022 for routine checkup.  She should have labs done before next office visit.

## 2022-07-26 NOTE — TELEPHONE ENCOUNTER
Phone Number Called: [phone] (home)   Spoke with pt's       Call outcome: Spoke to patient regarding message below.    Message: Forwarded PCP's message

## 2022-07-26 NOTE — TELEPHONE ENCOUNTER
VOICEMAIL  1. Caller Name: pt's                      Call Back Number: [phone]    2. Message: Pt fell down stairs and broke her toe. Was in ED 7/22/22. Pt would like Dr. Uribe to know about it and to see if she needs f/v visit. Should she keep 8/25/22 appt?    3. Patient approves office to leave a detailed voicemail/MyChart message: yes

## 2022-08-21 ENCOUNTER — APPOINTMENT (OUTPATIENT)
Dept: RADIOLOGY | Facility: MEDICAL CENTER | Age: 72
End: 2022-08-21
Attending: EMERGENCY MEDICINE
Payer: MEDICARE

## 2022-08-21 ENCOUNTER — HOSPITAL ENCOUNTER (EMERGENCY)
Facility: MEDICAL CENTER | Age: 72
End: 2022-08-21
Attending: EMERGENCY MEDICINE
Payer: MEDICARE

## 2022-08-21 VITALS
DIASTOLIC BLOOD PRESSURE: 42 MMHG | RESPIRATION RATE: 18 BRPM | HEIGHT: 61 IN | WEIGHT: 166.89 LBS | BODY MASS INDEX: 31.51 KG/M2 | HEART RATE: 52 BPM | SYSTOLIC BLOOD PRESSURE: 111 MMHG | TEMPERATURE: 97.5 F | OXYGEN SATURATION: 96 %

## 2022-08-21 DIAGNOSIS — K30 STOMACH UPSET: ICD-10-CM

## 2022-08-21 LAB
ALBUMIN SERPL BCP-MCNC: 4.2 G/DL (ref 3.2–4.9)
ALBUMIN/GLOB SERPL: 1.4 G/DL
ALP SERPL-CCNC: 127 U/L (ref 30–99)
ALT SERPL-CCNC: 10 U/L (ref 2–50)
ANION GAP SERPL CALC-SCNC: 11 MMOL/L (ref 7–16)
APPEARANCE UR: CLEAR
AST SERPL-CCNC: 14 U/L (ref 12–45)
BASOPHILS # BLD AUTO: 0.4 % (ref 0–1.8)
BASOPHILS # BLD: 0.04 K/UL (ref 0–0.12)
BILIRUB SERPL-MCNC: 0.4 MG/DL (ref 0.1–1.5)
BILIRUB UR QL STRIP.AUTO: NEGATIVE
BUN SERPL-MCNC: 19 MG/DL (ref 8–22)
CALCIUM SERPL-MCNC: 9.2 MG/DL (ref 8.4–10.2)
CHLORIDE SERPL-SCNC: 106 MMOL/L (ref 96–112)
CO2 SERPL-SCNC: 24 MMOL/L (ref 20–33)
COLOR UR: YELLOW
CREAT SERPL-MCNC: 0.71 MG/DL (ref 0.5–1.4)
EKG IMPRESSION: NORMAL
EOSINOPHIL # BLD AUTO: 0.08 K/UL (ref 0–0.51)
EOSINOPHIL NFR BLD: 0.7 % (ref 0–6.9)
ERYTHROCYTE [DISTWIDTH] IN BLOOD BY AUTOMATED COUNT: 42.5 FL (ref 35.9–50)
GFR SERPLBLD CREATININE-BSD FMLA CKD-EPI: 90 ML/MIN/1.73 M 2
GLOBULIN SER CALC-MCNC: 2.9 G/DL (ref 1.9–3.5)
GLUCOSE SERPL-MCNC: 122 MG/DL (ref 65–99)
GLUCOSE UR STRIP.AUTO-MCNC: NEGATIVE MG/DL
HCT VFR BLD AUTO: 40.4 % (ref 37–47)
HGB BLD-MCNC: 13.1 G/DL (ref 12–16)
IMM GRANULOCYTES # BLD AUTO: 0.05 K/UL (ref 0–0.11)
IMM GRANULOCYTES NFR BLD AUTO: 0.5 % (ref 0–0.9)
KETONES UR STRIP.AUTO-MCNC: ABNORMAL MG/DL
LEUKOCYTE ESTERASE UR QL STRIP.AUTO: NEGATIVE
LIPASE SERPL-CCNC: 30 U/L (ref 7–58)
LYMPHOCYTES # BLD AUTO: 2.4 K/UL (ref 1–4.8)
LYMPHOCYTES NFR BLD: 22.1 % (ref 22–41)
MCH RBC QN AUTO: 29.1 PG (ref 27–33)
MCHC RBC AUTO-ENTMCNC: 32.4 G/DL (ref 33.6–35)
MCV RBC AUTO: 89.8 FL (ref 81.4–97.8)
MICRO URNS: ABNORMAL
MONOCYTES # BLD AUTO: 0.73 K/UL (ref 0–0.85)
MONOCYTES NFR BLD AUTO: 6.7 % (ref 0–13.4)
NEUTROPHILS # BLD AUTO: 7.57 K/UL (ref 2–7.15)
NEUTROPHILS NFR BLD: 69.6 % (ref 44–72)
NITRITE UR QL STRIP.AUTO: NEGATIVE
NRBC # BLD AUTO: 0 K/UL
NRBC BLD-RTO: 0 /100 WBC
PH UR STRIP.AUTO: 5 [PH] (ref 5–8)
PLATELET # BLD AUTO: 207 K/UL (ref 164–446)
PMV BLD AUTO: 9 FL (ref 9–12.9)
POTASSIUM SERPL-SCNC: 3.5 MMOL/L (ref 3.6–5.5)
PROT SERPL-MCNC: 7.1 G/DL (ref 6–8.2)
PROT UR QL STRIP: NEGATIVE MG/DL
RBC # BLD AUTO: 4.5 M/UL (ref 4.2–5.4)
RBC UR QL AUTO: NEGATIVE
SODIUM SERPL-SCNC: 141 MMOL/L (ref 135–145)
SP GR UR STRIP.AUTO: >=1.03
TROPONIN T SERPL-MCNC: 7 NG/L (ref 6–19)
WBC # BLD AUTO: 10.9 K/UL (ref 4.8–10.8)

## 2022-08-21 PROCEDURE — 700102 HCHG RX REV CODE 250 W/ 637 OVERRIDE(OP): Performed by: EMERGENCY MEDICINE

## 2022-08-21 PROCEDURE — 85025 COMPLETE CBC W/AUTO DIFF WBC: CPT

## 2022-08-21 PROCEDURE — 71045 X-RAY EXAM CHEST 1 VIEW: CPT

## 2022-08-21 PROCEDURE — 76705 ECHO EXAM OF ABDOMEN: CPT

## 2022-08-21 PROCEDURE — 80053 COMPREHEN METABOLIC PANEL: CPT

## 2022-08-21 PROCEDURE — 93005 ELECTROCARDIOGRAM TRACING: CPT | Performed by: EMERGENCY MEDICINE

## 2022-08-21 PROCEDURE — 36415 COLL VENOUS BLD VENIPUNCTURE: CPT

## 2022-08-21 PROCEDURE — 700105 HCHG RX REV CODE 258: Performed by: EMERGENCY MEDICINE

## 2022-08-21 PROCEDURE — 84484 ASSAY OF TROPONIN QUANT: CPT

## 2022-08-21 PROCEDURE — A9270 NON-COVERED ITEM OR SERVICE: HCPCS | Performed by: EMERGENCY MEDICINE

## 2022-08-21 PROCEDURE — 99285 EMERGENCY DEPT VISIT HI MDM: CPT

## 2022-08-21 PROCEDURE — 83690 ASSAY OF LIPASE: CPT

## 2022-08-21 PROCEDURE — 81003 URINALYSIS AUTO W/O SCOPE: CPT

## 2022-08-21 RX ORDER — SODIUM CHLORIDE 9 MG/ML
500 INJECTION, SOLUTION INTRAVENOUS ONCE
Status: COMPLETED | OUTPATIENT
Start: 2022-08-21 | End: 2022-08-21

## 2022-08-21 RX ADMIN — LIDOCAINE HYDROCHLORIDE 30 ML: 20 SOLUTION OROPHARYNGEAL at 18:30

## 2022-08-21 RX ADMIN — SODIUM CHLORIDE 500 ML: 9 INJECTION, SOLUTION INTRAVENOUS at 18:30

## 2022-08-21 ASSESSMENT — FIBROSIS 4 INDEX: FIB4 SCORE: 1.96

## 2022-08-22 NOTE — ED NOTES
Patient discharged home in stable condition with significant other  AVS provided with recommended follow up and home care instructions and education informatione  No prescriptions provided at this time  Patient's significant other verbalized understanding  Ambulatory to wheelchair at time of discharge

## 2022-08-22 NOTE — ED PROVIDER NOTES
"ED Provider Note    Scribed for Yao Blackmon M.D. by Kadeem Aragon. 2022,  6:03 PM.    CHIEF COMPLAINT  Chief Complaint   Patient presents with    Dizziness     Intermittent X 4 d    Nausea     X 4 d Emesis x 1    Chest Pain     Developed  after eating toast  approx 1 hr ago \" heart burn \"       HPI  Thuy Albert is a 72 y.o. female who presents to the Emergency Department for evaluation of headaches onset 2 days ago. Today patient was very nauseous and had chest pain, prompting her to present to the ED. She describes this chest pain as \"burning\". Patient has had heart burn in the past, but not for several years. She is experiencing associated dizziness, nausea, vomiting (one episode), and mild abdominal pain. She denies any fevers. She states that her headache is still present, but is not very painful at this time. She adds that she feels tired right now. Patient has been treated with Pepto Bismol with no relief. She denies any recent falls. She reports a history of a neurological condition that inhibits her vision.     REVIEW OF SYSTEMS  See HPI for further details. All other systems are negative.     PAST MEDICAL HISTORY   has a past medical history of Arthritis, Asthma, Dementia ([facility]), DVT (deep venous thrombosis) ([facility]) (), GERD (gastroesophageal reflux disease), and Thyroid disease.    SOCIAL HISTORY  Social History     Tobacco Use    Smoking status: Former     Packs/day: 1.50     Years: 40.00     Pack years: 60.00     Types: Cigarettes     Quit date: 2006     Years since quittin.6    Smokeless tobacco: Never    Tobacco comments:     QUIT ,  USED TO TO SMOKE 2-3 PPD FOR 40 YRS.   Vaping Use    Vaping Use: Never used   Substance and Sexual Activity    Alcohol use: No     Alcohol/week: 0.0 oz    Drug use: No    Sexual activity: Never     Social History     Substance and Sexual Activity   Drug Use No       SURGICAL HISTORY   has a past surgical history that includes other " "abdominal surgery; appendectomy laparoscopic (8/1/2011); cholecystectomy; appendectomy; and tonsillectomy.    CURRENT MEDICATIONS  Home Medications    **Home medications have not yet been reviewed for this encounter**         ALLERGIES  Allergies   Allergen Reactions    Cephalexin Rash     rash    Nabumetone      rash    Other Drug Swelling     ALL STEROIDS     Synthroid [Levothroid]      Nausea      Tetracycline Swelling       PHYSICAL EXAM  VITAL SIGNS: /75   Pulse (!) 59   Temp 36.4 °C (97.5 °F) (Temporal)   Resp 14   Ht 1.549 m (5' 1\")   Wt 75.7 kg (166 lb 14.2 oz)   SpO2 92%   BMI 31.53 kg/m²   Pulse ox interpretation: I interpret this pulse ox as normal.  Constitutional: Alert in no apparent distress.  HENT: No signs of trauma, Bilateral external ears normal, Nose normal.   Eyes: Conjunctiva normal, Non-icteric.   Neck: Normal range of motion, Supple, No stridor.   Lymphatic: No lymphadenopathy noted.   Cardiovascular: Regular rate and rhythm, no murmurs.   Thorax & Lungs: Normal breath sounds, No respiratory distress, No wheezing, No chest tenderness.   Abdomen: Bowel sounds normal, Soft, No tenderness, No masses, No pulsatile masses. No peritoneal signs.  Skin: Warm, Dry, No erythema, No rash.   Back: No midline bony tenderness.   Extremities: Intact distal pulses, No edema, No cyanosis.  Musculoskeletal: Good range of motion in all major joints. No or major deformities noted.   Neurologic: Alert , Normal motor function, Normal sensory function, No focal deficits noted.   Psychiatric: Affect normal, Judgment normal, Mood normal.     DIAGNOSTIC STUDIES / PROCEDURES      EKG:  Results for orders placed or performed during the hospital encounter of 08/21/22   EKG   Result Value Ref Range    Report       [facility] Emergency Dept.    Test Date:  2022-08-21  Pt Name:    DARYA SANTOS Department: [facility]  MRN:        [id number]                      Room:  Gender:     " Female                       Technician: MANJULA  :        1950                   Requested By:ER TRIAGE PROTOCOL  Order #:    [id number]                    Reading MD:    Measurements  Intervals                                Axis  Rate:       53                           P:          43  NY:         168                          QRS:        29  QRSD:       88                           T:          64  QT:         457  QTc:        430    Interpretive Statements  Sinus bradycardia  Compared to ECG 2022 17:25:08  Sinus rhythm no longer present           LABS  Labs Reviewed   CBC WITH DIFFERENTIAL - Abnormal; Notable for the following components:       Result Value    WBC 10.9 (*)     MCHC 32.4 (*)     Neutrophils (Absolute) 7.57 (*)     All other components within normal limits   COMP METABOLIC PANEL - Abnormal; Notable for the following components:    Potassium 3.5 (*)     Glucose 122 (*)     Alkaline Phosphatase 127 (*)     All other components within normal limits   URINALYSIS - Abnormal; Notable for the following components:    Ketones Trace (*)     All other components within normal limits   TROPONIN   LIPASE   ESTIMATED GFR     All labs reviewed by me.    RADIOLOGY  US-RUQ   Final Result      1.  Prominence of the common duct may represent ectasia the setting of prior cholecystectomy.   2.  Heterogeneous and echogenic appearance of the liver can be seen in hepatic steatosis or hepatocellular disease.   3.  Atherosclerotic plaque.   4.  Small right renal cyst.         DX-CHEST-PORTABLE (1 VIEW)   Final Result      1.  No acute cardiopulmonary process is seen.   2.  Atherosclerotic plaque.        The radiologist's interpretation of all radiological studies have been reviewed by me.    COURSE & MEDICAL DECISION MAKING  Nursing notes, VS, PMSFHx reviewed in chart.     6:03 PM Patient seen and examined at bedside. Broad differential diagnosis for vague symptoms including UTI, viral stomach illness, bacterial  abdominal infection, pneumonia, arhythmia, dehydration, and electrolyte abnormality. There is no reason to suspect stroke upon bedside evaluation. Ordered for EKG, Troponin, CMP, CBC w/ diff., Lipase, and DX-Chest to evaluate. Patient will be treated with Bolus 500 mL and GI Cocktail 30 mL for her symptoms.     6:27 PM ultrasound added based on alk phos elevation, stomach upset, extremely subtle leukocytosis, in the setting of somewhat vague/generalized symptoms in an older female with memory difficulties which somewhat compromises her history and review of systems.  Need to exclude cholecystitis.    8:36 PM - Patient was reevaluated at bedside. Discussed lab and radiology results with the patient and informed them that their lab work came back clear. I discussed my plan for discharge with patient and family member. Patient is comfortable with discharge at this time.      The patient will return for new or worsening symptoms and is stable at the time of discharge.    DISPOSITION:  Patient will be discharged home in stable condition.    FOLLOW UP:  Lorna Uribe M.D.  [address] [place] [address] Dr [place] [id number] [phone]    Schedule an appointment as soon as possible for a visit       [facility], Emergency Dept  [address]  [place] [address] [phone]    If symptoms worsen         FINAL IMPRESSION  1. Stomach upset         Kadeem ANGELES (Scribe), am scribing for, and in the presence of, Yao Blackmon M.D..    Electronically signed by: Kadeem Aragon (Scribe), 8/21/2022    Yao ANGELES M.D. personally performed the services described in this documentation, as scribed by Kadeem Aragon in my presence, and it is both accurate and complete.    The note accurately reflects work and decisions made by me.  Yao Blackmon M.D.  8/22/2022  5:48 PM

## 2022-08-22 NOTE — DISCHARGE INSTRUCTIONS
Your tests here were reassuring.  Your EKG, chest x-ray, abdominal ultrasound, and blood and urine tests did not show signs of infection or dangerous cause of your symptoms.  Stick to a bland diet for the next few days, avoiding fatty and spicy foods, caffeine, alcohol, soda, and focus on hydrating with clear fluids.  Continue all your home medications.  Return here if you are getting worse instead of gradually better.

## 2022-08-23 ENCOUNTER — HOSPITAL ENCOUNTER (OUTPATIENT)
Dept: LAB | Facility: MEDICAL CENTER | Age: 72
End: 2022-08-23
Attending: PSYCHIATRY & NEUROLOGY
Payer: MEDICARE

## 2022-08-23 ENCOUNTER — HOSPITAL ENCOUNTER (OUTPATIENT)
Dept: LAB | Facility: MEDICAL CENTER | Age: 72
End: 2022-08-23
Attending: FAMILY MEDICINE
Payer: MEDICARE

## 2022-08-23 DIAGNOSIS — E78.2 MIXED HYPERLIPIDEMIA: ICD-10-CM

## 2022-08-23 DIAGNOSIS — N18.31 STAGE 3A CHRONIC KIDNEY DISEASE: ICD-10-CM

## 2022-08-23 DIAGNOSIS — E03.9 ACQUIRED HYPOTHYROIDISM: Chronic | ICD-10-CM

## 2022-08-23 DIAGNOSIS — R73.03 PREDIABETES: ICD-10-CM

## 2022-08-23 DIAGNOSIS — F03.B0 MODERATE DEMENTIA WITHOUT BEHAVIORAL DISTURBANCE (HCC): ICD-10-CM

## 2022-08-23 LAB
25(OH)D3 SERPL-MCNC: 45 NG/ML (ref 30–100)
ALBUMIN SERPL BCP-MCNC: 4.1 G/DL (ref 3.2–4.9)
ALBUMIN/GLOB SERPL: 1.5 G/DL
ALP SERPL-CCNC: 121 U/L (ref 30–99)
ALT SERPL-CCNC: 9 U/L (ref 2–50)
ANION GAP SERPL CALC-SCNC: 11 MMOL/L (ref 7–16)
AST SERPL-CCNC: 12 U/L (ref 12–45)
BASOPHILS # BLD AUTO: 0.4 % (ref 0–1.8)
BASOPHILS # BLD: 0.04 K/UL (ref 0–0.12)
BILIRUB SERPL-MCNC: 0.4 MG/DL (ref 0.1–1.5)
BUN SERPL-MCNC: 13 MG/DL (ref 8–22)
CALCIUM SERPL-MCNC: 9.4 MG/DL (ref 8.5–10.5)
CHLORIDE SERPL-SCNC: 104 MMOL/L (ref 96–112)
CHOLEST SERPL-MCNC: 189 MG/DL (ref 100–199)
CO2 SERPL-SCNC: 26 MMOL/L (ref 20–33)
CREAT SERPL-MCNC: 0.68 MG/DL (ref 0.5–1.4)
CREAT UR-MCNC: 288.15 MG/DL
EOSINOPHIL # BLD AUTO: 0.1 K/UL (ref 0–0.51)
EOSINOPHIL NFR BLD: 1 % (ref 0–6.9)
ERYTHROCYTE [DISTWIDTH] IN BLOOD BY AUTOMATED COUNT: 44.4 FL (ref 35.9–50)
EST. AVERAGE GLUCOSE BLD GHB EST-MCNC: 111 MG/DL
FASTING STATUS PATIENT QL REPORTED: NORMAL
FOLATE SERPL-MCNC: 2.6 NG/ML
GFR SERPLBLD CREATININE-BSD FMLA CKD-EPI: 92 ML/MIN/1.73 M 2
GLOBULIN SER CALC-MCNC: 2.8 G/DL (ref 1.9–3.5)
GLUCOSE SERPL-MCNC: 104 MG/DL (ref 65–99)
HBA1C MFR BLD: 5.5 % (ref 4–5.6)
HCT VFR BLD AUTO: 40.3 % (ref 37–47)
HDLC SERPL-MCNC: 33 MG/DL
HGB BLD-MCNC: 12.8 G/DL (ref 12–16)
IMM GRANULOCYTES # BLD AUTO: 0.04 K/UL (ref 0–0.11)
IMM GRANULOCYTES NFR BLD AUTO: 0.4 % (ref 0–0.9)
LDLC SERPL CALC-MCNC: 116 MG/DL
LYMPHOCYTES # BLD AUTO: 3.38 K/UL (ref 1–4.8)
LYMPHOCYTES NFR BLD: 34.5 % (ref 22–41)
MCH RBC QN AUTO: 29.1 PG (ref 27–33)
MCHC RBC AUTO-ENTMCNC: 31.8 G/DL (ref 33.6–35)
MCV RBC AUTO: 91.6 FL (ref 81.4–97.8)
MICROALBUMIN UR-MCNC: 1.5 MG/DL
MICROALBUMIN/CREAT UR: 5 MG/G (ref 0–30)
MONOCYTES # BLD AUTO: 0.69 K/UL (ref 0–0.85)
MONOCYTES NFR BLD AUTO: 7 % (ref 0–13.4)
NEUTROPHILS # BLD AUTO: 5.54 K/UL (ref 2–7.15)
NEUTROPHILS NFR BLD: 56.7 % (ref 44–72)
NRBC # BLD AUTO: 0 K/UL
NRBC BLD-RTO: 0 /100 WBC
PLATELET # BLD AUTO: 220 K/UL (ref 164–446)
PMV BLD AUTO: 9.8 FL (ref 9–12.9)
POTASSIUM SERPL-SCNC: 3.6 MMOL/L (ref 3.6–5.5)
PROT SERPL-MCNC: 6.9 G/DL (ref 6–8.2)
RBC # BLD AUTO: 4.4 M/UL (ref 4.2–5.4)
SODIUM SERPL-SCNC: 141 MMOL/L (ref 135–145)
TRIGL SERPL-MCNC: 202 MG/DL (ref 0–149)
TSH SERPL DL<=0.005 MIU/L-ACNC: 0.03 UIU/ML (ref 0.38–5.33)
VIT B12 SERPL-MCNC: 603 PG/ML (ref 211–911)
WBC # BLD AUTO: 9.8 K/UL (ref 4.8–10.8)

## 2022-08-23 PROCEDURE — 84425 ASSAY OF VITAMIN B-1: CPT

## 2022-08-23 PROCEDURE — 82570 ASSAY OF URINE CREATININE: CPT

## 2022-08-23 PROCEDURE — 82607 VITAMIN B-12: CPT

## 2022-08-23 PROCEDURE — 82746 ASSAY OF FOLIC ACID SERUM: CPT

## 2022-08-23 PROCEDURE — 80053 COMPREHEN METABOLIC PANEL: CPT

## 2022-08-23 PROCEDURE — 80061 LIPID PANEL: CPT

## 2022-08-23 PROCEDURE — 82306 VITAMIN D 25 HYDROXY: CPT

## 2022-08-23 PROCEDURE — 82043 UR ALBUMIN QUANTITATIVE: CPT

## 2022-08-23 PROCEDURE — 36415 COLL VENOUS BLD VENIPUNCTURE: CPT

## 2022-08-23 PROCEDURE — 85025 COMPLETE CBC W/AUTO DIFF WBC: CPT

## 2022-08-23 PROCEDURE — 83036 HEMOGLOBIN GLYCOSYLATED A1C: CPT | Mod: GA

## 2022-08-23 PROCEDURE — 84443 ASSAY THYROID STIM HORMONE: CPT

## 2022-08-25 ENCOUNTER — OFFICE VISIT (OUTPATIENT)
Dept: MEDICAL GROUP | Age: 72
End: 2022-08-25
Payer: MEDICARE

## 2022-08-25 VITALS
WEIGHT: 166.4 LBS | BODY MASS INDEX: 31.42 KG/M2 | HEART RATE: 57 BPM | SYSTOLIC BLOOD PRESSURE: 114 MMHG | TEMPERATURE: 97.6 F | OXYGEN SATURATION: 97 % | DIASTOLIC BLOOD PRESSURE: 72 MMHG | HEIGHT: 61 IN

## 2022-08-25 DIAGNOSIS — R51.9 INTRACTABLE EPISODIC HEADACHE, UNSPECIFIED HEADACHE TYPE: ICD-10-CM

## 2022-08-25 DIAGNOSIS — F02.82 DEMENTIA OF THE ALZHEIMER'S TYPE, WITH EARLY ONSET, WITH DELUSIONS (HCC): ICD-10-CM

## 2022-08-25 DIAGNOSIS — K30 STOMACH UPSET: ICD-10-CM

## 2022-08-25 DIAGNOSIS — Z12.11 SCREENING FOR MALIGNANT NEOPLASM OF COLON: ICD-10-CM

## 2022-08-25 DIAGNOSIS — G30.0 DEMENTIA OF THE ALZHEIMER'S TYPE, WITH EARLY ONSET, WITH DELUSIONS (HCC): ICD-10-CM

## 2022-08-25 DIAGNOSIS — E03.9 ACQUIRED HYPOTHYROIDISM: ICD-10-CM

## 2022-08-25 DIAGNOSIS — Z09 HOSPITAL DISCHARGE FOLLOW-UP: ICD-10-CM

## 2022-08-25 DIAGNOSIS — R73.03 PREDIABETES: ICD-10-CM

## 2022-08-25 DIAGNOSIS — N18.30 STAGE 3 CHRONIC KIDNEY DISEASE, UNSPECIFIED WHETHER STAGE 3A OR 3B CKD: ICD-10-CM

## 2022-08-25 PROCEDURE — 99214 OFFICE O/P EST MOD 30 MIN: CPT | Performed by: INTERNAL MEDICINE

## 2022-08-25 RX ORDER — DONEPEZIL HYDROCHLORIDE 10 MG/1
10 TABLET, FILM COATED ORAL
Qty: 90 TABLET | Refills: 3 | Status: SHIPPED | OUTPATIENT
Start: 2022-08-25

## 2022-08-25 RX ORDER — MEMANTINE HYDROCHLORIDE 10 MG/1
10 TABLET ORAL 2 TIMES DAILY
Qty: 180 TABLET | Refills: 3 | Status: SHIPPED | OUTPATIENT
Start: 2022-08-25

## 2022-08-25 RX ORDER — FOLIC ACID 1 MG/1
1 TABLET ORAL DAILY
Qty: 90 TABLET | Refills: 1 | Status: SHIPPED | OUTPATIENT
Start: 2022-08-25

## 2022-08-25 ASSESSMENT — FIBROSIS 4 INDEX: FIB4 SCORE: 1.309090909090909091

## 2022-08-25 NOTE — PROGRESS NOTES
CHIEF COMPLAINT  ER d/c follow up    HPI  Thuy Albert is a 72 y.o. female who presents today for the following     Hospital ER discharge follow-up, headache/upset stomach, dementia, IFG, CKD stage III,  hypothyroidism  72 y.o. female with history of dementia, IFG, CKD stage III, h/o GERD, presented to the Emergency Department on 8/21/22, 4 days ago  for due to HA for 2 day, with subsequent  N, burning CP.   Accompanied with dizziness, nausea, vomiting x 1, mild abdominal pain, fatigue.   She did not have fever or chills, falls.  Sx did not respond to Pepto Bismol.    Negative ER evaluation:  CBC, CMP  Lipase  Troponin  EKG  US RUQ  CXR     Received IVF.    After ER visit, patient has been asymptomatic, w/o HA, N/V, abdominal pain, fever, chills.     Reviewed PMH, PSH, FH, SH, ALL, HCM/IMM, no changes  Reviewed MEDS, no changes    Patient Active Problem List    Diagnosis Date Noted    Osteopenia of multiple sites, advanced 04/29/2022    Dementia of the Alzheimer's type, with early onset, with delusions ([facility])_Dr. Barillas  10/07/2019    Obesity (BMI 30-39.9) 10/04/2018    Plantar fasciitis, bilateral 11/04/2016    Cataract, mild  07/31/2016    Prediabetes 07/12/2015    Mild intermittent asthma without complication_controlled w/o meds  10/28/2014    DDD (degenerative disc disease), lumbar_rarely symptomatic  06/18/2014    Stage 3 chronic kidney disease (HCC) 05/03/2012    Hypothyroidism 05/03/2012    Mixed hyperlipidemia 05/03/2012    GERD (gastroesophageal reflux disease)      CURRENT MEDICATIONS  Current Outpatient Medications   Medication Sig Dispense Refill    ARMOUR THYROID 15 MG Tab TAKE 4 TABLETS BY MOUTH EVERY  Tablet 0    omeprazole (PRILOSEC) 20 MG delayed-release capsule Take 1 Capsule by mouth every day. 90 Capsule 3    metronidazole (METROGEL) 0.75 % gel Apply to face twice daily (Patient not taking: Reported on 7/21/2022) 45 g 1    memantine (NAMENDA) 10 MG Tab TAKE 1 TABLET BY MOUTH  TWICE A  Tablet 3    donepezil (ARICEPT) 10 MG tablet TAKE 1 TABLET BY MOUTH EVERY DAY 90 Tablet 3     No current facility-administered medications for this visit.     ALLERGIES  Allergies: Cephalexin, Nabumetone, Other drug, Synthroid [levothroid], and Tetracycline  PAST MEDICAL HISTORY  Past Medical History:   Diagnosis Date    DVT (deep venous thrombosis) ([facility])     DVT LEFT LEG-- STATES SHE HAD TAKEN COUMADIN FOR 8 WEEKS IN .    Arthritis     Asthma     Dementia ([facility])     GERD (gastroesophageal reflux disease)     Thyroid disease     hypothyroidism     SURGICAL HISTORY  She  has a past surgical history that includes other abdominal surgery; appendectomy laparoscopic (2011); cholecystectomy; appendectomy; and tonsillectomy.  SOCIAL HISTORY  Social History     Tobacco Use    Smoking status: Former     Packs/day: 1.50     Years: 40.00     Pack years: 60.00     Types: Cigarettes     Quit date: 2006     Years since quittin.6    Smokeless tobacco: Never    Tobacco comments:     QUIT ,  USED TO TO SMOKE 2-3 PPD FOR 40 YRS.   Vaping Use    Vaping Use: Never used   Substance Use Topics    Alcohol use: No     Alcohol/week: 0.0 oz    Drug use: No     Social History     Social History Narrative    Not on file     FAMILY HISTORY  Family History   Problem Relation Age of Onset    Hypertension Sister     Diabetes Sister     Heart Disease Sister     Other Sister         loss vision complete    Heart Disease Brother 55        MI    Genetic Disorder Mother     Stroke Father     No Known Problems Maternal Grandmother     No Known Problems Maternal Grandfather     No Known Problems Paternal Grandmother     No Known Problems Paternal Grandfather      Family Status   Relation Name Status    Sis  Alive    Bro  Alive    Mo      Fa      MGMo      MGFa      PGMo      PGFa      Sis  Alive       ROS   Constitutional: Negative for fever, chills.  HENT: Negative  "for congestion, sore throat.  Eyes: Negative for vision problems.   Respiratory: Negative for cough, shortness of breath.  Cardiovascular: Negative for chest pain, palpitations.   Gastrointestinal: Per HPI.  Genitourinary: Negative for dysuria.  Musculoskeletal: Negative for significant myalgia, back and joint pain.   Skin: Negative for rash.   Neuro: Per HPI.  Endo/Heme/Allergies: Does not bruise/bleed easily.   Psychiatric/Behavioral: Negative for depression.    PHYSICAL EXAM   Blood Pressure 114/72 (BP Location: Right arm, Patient Position: Sitting, BP Cuff Size: Adult)   Pulse (Abnormal) 57   Temperature 36.4 °C (97.6 °F) (Temporal)   Height 1.549 m (5' 1\")   Weight 75.5 kg (166 lb 6.4 oz)   Oxygen Saturation 97%   Body Mass Index 31.44 kg/m²   General:  NAD, well appearing  HEENT:   NC/AT, PERRLA, EOMI.  Cardiovascular: unlabored breathing, no peripheral cyanosis or swelling.  Lungs:   no respiratory distress.  Abdomen: non- distended.  Extremities:  No LE swelling.  Skin:  Warm, dry.  No erythema. No rash.   Neurologic: Alert & oriented x 3. CN II-XII grossly intact. No focal deficits.  Psychiatric:  Affect normal, mood normal, judgment normal.    Labs     Labs are reviewed and discussed with a patient  Lab Results   Component Value Date/Time    CHOLSTRLTOT 189 08/23/2022 01:36 PM     (H) 08/23/2022 01:36 PM    HDL 33 (A) 08/23/2022 01:36 PM    TRIGLYCERIDE 202 (H) 08/23/2022 01:36 PM       Lab Results   Component Value Date/Time    SODIUM 141 08/23/2022 01:36 PM    POTASSIUM 3.6 08/23/2022 01:36 PM    CHLORIDE 104 08/23/2022 01:36 PM    CO2 26 08/23/2022 01:36 PM    GLUCOSE 104 (H) 08/23/2022 01:36 PM    BUN 13 08/23/2022 01:36 PM    CREATININE 0.68 08/23/2022 01:36 PM     Lab Results   Component Value Date/Time    ALKPHOSPHAT 121 (H) 08/23/2022 01:36 PM    ASTSGOT 12 08/23/2022 01:36 PM    ALTSGPT 9 08/23/2022 01:36 PM    TBILIRUBIN 0.4 08/23/2022 01:36 PM      Lab Results   Component Value " Date/Time    HBA1C 5.5 08/23/2022 01:36 PM    HBA1C 5.6 01/24/2022 08:39 AM    HBA1C 5.2 07/27/2021 02:37 PM     No results found for: TSH  Lab Results   Component Value Date/Time    FREET4 0.88 (L) 01/24/2022 08:39 AM    FREET4 0.74 (L) 07/27/2021 02:37 PM       Lab Results   Component Value Date/Time    WBC 9.8 08/23/2022 01:36 PM    RBC 4.40 08/23/2022 01:36 PM    HEMOGLOBIN 12.8 08/23/2022 01:36 PM    HEMATOCRIT 40.3 08/23/2022 01:36 PM    MCV 91.6 08/23/2022 01:36 PM    MCH 29.1 08/23/2022 01:36 PM    MCHC 31.8 (L) 08/23/2022 01:36 PM    MPV 9.8 08/23/2022 01:36 PM    NEUTSPOLYS 56.70 08/23/2022 01:36 PM    LYMPHOCYTES 34.50 08/23/2022 01:36 PM    MONOCYTES 7.00 08/23/2022 01:36 PM    EOSINOPHILS 1.00 08/23/2022 01:36 PM    BASOPHILS 0.40 08/23/2022 01:36 PM    HYPOCHROMIA 1+ 06/18/2014 12:36 PM      Imaging     Reviewed and discussed per HPI    Assessment and Plan     Thuy Albert is a 72 y.o. female    1. Hospital discharge follow-up   All symptoms resolved, patient at baseline, caregiver is .    2. Intractable episodic headache, unspecified headache type  3. Stomach upset  4. Dementia of the Alzheimer's type, with early onset, with delusions ([facility])_Dr. Barillas   Stable, continue current tx  - memantine (NAMENDA) 10 MG Tab; Take 1 Tablet by mouth 2 times a day.  Dispense: 180 Tablet; Refill: 3  - donepezil (ARICEPT) 10 MG tablet; Take 1 Tablet by mouth every day.  Dispense: 90 Tablet; Refill: 3  - Referral to Neurology  requested by   5. Prediabetes   Appropriate counseling given    6. Stage 3 chronic kidney disease, unspecified whether stage 3a or 3b CKD ([facility])   - Stable, continue with current management.    7. Acquired hypothyroidism   Need labs f/u before PCP visit in 6 weeks  - TSH+FREE T4  - T3 FREE; Future    8. Screening for malignant neoplasm of colon  - COLOGUARD (FIT DNA)      Followup: has appointment with PCP in 6 weeks    All questions are answered.    Please note that  this dictation was created using voice recognition software, and that there might be errors of amelie and possibly content.

## 2022-08-28 LAB — VIT B1 BLD-MCNC: 54 NMOL/L (ref 70–180)

## 2022-09-08 DIAGNOSIS — E03.9 ACQUIRED HYPOTHYROIDISM: Chronic | ICD-10-CM

## 2022-09-08 RX ORDER — THYROID 15 MG/1
60 TABLET ORAL
Qty: 360 TABLET | Refills: 0 | Status: CANCELLED | OUTPATIENT
Start: 2022-09-08

## 2022-09-09 ENCOUNTER — OFFICE VISIT (OUTPATIENT)
Dept: MEDICAL GROUP | Age: 72
End: 2022-09-09
Payer: MEDICARE

## 2022-09-09 VITALS
TEMPERATURE: 96.8 F | DIASTOLIC BLOOD PRESSURE: 70 MMHG | SYSTOLIC BLOOD PRESSURE: 122 MMHG | WEIGHT: 169 LBS | HEIGHT: 61 IN | BODY MASS INDEX: 31.91 KG/M2 | HEART RATE: 55 BPM | OXYGEN SATURATION: 97 %

## 2022-09-09 DIAGNOSIS — G30.0 DEMENTIA OF THE ALZHEIMER'S TYPE, WITH EARLY ONSET, WITH DELUSIONS (HCC): ICD-10-CM

## 2022-09-09 DIAGNOSIS — E03.9 ACQUIRED HYPOTHYROIDISM: Chronic | ICD-10-CM

## 2022-09-09 DIAGNOSIS — E53.8 FOLIC ACID DEFICIENCY: ICD-10-CM

## 2022-09-09 DIAGNOSIS — R63.4 UNEXPLAINED WEIGHT LOSS: ICD-10-CM

## 2022-09-09 DIAGNOSIS — E51.9 VITAMIN B1 DEFICIENCY: ICD-10-CM

## 2022-09-09 DIAGNOSIS — S92.534A CLOSED NONDISPLACED FRACTURE OF DISTAL PHALANX OF LESSER TOE OF RIGHT FOOT, INITIAL ENCOUNTER: ICD-10-CM

## 2022-09-09 DIAGNOSIS — E66.9 OBESITY (BMI 30-39.9): ICD-10-CM

## 2022-09-09 DIAGNOSIS — F02.82 DEMENTIA OF THE ALZHEIMER'S TYPE, WITH EARLY ONSET, WITH DELUSIONS (HCC): ICD-10-CM

## 2022-09-09 DIAGNOSIS — R10.84 GENERALIZED ABDOMINAL PAIN: ICD-10-CM

## 2022-09-09 DIAGNOSIS — R73.03 PREDIABETES: ICD-10-CM

## 2022-09-09 DIAGNOSIS — E78.2 MIXED HYPERLIPIDEMIA: ICD-10-CM

## 2022-09-09 PROCEDURE — G2212 PROLONG OUTPT/OFFICE VIS: HCPCS | Performed by: FAMILY MEDICINE

## 2022-09-09 PROCEDURE — 99215 OFFICE O/P EST HI 40 MIN: CPT | Mod: 25 | Performed by: FAMILY MEDICINE

## 2022-09-09 RX ORDER — THYROID 15 MG/1
45 TABLET ORAL
Qty: 270 TABLET | Refills: 0 | Status: SHIPPED | OUTPATIENT
Start: 2022-09-09 | End: 2022-11-17 | Stop reason: SDUPTHER

## 2022-09-09 ASSESSMENT — FIBROSIS 4 INDEX: FIB4 SCORE: 1.309090909090909091

## 2022-09-09 NOTE — TELEPHONE ENCOUNTER
Thyroid medication needs to be adjusted according to most recent labs.  We will discuss medication management with patient at follow-up visit on 9/10/2022.  Lorna Uribe M.D.

## 2022-09-09 NOTE — PROGRESS NOTES
Subjective:   CC: Multiple concerns    HPI:     Thuy Albert is a 72 y.o. female, established patient of the clinic.     Patient broke the right 4th toe a few months ago after she tripped and fell down the stairs a few months ago.  Conservative management recommended.  Today, patient states that the affected toe is doing well.  Patient no longer having pain, redness, edeme, or gait problem.    Patient was recently found to have B1 and folic acid deficiency.  She is taking over-the-counter B1 and folic acid supplement.    Patient was previously diagnosed with dementia by Dr. Barillas, who recently retired.  Patient has been taking donepezil 10 mg daily and memantine 10 mg twice daily for a number of years.  Patient was referred to [facility] neurology and was seen by Dr. Armenta in July 2022.  Dr. Armenta recommended discontinuation of donepezil. Her partner/caregiver is extremely upset with this decision. He wishes to be referred to different neurologist for 2nd opinion.     Patient experiences a unexplained weight loss of approximately 30 pounds over the past 12 months.  She states that she still has good appetite.  However, she is no longer able to tolerate large portions due to early satiety. She complained of random abdominal discomfort around the umbilicus. Her diet is slightly healthier, and she consumes less soda. She endorses severe sedentary lifestyle.  She otherwise feeling well.  No other GI symptoms reported.  She is status post appendectomy and cholecystectomy.  She recently had negative mammogram, chest x-ray, right upper quadrant abdominal ultrasound.  Historically, she was never interested in colonoscopy or colon cancer screening.  She was recently seen by Dr. Martinez who ordered Cologuard.  However, patient has not yet done this test.     Current medicines (including changes today)  Current Outpatient Medications   Medication Sig Dispense Refill    thyroid (ARMOUR THYROID) 15 MG Tab Take 3 Tablets by  "mouth every day. 270 Tablet 0    memantine (NAMENDA) 10 MG Tab Take 1 Tablet by mouth 2 times a day. 180 Tablet 3    donepezil (ARICEPT) 10 MG tablet Take 1 Tablet by mouth every day. 90 Tablet 3    folic acid (FOLVITE) 1 MG Tab Take 1 Tablet by mouth every day. 90 Tablet 1    omeprazole (PRILOSEC) 20 MG delayed-release capsule Take 1 Capsule by mouth every day. 90 Capsule 3     No current facility-administered medications for this visit.     She  has a past medical history of Arthritis, Asthma, Dementia ([facility]), DVT (deep venous thrombosis) ([facility]) (2003), GERD (gastroesophageal reflux disease), and Thyroid disease.    I reviewed patient's problem list, allergies, medications, family hx, social hx with patient and update EPIC.        Objective:     /70 (BP Location: Right arm, Patient Position: Sitting, BP Cuff Size: Adult)   Pulse (!) 55   Temp 36 °C (96.8 °F) (Temporal)   Ht 1.549 m (5' 1\")   Wt 76.7 kg (169 lb)   SpO2 97%  Body mass index is 31.93 kg/m².    Physical Exam:  Constitutional: awake, alert, in no distress.  Skin: Warm, dry, good turgor, no rashes, bruises, ulcers in visible areas.  Neck: Trachea midline, no masses, no thyromegaly. No cervical or supraclavicular lymphadenopathy  Respiratory: Unlabored respiratory effort, lungs clear to auscultation, no wheezes, no rales.  Cardiovascular: Normal S1, S2, no murmur, no pedal edema.  Abdomen: Soft, obese, mild tender to palpation at the umbilicus/LUQ/LLQ, active BS, no hernia, no hepatosplenomegaly, negative rebound or guarding.   Psych: Oriented x3, affect and mood wnl, intact judgement and insight.       Assessment and Plan:   The following treatment plan was discussed    1. Acquired hypothyroidism  Chronic, currently taking [place] Thyroid 60 mg daily.  Her most recent thyroid function test was abnormal.  We will reduce the dose of [place] Thyroid to 45 mg daily.  Patient will have repeat lab in 6 to 12 weeks for reassessment.  - thyroid (ARMOUR " THYROID) 15 MG Tab; Take 3 Tablets by mouth every day.  Dispense: 270 Tablet; Refill: 0  - TSH; Future  - FREE THYROXINE; Future    2. Closed nondisplaced fracture of distal phalanx of lesser toe of right foot, initial encounter  Resolved.    3. Vitamin B1 deficiency  - Patient is taking over-the-counter vitamin B1 supplement  - VITAMIN B1; Future    4. Folic acid deficiency  - Patient is taking over-the-counter folic acid supplement  - FOLATE; Future    5. Mixed hyperlipidemia  Chronic, mild, improving with recent 30 pound weight loss.  Patient is not interested in pharmacotherapy.  - dietary modification, exercise, and weight loss.   - avoid alcohol, drugs, tobacco products     6. Prediabetes  Resolved with 30 lbs weight loss. Fasting blood sugar is still slightly elevated.   - Dietary/lifestyle modification and weight loss      7. Stage 3 chronic kidney disease, unspecified whether stage 3a or 3b CKD (HCC)  Resolved     8. Obesity (BMI 30-39.9)  9. Unexplained weight loss  10. Generalized abdominal pain  Patient lost approximately 30 pounds over the past 12 months.  Her thyroid is supratherapeutic, which can contribute to the weight loss.  There is some minor changes in diet, which can also contribute to weight loss.   Negative mammogram, CXR, and RUQ US over the past few months.   Historically, she was not interested in colon cancer screening, therefore she has never had one done.   She does have mild/moderate abdominal pain/discomfort without other GI symptoms.   - adjust [place] thyroid dose as above. Will repeat blood tests in 6-12 weeks.   - CT-ABDOMEN-PELVIS W/O; Future    11. Dementia of the Alzheimer's type, with early onset, with delusions    Chronic, diagnosed a few years ago by Dr. Barillas who recently retired.   She has been on donepezil 10 mg daily and memantine 10 mg twice daily.  She recently established care with Dr. Armenta who recommended discontinuation of donepezil. Patient's partner/caregiver  would like to be see other neurologist for 2nd opinion. He will try to schedule appointment with Dr. Montanez. Appropriate counseling provided.     Total time spent reviewing pt's chart, labs, notes, imaging, and counseling/prescribing medications to patient before, during, and after the visit: 40 minutes.           Lorna Uribe M.D.      Followup: Return in about 3 months (around 12/9/2022).    Please note that this dictation was created using voice recognition software. I have made every reasonable attempt to correct obvious errors, but I expect that there are errors of grammar and possibly content that I did not discover before finalizing the note.

## 2022-09-19 ENCOUNTER — TELEPHONE (OUTPATIENT)
Dept: MEDICAL GROUP | Age: 72
End: 2022-09-19
Payer: MEDICARE

## 2022-09-19 NOTE — TELEPHONE ENCOUNTER
"VOICEMAIL  1. Caller Name: Art                       Call Back Number: [phone]    2. Message: \"Dr Uribe told me before a certain test that she should stop taking her thyroid medicine or vitamin b.\" Pt is requesting clarification, what medication to stop taking before blood tests and how many days before.    3. Patient approves office to leave a detailed voicemail/MyChart message: yes  "

## 2022-09-20 NOTE — TELEPHONE ENCOUNTER
Tried to call patient, no answer.   Sent her an email with more instructions.   Lorna Uribe M.D.

## 2022-09-21 ENCOUNTER — HOSPITAL ENCOUNTER (OUTPATIENT)
Dept: RADIOLOGY | Facility: MEDICAL CENTER | Age: 72
End: 2022-09-21
Attending: FAMILY MEDICINE
Payer: MEDICARE

## 2022-09-21 DIAGNOSIS — R10.84 GENERALIZED ABDOMINAL PAIN: ICD-10-CM

## 2022-09-21 PROCEDURE — 74176 CT ABD & PELVIS W/O CONTRAST: CPT

## 2022-09-22 DIAGNOSIS — Q25.1 STENOSIS OF AORTA: ICD-10-CM

## 2022-11-08 ENCOUNTER — TELEPHONE (OUTPATIENT)
Dept: MEDICAL GROUP | Age: 72
End: 2022-11-08
Payer: MEDICARE

## 2022-11-08 NOTE — TELEPHONE ENCOUNTER
VOICEMAIL  1. Caller Name: Art ()                      Call Back Number: [phone]    2. Message: Pt's  was wondering if he should stop giving [place] the 3 medications: B complex, B1, and folic acid.    3. Patient approves office to leave a detailed voicemail/MyChart message: yes

## 2022-11-08 NOTE — TELEPHONE ENCOUNTER
Phone Number Called: [phone] (home)       Call outcome: Spoke to patient regarding message below.    Message: Told patient's  he can stop giving Chantell all 3 vitamin B meds before her lab tests.

## 2022-11-09 ENCOUNTER — PATIENT MESSAGE (OUTPATIENT)
Dept: HEALTH INFORMATION MANAGEMENT | Facility: OTHER | Age: 72
End: 2022-11-09

## 2022-11-15 ENCOUNTER — HOSPITAL ENCOUNTER (OUTPATIENT)
Dept: LAB | Facility: MEDICAL CENTER | Age: 72
End: 2022-11-15
Attending: FAMILY MEDICINE
Payer: MEDICARE

## 2022-11-15 DIAGNOSIS — E53.8 FOLIC ACID DEFICIENCY: ICD-10-CM

## 2022-11-15 DIAGNOSIS — E51.9 VITAMIN B1 DEFICIENCY: ICD-10-CM

## 2022-11-15 DIAGNOSIS — E03.9 ACQUIRED HYPOTHYROIDISM: Chronic | ICD-10-CM

## 2022-11-15 LAB
FOLATE SERPL-MCNC: 19.6 NG/ML
T4 FREE SERPL-MCNC: 0.66 NG/DL (ref 0.93–1.7)
TSH SERPL DL<=0.005 MIU/L-ACNC: 3.1 UIU/ML (ref 0.38–5.33)

## 2022-11-15 PROCEDURE — 82746 ASSAY OF FOLIC ACID SERUM: CPT

## 2022-11-15 PROCEDURE — 84443 ASSAY THYROID STIM HORMONE: CPT

## 2022-11-15 PROCEDURE — 36415 COLL VENOUS BLD VENIPUNCTURE: CPT

## 2022-11-15 PROCEDURE — 84439 ASSAY OF FREE THYROXINE: CPT

## 2022-11-15 PROCEDURE — 84425 ASSAY OF VITAMIN B-1: CPT

## 2022-11-17 ENCOUNTER — OFFICE VISIT (OUTPATIENT)
Dept: MEDICAL GROUP | Age: 72
End: 2022-11-17
Payer: MEDICARE

## 2022-11-17 VITALS
OXYGEN SATURATION: 97 % | HEART RATE: 55 BPM | WEIGHT: 171 LBS | DIASTOLIC BLOOD PRESSURE: 72 MMHG | BODY MASS INDEX: 32.28 KG/M2 | SYSTOLIC BLOOD PRESSURE: 122 MMHG | TEMPERATURE: 96.6 F | HEIGHT: 61 IN

## 2022-11-17 DIAGNOSIS — E66.9 OBESITY (BMI 30-39.9): ICD-10-CM

## 2022-11-17 DIAGNOSIS — E03.9 ACQUIRED HYPOTHYROIDISM: Chronic | ICD-10-CM

## 2022-11-17 DIAGNOSIS — E51.9 VITAMIN B1 DEFICIENCY: ICD-10-CM

## 2022-11-17 DIAGNOSIS — F02.82 DEMENTIA OF THE ALZHEIMER'S TYPE, WITH EARLY ONSET, WITH DELUSIONS (HCC): ICD-10-CM

## 2022-11-17 DIAGNOSIS — Z23 NEED FOR VACCINATION: ICD-10-CM

## 2022-11-17 DIAGNOSIS — R73.03 PREDIABETES: ICD-10-CM

## 2022-11-17 DIAGNOSIS — M79.672 LEFT FOOT PAIN: ICD-10-CM

## 2022-11-17 DIAGNOSIS — K21.9 GASTROESOPHAGEAL REFLUX DISEASE WITHOUT ESOPHAGITIS: ICD-10-CM

## 2022-11-17 DIAGNOSIS — E78.2 MIXED HYPERLIPIDEMIA: ICD-10-CM

## 2022-11-17 DIAGNOSIS — G30.0 DEMENTIA OF THE ALZHEIMER'S TYPE, WITH EARLY ONSET, WITH DELUSIONS (HCC): ICD-10-CM

## 2022-11-17 PROCEDURE — G0008 ADMIN INFLUENZA VIRUS VAC: HCPCS | Performed by: FAMILY MEDICINE

## 2022-11-17 PROCEDURE — 90662 IIV NO PRSV INCREASED AG IM: CPT | Performed by: FAMILY MEDICINE

## 2022-11-17 PROCEDURE — 99214 OFFICE O/P EST MOD 30 MIN: CPT | Mod: 25 | Performed by: FAMILY MEDICINE

## 2022-11-17 RX ORDER — THYROID 15 MG/1
45 TABLET ORAL
Qty: 270 TABLET | Refills: 3 | Status: SHIPPED | OUTPATIENT
Start: 2022-11-17 | End: 2023-03-13 | Stop reason: SDUPTHER

## 2022-11-17 RX ORDER — OMEPRAZOLE 20 MG/1
20 CAPSULE, DELAYED RELEASE ORAL 2 TIMES DAILY
Qty: 180 CAPSULE | Refills: 1 | Status: SHIPPED | OUTPATIENT
Start: 2022-11-17 | End: 2022-12-08

## 2022-11-17 ASSESSMENT — FIBROSIS 4 INDEX: FIB4 SCORE: 1.309090909090909091

## 2022-11-18 NOTE — PROGRESS NOTES
Subjective:   CC: Multiple concerns    HPI:     Thuy Albert is a 72 y.o. female, established patient of the clinic.     Patient was previously diagnosed with dementia by Dr. Barillas.  She is currently taking memantine 10 mg twice daily and donezepil 10 mg daily.  She recently established care with Dr. Armenta.  However, caregiver wishes to be referred to a different neurologist due to personal difference.    Patient also had mixed hyperlipidemia, hypothyroidism, GERD, prediabetes, obesity, and B1 deficiency.  Patient is taking B complex regularly.  She complains of mild nausea and intermittent epigastric discomfort at night before bed.  Patient eats lots of junk food every day and has a few soft drinks per day.  Negative nausea, vomiting, hematochezia, melena, unexplained weight loss.    She also complains of intermittent left foot pain.  Pain is localized to the left 1st MTP.  Pain usually happen in the morning.  Negative fever, chills, history of trauma to the left foot, history of gout.  Pain is not associated with left first MTP swelling, redness.       Current medicines (including changes today)  Current Outpatient Medications   Medication Sig Dispense Refill    omeprazole (PRILOSEC) 20 MG delayed-release capsule Take 1 Capsule by mouth 2 times a day. 180 Capsule 1    thyroid (ARMOUR THYROID) 15 MG Tab Take 3 Tablets by mouth every day. 270 Tablet 3    memantine (NAMENDA) 10 MG Tab Take 1 Tablet by mouth 2 times a day. 180 Tablet 3    donepezil (ARICEPT) 10 MG tablet Take 1 Tablet by mouth every day. 90 Tablet 3    folic acid (FOLVITE) 1 MG Tab Take 1 Tablet by mouth every day. 90 Tablet 1     No current facility-administered medications for this visit.     She  has a past medical history of Arthritis, Asthma, Dementia ([facility]), DVT (deep venous thrombosis) ([facility]) (2003), GERD (gastroesophageal reflux disease), and Thyroid disease.    I reviewed patient's problem list, allergies, medications, family hx,  "social hx with patient and update EPIC.        Objective:     /72 (BP Location: Right arm, Patient Position: Sitting, BP Cuff Size: Large adult)   Pulse (!) 55   Temp 35.9 °C (96.6 °F) (Temporal)   Ht 1.549 m (5' 1\")   Wt 77.6 kg (171 lb)   SpO2 97%  Body mass index is 32.31 kg/m².    Physical Exam:  Constitutional: awake, alert, in no distress.  Skin: Warm, dry, good turgor, no rashes, bruises, ulcers in visible areas.  Eye: conjunctiva clear, lids neg for edema or lesions.  Neck: Trachea midline, no masses, no thyromegaly. No cervical or supraclavicular lymphadenopathy  Respiratory: Unlabored respiratory effort, lungs clear to auscultation, no wheezes, no rales.  Cardiovascular: Normal S1, S2, no murmur, no pedal edema.  Abdomen: Soft, non-tender to palpation, active BS, no hernia, no hepatosplenomegaly, negative rebound or guarding.   Psych: Oriented x3, affect and mood wnl, intact judgement and insight.   MSK: enlarged, mildly tender left first MTP joint without edema or erythema.    Assessment and Plan:   The following treatment plan was discussed    1. Dementia of the Alzheimer's type, with early onset, with delusions (HCC)  Chronic, previously diagnosed by Dr. Barillas a few years ago.  Patient is on memantine 10 mg twice daily and donezepil 10 mg daily.  Dr. Barillas recently retired.  She established care with Dr. Armenta, but wishes to be referred to a different neurologist due to personal difference.  - Referral to Neurology    2. Mixed hyperlipidemia  Chronic, mild, declined pharmacotherapy.  - dietary modification, exercise, and weight loss.   - avoid alcohol, drugs, tobacco products     3. Acquired hypothyroidism  Chronic, controlled with [place] Thyroid 45 mg daily, no s/e reported, will continue.    - thyroid (ARMOUR THYROID) 15 MG Tab; Take 3 Tablets by mouth every day.  Dispense: 270 Tablet; Refill: 3  - Patient is not interested in levothyroxine.    4. Gastroesophageal reflux disease " without esophagitis  Chronic, currently taking omeprazole 20 milligrams daily.  She complains of intermittent epigastric discomfort at night.  Dinner and bedtime are at least 4 hours apart.  Patient does consume lots of junk foods and drinks a few soft drinks per day.  No other GI or systemic symptoms reported.  Will increase omeprazole to 20 mg twice daily.  Patient is highly sedentary.  - omeprazole (PRILOSEC) 20 MG delayed-release capsule; Take 1 Capsule by mouth 2 times a day.  Dispense: 180 Capsule; Refill: 1  -Dietary and behavioral modification recommended and discussed.  -Weight loss, increase physical activity.    5. Prediabetes  Mild fasting blood sugar noticed on recent labs.  Her A1c was 5.5.  - Dietary/lifestyle modification and weight loss      6. Obesity (BMI 30-39.9)  - Patient identified as having weight management issue.  Appropriate orders and counseling given.     7. Left foot pain  Intermittent left first MTP pain without redness, edema, history of trauma.  History and exam are concerning for osteoarthritis of left first MTP joint.  - DX-FOOT-COMPLETE 3+ LEFT; Future  - URIC ACID; Future  - Tylenol as needed for pain    8. Vitamin B1 deficiency  Patient was found to have vitamin B1 deficiency on previous labs.  Patient is on B complex daily.  - VITAMIN B1; Future    9. Need for vaccination  - Influenza Vaccine, High Dose (65+ Only)      Lorna Uribe M.D.      Followup: Return in about 6 months (around 5/17/2023) for Annual wellness visit.    Please note that this dictation was created using voice recognition software. I have made every reasonable attempt to correct obvious errors, but I expect that there are errors of grammar and possibly content that I did not discover before finalizing the note.

## 2022-11-20 LAB — VIT B1 BLD-MCNC: 73 NMOL/L (ref 70–180)

## 2022-11-28 ENCOUNTER — TELEPHONE (OUTPATIENT)
Dept: MEDICAL GROUP | Age: 72
End: 2022-11-28
Payer: MEDICARE

## 2022-11-28 NOTE — TELEPHONE ENCOUNTER
VOICEMAIL  1. Caller Name: Thuy Albert                        Call Back Number: [phone] (home)       2. Message: Pt's  called and said they called the neurology office they were referred to. They were put on a waiting list, with no current appt. Pt's  was wondering if it's better to get referred to a different office for sooner appt.    3. Patient approves office to leave a detailed voicemail/MyChart message: yes

## 2022-11-29 NOTE — TELEPHONE ENCOUNTER
I called referral department. They said they will reroute the referral to Dr Becca Bender's office, at [facility]. They accept Medicare and Medicaid.

## 2022-12-05 ENCOUNTER — TELEPHONE (OUTPATIENT)
Dept: MEDICAL GROUP | Age: 72
End: 2022-12-05
Payer: MEDICARE

## 2022-12-05 NOTE — TELEPHONE ENCOUNTER
Pt's  came in to the office and says Chantell has been experiencing L arm pain. Pt would like to get this checked out at a sooner appt.

## 2022-12-06 NOTE — TELEPHONE ENCOUNTER
Phone Number Called: [phone] (home)       Call outcome: Did not leave a detailed message. Requested patient to call back.    Message: lvm to schedule appt

## 2022-12-06 NOTE — TELEPHONE ENCOUNTER
Phone Number Called: [phone] (home)       Call outcome: Spoke to patient regarding message below.    Message: Pt not available for appt today. Pt scheduled for 12/8/22.

## 2022-12-08 ENCOUNTER — OFFICE VISIT (OUTPATIENT)
Dept: MEDICAL GROUP | Age: 72
End: 2022-12-08
Payer: MEDICARE

## 2022-12-08 VITALS
OXYGEN SATURATION: 95 % | BODY MASS INDEX: 32.85 KG/M2 | DIASTOLIC BLOOD PRESSURE: 68 MMHG | SYSTOLIC BLOOD PRESSURE: 118 MMHG | TEMPERATURE: 96.9 F | HEART RATE: 58 BPM | HEIGHT: 61 IN | WEIGHT: 174 LBS

## 2022-12-08 DIAGNOSIS — E78.2 MIXED HYPERLIPIDEMIA: ICD-10-CM

## 2022-12-08 DIAGNOSIS — M77.12 LATERAL EPICONDYLITIS OF LEFT ELBOW: ICD-10-CM

## 2022-12-08 DIAGNOSIS — Q25.1 STENOSIS OF AORTA: ICD-10-CM

## 2022-12-08 DIAGNOSIS — Z87.891 FORMER SMOKER: ICD-10-CM

## 2022-12-08 DIAGNOSIS — K21.9 GASTROESOPHAGEAL REFLUX DISEASE WITHOUT ESOPHAGITIS: ICD-10-CM

## 2022-12-08 PROCEDURE — 20605 DRAIN/INJ JOINT/BURSA W/O US: CPT | Performed by: FAMILY MEDICINE

## 2022-12-08 PROCEDURE — 99214 OFFICE O/P EST MOD 30 MIN: CPT | Mod: 25 | Performed by: FAMILY MEDICINE

## 2022-12-08 RX ORDER — METHYLPREDNISOLONE SODIUM SUCCINATE 40 MG/ML
40 INJECTION, POWDER, LYOPHILIZED, FOR SOLUTION INTRAMUSCULAR; INTRAVENOUS ONCE
Status: COMPLETED | OUTPATIENT
Start: 2022-12-08 | End: 2022-12-08

## 2022-12-08 RX ORDER — OMEPRAZOLE 20 MG/1
20 CAPSULE, DELAYED RELEASE ORAL NIGHTLY
Qty: 90 CAPSULE | Refills: 1 | Status: SHIPPED | OUTPATIENT
Start: 2022-12-08 | End: 2023-02-06

## 2022-12-08 RX ORDER — ROSUVASTATIN CALCIUM 10 MG/1
10 TABLET, COATED ORAL EVERY EVENING
Qty: 30 TABLET | Refills: 11 | Status: SHIPPED | OUTPATIENT
Start: 2022-12-08 | End: 2023-12-13 | Stop reason: SDUPTHER

## 2022-12-08 RX ADMIN — METHYLPREDNISOLONE SODIUM SUCCINATE 40 MG: 40 INJECTION, POWDER, LYOPHILIZED, FOR SOLUTION INTRAMUSCULAR; INTRAVENOUS at 16:11

## 2022-12-08 ASSESSMENT — FIBROSIS 4 INDEX: FIB4 SCORE: 1.309090909090909091

## 2022-12-11 NOTE — PROGRESS NOTES
Subjective:   CC: Left elbow pain    HPI:     Thuy Albert is a 72 y.o. female, established patient of the clinic.     Patient complains of recent development of moderately severe left lateral pain.  Patient does not remember any specific injury to the affected area.  Denies any fever, chills, swelling or redness of the concerned area.  Pain is exacerbated with certain movement of left upper extremity.    Patient had CT abnormal/pelvic scan done a few weeks ago.  Incidental findings of possible significant stenosis at the aortic bifurcation.  Patient was referred to vascular surgery consultation.  Denies claudication, unusual lower extremity weakness, gait changes, etc. Patient has chronic hyperlipidemia. Historically, patient was not interested in treatment for hyperlipidemia. Patient is a former smoker. She used to smoke 1.5 ppd x 40 years. She quit in 2006.       She takes 20 mg of Omeprazole in the morning, but complained of GERD-like symptoms at night. She as advised to take Omeprazole 20 mg BID. Caregiver would like to try to switch Omeprazole 20 mg nightly instead of BID.       Current medicines (including changes today)  Current Outpatient Medications   Medication Sig Dispense Refill    omeprazole (PRILOSEC) 20 MG delayed-release capsule Take 1 Capsule by mouth every evening. 90 Capsule 1    diclofenac sodium (VOLTAREN) 1 % Gel Apply to 2-4 gram to affected area 4 times daily 100 g 1    rosuvastatin (CRESTOR) 10 MG Tab Take 1 Tablet by mouth every evening. 30 Tablet 11    thyroid (ARMOUR THYROID) 15 MG Tab Take 3 Tablets by mouth every day. 270 Tablet 3    memantine (NAMENDA) 10 MG Tab Take 1 Tablet by mouth 2 times a day. 180 Tablet 3    donepezil (ARICEPT) 10 MG tablet Take 1 Tablet by mouth every day. 90 Tablet 3    folic acid (FOLVITE) 1 MG Tab Take 1 Tablet by mouth every day. 90 Tablet 1     No current facility-administered medications for this visit.     She  has a past medical history of  "Arthritis, Asthma, Dementia (HCC), DVT (deep venous thrombosis) ([facility]) (2003), GERD (gastroesophageal reflux disease), and Thyroid disease.    I reviewed patient's problem list, allergies, medications, family hx, social hx with patient and update EPIC.        Objective:     /68 (BP Location: Right arm, Patient Position: Sitting, BP Cuff Size: Large adult)   Pulse (!) 58   Temp 36.1 °C (96.9 °F) (Temporal)   Ht 1.549 m (5' 1\")   Wt 78.9 kg (174 lb)   SpO2 95%  Body mass index is 32.88 kg/m².    Physical Exam:  Constitutional: awake, alert, in no distress.  Skin: Warm, dry, good turgor, no rashes, bruises, ulcers in visible areas.  Eye: conjunctiva clear, lids neg for edema or lesions.  Neck: Trachea midline, no masses, no thyromegaly. No cervical or supraclavicular lymphadenopathy  Respiratory: Unlabored respiratory effort, lungs clear to auscultation, no wheezes, no rales.  Cardiovascular: Normal S1, S2, no murmur, no pedal edema.  Psych: Oriented x3, affect and mood wnl, intact judgement and insight.   MSK: Mild soft tissue tenderness to palpation and edema at the left lateral epicondyle.  Pain is reproducible with resisted left wrist extension or supination.      Assessment and Plan:   The following treatment plan was discussed    1. Lateral epicondylitis of left elbow  History and exam are concerning for acute left lateral epicondylitis.  I discussed treatment options with patient.  She would like to try steroid injection.  - methylPREDNISolone (SOLU-MEDROL) 40 MG injection 40 mg  - diclofenac sodium (VOLTAREN) 1 % Gel; Apply to 2-4 gram to affected area 4 times daily  Dispense: 100 g; Refill: 1  - home rehab exercise handout provided  - Activity modification, avoid repetitive injury to affected area  - Follow up as needed    2. Gastroesophageal reflux disease without esophagitis  Chronic, previously controlled with omeprazole 20 daily.  However, she complains of recent development of acid reflux, mild " nausea at night.  She was advised to take omeprazole twice daily, however, caregiver would like to try to take omeprazole 20 mg at night instead due to fear to polypharmacy.  - omeprazole (PRILOSEC) 20 MG delayed-release capsule; Take 1 Capsule by mouth every evening.  Dispense: 90 Capsule; Refill: 1  - weight loss, dietary modification    3. Mixed hyperlipidemia   4. Stenosis of aorta  5. Former smoker  Patient has chronic hyperlipidemia.  Presently, she was not interested in treatment.  Patient is a former smoker.  She used to smoke 1.5 pack/day for 4 years.  She quit in 2006.  She was recently found to have significant stenosis of the abdominal aorta at the bifurcation.  Patient is scheduled to establish care with vascular surgery for consultation.  I had a discussion with patient and caregiver regarding this finding and CVD risks given medical history.  Recommended statin, which she agrees to try.     - rosuvastatin (CRESTOR) 10 MG Tab; Take 1 Tablet by mouth every evening.  Dispense: 30 Tablet; Refill: 11  - dietary modification, exercise, and weight loss.   - avoid alcohol, drugs, tobacco products   - Keep appointment to establish care with vascular surgeon for consultation.    Lateral epicondylitis procedure note:   Indication: Acute left lateral epicondylitis    Injectable solution: 0.5 ml of Kenalog 40 mg/ml + 1 ml of Lidocaine w/o Epi.   Needle: 25-1    Verbal PARQ obtained. Risks, benefits, and alternative options discussed with patients. Pt consents to procedure.     The injection site was identified and cleansed thoroughly with alcohol swab.   Using no touch technique, the above solution was injected to the location previously marked w/o any resistance.   The injection site was gently massaged & dressed with adhesive bandage.   Pt tolerates the procedure well. No immediate complication noted.   Wound care instructions provided.   F/u PRN.     Lorna Uribe M.D.        Followup: Return in about 6 months  (around 6/8/2023) for Multiple issues.    Please note that this dictation was created using voice recognition software. I have made every reasonable attempt to correct obvious errors, but I expect that there are errors of grammar and possibly content that I did not discover before finalizing the note.

## 2023-01-10 ENCOUNTER — APPOINTMENT (OUTPATIENT)
Dept: VASCULAR SURGERY | Facility: MEDICAL CENTER | Age: 73
End: 2023-01-10
Payer: MEDICARE

## 2023-02-06 ENCOUNTER — TELEPHONE (OUTPATIENT)
Dept: MEDICAL GROUP | Age: 73
End: 2023-02-06
Payer: MEDICARE

## 2023-02-06 NOTE — TELEPHONE ENCOUNTER
Patient came in stating that she is no longer taking Omeprazole since she is no longer having heart burn. Patient stated that she has not had heart burn for 49 days now. Patient wants to know if she should still be taking Rosuvastatin.

## 2023-02-07 ENCOUNTER — OFFICE VISIT (OUTPATIENT)
Dept: VASCULAR SURGERY | Facility: MEDICAL CENTER | Age: 73
End: 2023-02-07
Payer: MEDICARE

## 2023-02-07 VITALS — WEIGHT: 175.2 LBS | BODY MASS INDEX: 33.1 KG/M2

## 2023-02-07 DIAGNOSIS — I77.9 PAOD (PERIPHERAL ARTERIAL OCCLUSIVE DISEASE) (HCC): ICD-10-CM

## 2023-02-07 PROCEDURE — 99203 OFFICE O/P NEW LOW 30 MIN: CPT | Performed by: SURGERY

## 2023-02-07 ASSESSMENT — FIBROSIS 4 INDEX: FIB4 SCORE: 1.309090909090909091

## 2023-02-07 NOTE — TELEPHONE ENCOUNTER
Please advise patient to continue to take Crestor until her next office visit with me in May 2023.  Lorna Uribe M.D.

## 2023-02-07 NOTE — H&P
Vascular Surgery            New Patient Consultation    Patient:Thuy Albert  MRN:[id number]  Primary care physician:Lorna Uribe M.D.  Referring Provider: Lorna Uribe M.D.      Vascular Consultant: Sebastián Simpson MD    Date: 2/7/2023  _____________________________________________________    Chief Complaint:     Iliac artery atherosclerosis    History of Present Illness:   Thuy Albert  is a 72 y.o. year old female who was referred for evaluation of iliac artery atherosclerosis. This was found incidentally on CT for abdominal pain.  Patient reports pain in both feet due to plantar fasciitis however the patient does not have symptoms of arterial insufficiency in the lower extremities, particularly no claudication, no rest pain, no tissue loss.  Admittedly claudication is difficult to assess given that the patient is predominantly wheelchair-bound and does not ambulate much due to her plantar fasciitis.  No history of previous vascular intervention.  Quit smoking about 15 years ago    Past Medical History:     Past Medical History:   Diagnosis Date    Arthritis     Asthma     Dementia (HCC)     DVT (deep venous thrombosis) ([facility]) 2003    DVT LEFT LEG-- STATES SHE HAD TAKEN COUMADIN FOR 8 WEEKS IN 2003.    GERD (gastroesophageal reflux disease)     Thyroid disease     hypothyroidism     Past Surgical History:     Past Surgical History:   Procedure Laterality Date    APPENDECTOMY LAPAROSCOPIC  8/1/2011    Performed by [facility]ILLA at [facility]    APPENDECTOMY      CHOLECYSTECTOMY      OTHER ABDOMINAL SURGERY      ksenia    TONSILLECTOMY       Allergies:     Allergies   Allergen Reactions    Cephalexin Rash     rash    Nabumetone      rash    Synthroid [Levothroid]      Nausea      Tetracycline Swelling     Medications:     Outpatient Encounter Medications as of 2/7/2023   Medication Sig Dispense Refill    diclofenac sodium (VOLTAREN) 1 % Gel Apply to 2-4 gram to  affected area 4 times daily 100 g 1    rosuvastatin (CRESTOR) 10 MG Tab Take 1 Tablet by mouth every evening. 30 Tablet 11    thyroid (ARMOUR THYROID) 15 MG Tab Take 3 Tablets by mouth every day. 270 Tablet 3    memantine (NAMENDA) 10 MG Tab Take 1 Tablet by mouth 2 times a day. 180 Tablet 3    donepezil (ARICEPT) 10 MG tablet Take 1 Tablet by mouth every day. 90 Tablet 3    folic acid (FOLVITE) 1 MG Tab Take 1 Tablet by mouth every day. 90 Tablet 1     No facility-administered encounter medications on file as of 2023.     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   Vaping Use    Vaping Use: Never used   Substance and Sexual Activity    Alcohol use: No     Alcohol/week: 0.0 oz    Drug use: No    Sexual activity: Never   Other Topics Concern    Not on file   Social History Narrative    Not on file     Social Determinants of Health     Financial Resource Strain: Not on file   Food Insecurity: Not on file   Transportation Needs: Not on file   Physical Activity: Not on file   Stress: Not on file   Social Connections: Not on file   Intimate Partner Violence: Not on file   Housing Stability: Not on file      Social History     Tobacco Use   Smoking Status Former    Packs/day: 1.50    Years: 40.00    Pack years: 60.00    Types: Cigarettes    Quit date: 2006    Years since quittin.1   Smokeless Tobacco Never   Tobacco Comments    QUIT ,  USED TO TO SMOKE  2-3 PPD FOR 40 YRS.     Social History     Substance and Sexual Activity   Alcohol Use No    Alcohol/week: 0.0 oz     Social History     Substance and Sexual Activity   Drug Use No      Family History:     Family History   Problem Relation Age of Onset    Hypertension Sister     Diabetes Sister     Heart Disease Sister     Other Sister         loss vision complete    Heart Disease Brother 55        MI    Genetic Disorder Mother     Stroke Father     No Known Problems Maternal Grandmother     No Known Problems Maternal Grandfather     No Known Problems Paternal Grandmother     No Known Problems Paternal Grandfather        Review of Systems:   Constitutional: Negative for fever or chills  HENT:   Negative for hearing loss or tinnitus    Eyes:    Negative for blurred vision or loss of vision  Respiratory:  Negative for cough or hemoptysis  Cardiac:  Negative for chest pain or palpitations  Vascular:  Negative for claudication, Negative for rest pain  Gastrointestinal: Negative for vomiting or abdominal pain     Negative for hematochezia or melena   Genitourinary: Negative for dysuria or hematuria   Musculoskeletal: Negative for myalgias or acute joint pain  Skin:   Negative for itching or rash  Neurological:  Negative for dizziness or headaches     Negative for speech disturbance     Negative for extremity weakness or paresthesias  Endo/Heme:  Negative for easy bruising or bleeding       Exam:   Wt 79.5 kg (175 lb 3.2 oz)   BMI 33.10 kg/m²     Constitutional: Alert, oriented, no acute distress  HEENT:  Normocephalic and atraumatic, EOMI  Neck:   Supple, no JVD,   Cardiovascular: Regular rate and rhythm,   Pulmonary:  Good air entry bilaterally,    Abdominal:  Soft, non-tender, non-distended     Aortic impulse not appreciable due to body habitus  Musculoskeletal: No tenderness, no deformity  Neurological:  CN II-XII grossly intact, no focal deficits  Skin:   Skin is warm and dry. No rash noted.  Vascular exam:    RUE:  warm, cap refill<3 seconds, no edema, no tissue loss,   LUE: warm, cap refill<3 seconds, no edema, no tissue loss,   RLE: warm, cap refill<3 seconds, no edema, no tissue loss, Palpable PT pulse  LLE: warm, cap refill<3 seconds, no edema, no tissue loss, Palpable PT pulse      Imaging:   CT scan shows atherosclerotic plaque in the bilateral common iliac arteries however the degree of stenosis is mild to moderate; there does not appear to be severe luminal compromise      Assessment and Plan:   -Atherosclerosis of bilateral common iliac arteries    Patient still has palpable posterior tibial pulses bilaterally and there are no symptoms and no physical exam evidence of significant arterial insufficiency.  No vascular intervention is indicated at this time.  I recommend the patient follow-up with me in 1 year with surveillance arterial testing or sooner if concerns arise in the meantime.    _____________________________________________________  Sebastián Simpson MD  [facility]  [phone]  [address], [place] [address]

## 2023-02-14 ENCOUNTER — TELEPHONE (OUTPATIENT)
Dept: MEDICAL GROUP | Age: 73
End: 2023-02-14

## 2023-02-14 NOTE — TELEPHONE ENCOUNTER
Patient's spouse called stating that a prior authorization is needed for her [place] Thyroid. He states in came into the office last week, but she still has not received her prescription.     Can we please check on this for her? Patient's spouse is requesting a call with an update.

## 2023-02-19 ENCOUNTER — PATIENT MESSAGE (OUTPATIENT)
Dept: HEALTH INFORMATION MANAGEMENT | Facility: OTHER | Age: 73
End: 2023-02-19

## 2023-02-25 ENCOUNTER — PATIENT MESSAGE (OUTPATIENT)
Dept: MEDICAL GROUP | Age: 73
End: 2023-02-25
Payer: MEDICARE

## 2023-02-25 DIAGNOSIS — Q25.1 STENOSIS OF AORTA: ICD-10-CM

## 2023-03-06 ENCOUNTER — TELEPHONE (OUTPATIENT)
Dept: MEDICAL GROUP | Age: 73
End: 2023-03-06
Payer: MEDICARE

## 2023-03-13 ENCOUNTER — PATIENT MESSAGE (OUTPATIENT)
Dept: MEDICAL GROUP | Age: 73
End: 2023-03-13
Payer: MEDICARE

## 2023-03-13 DIAGNOSIS — E03.9 ACQUIRED HYPOTHYROIDISM: Chronic | ICD-10-CM

## 2023-03-13 RX ORDER — THYROID 15 MG/1
45 TABLET ORAL
Qty: 270 TABLET | Refills: 3 | Status: SHIPPED | OUTPATIENT
Start: 2023-03-13 | End: 2024-01-24 | Stop reason: SDUPTHER

## 2023-03-17 NOTE — PATIENT COMMUNICATION
Submitted Appeal to [facility] for patients [place] thyroid. Per Khushi at [facility] she sees an approval and it is good through 12/31/2023. pts significant other Art has been informed of the approval and is also informed we will need to submit a new auth come January of 2024

## 2023-03-29 ENCOUNTER — APPOINTMENT (OUTPATIENT)
Dept: RADIOLOGY | Facility: MEDICAL CENTER | Age: 73
End: 2023-03-29
Attending: PSYCHIATRY & NEUROLOGY
Payer: MEDICARE

## 2023-04-10 ENCOUNTER — HOSPITAL ENCOUNTER (OUTPATIENT)
Dept: RADIOLOGY | Facility: MEDICAL CENTER | Age: 73
End: 2023-04-10
Attending: PSYCHIATRY & NEUROLOGY
Payer: MEDICARE

## 2023-04-10 DIAGNOSIS — H54.8 LEGAL BLINDNESS, AS DEFINED IN USA: ICD-10-CM

## 2023-04-10 DIAGNOSIS — G44.89 OTHER HEADACHE SYNDROME: ICD-10-CM

## 2023-04-10 DIAGNOSIS — F03.C3 SEVERE DEMENTIA WITH MOOD DISTURBANCE, UNSPECIFIED DEMENTIA TYPE (HCC): ICD-10-CM

## 2023-04-10 PROCEDURE — 70551 MRI BRAIN STEM W/O DYE: CPT

## 2023-05-18 ENCOUNTER — HOSPITAL ENCOUNTER (OUTPATIENT)
Dept: LAB | Facility: MEDICAL CENTER | Age: 73
End: 2023-05-18
Attending: FAMILY MEDICINE
Payer: MEDICARE

## 2023-05-18 ENCOUNTER — OFFICE VISIT (OUTPATIENT)
Dept: MEDICAL GROUP | Age: 73
End: 2023-05-18
Payer: MEDICARE

## 2023-05-18 VITALS
WEIGHT: 176.2 LBS | HEART RATE: 50 BPM | DIASTOLIC BLOOD PRESSURE: 78 MMHG | TEMPERATURE: 98.2 F | OXYGEN SATURATION: 97 % | BODY MASS INDEX: 33.27 KG/M2 | HEIGHT: 61 IN | SYSTOLIC BLOOD PRESSURE: 120 MMHG

## 2023-05-18 DIAGNOSIS — E03.9 ACQUIRED HYPOTHYROIDISM: Chronic | ICD-10-CM

## 2023-05-18 DIAGNOSIS — M79.672 LEFT FOOT PAIN: ICD-10-CM

## 2023-05-18 DIAGNOSIS — E03.9 ACQUIRED HYPOTHYROIDISM: Primary | Chronic | ICD-10-CM

## 2023-05-18 DIAGNOSIS — E51.9 VITAMIN B1 DEFICIENCY: ICD-10-CM

## 2023-05-18 DIAGNOSIS — Z12.12 SCREENING FOR COLORECTAL CANCER: ICD-10-CM

## 2023-05-18 DIAGNOSIS — E78.2 MIXED HYPERLIPIDEMIA: ICD-10-CM

## 2023-05-18 DIAGNOSIS — G30.0 DEMENTIA OF THE ALZHEIMER'S TYPE, WITH EARLY ONSET, WITH DELUSIONS (HCC): ICD-10-CM

## 2023-05-18 DIAGNOSIS — E66.9 OBESITY (BMI 30-39.9): ICD-10-CM

## 2023-05-18 DIAGNOSIS — Z12.11 SCREENING FOR COLORECTAL CANCER: ICD-10-CM

## 2023-05-18 DIAGNOSIS — R73.03 PREDIABETES: ICD-10-CM

## 2023-05-18 DIAGNOSIS — F02.82 DEMENTIA OF THE ALZHEIMER'S TYPE, WITH EARLY ONSET, WITH DELUSIONS (HCC): ICD-10-CM

## 2023-05-18 LAB
ALBUMIN SERPL BCP-MCNC: 4.1 G/DL (ref 3.2–4.9)
ALBUMIN/GLOB SERPL: 1.5 G/DL
ALP SERPL-CCNC: 117 U/L (ref 30–99)
ALT SERPL-CCNC: 15 U/L (ref 2–50)
ANION GAP SERPL CALC-SCNC: 13 MMOL/L (ref 7–16)
AST SERPL-CCNC: 18 U/L (ref 12–45)
BASOPHILS # BLD AUTO: 0.6 % (ref 0–1.8)
BASOPHILS # BLD: 0.06 K/UL (ref 0–0.12)
BILIRUB SERPL-MCNC: 0.5 MG/DL (ref 0.1–1.5)
BUN SERPL-MCNC: 14 MG/DL (ref 8–22)
CALCIUM ALBUM COR SERPL-MCNC: 9 MG/DL (ref 8.5–10.5)
CALCIUM SERPL-MCNC: 9.1 MG/DL (ref 8.5–10.5)
CHLORIDE SERPL-SCNC: 105 MMOL/L (ref 96–112)
CHOLEST SERPL-MCNC: 216 MG/DL (ref 100–199)
CO2 SERPL-SCNC: 24 MMOL/L (ref 20–33)
CREAT SERPL-MCNC: 0.89 MG/DL (ref 0.5–1.4)
EOSINOPHIL # BLD AUTO: 0.13 K/UL (ref 0–0.51)
EOSINOPHIL NFR BLD: 1.3 % (ref 0–6.9)
ERYTHROCYTE [DISTWIDTH] IN BLOOD BY AUTOMATED COUNT: 46.5 FL (ref 35.9–50)
FASTING STATUS PATIENT QL REPORTED: NORMAL
GFR SERPLBLD CREATININE-BSD FMLA CKD-EPI: 68 ML/MIN/1.73 M 2
GLOBULIN SER CALC-MCNC: 2.8 G/DL (ref 1.9–3.5)
GLUCOSE SERPL-MCNC: 97 MG/DL (ref 65–99)
HCT VFR BLD AUTO: 42.5 % (ref 37–47)
HDLC SERPL-MCNC: 44 MG/DL
HGB BLD-MCNC: 13.5 G/DL (ref 12–16)
IMM GRANULOCYTES # BLD AUTO: 0.03 K/UL (ref 0–0.11)
IMM GRANULOCYTES NFR BLD AUTO: 0.3 % (ref 0–0.9)
LDLC SERPL CALC-MCNC: 126 MG/DL
LYMPHOCYTES # BLD AUTO: 4.06 K/UL (ref 1–4.8)
LYMPHOCYTES NFR BLD: 39 % (ref 22–41)
MCH RBC QN AUTO: 29.9 PG (ref 27–33)
MCHC RBC AUTO-ENTMCNC: 31.8 G/DL (ref 33.6–35)
MCV RBC AUTO: 94 FL (ref 81.4–97.8)
MONOCYTES # BLD AUTO: 0.58 K/UL (ref 0–0.85)
MONOCYTES NFR BLD AUTO: 5.6 % (ref 0–13.4)
NEUTROPHILS # BLD AUTO: 5.54 K/UL (ref 2–7.15)
NEUTROPHILS NFR BLD: 53.2 % (ref 44–72)
NRBC # BLD AUTO: 0 K/UL
NRBC BLD-RTO: 0 /100 WBC
PLATELET # BLD AUTO: 202 K/UL (ref 164–446)
PMV BLD AUTO: 9.8 FL (ref 9–12.9)
POTASSIUM SERPL-SCNC: 4 MMOL/L (ref 3.6–5.5)
PROT SERPL-MCNC: 6.9 G/DL (ref 6–8.2)
RBC # BLD AUTO: 4.52 M/UL (ref 4.2–5.4)
SODIUM SERPL-SCNC: 142 MMOL/L (ref 135–145)
TRIGL SERPL-MCNC: 229 MG/DL (ref 0–149)
TSH SERPL DL<=0.005 MIU/L-ACNC: 2.17 UIU/ML (ref 0.38–5.33)
URATE SERPL-MCNC: 5.6 MG/DL (ref 1.9–8.2)
WBC # BLD AUTO: 10.4 K/UL (ref 4.8–10.8)

## 2023-05-18 PROCEDURE — 80061 LIPID PANEL: CPT

## 2023-05-18 PROCEDURE — 80053 COMPREHEN METABOLIC PANEL: CPT

## 2023-05-18 PROCEDURE — 3074F SYST BP LT 130 MM HG: CPT | Performed by: FAMILY MEDICINE

## 2023-05-18 PROCEDURE — 84443 ASSAY THYROID STIM HORMONE: CPT

## 2023-05-18 PROCEDURE — 3078F DIAST BP <80 MM HG: CPT | Performed by: FAMILY MEDICINE

## 2023-05-18 PROCEDURE — 84550 ASSAY OF BLOOD/URIC ACID: CPT

## 2023-05-18 PROCEDURE — 36415 COLL VENOUS BLD VENIPUNCTURE: CPT

## 2023-05-18 PROCEDURE — 85025 COMPLETE CBC W/AUTO DIFF WBC: CPT

## 2023-05-18 PROCEDURE — 84425 ASSAY OF VITAMIN B-1: CPT

## 2023-05-18 PROCEDURE — 99214 OFFICE O/P EST MOD 30 MIN: CPT | Performed by: FAMILY MEDICINE

## 2023-05-18 RX ORDER — ERGOCALCIFEROL 1.25 MG/1
CAPSULE ORAL
COMMUNITY
End: 2023-10-03

## 2023-05-18 ASSESSMENT — FIBROSIS 4 INDEX: FIB4 SCORE: 1.309090909090909091

## 2023-05-18 ASSESSMENT — PATIENT HEALTH QUESTIONNAIRE - PHQ9: CLINICAL INTERPRETATION OF PHQ2 SCORE: 0

## 2023-05-18 NOTE — PROGRESS NOTES
Subjective:   CC: Hypothyroidism follow-up    HPI:     Thuy Albert is a 72 y.o. female, established patient of the clinic.     Patient had chronic hypothyroidism, dementia, hyperlipidemia, prediabetes, obesity.  Patient is taking all medication as directed.  She tolerates them well, no side effect reported.  Patient is taking Aricept 10 mg daily and memantine 10 mg twice daily for dementia managed by Dr. Colin.  Patient has not started on Crestor, but she is planning to start Crestor near future  Patient is active, but does not exercise regularly.  She had blood draw this morning, results not available for review.    Current medicines (including changes today)  Current Outpatient Medications   Medication Sig Dispense Refill    vitamin D2, Ergocalciferol, (DRISDOL) 1.25 MG (73386 UT) Cap capsule Take  by mouth every 7 days.      cyanocobalamin (VITAMIN B-12) 100 MCG Tab Take 100 mcg by mouth every day.      VITAMIN B COMPLEX-C PO Take  by mouth.      Zinc Sulfate (ZINC 15 PO) Take  by mouth.      Ascorbic Acid (VITAMIN C) 1000 MG Tab Take  by mouth.      thyroid (ARMOUR THYROID) 15 MG Tab Take 3 Tablets by mouth every day. 270 Tablet 3    rosuvastatin (CRESTOR) 10 MG Tab Take 1 Tablet by mouth every evening. 30 Tablet 11    memantine (NAMENDA) 10 MG Tab Take 1 Tablet by mouth 2 times a day. 180 Tablet 3    donepezil (ARICEPT) 10 MG tablet Take 1 Tablet by mouth every day. 90 Tablet 3    folic acid (FOLVITE) 1 MG Tab Take 1 Tablet by mouth every day. 90 Tablet 1     No current facility-administered medications for this visit.     She  has a past medical history of Arthritis, Asthma, Dementia ([facility]), DVT (deep venous thrombosis) ([facility]) (2003), GERD (gastroesophageal reflux disease), and Thyroid disease.    I reviewed patient's problem list, allergies, medications, family hx, social hx with patient and update EPIC.        Objective:     /78 (BP Location: Left arm, Patient Position: Sitting, BP Cuff Size:  "Adult)   Pulse (!) 50   Temp 36.8 °C (98.2 °F) (Temporal)   Ht 1.549 m (5' 1\")   Wt 79.9 kg (176 lb 3.2 oz)   SpO2 97%  Body mass index is 33.29 kg/m².    Physical Exam:  Constitutional: awake, alert, in no distress.  Skin: Warm, dry, good turgor, no rashes, bruises, ulcers in visible areas.  Eye: conjunctiva clear, lids neg for edema or lesions.  Neck: Trachea midline, no masses, no thyromegaly. No cervical or supraclavicular lymphadenopathy  Respiratory: Unlabored respiratory effort, lungs clear to auscultation, no wheezes, no rales.  Cardiovascular: Normal S1, S2, no murmur, no pedal edema.  Psych: Oriented x3, affect and mood wnl, intact judgement and insight.       Assessment and Plan:   The following treatment plan was discussed    1. Acquired hypothyroidism  Chronic, currently taking [place] Thyroid 45 mg daily.  Patient is due to have repeat thyroid function test for reassessment.  - Continue [place] Thyroid 45 mg daily  - TSH; Future    2. Dementia of the Alzheimer's type, with early onset, with delusions (HCC)  Chronic, currently taking Aricept 10 mg daily and memantine 10 mg twice daily managed by Dr. Bender.  No major changes in behaviors.  Patient remains cooperative per caregiver.  -Continue Aricept and memantine and follow-up with Dr. Bender as directed.  -care giver has lots of questions regarding treatment for this conditions as well as side effects of Aricept and Memantine. I had long conversation with patient about this. Appropriate counseling provided.     3. Mixed hyperlipidemia   Chronic, Crestor 10 mg qd were previously prescribed, however, patient has not started this medication.  She is planning to start Crestor in a few weeks.  - continue Crestor 10 mg qd  - dietary modification, exercise, and weight loss.   - avoid alcohol, drugs, tobacco products     4. Prediabetes  - Dietary/lifestyle modification and weight loss      5. Obesity (BMI 30-39.9)  - Patient identified as having weight " management issue.  Appropriate orders and counseling given.    6. Screening for colorectal cancer  - COLOGUARD (FIT DNA)       Lorna Uribe M.D.      Followup: Return in about 6 months (around 11/18/2023) for Multiple issues.    Please note that this dictation was created using voice recognition software. I have made every reasonable attempt to correct obvious errors, but I expect that there are errors of grammar and possibly content that I did not discover before finalizing the note.

## 2023-05-19 NOTE — PROGRESS NOTES
"Patient was seen by myself on 5/18/2023. She presented with her long-term partner Art. During office visit, Art states that patient has been suffering nocturnal headache. Dr. Bender, his neurologist, prescribed Depakote for patient. Art picked up this medication, however, he decided not to give it to patient after he read drug information handout provided by the pharmacy. He states states that he was horrified of the potential side effects of Depakote. He worries that the drug will kill her. He states that \"he will get a gun and shoot every single providers in the office\" if she dies from this drug. I advised Art not to give patient Depakote. I recommended that he tries Advil as needed. Patient tried Tylenol without relief.     I discuss patient's threat with our clinic managers on 5/19/2023.    Lorna Uribe M.D.     "

## 2023-05-22 LAB — VIT B1 BLD-MCNC: 166 NMOL/L (ref 70–180)

## 2023-06-25 SDOH — ECONOMIC STABILITY: HOUSING INSECURITY: IN THE LAST 12 MONTHS, HOW MANY PLACES HAVE YOU LIVED?: 1

## 2023-06-25 SDOH — ECONOMIC STABILITY: HOUSING INSECURITY

## 2023-06-25 SDOH — ECONOMIC STABILITY: TRANSPORTATION INSECURITY
IN THE PAST 12 MONTHS, HAS THE LACK OF TRANSPORTATION KEPT YOU FROM MEDICAL APPOINTMENTS OR FROM GETTING MEDICATIONS?: NO

## 2023-06-25 SDOH — ECONOMIC STABILITY: HOUSING INSECURITY
IN THE LAST 12 MONTHS, WAS THERE A TIME WHEN YOU DID NOT HAVE A STEADY PLACE TO SLEEP OR SLEPT IN A SHELTER (INCLUDING NOW)?: NO

## 2023-06-25 SDOH — HEALTH STABILITY: MENTAL HEALTH
STRESS IS WHEN SOMEONE FEELS TENSE, NERVOUS, ANXIOUS, OR CAN'T SLEEP AT NIGHT BECAUSE THEIR MIND IS TROUBLED. HOW STRESSED ARE YOU?: NOT AT ALL

## 2023-06-25 SDOH — ECONOMIC STABILITY: FOOD INSECURITY: WITHIN THE PAST 12 MONTHS, THE FOOD YOU BOUGHT JUST DIDN'T LAST AND YOU DIDN'T HAVE MONEY TO GET MORE.: NEVER TRUE

## 2023-06-25 SDOH — HEALTH STABILITY: PHYSICAL HEALTH: ON AVERAGE, HOW MANY DAYS PER WEEK DO YOU ENGAGE IN MODERATE TO STRENUOUS EXERCISE (LIKE A BRISK WALK)?: 0 DAYS

## 2023-06-25 SDOH — ECONOMIC STABILITY: INCOME INSECURITY: HOW HARD IS IT FOR YOU TO PAY FOR THE VERY BASICS LIKE FOOD, HOUSING, MEDICAL CARE, AND HEATING?: SOMEWHAT HARD

## 2023-06-25 SDOH — ECONOMIC STABILITY: FOOD INSECURITY: WITHIN THE PAST 12 MONTHS, YOU WORRIED THAT YOUR FOOD WOULD RUN OUT BEFORE YOU GOT MONEY TO BUY MORE.: NEVER TRUE

## 2023-06-25 SDOH — ECONOMIC STABILITY: TRANSPORTATION INSECURITY
IN THE PAST 12 MONTHS, HAS LACK OF TRANSPORTATION KEPT YOU FROM MEETINGS, WORK, OR FROM GETTING THINGS NEEDED FOR DAILY LIVING?: NO

## 2023-06-25 SDOH — HEALTH STABILITY: PHYSICAL HEALTH: ON AVERAGE, HOW MANY MINUTES DO YOU ENGAGE IN EXERCISE AT THIS LEVEL?: 10 MIN

## 2023-06-25 SDOH — ECONOMIC STABILITY: TRANSPORTATION INSECURITY
IN THE PAST 12 MONTHS, HAS LACK OF RELIABLE TRANSPORTATION KEPT YOU FROM MEDICAL APPOINTMENTS, MEETINGS, WORK OR FROM GETTING THINGS NEEDED FOR DAILY LIVING?: NO

## 2023-06-25 ASSESSMENT — LIFESTYLE VARIABLES
HOW MANY STANDARD DRINKS CONTAINING ALCOHOL DO YOU HAVE ON A TYPICAL DAY: PATIENT DOES NOT DRINK
AUDIT-C TOTAL SCORE: 0
SKIP TO QUESTIONS 9-10: 1
HOW OFTEN DO YOU HAVE SIX OR MORE DRINKS ON ONE OCCASION: NEVER
HOW OFTEN DO YOU HAVE A DRINK CONTAINING ALCOHOL: NEVER

## 2023-06-25 ASSESSMENT — SOCIAL DETERMINANTS OF HEALTH (SDOH)
HOW OFTEN DO YOU GET TOGETHER WITH FRIENDS OR RELATIVES?: NEVER
IN A TYPICAL WEEK, HOW MANY TIMES DO YOU TALK ON THE PHONE WITH FAMILY, FRIENDS, OR NEIGHBORS?: NEVER
DO YOU BELONG TO ANY CLUBS OR ORGANIZATIONS SUCH AS CHURCH GROUPS UNIONS, FRATERNAL OR ATHLETIC GROUPS, OR SCHOOL GROUPS?: NO
IN A TYPICAL WEEK, HOW MANY TIMES DO YOU TALK ON THE PHONE WITH FAMILY, FRIENDS, OR NEIGHBORS?: NEVER
WITHIN THE PAST 12 MONTHS, YOU WORRIED THAT YOUR FOOD WOULD RUN OUT BEFORE YOU GOT THE MONEY TO BUY MORE: NEVER TRUE
HOW OFTEN DO YOU GET TOGETHER WITH FRIENDS OR RELATIVES?: NEVER
HOW OFTEN DO YOU ATTEND CHURCH OR RELIGIOUS SERVICES?: NEVER
ARE YOU MARRIED, WIDOWED, DIVORCED, SEPARATED, NEVER MARRIED, OR LIVING WITH A PARTNER?: LIVING WITH PARTNER
HOW HARD IS IT FOR YOU TO PAY FOR THE VERY BASICS LIKE FOOD, HOUSING, MEDICAL CARE, AND HEATING?: SOMEWHAT HARD
HOW OFTEN DO YOU ATTENT MEETINGS OF THE CLUB OR ORGANIZATION YOU BELONG TO?: NEVER
ARE YOU MARRIED, WIDOWED, DIVORCED, SEPARATED, NEVER MARRIED, OR LIVING WITH A PARTNER?: LIVING WITH PARTNER
DO YOU BELONG TO ANY CLUBS OR ORGANIZATIONS SUCH AS CHURCH GROUPS UNIONS, FRATERNAL OR ATHLETIC GROUPS, OR SCHOOL GROUPS?: NO
HOW MANY DRINKS CONTAINING ALCOHOL DO YOU HAVE ON A TYPICAL DAY WHEN YOU ARE DRINKING: PATIENT DOES NOT DRINK
HOW OFTEN DO YOU ATTENT MEETINGS OF THE CLUB OR ORGANIZATION YOU BELONG TO?: NEVER
HOW OFTEN DO YOU HAVE A DRINK CONTAINING ALCOHOL: NEVER
HOW OFTEN DO YOU ATTEND CHURCH OR RELIGIOUS SERVICES?: NEVER
HOW OFTEN DO YOU HAVE SIX OR MORE DRINKS ON ONE OCCASION: NEVER

## 2023-06-26 ENCOUNTER — OFFICE VISIT (OUTPATIENT)
Dept: MEDICAL GROUP | Facility: MEDICAL CENTER | Age: 73
End: 2023-06-26
Payer: MEDICARE

## 2023-06-26 VITALS
OXYGEN SATURATION: 94 % | RESPIRATION RATE: 16 BRPM | TEMPERATURE: 98 F | HEART RATE: 53 BPM | BODY MASS INDEX: 32.47 KG/M2 | HEIGHT: 61 IN | WEIGHT: 171.96 LBS | DIASTOLIC BLOOD PRESSURE: 60 MMHG | SYSTOLIC BLOOD PRESSURE: 120 MMHG

## 2023-06-26 DIAGNOSIS — E78.2 MIXED HYPERLIPIDEMIA: ICD-10-CM

## 2023-06-26 DIAGNOSIS — G43.709 CHRONIC MIGRAINE WITHOUT AURA WITHOUT STATUS MIGRAINOSUS, NOT INTRACTABLE: ICD-10-CM

## 2023-06-26 DIAGNOSIS — E03.9 ACQUIRED HYPOTHYROIDISM: Chronic | ICD-10-CM

## 2023-06-26 PROCEDURE — 3074F SYST BP LT 130 MM HG: CPT | Performed by: FAMILY MEDICINE

## 2023-06-26 PROCEDURE — 3078F DIAST BP <80 MM HG: CPT | Performed by: FAMILY MEDICINE

## 2023-06-26 PROCEDURE — 99214 OFFICE O/P EST MOD 30 MIN: CPT | Performed by: FAMILY MEDICINE

## 2023-06-26 RX ORDER — VENLAFAXINE HYDROCHLORIDE 37.5 MG/1
37.5 CAPSULE, EXTENDED RELEASE ORAL DAILY
Qty: 30 CAPSULE | Refills: 0 | Status: SHIPPED | OUTPATIENT
Start: 2023-06-26 | End: 2023-07-26

## 2023-06-26 ASSESSMENT — ENCOUNTER SYMPTOMS
SENSORY CHANGE: 0
WEAKNESS: 0
CHILLS: 0
TINGLING: 0
HEADACHES: 1
PALPITATIONS: 0
TREMORS: 0
MEMORY LOSS: 1
SPEECH CHANGE: 0
FOCAL WEAKNESS: 0
FEVER: 0

## 2023-06-26 ASSESSMENT — FIBROSIS 4 INDEX: FIB4 SCORE: 1.66

## 2023-06-27 NOTE — PROGRESS NOTES
FAMILY MEDICINE VISIT                                                               Chief complaint::Diagnoses of Mixed hyperlipidemia , Acquired hypothyroidism, and Chronic migraine without aura without status migrainosus, not intractable were pertinent to this visit.    History of present illness: Thuy Albert is a 72 y.o. female who presented to establish care, to talk about headaches.  She is here with her  Art.    Previous PCP Dr. Uribe.     I discussed with patient and her  today about further continuation of her care here.  Discussed that I will not be able to describe every single side effect of the medication in an appointment.  We will discuss more pertinent side effects of the medication and will discuss about benefits versus risk of medications.  They can always reach out to our office if patient is experiencing any side effects and the phone call can be directed to nursing staff for getting more details and they can follow-up with me sooner to discuss about these.  Patient and  reports understanding of this.    Problem   Chronic Migraine Without Aura Without Status Migrainosus, Not Intractable    She has been having headaches on one side of her head mostly on her left side which she is describing.  She reports that her headache is pounding when it occurs and she has light sensitivity and sound sensitivity.  She has been taking Tylenol which has not been helping with headaches.  She was seen by neurology and was prescribed Depakote but they did not start the medication when they read about side effects of this medication.  She has vision problems for which she is seeing ophthalmology.  No other neurological symptoms.     Hypothyroidism    Unable to tolerate Levothyroxine.  She is currently on [place] Thyroid which has been able to control her symptoms and her thyroid function has been in normal range.     Mixed hyperlipidemia     The 10-year ASCVD risk score (Belgica DK, et al.,  2019) is: 10.8%    Values used to calculate the score:      Age: 72 years      Sex: Female      Is Non- : No      Diabetic: No      Tobacco smoker: No      Systolic Blood Pressure: 120 mmHg      Is BP treated: No      HDL Cholesterol: 44 mg/dL      Total Cholesterol: 216 mg/dL       Lab Results   Component Value Date/Time    CHOLSTRLTOT 216 (H) 05/18/2023 1409    TRIGLYCERIDE 229 (H) 05/18/2023 1409    HDL 44 05/18/2023 1409     (H) 05/18/2023 1409     She is currently on Crestor 10 mg daily which per  he has not been givin that to patient due to potential interaction with her thyroid medication          Review of systems:     Review of Systems   Constitutional:  Negative for chills and fever.   Cardiovascular:  Negative for chest pain, palpitations and leg swelling.   Neurological:  Positive for headaches. Negative for tingling, tremors, sensory change, speech change, focal weakness and weakness.   Psychiatric/Behavioral:  Positive for memory loss.         Medications and Allergies:     Current Outpatient Medications   Medication Sig Dispense Refill    venlafaxine XR (EFFEXOR XR) 37.5 MG CAPSULE SR 24 HR Take 1 Capsule by mouth every day. 30 Capsule 0    vitamin D2, Ergocalciferol, (DRISDOL) 1.25 MG (80341 UT) Cap capsule Take  by mouth every 7 days.      cyanocobalamin (VITAMIN B-12) 100 MCG Tab Take 100 mcg by mouth every day.      VITAMIN B COMPLEX-C PO Take  by mouth.      Zinc Sulfate (ZINC 15 PO) Take  by mouth.      Ascorbic Acid (VITAMIN C) 1000 MG Tab Take  by mouth.      thyroid (ARMOUR THYROID) 15 MG Tab Take 3 Tablets by mouth every day. 270 Tablet 3    rosuvastatin (CRESTOR) 10 MG Tab Take 1 Tablet by mouth every evening. 30 Tablet 11    memantine (NAMENDA) 10 MG Tab Take 1 Tablet by mouth 2 times a day. 180 Tablet 3    donepezil (ARICEPT) 10 MG tablet Take 1 Tablet by mouth every day. 90 Tablet 3    folic acid (FOLVITE) 1 MG Tab Take 1 Tablet by mouth every day.  "90 Tablet 1     No current facility-administered medications for this visit.          Vitals:    /60   Pulse (!) 53   Temp 36.7 °C (98 °F)   Resp 16   Ht 1.549 m (5' 1\")   Wt 78 kg (171 lb 15.3 oz)   SpO2 94%  Body mass index is 32.49 kg/m².    Physical Exam:     Physical Exam  Constitutional:       Appearance: Normal appearance. She is well-developed and well-groomed.   HENT:      Head: Normocephalic and atraumatic.   Eyes:      General:         Right eye: No discharge.         Left eye: No discharge.      Conjunctiva/sclera: Conjunctivae normal.   Cardiovascular:      Rate and Rhythm: Normal rate and regular rhythm.      Heart sounds: Normal heart sounds. No murmur heard.     No friction rub. No gallop.   Pulmonary:      Effort: Pulmonary effort is normal. No respiratory distress.      Breath sounds: Normal breath sounds. No wheezing or rales.   Neurological:      General: No focal deficit present.      Mental Status: She is alert. Mental status is at baseline.      Gait: Gait is intact.      Comments: Oriented to person and place.  Not oriented to time   Psychiatric:         Mood and Affect: Mood and affect normal.         Behavior: Behavior normal.          Labs:  I reviewed with patient recent labs resulted on 5/18/2023.    Assessment/Plan:         Problem List Items Addressed This Visit       Hypothyroidism (Chronic)     Chronic problem, stable, continue [place] Thyroid 45 mg daily.  Recheck labs in 1 year         Mixed hyperlipidemia      Chronic problem, unstable, I discussed with patient and  to restart Crestor 10 mg daily.  Discussed that it does not interact with thyroid medication.  Hypothyroidism can cause hyperlipidemia.  Recheck labs in 6 months.         Relevant Orders    Comp Metabolic Panel    Lipid Profile    Chronic migraine without aura without status migrainosus, not intractable     Chronic problem, unstable, we discussed about prophylactic medications for migraine headaches " as she is getting headaches almost every day.  We discussed in detail about beta-blockers, calcium channel blocker, TCAs like amitriptyline, anticonvulsants, antidepressant medications.  Beta-blockers and calcium channel blockers I do not recommend for her as her blood pressure today came back at 120/60.  TCA like amitriptyline is not much recommended after age 65 and there is a potential for more side effects of this medication.  Anticonvulsant like Topamax can enhance dementia process so we will avoid this medication.  The best option currently is Effexor at lowest dose.  We discussed about side effects of this medication including GI side effects, hypertension.  We will monitor closely for any side effects with this medication.         Relevant Medications    venlafaxine XR (EFFEXOR XR) 37.5 MG CAPSULE SR 24 HR        Please note that this dictation was created using voice recognition software. I have made every reasonable attempt to correct obvious errors, but I expect that there are errors of grammar and possibly content that I did not discover before finalizing the note.    Follow up in 1 month for medication follow up.

## 2023-06-27 NOTE — ASSESSMENT & PLAN NOTE
Chronic problem, unstable, we discussed about prophylactic medications for migraine headaches as she is getting headaches almost every day.  We discussed in detail about beta-blockers, calcium channel blocker, TCAs like amitriptyline, anticonvulsants, antidepressant medications.  Beta-blockers and calcium channel blockers I do not recommend for her as her blood pressure today came back at 120/60.  TCA like amitriptyline is not much recommended after age 65 and there is a potential for more side effects of this medication.  Anticonvulsant like Topamax can enhance dementia process so we will avoid this medication.  The best option currently is Effexor at lowest dose.  We discussed about side effects of this medication including GI side effects, hypertension.  We will monitor closely for any side effects with this medication.

## 2023-06-27 NOTE — ASSESSMENT & PLAN NOTE
Chronic problem, unstable, I discussed with patient and  to restart Crestor 10 mg daily.  Discussed that it does not interact with thyroid medication.  Hypothyroidism can cause hyperlipidemia.  Recheck labs in 6 months.

## 2023-07-26 ENCOUNTER — OFFICE VISIT (OUTPATIENT)
Dept: MEDICAL GROUP | Facility: MEDICAL CENTER | Age: 73
End: 2023-07-26
Payer: MEDICARE

## 2023-07-26 VITALS
DIASTOLIC BLOOD PRESSURE: 58 MMHG | WEIGHT: 171 LBS | HEART RATE: 68 BPM | BODY MASS INDEX: 32.28 KG/M2 | TEMPERATURE: 97.8 F | SYSTOLIC BLOOD PRESSURE: 118 MMHG | OXYGEN SATURATION: 97 % | RESPIRATION RATE: 17 BRPM | HEIGHT: 61 IN

## 2023-07-26 DIAGNOSIS — G43.709 CHRONIC MIGRAINE WITHOUT AURA WITHOUT STATUS MIGRAINOSUS, NOT INTRACTABLE: ICD-10-CM

## 2023-07-26 DIAGNOSIS — R26.9 ABNORMAL GAIT: ICD-10-CM

## 2023-07-26 PROCEDURE — 3078F DIAST BP <80 MM HG: CPT | Performed by: FAMILY MEDICINE

## 2023-07-26 PROCEDURE — 99214 OFFICE O/P EST MOD 30 MIN: CPT | Performed by: FAMILY MEDICINE

## 2023-07-26 PROCEDURE — 3074F SYST BP LT 130 MM HG: CPT | Performed by: FAMILY MEDICINE

## 2023-07-26 ASSESSMENT — ENCOUNTER SYMPTOMS
CHILLS: 0
PALPITATIONS: 0
HEADACHES: 1
FEVER: 0

## 2023-07-26 ASSESSMENT — FIBROSIS 4 INDEX: FIB4 SCORE: 1.66

## 2023-07-26 NOTE — ASSESSMENT & PLAN NOTE
Chronic problem, unstable, I discussed with patient and  about starting Imitrex but Imitrex can make glaucoma symptoms more worse also.  I recommend him to follow-up with ophthalmology and with neurology for further evaluation for these headaches.

## 2023-07-26 NOTE — PROGRESS NOTES
FAMILY MEDICINE VISIT                                                               Chief complaint::Diagnoses of Chronic migraine without aura without status migrainosus, not intractable and Abnormal gait were pertinent to this visit.    History of present illness: Thuy Albert is a 72 y.o. female who presented for medication follow-up.  Patient is here with her  today.    Problem   Abnormal Gait    She reports that she does not walk much and she gets tired.  They live in an apartment.  Her  reports that they need to clean out the apartment and remove some stuff from home which they do not need     Chronic Migraine Without Aura Without Status Migrainosus, Not Intractable    She has been having headaches on one side of her head mostly on her left side which she is describing.  She reports that her headache is pounding when it occurs and she has light sensitivity and sound sensitivity.  She has been taking Tylenol which has not been helping with headaches.  She was seen by neurology and was prescribed Depakote but they did not start the medication when they read about side effects of this medication.    At last visit we discussed in detail about medications for migraine headaches and we agreed to prescribe her venlafaxine which I did.    Her  reports that she was seen by ophthalmology and was diagnosed with glaucoma and he did side effects that venlafaxine can make them more worse so they have not stopped this medication.  She has been taking Tylenol 1 g as needed for headaches.  He reports that she sits under lights sometimes and he thinks that may be one of the aggravating factor for her.  She has appointment with neurology              Review of systems:     Review of Systems   Constitutional:  Negative for chills and fever.   Cardiovascular:  Negative for chest pain, palpitations and leg swelling.   Neurological:  Positive for headaches.        Medications and Allergies:     Current  "Outpatient Medications   Medication Sig Dispense Refill    vitamin D2, Ergocalciferol, (DRISDOL) 1.25 MG (35021 UT) Cap capsule Take  by mouth every 7 days.      cyanocobalamin (VITAMIN B-12) 100 MCG Tab Take 100 mcg by mouth every day.      VITAMIN B COMPLEX-C PO Take  by mouth.      Zinc Sulfate (ZINC 15 PO) Take  by mouth.      Ascorbic Acid (VITAMIN C) 1000 MG Tab Take  by mouth.      thyroid (ARMOUR THYROID) 15 MG Tab Take 3 Tablets by mouth every day. 270 Tablet 3    rosuvastatin (CRESTOR) 10 MG Tab Take 1 Tablet by mouth every evening. 30 Tablet 11    memantine (NAMENDA) 10 MG Tab Take 1 Tablet by mouth 2 times a day. 180 Tablet 3    donepezil (ARICEPT) 10 MG tablet Take 1 Tablet by mouth every day. 90 Tablet 3    folic acid (FOLVITE) 1 MG Tab Take 1 Tablet by mouth every day. 90 Tablet 1     No current facility-administered medications for this visit.          Vitals:    /58   Pulse 68   Temp 36.6 °C (97.8 °F)   Resp 17   Ht 1.549 m (5' 1\")   Wt 77.6 kg (171 lb)   SpO2 97%  Body mass index is 32.31 kg/m².    Physical Exam:     Physical Exam  Constitutional:       Appearance: Normal appearance. She is well-developed and well-groomed.   HENT:      Head: Normocephalic and atraumatic.      Right Ear: External ear normal.      Left Ear: External ear normal.   Eyes:      General:         Right eye: No discharge.         Left eye: No discharge.      Conjunctiva/sclera: Conjunctivae normal.   Cardiovascular:      Rate and Rhythm: Normal rate.   Pulmonary:      Effort: Pulmonary effort is normal. No respiratory distress.   Musculoskeletal:      Cervical back: Neck supple.   Skin:     Findings: No rash.   Neurological:      Mental Status: She is alert.      Gait: Gait abnormal.   Psychiatric:         Mood and Affect: Mood and affect normal.         Behavior: Behavior normal.            Assessment/Plan:         Problem List Items Addressed This Visit       Chronic migraine without aura without status " migrainosus, not intractable     Chronic problem, unstable, I discussed with patient and  about starting Imitrex but Imitrex can make glaucoma symptoms more worse also.  I recommend him to follow-up with ophthalmology and with neurology for further evaluation for these headaches.         Abnormal gait     Chronic ongoing problem, unstable, discussed with patient and  about referral to home health for doing physical therapy.  They would like to wait as they would like to remove some stuff from home which they do not need and then they can have someone come at home to do physical therapy.  We discussed also about walker.  They report that they live on the second floor and due to small space she cannot walk with a walker in that appointment             Please note that this dictation was created using voice recognition software. I have made every reasonable attempt to correct obvious errors, but I expect that there are errors of grammar and possibly content that I did not discover before finalizing the note.    Follow up in 2 months for follow-up.

## 2023-07-26 NOTE — ASSESSMENT & PLAN NOTE
Chronic ongoing problem, unstable, discussed with patient and  about referral to home health for doing physical therapy.  They would like to wait as they would like to remove some stuff from home which they do not need and then they can have someone come at home to do physical therapy.  We discussed also about walker.  They report that they live on the second floor and due to small space she cannot walk with a walker in that appointment

## 2023-07-30 DIAGNOSIS — G43.709 CHRONIC MIGRAINE WITHOUT AURA WITHOUT STATUS MIGRAINOSUS, NOT INTRACTABLE: ICD-10-CM

## 2023-07-31 RX ORDER — VENLAFAXINE HYDROCHLORIDE 37.5 MG/1
37.5 CAPSULE, EXTENDED RELEASE ORAL DAILY
Qty: 30 CAPSULE | Refills: 0 | OUTPATIENT
Start: 2023-07-31

## 2023-07-31 NOTE — TELEPHONE ENCOUNTER
Received request via: Pharmacy    Was the patient seen in the last year in this department? Yes    Does the patient have an active prescription (recently filled or refills available) for medication(s) requested? No    Does the patient have snf Plus and need 100 day supply (blood pressure, diabetes and cholesterol meds only)? 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 Pt is now s/p ex lap, SBR, primary anastomosis on 7/6. C/b UTI and ileus, NGT placed 7/11.     NPO/IVF   NGT to LIWS  Obtain 2 view AXR  PICC placement  Pain/nausea control PRN  Zosyn   SQL/SCDs  OOBA/IS  Dispo: [facility]

## 2023-09-27 ENCOUNTER — APPOINTMENT (OUTPATIENT)
Dept: MEDICAL GROUP | Facility: MEDICAL CENTER | Age: 73
End: 2023-09-27
Payer: MEDICARE

## 2023-10-03 ENCOUNTER — HOSPITAL ENCOUNTER (EMERGENCY)
Facility: MEDICAL CENTER | Age: 73
End: 2023-10-03
Attending: EMERGENCY MEDICINE
Payer: MEDICARE

## 2023-10-03 VITALS
HEART RATE: 61 BPM | TEMPERATURE: 98.4 F | DIASTOLIC BLOOD PRESSURE: 60 MMHG | WEIGHT: 170 LBS | SYSTOLIC BLOOD PRESSURE: 116 MMHG | RESPIRATION RATE: 22 BRPM | BODY MASS INDEX: 32.1 KG/M2 | HEIGHT: 61 IN | OXYGEN SATURATION: 98 %

## 2023-10-03 DIAGNOSIS — R55 SYNCOPE, UNSPECIFIED SYNCOPE TYPE: ICD-10-CM

## 2023-10-03 DIAGNOSIS — R00.1 SINUS BRADYCARDIA: ICD-10-CM

## 2023-10-03 LAB
ANION GAP SERPL CALC-SCNC: 12 MMOL/L (ref 7–16)
APPEARANCE UR: CLEAR
BACTERIA #/AREA URNS HPF: ABNORMAL /HPF
BASOPHILS # BLD AUTO: 0.3 % (ref 0–1.8)
BASOPHILS # BLD: 0.04 K/UL (ref 0–0.12)
BILIRUB UR QL STRIP.AUTO: NEGATIVE
BUN SERPL-MCNC: 18 MG/DL (ref 8–22)
CALCIUM SERPL-MCNC: 9.2 MG/DL (ref 8.4–10.2)
CHLORIDE SERPL-SCNC: 103 MMOL/L (ref 96–112)
CO2 SERPL-SCNC: 24 MMOL/L (ref 20–33)
COLOR UR: YELLOW
CREAT SERPL-MCNC: 1.05 MG/DL (ref 0.5–1.4)
EKG IMPRESSION: NORMAL
EOSINOPHIL # BLD AUTO: 0.07 K/UL (ref 0–0.51)
EOSINOPHIL NFR BLD: 0.5 % (ref 0–6.9)
EPI CELLS #/AREA URNS HPF: ABNORMAL /HPF
ERYTHROCYTE [DISTWIDTH] IN BLOOD BY AUTOMATED COUNT: 44.4 FL (ref 35.9–50)
GFR SERPLBLD CREATININE-BSD FMLA CKD-EPI: 56 ML/MIN/1.73 M 2
GLUCOSE SERPL-MCNC: 114 MG/DL (ref 65–99)
GLUCOSE UR STRIP.AUTO-MCNC: NEGATIVE MG/DL
HCT VFR BLD AUTO: 40.5 % (ref 37–47)
HGB BLD-MCNC: 13.2 G/DL (ref 12–16)
IMM GRANULOCYTES # BLD AUTO: 0.07 K/UL (ref 0–0.11)
IMM GRANULOCYTES NFR BLD AUTO: 0.5 % (ref 0–0.9)
KETONES UR STRIP.AUTO-MCNC: 40 MG/DL
LEUKOCYTE ESTERASE UR QL STRIP.AUTO: ABNORMAL
LYMPHOCYTES # BLD AUTO: 2.03 K/UL (ref 1–4.8)
LYMPHOCYTES NFR BLD: 15 % (ref 22–41)
MCH RBC QN AUTO: 30.3 PG (ref 27–33)
MCHC RBC AUTO-ENTMCNC: 32.6 G/DL (ref 32.2–35.5)
MCV RBC AUTO: 92.9 FL (ref 81.4–97.8)
MICRO URNS: ABNORMAL
MONOCYTES # BLD AUTO: 0.86 K/UL (ref 0–0.85)
MONOCYTES NFR BLD AUTO: 6.4 % (ref 0–13.4)
NEUTROPHILS # BLD AUTO: 10.44 K/UL (ref 1.82–7.42)
NEUTROPHILS NFR BLD: 77.3 % (ref 44–72)
NITRITE UR QL STRIP.AUTO: NEGATIVE
NRBC # BLD AUTO: 0 K/UL
NRBC BLD-RTO: 0 /100 WBC (ref 0–0.2)
PH UR STRIP.AUTO: 5.5 [PH] (ref 5–8)
PLATELET # BLD AUTO: 201 K/UL (ref 164–446)
PMV BLD AUTO: 9.3 FL (ref 9–12.9)
POTASSIUM SERPL-SCNC: 3.9 MMOL/L (ref 3.6–5.5)
PROT UR QL STRIP: NEGATIVE MG/DL
RBC # BLD AUTO: 4.36 M/UL (ref 4.2–5.4)
RBC # URNS HPF: ABNORMAL /HPF
RBC UR QL AUTO: NEGATIVE
SODIUM SERPL-SCNC: 139 MMOL/L (ref 135–145)
SP GR UR STRIP.AUTO: 1.02
WBC # BLD AUTO: 13.5 K/UL (ref 4.8–10.8)
WBC #/AREA URNS HPF: ABNORMAL /HPF

## 2023-10-03 PROCEDURE — 36415 COLL VENOUS BLD VENIPUNCTURE: CPT

## 2023-10-03 PROCEDURE — 85025 COMPLETE CBC W/AUTO DIFF WBC: CPT

## 2023-10-03 PROCEDURE — 81001 URINALYSIS AUTO W/SCOPE: CPT

## 2023-10-03 PROCEDURE — 93005 ELECTROCARDIOGRAM TRACING: CPT

## 2023-10-03 PROCEDURE — 93005 ELECTROCARDIOGRAM TRACING: CPT | Performed by: EMERGENCY MEDICINE

## 2023-10-03 PROCEDURE — 99284 EMERGENCY DEPT VISIT MOD MDM: CPT

## 2023-10-03 PROCEDURE — 80048 BASIC METABOLIC PNL TOTAL CA: CPT

## 2023-10-03 RX ORDER — ACETAMINOPHEN 500 MG
1000 TABLET ORAL EVERY 6 HOURS PRN
COMMUNITY

## 2023-10-03 ASSESSMENT — FIBROSIS 4 INDEX: FIB4 SCORE: 1.68

## 2023-10-03 NOTE — ED TRIAGE NOTES
"Pt comes in w/    pt passed out today while she was getting \"cleaned up\"    was by her side when this occurred  he noticed her eyes roll up and went out  he helped her down  no injury occurred  syncope lasted about a minute prior to regaining awake per  \"it looked like she was jerking\"   pt w/ no Hx of such   does have dementia and is legally blind    EKG done in triage   " rehab liaison, signing and reviewing screen

## 2023-10-03 NOTE — ED PROVIDER NOTES
ED Provider Note    CHIEF COMPLAINT  Chief Complaint   Patient presents with    Syncope      brings pt in  pt was cleaning herself when sudden onset of passing out   lasted about a 1 minute  no Hx of such per     pt has dementia and is legally blind        LIMITATION TO HISTORY   None    HPI    Thuy Albert is a 73 y.o. female who presents to the Emergency Department for witnessed syncope at 1 PM today.  She was standing by the sink washing and then her eyes rolled up and her significant other eased her to the floor.  There was a few seconds of shaking and total loss of consciousness was less than a minute.  She was not postictal.  She has never fainted before.  She has no heart failure.  She has never been told she is bradycardic.  She has not hypertensive.  No blood thinner use.  She may have had a DVT before.  No coronary artery disease.  Denies severe headache at the time.  No leg swelling or calf pain.  No chest pain or palpitations.  The patient was not dizzy after she reawakened.  Lately she has had dizziness on standing at times.  She had not eaten today and drank very little this morning before the episode of syncope.    OUTSIDE HISTORIAN(S):  Her significant other of 27 years provided most of the history surrounding the episode of syncope since the patient was amnestic to this    EXTERNAL RECORDS REVIEWED  Family medicine note reviewed from July 26 of this year.  She has a history of chronic migraine headache.  She does not seem to have had a prior outpatient echocardiogram.    REVIEW OF SYSTEMS  Pertinent positives include: Loss of consciousness, dizziness, shaking.  Pertinent negatives include: Leg swelling, calf pain, melena, GI bleed, dysuria, urgency, frequency.      PAST MEDICAL HISTORY  Past Medical History:   Diagnosis Date    Arthritis     Asthma     Dementia ([facility])     DVT (deep venous thrombosis) ([facility]) 2003    DVT LEFT LEG-- STATES SHE HAD TAKEN COUMADIN FOR 8 WEEKS IN  .    GERD (gastroesophageal reflux disease)     Thyroid disease     hypothyroidism       FAMILY HISTORY  Family History   Problem Relation Age of Onset    Hypertension Sister     Diabetes Sister     Heart Disease Sister     Other Sister         loss vision complete    Heart Disease Brother 55        MI    Genetic Disorder Mother     Stroke Father     No Known Problems Maternal Grandmother     No Known Problems Maternal Grandfather     No Known Problems Paternal Grandmother     No Known Problems Paternal Grandfather        SOCIAL HISTORY  Social History     Tobacco Use    Smoking status: Former     Current packs/day: 0.00     Average packs/day: 1.5 packs/day for 40.0 years (60.0 ttl pk-yrs)     Types: Cigarettes     Start date: 1966     Quit date: 2006     Years since quittin.7    Smokeless tobacco: Never    Tobacco comments:     QUIT ,  USED TO TO SMOKE 2-3 PPD FOR 40 YRS.   Vaping Use    Vaping Use: Never used   Substance Use Topics    Alcohol use: No     Alcohol/week: 0.0 oz    Drug use: No     Social History     Substance and Sexual Activity   Drug Use No       SURGICAL HISTORY  Past Surgical History:   Procedure Laterality Date    APPENDECTOMY LAPAROSCOPIC  2011    Performed by JJ QUINTANILLA at [facility] ORS    APPENDECTOMY      CHOLECYSTECTOMY      OTHER ABDOMINAL SURGERY      ksenia    TONSILLECTOMY         CURRENT MEDICATIONS  No current facility-administered medications for this encounter.    Current Outpatient Medications:     vitamin D2, Ergocalciferol, (DRISDOL) 1.25 MG (65965 UT) Cap capsule, Take  by mouth every 7 days., Disp: , Rfl:     cyanocobalamin (VITAMIN B-12) 100 MCG Tab, Take 100 mcg by mouth every day., Disp: , Rfl:     VITAMIN B COMPLEX-C PO, Take  by mouth., Disp: , Rfl:     Zinc Sulfate (ZINC 15 PO), Take  by mouth., Disp: , Rfl:     Ascorbic Acid (VITAMIN C) 1000 MG Tab, Take  by mouth., Disp: , Rfl:     thyroid (ARMOUR THYROID) 15 MG Tab, Take 3  "Tablets by mouth every day., Disp: 270 Tablet, Rfl: 3    rosuvastatin (CRESTOR) 10 MG Tab, Take 1 Tablet by mouth every evening., Disp: 30 Tablet, Rfl: 11    memantine (NAMENDA) 10 MG Tab, Take 1 Tablet by mouth 2 times a day., Disp: 180 Tablet, Rfl: 3    donepezil (ARICEPT) 10 MG tablet, Take 1 Tablet by mouth every day., Disp: 90 Tablet, Rfl: 3    folic acid (FOLVITE) 1 MG Tab, Take 1 Tablet by mouth every day., Disp: 90 Tablet, Rfl: 1    ALLERGIES  Allergies   Allergen Reactions    Cephalexin Rash     rash    Nabumetone      rash    Synthroid [Levothroid]      Nausea      Tetracycline Swelling       PHYSICAL EXAM  VITAL SIGNS: /49   Pulse (!) 55   Temp 36.3 °C (97.4 °F) (Temporal)   Resp 18   Ht 1.549 m (5' 1\")   Wt 77.1 kg (170 lb)   SpO2 97%   BMI 32.12 kg/m²   Reviewed and bradycardic, low normal blood pressure, afebrile  Constitutional: Well developed, Well nourished, appearing.  HENT: Normocephalic, atraumatic, bilateral external ears normal, No intraoral erythema, edema, exudate  Eyes: PERRLA, conjunctiva pink, no scleral icterus.   Cardiovascular: Regular rate and rhythm. No murmurs, rubs or gallops.  No dependent edema or calf tenderness  Respiratory: Lungs clear to auscultation bilaterally. No wheezes, rales, or rhonchi.  Abdominal:  Abdomen soft, non-tender, non distended. No rebound, or guarding.    Skin: No erythema, no rash. No wounds or bruising.  Genitourinary: No costovertebral angle tenderness.   Musculoskeletal: no deformities.   Neurologic: Alert & oriented x 3, cranial nerves 2-12 intact by passive exam.  Moves 4 limbs with symmetric strength.  Psychiatric: Affect normal, Judgment normal, Mood normal.     LABS Ordered and Reviewed by Me:  Results for orders placed or performed during the hospital encounter of 10/03/23   EKG   Result Value Ref Range    Report       [facility] Emergency Dept.    Test Date:  2023-10-03  Pt Name:    DARYA SANTOS" GREEN Department: [facility]  MRN:        [id number]                      Room:  Gender:     Female                       Technician: [id number]  :        [id number]                   Requested By:ER TRIAGE PROTOCOL  Order #:    [id number]                    Reading MD:    Measurements  Intervals                                Axis  Rate:       47                           P:          37  TN:         165                          QRS:        37  QRSD:       79                           T:          68  QT:         443  QTc:        392    Interpretive Statements  Sinus bradycardia  Compared to ECG 2022 16:42:17  No significant changes         Rhythm Strip: Interpretation by me sinus bradycardia    EKG Interpretation by me    Indication: Syncope    Rhythm: normal sinus   Rate: Bradycardic at 47  Axis: normal  Ectopy: none  Conduction: normal  ST/T Waves: no acute change  Q Waves: none  R Wave progression: normal  Hypertrophy changes: Absent    She was bradycardic at 55 on a prior EKG last year.    Clinical Impression: Sinus bradycardia      ED COURSE:    ED Observation Status? Yes; Patient placed in observation status at 4:03 PM 10/03/23     Observation plan is as follows: Evaluation of syncope        6:00 PM - Patient reassessed and notified that we were still waiting for urinalysis.  Labs have otherwise been unremarkable.  She is not orthostatic by blood pressure.      Patient discharged from ED observation at 7:08 PM 10/03/23.  Patient notified that urinalysis probably just represents skin contamination given minimal leukocytosis, no nitrites in urine that was collected from a bedpan.    MEDICAL DECISION MAKING:  PROBLEMS EVALUATED THIS VISIT:  This patient presents after syncope today.  She was dizzy afterwards.  There was shaking but this was likely syncope and not a seizure since there was no postictal state.  She has sinus bradycardia here and has had this in the past.  She has a low normal blood pressure at baseline.   There is no evidence of GI bleed or anemia.  There is no evidence of severe dehydration based on labs.  As stated above I doubt she has urinary tract infection.  Given her bradycardia she would benefit from an echocardiogram and an outpatient monitoring which I will ask her to obtain through her primary physician.    RISK:  Admission considered given her age but given reassuring labs and observation time I doubt admission would .     PLAN:  Increase fluid intake    Syncope handout given    Return for syncope without warning, syncope with exertion or syncope with severe headache chest pain palpitations or leg swelling    Followup:  Pramod Lu M.D.  [address]  [address]  [facility] [id number] [phone]    Call   tomorrow for an appointment to discuss echocardiogram and monitor      CONDITION: Stable.     FINAL IMPRESSION  1. Syncope, unspecified syncope type    2. Sinus bradycardia       ED Observation Care    Electronically signed by: Junior Moreau M.D., 10/3/2023 4:03 PM

## 2023-10-04 NOTE — ED NOTES
2x attempts at straight cath by female RN. Unable to obtain more than a couple drops of urine. Pt placed on bedpan for another attempt at urine sample.

## 2023-10-04 NOTE — DISCHARGE INSTRUCTIONS
Syncope (Fainting Episode)  Increase your fluid intake.  Return for fainting without warning, fainting during exertion or for fainting with leg swelling or headache.  Call your doctor for follow-up to schedule an echocardiogram and an outpatient monitor.    You have had a syncopal (fainting) spell. A fainting episode is a sudden, short-lived loss of consciousness. It results in complete recovery. It occurs because there has been a temporary shortage of oxygen and/or glucose (sugar) to the brain.    causes  Blood pressure pills and other medications that may lower blood pressure below normal. Sudden changes in posture (sudden standing).  Over medication. Take your medications as directed.  Standing too long. This can cause blood to pool in the legs.  Seizure disorders.  Low blood sugar (hypoglycemia) of diabetes. This more commonly causes coma.  Bearing down to go to the bathroom. This can cause your blood pressure to rise suddenly. Your body compensates by making the blood pressure too low when you stop bearing down.  Hardening of the arteries where the brain temporarily does not receive enough blood.  Irregular heart beat and circulatory problems.  Fear, emotional distress, injury, sight of blood, or illness.    Your caregiver will send you home if the syncope was from benign causes (not a reason to worry). Depending on your age and health, you may stay to be monitored and observed. If you return home, have someone stay with you if your caregiver feels that is desirable.    It is very important to keep all follow-up referrals and appointments in order to properly manage this condition.    This is a serious problem which can lead to serious illness and death if not carefully managed.      WARNING: Do not drive or operate machinery until your caregiver feels that it is safe for you to do so.    seek immediate medical attention if:  You have another fainting episode or faint while lying or sitting down. DO NOT DRIVE  YOURSELF. Call 911 if no other help is available.  You have chest pain, nausea (feeling sick to your stomach), vomiting or abdominal pain.  You have an irregular heartbeat or one that is very fast (pulse over 120 beats per minute).  You have a loss of feeling in some part of your body or lose movement in your arms or legs.  You have difficulty with speech, confusion, severe weakness, or visual problems.  You become sweaty and/or feel light headed.    MAKE SURE YOU ARE RE-CHECKED AS INSTRUCTED    Document Released: 12/18/2006  Document Re-Released: 01/29/2008  [employer]® Patient Information ©2008 [employer], [employer].

## 2023-10-04 NOTE — ED NOTES
Medication history reviewed with pts . Med rec is complete.  Allergies reviewed, per pts     Pts  reports that he has not started giving pt ROSUVASTATIN 10MG yet.    Patient has not had any outpatient antibiotics in the last 30 days.    Pt is not on any anticoagulants

## 2023-10-09 ENCOUNTER — TELEPHONE (OUTPATIENT)
Dept: MEDICAL GROUP | Facility: MEDICAL CENTER | Age: 73
End: 2023-10-09
Payer: MEDICARE

## 2023-10-09 NOTE — TELEPHONE ENCOUNTER
VOICEMAIL  1. Caller Name: costa orona                      Call Back Number: [phone]    2. Message: called saying tyson has an appt on 10/11, asked if she needed to get blood draw piror    3. Patient approves office to leave a detailed voicemail/MyChart message: yes    Phone Number Called: [phone]    Call outcome: Did not leave a detailed message. Requested patient to call back.    Message: Left message for patient to call back office at [phone].

## 2023-10-11 ENCOUNTER — OFFICE VISIT (OUTPATIENT)
Dept: MEDICAL GROUP | Facility: MEDICAL CENTER | Age: 73
End: 2023-10-11
Payer: MEDICARE

## 2023-10-11 VITALS
OXYGEN SATURATION: 98 % | DIASTOLIC BLOOD PRESSURE: 60 MMHG | TEMPERATURE: 96.8 F | RESPIRATION RATE: 16 BRPM | BODY MASS INDEX: 31.72 KG/M2 | HEART RATE: 50 BPM | HEIGHT: 61 IN | SYSTOLIC BLOOD PRESSURE: 116 MMHG | WEIGHT: 168 LBS

## 2023-10-11 DIAGNOSIS — R00.1 SINUS BRADYCARDIA: ICD-10-CM

## 2023-10-11 DIAGNOSIS — Z23 NEED FOR VACCINATION: ICD-10-CM

## 2023-10-11 DIAGNOSIS — R73.03 PREDIABETES: ICD-10-CM

## 2023-10-11 DIAGNOSIS — E78.2 MIXED HYPERLIPIDEMIA: ICD-10-CM

## 2023-10-11 DIAGNOSIS — Z12.11 SCREENING FOR COLORECTAL CANCER: ICD-10-CM

## 2023-10-11 DIAGNOSIS — G30.0 SEVERE EARLY ONSET ALZHEIMER'S DEMENTIA WITH OTHER BEHAVIORAL DISTURBANCE (HCC): ICD-10-CM

## 2023-10-11 DIAGNOSIS — E03.9 ACQUIRED HYPOTHYROIDISM: Chronic | ICD-10-CM

## 2023-10-11 DIAGNOSIS — Z12.12 SCREENING FOR COLORECTAL CANCER: ICD-10-CM

## 2023-10-11 DIAGNOSIS — F02.C18 SEVERE EARLY ONSET ALZHEIMER'S DEMENTIA WITH OTHER BEHAVIORAL DISTURBANCE (HCC): ICD-10-CM

## 2023-10-11 DIAGNOSIS — R55 SYNCOPE, UNSPECIFIED SYNCOPE TYPE: ICD-10-CM

## 2023-10-11 PROCEDURE — 3074F SYST BP LT 130 MM HG: CPT | Performed by: FAMILY MEDICINE

## 2023-10-11 PROCEDURE — 3078F DIAST BP <80 MM HG: CPT | Performed by: FAMILY MEDICINE

## 2023-10-11 PROCEDURE — 99214 OFFICE O/P EST MOD 30 MIN: CPT | Mod: 25 | Performed by: FAMILY MEDICINE

## 2023-10-11 PROCEDURE — G0008 ADMIN INFLUENZA VIRUS VAC: HCPCS | Performed by: FAMILY MEDICINE

## 2023-10-11 PROCEDURE — 90662 IIV NO PRSV INCREASED AG IM: CPT | Performed by: FAMILY MEDICINE

## 2023-10-11 ASSESSMENT — ENCOUNTER SYMPTOMS
CHILLS: 0
FEVER: 0
PALPITATIONS: 0
MEMORY LOSS: 1
HEADACHES: 1

## 2023-10-11 ASSESSMENT — FIBROSIS 4 INDEX: FIB4 SCORE: 1.69

## 2023-10-12 NOTE — ASSESSMENT & PLAN NOTE
Chronic problem, unstable, recheck labs to assess control, will recommend medication based on lab results

## 2023-10-12 NOTE — PROGRESS NOTES
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    History of present illness: Thuy Albert is a 73 y.o. female who presented for ER follow up.    Problem   Sinus Bradycardia    She had an episode at home where she was about to pass out and she was seen in ER for evaluation.  She had EKG done which showed sinus bradycardia.  Had labs done also.  She was recommended to follow-up with PCP to get echo and monitor     Severe Early Onset Alzheimer's Dementia With Other Behavioral Disturbance (Hcc)    MRI 4/10/2023 showed  Moderate to severe diffuse cerebral volume loss. There is disproportionate severe temporal lobe volume loss indicating the possibility of Alzheimer's disease.    Seeing neurology.  Lives at home with her  who takes care of her.  On donepezil 10 mg daily and Namenda 10 mg 2 times daily     Prediabetes      Last A1c came back normal at 5.5.  Has history of prediabetes    Lab Results   Component Value Date/Time    HBA1C 5.5 08/23/2022 01:36 PM         Hypothyroidism    Unable to tolerate Levothyroxine.  She is currently on Ransom Thyroid which has been able to control her symptoms and her thyroid function has been in normal range.     Mixed hyperlipidemia     The 10-year ASCVD risk score (Belgica JULIO, et al., 2019) is: 11.2%    Values used to calculate the score:      Age: 73 years      Sex: Female      Is Non- : No      Diabetic: No      Tobacco smoker: No      Systolic Blood Pressure: 116 mmHg      Is BP treated: No      HDL Cholesterol: 44 mg/dL      Total Cholesterol: 216 mg/dL       Lab Results   Component Value  Date/Time    CHOLSTRLTOT 216 (H) 05/18/2023 1409    TRIGLYCERIDE 229 (H) 05/18/2023 1409    HDL 44 05/18/2023 1409     (H) 05/18/2023 1409     She is currently on Crestor 10 mg daily which per  she has not been taking as she recently got sick and was seen in the ER            Review of systems:     Review of Systems   Constitutional:  Negative for chills and fever.   Cardiovascular:  Negative for chest pain, palpitations and leg swelling.   Neurological:  Positive for headaches.        She is going to get glaucoma surgery as her headaches are related to glaucoma   Psychiatric/Behavioral:  Positive for memory loss.         Medications and Allergies:     Current Outpatient Medications   Medication Sig Dispense Refill    B Complex Vitamins (B COMPLEX PO) Take 1 Tablet by mouth every day.      Multiple Vitamins-Minerals (ZINC PO) Take 1 Tablet by mouth every day.      Cholecalciferol (D3 PO) Take 1 Capsule by mouth every day.      Bismuth Subsalicylate (PEPTO-BISMOL PO) Take 30 mL by mouth 2 times a day as needed (For upset stomach).      acetaminophen (TYLENOL) 500 MG Tab Take 1,000 mg by mouth every 6 hours as needed. Indications: Pain      Cyanocobalamin (VITAMIN B-12 PO) Take 1 Tablet by mouth every day.      Ascorbic Acid (VITAMIN C PO) Take 1 Tablet by mouth every day.      thyroid (ARMOUR THYROID) 15 MG Tab Take 3 Tablets by mouth every day. 270 Tablet 3    rosuvastatin (CRESTOR) 10 MG Tab Take 1 Tablet by mouth every evening. 30 Tablet 11    memantine (NAMENDA) 10 MG Tab Take 1 Tablet by mouth 2 times a day. 180 Tablet 3    donepezil (ARICEPT) 10 MG tablet Take 1 Tablet by mouth every day. (Patient taking differently: Take 10 mg by mouth every evening.) 90 Tablet 3    folic acid (FOLVITE) 1 MG Tab Take 1 Tablet by mouth every day. (Patient taking differently: Take 1 mg by mouth every evening.) 90 Tablet 1     No current facility-administered medications for this visit.          Vitals:    /60  "  Pulse (!) 50   Temp 36 °C (96.8 °F) (Temporal)   Resp 16   Ht 1.549 m (5' 1\")   Wt 76.2 kg (168 lb)   SpO2 98%  Body mass index is 31.74 kg/m².    Physical Exam:     Physical Exam  Constitutional:       Appearance: Normal appearance. She is well-developed and well-groomed.   HENT:      Head: Normocephalic and atraumatic.   Eyes:      General:         Right eye: No discharge.         Left eye: No discharge.      Conjunctiva/sclera: Conjunctivae normal.   Cardiovascular:      Rate and Rhythm: Regular rhythm. Bradycardia present.      Heart sounds: Normal heart sounds. No murmur heard.     No friction rub. No gallop.   Pulmonary:      Effort: Pulmonary effort is normal. No respiratory distress.      Breath sounds: Normal breath sounds. No wheezing or rales.   Neurological:      General: No focal deficit present.      Mental Status: She is alert. Mental status is at baseline.      Gait: Gait is intact.   Psychiatric:         Mood and Affect: Mood and affect normal.         Behavior: Behavior normal.          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 Continue with current treatment plan: Currently following with neurology, has delusions symptoms.  Follow-up with specialist as directed, but at least annually.  Last edited 10/11/23 17:11 PDT by Pramod Lu M.D.          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         Sinus bradycardia     New problem, unstable, check echocardiogram, Zio patch monitor.  Check labs.  Referral to cardiology         Relevant Orders    EC-ECHOCARDIOGRAM COMPLETE W/O CONT    REFERRAL TO CARDIOLOGY    [facility] ZIO PATCH MONITOR     Other Visit Diagnoses       Syncope, unspecified syncope type  New problem, unstable, check below work-up.       Relevant Orders    CBC WITH DIFFERENTIAL    Comp Metabolic Panel    REFERRAL TO CARDIOLOGY    [facility] ZIO PATCH MONITOR    Need for vaccination        Relevant Orders    Influenza Vaccine, High Dose (65+ Only) (Completed)    Screening for colorectal cancer        Relevant Orders    COLOGUARD (FIT DNA)             Please note that this dictation was created using voice recognition software. I have made every reasonable attempt to correct obvious errors, but I expect that there are errors of grammar and possibly content that I did not discover before finalizing the note.    Follow up in 2 months for lab and imaging follow-up.

## 2023-10-12 NOTE — ASSESSMENT & PLAN NOTE
New problem, unstable, check echocardiogram, Zio patch monitor.  Check labs.  Referral to cardiology

## 2023-10-17 ENCOUNTER — TELEPHONE (OUTPATIENT)
Dept: CARDIOLOGY | Facility: MEDICAL CENTER | Age: 73
End: 2023-10-17
Payer: MEDICARE

## 2023-10-17 NOTE — TELEPHONE ENCOUNTER
Spoke to patient in regards to records for NP appointment with CW.     Patient has seen a cardiologist before?  No   If yes, where?:     Any recent cardiac testing outside of [facility]?  No   What testing:   Where was it completed?:    Were any records requested?  No   Fax:

## 2023-10-25 ENCOUNTER — OFFICE VISIT (OUTPATIENT)
Dept: CARDIOLOGY | Facility: MEDICAL CENTER | Age: 73
End: 2023-10-25
Attending: FAMILY MEDICINE
Payer: MEDICARE

## 2023-10-25 VITALS
BODY MASS INDEX: 31.72 KG/M2 | HEIGHT: 61 IN | HEART RATE: 56 BPM | DIASTOLIC BLOOD PRESSURE: 76 MMHG | RESPIRATION RATE: 14 BRPM | WEIGHT: 168 LBS | SYSTOLIC BLOOD PRESSURE: 110 MMHG | OXYGEN SATURATION: 95 %

## 2023-10-25 DIAGNOSIS — R00.1 SINUS BRADYCARDIA: ICD-10-CM

## 2023-10-25 DIAGNOSIS — I70.0 ATHEROSCLEROSIS OF AORTA (HCC): Chronic | ICD-10-CM

## 2023-10-25 DIAGNOSIS — E78.2 MIXED HYPERLIPIDEMIA: ICD-10-CM

## 2023-10-25 DIAGNOSIS — R55 SYNCOPE, UNSPECIFIED SYNCOPE TYPE: ICD-10-CM

## 2023-10-25 PROCEDURE — 3074F SYST BP LT 130 MM HG: CPT | Performed by: INTERNAL MEDICINE

## 2023-10-25 PROCEDURE — 93246 EXT ECG>7D<15D RECORDING: CPT

## 2023-10-25 PROCEDURE — 99999 PR NO CHARGE: CPT | Performed by: INTERNAL MEDICINE

## 2023-10-25 PROCEDURE — 99203 OFFICE O/P NEW LOW 30 MIN: CPT | Performed by: INTERNAL MEDICINE

## 2023-10-25 PROCEDURE — 99213 OFFICE O/P EST LOW 20 MIN: CPT | Performed by: INTERNAL MEDICINE

## 2023-10-25 PROCEDURE — 3078F DIAST BP <80 MM HG: CPT | Performed by: INTERNAL MEDICINE

## 2023-10-25 ASSESSMENT — FIBROSIS 4 INDEX: FIB4 SCORE: 1.69

## 2023-10-25 NOTE — PATIENT INSTRUCTIONS
A total of 884 randomized controlled intervention trials evaluating 27 types of micronutrients among [id number],627 participants (4,[phone] person-years) were identified. Supplementation with n-3 fatty acid, n-6 fatty acid, l-arginine, l-citrulline, folic acid, vitamin D, magnesium, zinc, ?-lipoic acid, coenzyme Q10, melatonin, catechin, curcumin, flavanol, genistein, and quercetin showed moderate- to high-quality evidence for reducing CVD risk factors. Specifically, n-3 fatty acid supplementation decreased CVD mortality (relative risk [RR]: 0.93; 95% CI: 0.88-0.97), myocardial infarction (RR: 0.85; 95% CI: 0.78-0.92), and coronary heart disease events (RR: 0.86; 95% CI: 0.80-0.93). Folic acid supplementation decreased stroke risk (RR: 0.84; 95% CI: 0.72-0.97), and coenzyme Q10 supplementation decreased all-cause mortality events (RR: 0.68; 95% CI: 0.49-0.94). Vitamin C, vitamin D, vitamin E, and selenium showed no effect on CVD or type 2 diabetes risk. ?-carotene supplementation increased all-cause mortality (RR: 1.10; 95% CI: 1.05-1.15), CVD mortality events (RR: 1.12; 95% CI: 1.06-1.18), and stroke risk (RR: 1.09; 95% CI: 1.01-1.17).           [email or web address]

## 2023-10-25 NOTE — PROGRESS NOTES
Closed no charge, pt has dementia took off monitor lost it. Notified [employer] for a hold claim. See 11/1/23 rehook.    Patient enrolled in the 14 day Zio Holter monitoring program per Pramod Lu MD.  In clinic hook up, monitor s/n [id number]. Pending EOS.

## 2023-10-25 NOTE — PROGRESS NOTES
Chief Complaint   Patient presents with    New Patient     N/P Dx: Sinus bradycardia       Subjective     Chantell Albert is a 73 y.o. female who presents today  in consultation from Pramod Lu M.D. for evaluation of bradycardia in the setting of syncope    She was recently seen in the emergency room for syncope her EKG shows sinus bradycardia her thyroid testing has been normal    She has not had a recurrent event    She is here with her  who is her medical power of  he confirms full code        Past Medical History:   Diagnosis Date    Arthritis     Asthma     Dementia (HCC)     DVT (deep venous thrombosis) ([facility]) 2003    DVT LEFT LEG-- STATES SHE HAD TAKEN COUMADIN FOR 8 WEEKS IN 2003.    GERD (gastroesophageal reflux disease)     Thyroid disease     hypothyroidism     Past Surgical History:   Procedure Laterality Date    APPENDECTOMY LAPAROSCOPIC  8/1/2011    Performed by JJ QUINTANILLA at [facility] ORS    APPENDECTOMY      CHOLECYSTECTOMY      OTHER ABDOMINAL SURGERY      ksenia    TONSILLECTOMY       Family History   Problem Relation Age of Onset    Hypertension Sister     Diabetes Sister     Heart Disease Sister     Other Sister         loss vision complete    Heart Disease Brother 55        MI    Genetic Disorder Mother     Stroke Father     No Known Problems Maternal Grandmother     No Known Problems Maternal Grandfather     No Known Problems Paternal Grandmother     No Known Problems Paternal Grandfather      Social History     Socioeconomic History    Marital status:      Spouse name: Not on file    Number of children: Not on file    Years of education: Not on file    Highest education level: Some college, no degree   Occupational History    Occupation: other     Comment:  and , 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   Vaping Use    Vaping Use: Never used   Substance and Sexual Activity    Alcohol use: No     Alcohol/week: 0.0 oz    Drug use: No    Sexual activity: Never   Other Topics Concern    Not on file   Social History Narrative    Not on file     Social Determinants of Health     Financial Resource Strain: Medium Risk (2023)    Overall Financial Resource Strain (CARDIA)     Difficulty of Paying Living Expenses: Somewhat hard   Food Insecurity: No Food Insecurity (2023)    Hunger Vital Sign     Worried About Running Out of Food in the Last Year: Never true     Ran Out of Food in the Last Year: Never true   Transportation Needs: No Transportation Needs (2023)    PRAPARE - Transportation     Lack of Transportation (Medical): No     Lack of Transportation (Non-Medical): No   Physical Activity: Inactive (2023)    Exercise Vital Sign     Days of Exercise per Week: 0 days     Minutes of Exercise per Session: 10 min   Stress: No Stress Concern Present (2023)    [place] [employer] - Occupational Stress Questionnaire     Feeling of Stress : Not at all   Social Connections: Socially Isolated (2023)    Social Connection and Isolation Panel [NHANES]     Frequency of Communication with Friends and Family: Never     Frequency of Social Gatherings with Friends and Family: Never     Attends Restoration Services: Never     Active Member of Clubs or Organizations: No     Attends Club or Organization Meetings: Never     Marital Status: Living with partner   Intimate Partner Violence: Not on file   Housing Stability: Unknown (2023)    Housing Stability Vital Sign     Unable to Pay for Housing in the Last Year: Not on file     Number of Places Lived in the Last Year: 1     Unstable Housing in the Last Year: No  "    Allergies   Allergen Reactions    Cephalexin Rash     rash    Nabumetone      rash    Synthroid [Levothroid]      Nausea      Tetracycline Swelling     Outpatient Encounter Medications as of 10/25/2023   Medication Sig Dispense Refill    B Complex Vitamins (B COMPLEX PO) Take 1 Tablet by mouth every day.      Multiple Vitamins-Minerals (ZINC PO) Take 1 Tablet by mouth every day.      Cholecalciferol (D3 PO) Take 1 Capsule by mouth every day.      Bismuth Subsalicylate (PEPTO-BISMOL PO) Take 30 mL by mouth 2 times a day as needed (For upset stomach).      acetaminophen (TYLENOL) 500 MG Tab Take 1,000 mg by mouth every 6 hours as needed. Indications: Pain      Cyanocobalamin (VITAMIN B-12 PO) Take 1 Tablet by mouth every day.      Ascorbic Acid (VITAMIN C PO) Take 1 Tablet by mouth every day.      thyroid (ARMOUR THYROID) 15 MG Tab Take 3 Tablets by mouth every day. 270 Tablet 3    memantine (NAMENDA) 10 MG Tab Take 1 Tablet by mouth 2 times a day. 180 Tablet 3    donepezil (ARICEPT) 10 MG tablet Take 1 Tablet by mouth every day. (Patient taking differently: Take 10 mg by mouth every evening.) 90 Tablet 3    folic acid (FOLVITE) 1 MG Tab Take 1 Tablet by mouth every day. (Patient taking differently: Take 1 mg by mouth every evening.) 90 Tablet 1    rosuvastatin (CRESTOR) 10 MG Tab Take 1 Tablet by mouth every evening. (Patient not taking: Reported on 10/25/2023) 30 Tablet 11     No facility-administered encounter medications on file as of 10/25/2023.     ROS           Objective     /76 (BP Location: Left arm, Patient Position: Sitting, BP Cuff Size: Adult)   Pulse (!) 56   Resp 14   Ht 1.549 m (5' 1\")   Wt 76.2 kg (168 lb)   SpO2 95%   BMI 31.74 kg/m²     Physical Exam  Constitutional:       General: She is not in acute distress.     Appearance: She is not diaphoretic.   Eyes:      General: No scleral icterus.  Neck:      Vascular: No JVD.   Cardiovascular:      Rate and Rhythm: Bradycardia present.    "   Heart sounds: Normal heart sounds. No murmur heard.     No friction rub. No gallop.   Pulmonary:      Effort: No respiratory distress.      Breath sounds: No wheezing or rales.   Abdominal:      General: Bowel sounds are normal.      Palpations: Abdomen is soft.   Musculoskeletal:      Right lower leg: No edema.      Left lower leg: No edema.   Skin:     Findings: No rash.   Neurological:      Mental Status: She is alert. Mental status is at baseline.   Psychiatric:         Mood and Affect: Mood normal.            We reviewed in person the most recent labs  Recent Results (from the past 5040 hour(s))   VITAMIN B1    Collection Time: 05/18/23  2:08 PM   Result Value Ref Range    Vitamin B1 166 70 - 180 nmol/L   Lipid Profile    Collection Time: 05/18/23  2:09 PM   Result Value Ref Range    Cholesterol,Tot 216 (H) 100 - 199 mg/dL    Triglycerides 229 (H) 0 - 149 mg/dL    HDL 44 >=40 mg/dL     (H) <100 mg/dL   Comp Metabolic Panel    Collection Time: 05/18/23  2:09 PM   Result Value Ref Range    Sodium 142 135 - 145 mmol/L    Potassium 4.0 3.6 - 5.5 mmol/L    Chloride 105 96 - 112 mmol/L    Co2 24 20 - 33 mmol/L    Anion Gap 13.0 7.0 - 16.0    Glucose 97 65 - 99 mg/dL    Bun 14 8 - 22 mg/dL    Creatinine 0.89 0.50 - 1.40 mg/dL    Calcium 9.1 8.5 - 10.5 mg/dL    AST(SGOT) 18 12 - 45 U/L    ALT(SGPT) 15 2 - 50 U/L    Alkaline Phosphatase 117 (H) 30 - 99 U/L    Total Bilirubin 0.5 0.1 - 1.5 mg/dL    Albumin 4.1 3.2 - 4.9 g/dL    Total Protein 6.9 6.0 - 8.2 g/dL    Globulin 2.8 1.9 - 3.5 g/dL    A-G Ratio 1.5 g/dL   CBC WITH DIFFERENTIAL    Collection Time: 05/18/23  2:09 PM   Result Value Ref Range    WBC 10.4 4.8 - 10.8 K/uL    RBC 4.52 4.20 - 5.40 M/uL    Hemoglobin 13.5 12.0 - 16.0 g/dL    Hematocrit 42.5 37.0 - 47.0 %    MCV 94.0 81.4 - 97.8 fL    MCH 29.9 27.0 - 33.0 pg    MCHC 31.8 (L) 33.6 - 35.0 g/dL    RDW 46.5 35.9 - 50.0 fL    Platelet Count 202 164 - 446 K/uL    MPV 9.8 9.0 - 12.9 fL     Neutrophils-Polys 53.20 44.00 - 72.00 %    Lymphocytes 39.00 22.00 - 41.00 %    Monocytes 5.60 0.00 - 13.40 %    Eosinophils 1.30 0.00 - 6.90 %    Basophils 0.60 0.00 - 1.80 %    Immature Granulocytes 0.30 0.00 - 0.90 %    Nucleated RBC 0.00 /100 WBC    Neutrophils (Absolute) 5.54 2.00 - 7.15 K/uL    Lymphs (Absolute) 4.06 1.00 - 4.80 K/uL    Monos (Absolute) 0.58 0.00 - 0.85 K/uL    Eos (Absolute) 0.13 0.00 - 0.51 K/uL    Baso (Absolute) 0.06 0.00 - 0.12 K/uL    Immature Granulocytes (abs) 0.03 0.00 - 0.11 K/uL    NRBC (Absolute) 0.00 K/uL   URIC ACID    Collection Time: 23  2:09 PM   Result Value Ref Range    Uric Acid 5.6 1.9 - 8.2 mg/dL   FASTING STATUS    Collection Time: 23  2:09 PM   Result Value Ref Range    Fasting Status Fasting    CORRECTED CALCIUM    Collection Time: 23  2:09 PM   Result Value Ref Range    Correct Calcium 9.0 8.5 - 10.5 mg/dL   ESTIMATED GFR    Collection Time: 23  2:09 PM   Result Value Ref Range    GFR (CKD-EPI) 68 >60 mL/min/1.73 m 2   TSH    Collection Time: 23  4:00 PM   Result Value Ref Range    TSH 2.170 0.380 - 5.330 uIU/mL   EKG    Collection Time: 10/03/23  3:27 PM   Result Value Ref Range    Report       [facility] Emergency Dept.    Test Date:  2023-10-03  Pt Name:    DARYA SANTOS Department: EDSM  MRN:        [id number]                      Room:  Gender:     Female                       Technician: [id number]  :        1950                   Requested By:ER TRIAGE PROTOCOL  Order #:    [id number]                    Reading MD:    Measurements  Intervals                                Axis  Rate:       47                           P:          37  NJ:         165                          QRS:        37  QRSD:       79                           T:          68  QT:         443  QTc:        392    Interpretive Statements  Sinus bradycardia  Compared to ECG 2022 16:42:17  No significant changes     CBC  WITH DIFFERENTIAL    Collection Time: 10/03/23  4:28 PM   Result Value Ref Range    WBC 13.5 (H) 4.8 - 10.8 K/uL    RBC 4.36 4.20 - 5.40 M/uL    Hemoglobin 13.2 12.0 - 16.0 g/dL    Hematocrit 40.5 37.0 - 47.0 %    MCV 92.9 81.4 - 97.8 fL    MCH 30.3 27.0 - 33.0 pg    MCHC 32.6 32.2 - 35.5 g/dL    RDW 44.4 35.9 - 50.0 fL    Platelet Count 201 164 - 446 K/uL    MPV 9.3 9.0 - 12.9 fL    Neutrophils-Polys 77.30 (H) 44.00 - 72.00 %    Lymphocytes 15.00 (L) 22.00 - 41.00 %    Monocytes 6.40 0.00 - 13.40 %    Eosinophils 0.50 0.00 - 6.90 %    Basophils 0.30 0.00 - 1.80 %    Immature Granulocytes 0.50 0.00 - 0.90 %    Nucleated RBC 0.00 0.00 - 0.20 /100 WBC    Neutrophils (Absolute) 10.44 (H) 1.82 - 7.42 K/uL    Lymphs (Absolute) 2.03 1.00 - 4.80 K/uL    Monos (Absolute) 0.86 (H) 0.00 - 0.85 K/uL    Eos (Absolute) 0.07 0.00 - 0.51 K/uL    Baso (Absolute) 0.04 0.00 - 0.12 K/uL    Immature Granulocytes (abs) 0.07 0.00 - 0.11 K/uL    NRBC (Absolute) 0.00 K/uL   Basic Metabolic Panel    Collection Time: 10/03/23  4:28 PM   Result Value Ref Range    Sodium 139 135 - 145 mmol/L    Potassium 3.9 3.6 - 5.5 mmol/L    Chloride 103 96 - 112 mmol/L    Co2 24 20 - 33 mmol/L    Glucose 114 (H) 65 - 99 mg/dL    Bun 18 8 - 22 mg/dL    Creatinine 1.05 0.50 - 1.40 mg/dL    Calcium 9.2 8.4 - 10.2 mg/dL    Anion Gap 12.0 7.0 - 16.0   ESTIMATED GFR    Collection Time: 10/03/23  4:28 PM   Result Value Ref Range    GFR (CKD-EPI) 56 (A) >60 mL/min/1.73 m 2   URINALYSIS    Collection Time: 10/03/23  6:08 PM    Specimen: Urine   Result Value Ref Range    Color Yellow     Character Clear     Specific Gravity 1.025 <1.035    Ph 5.5 5.0 - 8.0    Glucose Negative Negative mg/dL    Ketones 40 (A) Negative mg/dL    Protein Negative Negative mg/dL    Bilirubin Negative Negative    Nitrite Negative Negative    Leukocyte Esterase Small (A) Negative    Occult Blood Negative Negative    Micro Urine Req Microscopic    URINE MICROSCOPIC (W/UA)    Collection  Time: 10/03/23  6:08 PM   Result Value Ref Range    WBC 10-20 (A) /hpf    RBC 0-2 /hpf    Bacteria Moderate (A) None /hpf    Epithelial Cells Few Few /hpf         Assessment & Plan     1. Sinus bradycardia  EC-ECHOCARDIOGRAM COMPLETE W/O CONT      2. Mixed hyperlipidemia         3. Atherosclerosis of aorta (HCC)            Medical Decision Making: Today's Assessment/Status/Plan:          It was my pleasure to meet with Ms. Albert.    We addressed the management of hypertension at today's visit. Blood pressure is well controlled.  We specifically assessed the labs on hypertension treatment    The 10-year ASCVD risk score ([place] JULIO, et al., 2019) is: 10.1% her  declines statin which is reasonable, she does have atherosclerosis of the aorta without severe peripheral arterial disease, he would like to focus on just the most important medicines    Zio patch and echo are appropriate for work-up, she has longstanding resting bradycardia but cannot exclude arrhythmia or source of syncope    We will follow up with Ms. Albert on the results of the testing with MyChart or over the phone. We will determine further follow-up from there.    It is my pleasure to participate in the care of Ms. Albert.  Please do not hesitate to contact me with questions or concerns.    Arturo Vergara MD PhD [facility]  Cardiologist [facility]    Please note that this dictation was created using voice recognition software. There may be errors I did not discover before finalizing the note.     10/25/2023  3:24 PM

## 2023-11-01 ENCOUNTER — NON-PROVIDER VISIT (OUTPATIENT)
Dept: CARDIOLOGY | Facility: MEDICAL CENTER | Age: 73
End: 2023-11-01
Attending: INTERNAL MEDICINE
Payer: MEDICARE

## 2023-11-01 DIAGNOSIS — I49.8 VENTRICULAR BIGEMINY: ICD-10-CM

## 2023-11-01 DIAGNOSIS — I47.19 PAT (PAROXYSMAL ATRIAL TACHYCARDIA) (HCC): ICD-10-CM

## 2023-11-01 DIAGNOSIS — R00.2 PALPITATIONS: ICD-10-CM

## 2023-11-01 DIAGNOSIS — R00.1 SINUS BRADYCARDIA: ICD-10-CM

## 2023-11-01 PROCEDURE — 93246 EXT ECG>7D<15D RECORDING: CPT

## 2023-11-01 NOTE — ADDENDUM NOTE
Addended by: MANUEL PIERSON on: 11/1/2023 03:28 PM     Modules accepted: Level of Service    
Statement Selected

## 2023-11-01 NOTE — PROGRESS NOTES
Patient enrolled in the 14 day Zio Holter monitor per Pramod Lu MD.  In clinic hook up, s/n [id number],  re hook from previous monitor removed from pt she has dementia lost monitor. Pending EOS.

## 2023-11-03 ENCOUNTER — HOSPITAL ENCOUNTER (OUTPATIENT)
Dept: LAB | Facility: MEDICAL CENTER | Age: 73
End: 2023-11-03
Attending: FAMILY MEDICINE
Payer: MEDICARE

## 2023-11-03 DIAGNOSIS — R55 SYNCOPE, UNSPECIFIED SYNCOPE TYPE: ICD-10-CM

## 2023-11-03 DIAGNOSIS — E78.2 MIXED HYPERLIPIDEMIA: ICD-10-CM

## 2023-11-03 DIAGNOSIS — R73.03 PREDIABETES: ICD-10-CM

## 2023-11-03 LAB
ALBUMIN SERPL BCP-MCNC: 4.2 G/DL (ref 3.2–4.9)
ALBUMIN/GLOB SERPL: 1.6 G/DL
ALP SERPL-CCNC: 106 U/L (ref 30–99)
ALT SERPL-CCNC: 15 U/L (ref 2–50)
ANION GAP SERPL CALC-SCNC: 10 MMOL/L (ref 7–16)
AST SERPL-CCNC: 16 U/L (ref 12–45)
BASOPHILS # BLD AUTO: 0.5 % (ref 0–1.8)
BASOPHILS # BLD: 0.05 K/UL (ref 0–0.12)
BILIRUB SERPL-MCNC: 0.5 MG/DL (ref 0.1–1.5)
BUN SERPL-MCNC: 15 MG/DL (ref 8–22)
CALCIUM ALBUM COR SERPL-MCNC: 9.1 MG/DL (ref 8.5–10.5)
CALCIUM SERPL-MCNC: 9.3 MG/DL (ref 8.5–10.5)
CHLORIDE SERPL-SCNC: 105 MMOL/L (ref 96–112)
CHOLEST SERPL-MCNC: 166 MG/DL (ref 100–199)
CO2 SERPL-SCNC: 26 MMOL/L (ref 20–33)
CREAT SERPL-MCNC: 0.86 MG/DL (ref 0.5–1.4)
EOSINOPHIL # BLD AUTO: 0.09 K/UL (ref 0–0.51)
EOSINOPHIL NFR BLD: 0.9 % (ref 0–6.9)
ERYTHROCYTE [DISTWIDTH] IN BLOOD BY AUTOMATED COUNT: 45.1 FL (ref 35.9–50)
EST. AVERAGE GLUCOSE BLD GHB EST-MCNC: 103 MG/DL
GFR SERPLBLD CREATININE-BSD FMLA CKD-EPI: 71 ML/MIN/1.73 M 2
GLOBULIN SER CALC-MCNC: 2.6 G/DL (ref 1.9–3.5)
GLUCOSE SERPL-MCNC: 97 MG/DL (ref 65–99)
HBA1C MFR BLD: 5.2 % (ref 4–5.6)
HCT VFR BLD AUTO: 42.8 % (ref 37–47)
HDLC SERPL-MCNC: 40 MG/DL
HGB BLD-MCNC: 13.6 G/DL (ref 12–16)
IMM GRANULOCYTES # BLD AUTO: 0.03 K/UL (ref 0–0.11)
IMM GRANULOCYTES NFR BLD AUTO: 0.3 % (ref 0–0.9)
LDLC SERPL CALC-MCNC: 87 MG/DL
LYMPHOCYTES # BLD AUTO: 2.88 K/UL (ref 1–4.8)
LYMPHOCYTES NFR BLD: 29 % (ref 22–41)
MCH RBC QN AUTO: 29.8 PG (ref 27–33)
MCHC RBC AUTO-ENTMCNC: 31.8 G/DL (ref 32.2–35.5)
MCV RBC AUTO: 93.9 FL (ref 81.4–97.8)
MONOCYTES # BLD AUTO: 0.58 K/UL (ref 0–0.85)
MONOCYTES NFR BLD AUTO: 5.8 % (ref 0–13.4)
NEUTROPHILS # BLD AUTO: 6.31 K/UL (ref 1.82–7.42)
NEUTROPHILS NFR BLD: 63.5 % (ref 44–72)
NRBC # BLD AUTO: 0 K/UL
NRBC BLD-RTO: 0 /100 WBC (ref 0–0.2)
PLATELET # BLD AUTO: 221 K/UL (ref 164–446)
PMV BLD AUTO: 10 FL (ref 9–12.9)
POTASSIUM SERPL-SCNC: 3.7 MMOL/L (ref 3.6–5.5)
PROT SERPL-MCNC: 6.8 G/DL (ref 6–8.2)
RBC # BLD AUTO: 4.56 M/UL (ref 4.2–5.4)
SODIUM SERPL-SCNC: 141 MMOL/L (ref 135–145)
T4 FREE SERPL-MCNC: 0.86 NG/DL (ref 0.93–1.7)
TRIGL SERPL-MCNC: 193 MG/DL (ref 0–149)
TSH SERPL DL<=0.005 MIU/L-ACNC: 1.57 UIU/ML (ref 0.38–5.33)
WBC # BLD AUTO: 9.9 K/UL (ref 4.8–10.8)

## 2023-11-03 PROCEDURE — 80061 LIPID PANEL: CPT

## 2023-11-03 PROCEDURE — 84439 ASSAY OF FREE THYROXINE: CPT

## 2023-11-03 PROCEDURE — 84443 ASSAY THYROID STIM HORMONE: CPT

## 2023-11-03 PROCEDURE — 83036 HEMOGLOBIN GLYCOSYLATED A1C: CPT | Mod: GA

## 2023-11-03 PROCEDURE — 36415 COLL VENOUS BLD VENIPUNCTURE: CPT | Mod: GA

## 2023-11-03 PROCEDURE — 80053 COMPREHEN METABOLIC PANEL: CPT

## 2023-11-03 PROCEDURE — 85025 COMPLETE CBC W/AUTO DIFF WBC: CPT

## 2023-11-13 ENCOUNTER — ANCILLARY PROCEDURE (OUTPATIENT)
Dept: CARDIOLOGY | Facility: MEDICAL CENTER | Age: 73
End: 2023-11-13
Attending: FAMILY MEDICINE
Payer: MEDICARE

## 2023-11-13 DIAGNOSIS — R00.1 SINUS BRADYCARDIA: ICD-10-CM

## 2023-11-13 LAB
LV EJECT FRACT  99904: 67
LV EJECT FRACT MOD 2C 99903: 69.09
LV EJECT FRACT MOD 4C 99902: 65.67
LV EJECT FRACT MOD BP 99901: 67.81

## 2023-11-13 PROCEDURE — 93306 TTE W/DOPPLER COMPLETE: CPT | Mod: 26 | Performed by: INTERNAL MEDICINE

## 2023-11-13 PROCEDURE — 93306 TTE W/DOPPLER COMPLETE: CPT

## 2023-11-16 ENCOUNTER — TELEPHONE (OUTPATIENT)
Dept: CARDIOLOGY | Facility: MEDICAL CENTER | Age: 73
End: 2023-11-16
Payer: MEDICARE

## 2023-11-19 PROCEDURE — 93248 EXT ECG>7D<15D REV&INTERPJ: CPT | Performed by: INTERNAL MEDICINE

## 2023-12-13 ENCOUNTER — OFFICE VISIT (OUTPATIENT)
Dept: MEDICAL GROUP | Facility: MEDICAL CENTER | Age: 73
End: 2023-12-13
Payer: MEDICARE

## 2023-12-13 VITALS
TEMPERATURE: 97.5 F | BODY MASS INDEX: 31.74 KG/M2 | DIASTOLIC BLOOD PRESSURE: 58 MMHG | RESPIRATION RATE: 17 BRPM | OXYGEN SATURATION: 99 % | HEIGHT: 61 IN | SYSTOLIC BLOOD PRESSURE: 120 MMHG | HEART RATE: 90 BPM

## 2023-12-13 DIAGNOSIS — F02.C18 SEVERE EARLY ONSET ALZHEIMER'S DEMENTIA WITH OTHER BEHAVIORAL DISTURBANCE (HCC): ICD-10-CM

## 2023-12-13 DIAGNOSIS — E03.9 ACQUIRED HYPOTHYROIDISM: Chronic | ICD-10-CM

## 2023-12-13 DIAGNOSIS — G30.0 SEVERE EARLY ONSET ALZHEIMER'S DEMENTIA WITH OTHER BEHAVIORAL DISTURBANCE (HCC): ICD-10-CM

## 2023-12-13 DIAGNOSIS — E78.2 MIXED HYPERLIPIDEMIA: ICD-10-CM

## 2023-12-13 DIAGNOSIS — Z12.31 ENCOUNTER FOR SCREENING MAMMOGRAM FOR MALIGNANT NEOPLASM OF BREAST: ICD-10-CM

## 2023-12-13 DIAGNOSIS — F22 DELUSIONAL DISORDERS (HCC): ICD-10-CM

## 2023-12-13 PROCEDURE — 99214 OFFICE O/P EST MOD 30 MIN: CPT | Performed by: FAMILY MEDICINE

## 2023-12-13 PROCEDURE — 3078F DIAST BP <80 MM HG: CPT | Performed by: FAMILY MEDICINE

## 2023-12-13 PROCEDURE — 3074F SYST BP LT 130 MM HG: CPT | Performed by: FAMILY MEDICINE

## 2023-12-13 RX ORDER — ROSUVASTATIN CALCIUM 10 MG/1
10 TABLET, COATED ORAL EVERY EVENING
Qty: 90 TABLET | Refills: 3 | Status: SHIPPED | OUTPATIENT
Start: 2023-12-13

## 2023-12-13 ASSESSMENT — ENCOUNTER SYMPTOMS
FEVER: 0
HEADACHES: 1
PALPITATIONS: 0
CHILLS: 0
NERVOUS/ANXIOUS: 1

## 2023-12-14 NOTE — PROGRESS NOTES
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    History of present illness: Thuy Albert is a 73 y.o. female who presented for heart monitor results.  Patient is here with her  today.    We reviewed heart monitor results.  It showed few episodes of sinus bradycardia and single episode of atrial tachycardia.  No A-fib, atrial flutter or AV block seen.  Her heart rate is now much better.  She had an echocardiogram done which came back normal.    She gets headache symptoms, tried multiple medications which did not help with symptoms.  She has glaucoma and is planning to get procedure for glaucoma    Problem   Delusional Disorders (Hcc)   Severe Early Onset Alzheimer's Dementia With Other Behavioral Disturbance (Hcc)    MRI 4/10/2023 showed  Moderate to severe diffuse cerebral volume loss. There is disproportionate severe temporal lobe volume loss indicating the possibility of Alzheimer's disease.    Seeing neurology.  Lives at home with her  who takes care of her.  On donepezil 10 mg daily and Namenda 10 mg 2 times daily     Hypothyroidism    Unable to tolerate Levothyroxine.  She is currently on [place] Thyroid which has been able to control her symptoms and her thyroid function has been in normal range.  T4 is mildly low, TSH is normal     Mixed hyperlipidemia     The 10-year ASCVD risk score ([place] JULIO, et al., 2019) is: 11.4%    Values used to calculate the score:      Age: 73 years      Sex: Female      Is Non- : No      Diabetic: No      Tobacco smoker: No      Systolic Blood Pressure: 120 mmHg      Is BP treated: No      HDL Cholesterol: 40  mg/dL      Total Cholesterol: 166 mg/dL       Lab Results   Component Value Date/Time    CHOLSTRLTOT 166 11/03/2023 1533    TRIGLYCERIDE 193 (H) 11/03/2023 1533    HDL 40 11/03/2023 1533    LDL 87 11/03/2023 1533      She is currently on Crestor 10 mg daily which she has not started medication            Review of systems:     Review of Systems   Constitutional:  Negative for chills and fever.   Cardiovascular:  Negative for chest pain, palpitations and leg swelling.   Neurological:  Positive for headaches.   Psychiatric/Behavioral:  The patient is nervous/anxious.         Medications and Allergies:     Current Outpatient Medications   Medication Sig Dispense Refill    rosuvastatin (CRESTOR) 10 MG Tab Take 1 Tablet by mouth every evening. 90 Tablet 3    B Complex Vitamins (B COMPLEX PO) Take 1 Tablet by mouth every day.      Multiple Vitamins-Minerals (ZINC PO) Take 1 Tablet by mouth every day.      Cholecalciferol (D3 PO) Take 1 Capsule by mouth every day.      Bismuth Subsalicylate (PEPTO-BISMOL PO) Take 30 mL by mouth 2 times a day as needed (For upset stomach).      acetaminophen (TYLENOL) 500 MG Tab Take 1,000 mg by mouth every 6 hours as needed. Indications: Pain      Cyanocobalamin (VITAMIN B-12 PO) Take 1 Tablet by mouth every day.      Ascorbic Acid (VITAMIN C PO) Take 1 Tablet by mouth every day.      thyroid (ARMOUR THYROID) 15 MG Tab Take 3 Tablets by mouth every day. 270 Tablet 3    memantine (NAMENDA) 10 MG Tab Take 1 Tablet by mouth 2 times a day. 180 Tablet 3    donepezil (ARICEPT) 10 MG tablet Take 1 Tablet by mouth every day. (Patient taking differently: Take 10 mg by mouth every evening.) 90 Tablet 3    folic acid (FOLVITE) 1 MG Tab Take 1 Tablet by mouth every day. (Patient taking differently: Take 1 mg by mouth every evening.) 90 Tablet 1     No current facility-administered medications for this visit.          Vitals:    /58   Pulse 90   Temp 36.4 °C (97.5 °F)   Resp 17   Ht 1.549 m  "(5' 1\")   SpO2 99%  Body mass index is 31.74 kg/m².    Physical Exam:     Physical Exam  Constitutional:       Appearance: Normal appearance. She is well-developed and well-groomed.   HENT:      Head: Normocephalic and atraumatic.      Right Ear: External ear normal.      Left Ear: External ear normal.   Eyes:      General:         Right eye: No discharge.         Left eye: No discharge.      Conjunctiva/sclera: Conjunctivae normal.   Cardiovascular:      Rate and Rhythm: Normal rate.   Pulmonary:      Effort: Pulmonary effort is normal. No respiratory distress.   Musculoskeletal:      Cervical back: Neck supple.   Skin:     Findings: No rash.   Neurological:      Mental Status: She is alert.   Psychiatric:         Mood and Affect: Mood is anxious.          Labs:  I reviewed with patient recent labs resulted on 11/3/2023.    Assessment/Plan:    HCC Gap Form    Diagnosis: F22 - Delusional disorders (HCC)  Assessment and plan: Chronic, stable.  Has Alzheimer's dementia, currently following with neurology.  Continue with current defined treatment plan: Continue to follow-up with neurology. Follow-up at least annually.  Last edited 12/13/23 17:12 PST by Pramod Lu M.D.          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  Continue donepezil and Namenda.         " Delusional disorders (HCC)     Other Visit Diagnoses       Encounter for screening mammogram for malignant neoplasm of breast        Relevant Orders    MA-SCREENING MAMMO BILAT W/TOMOSYNTHESIS W/CAD             Please note that this dictation was created using voice recognition software. I have made every reasonable attempt to correct obvious errors, but I expect that there are errors of grammar and possibly content that I did not discover before finalizing the note.    Follow up in 6 months for lab follow-up.

## 2023-12-14 NOTE — ASSESSMENT & PLAN NOTE
Chronic problem, elevated triglyceride level, other cholesterol numbers are improving, recommended to start taking Crestor 10 mg as there is coronary calcification seen on previous CTs

## 2023-12-19 ENCOUNTER — HOSPITAL ENCOUNTER (OUTPATIENT)
Dept: LAB | Facility: MEDICAL CENTER | Age: 73
End: 2023-12-19
Attending: NURSE PRACTITIONER
Payer: MEDICARE

## 2023-12-19 LAB
BASOPHILS # BLD AUTO: 0.7 % (ref 0–1.8)
BASOPHILS # BLD: 0.06 K/UL (ref 0–0.12)
CRP SERPL HS-MCNC: 0.72 MG/DL (ref 0–0.75)
EOSINOPHIL # BLD AUTO: 0.12 K/UL (ref 0–0.51)
EOSINOPHIL NFR BLD: 1.3 % (ref 0–6.9)
ERYTHROCYTE [DISTWIDTH] IN BLOOD BY AUTOMATED COUNT: 45 FL (ref 35.9–50)
ERYTHROCYTE [SEDIMENTATION RATE] IN BLOOD BY WESTERGREN METHOD: 27 MM/HOUR (ref 0–25)
HCT VFR BLD AUTO: 41.1 % (ref 37–47)
HGB BLD-MCNC: 13.3 G/DL (ref 12–16)
IMM GRANULOCYTES # BLD AUTO: 0.02 K/UL (ref 0–0.11)
IMM GRANULOCYTES NFR BLD AUTO: 0.2 % (ref 0–0.9)
LYMPHOCYTES # BLD AUTO: 3.12 K/UL (ref 1–4.8)
LYMPHOCYTES NFR BLD: 34.6 % (ref 22–41)
MCH RBC QN AUTO: 30.2 PG (ref 27–33)
MCHC RBC AUTO-ENTMCNC: 32.4 G/DL (ref 32.2–35.5)
MCV RBC AUTO: 93.2 FL (ref 81.4–97.8)
MONOCYTES # BLD AUTO: 0.57 K/UL (ref 0–0.85)
MONOCYTES NFR BLD AUTO: 6.3 % (ref 0–13.4)
NEUTROPHILS # BLD AUTO: 5.12 K/UL (ref 1.82–7.42)
NEUTROPHILS NFR BLD: 56.9 % (ref 44–72)
NRBC # BLD AUTO: 0 K/UL
NRBC BLD-RTO: 0 /100 WBC (ref 0–0.2)
PLATELET # BLD AUTO: 216 K/UL (ref 164–446)
PMV BLD AUTO: 9.6 FL (ref 9–12.9)
RBC # BLD AUTO: 4.41 M/UL (ref 4.2–5.4)
WBC # BLD AUTO: 9 K/UL (ref 4.8–10.8)

## 2023-12-19 PROCEDURE — 85652 RBC SED RATE AUTOMATED: CPT

## 2023-12-19 PROCEDURE — 82164 ANGIOTENSIN I ENZYME TEST: CPT

## 2023-12-19 PROCEDURE — 86140 C-REACTIVE PROTEIN: CPT

## 2023-12-19 PROCEDURE — 85025 COMPLETE CBC W/AUTO DIFF WBC: CPT

## 2023-12-19 PROCEDURE — 36415 COLL VENOUS BLD VENIPUNCTURE: CPT

## 2023-12-21 LAB — ACE SERPL-CCNC: 26 U/L (ref 16–85)

## 2024-01-10 ENCOUNTER — APPOINTMENT (OUTPATIENT)
Dept: RADIOLOGY | Facility: MEDICAL CENTER | Age: 74
End: 2024-01-10
Attending: FAMILY MEDICINE
Payer: MEDICARE

## 2024-01-10 DIAGNOSIS — Z12.31 ENCOUNTER FOR SCREENING MAMMOGRAM FOR MALIGNANT NEOPLASM OF BREAST: ICD-10-CM

## 2024-01-10 PROCEDURE — 77067 SCR MAMMO BI INCL CAD: CPT | Mod: 50

## 2024-01-24 DIAGNOSIS — E03.9 ACQUIRED HYPOTHYROIDISM: Chronic | ICD-10-CM

## 2024-01-24 RX ORDER — THYROID 15 MG/1
45 TABLET ORAL
Qty: 270 TABLET | Refills: 3 | Status: SHIPPED | OUTPATIENT
Start: 2024-01-24

## 2024-02-02 ENCOUNTER — HOSPITAL ENCOUNTER (OUTPATIENT)
Dept: LAB | Facility: MEDICAL CENTER | Age: 74
End: 2024-02-02
Attending: PSYCHIATRY & NEUROLOGY
Payer: MEDICARE

## 2024-02-02 DIAGNOSIS — E03.9 ACQUIRED HYPOTHYROIDISM: Chronic | ICD-10-CM

## 2024-02-02 DIAGNOSIS — E78.2 MIXED HYPERLIPIDEMIA: ICD-10-CM

## 2024-02-02 LAB
CRP SERPL HS-MCNC: 0.42 MG/DL (ref 0–0.75)
ERYTHROCYTE [SEDIMENTATION RATE] IN BLOOD BY WESTERGREN METHOD: 21 MM/HOUR (ref 0–25)
RHEUMATOID FACT SER IA-ACNC: <10 IU/ML (ref 0–14)
THYROPEROXIDASE AB SERPL-ACNC: <9 IU/ML (ref 0–9)
TSH SERPL DL<=0.005 MIU/L-ACNC: 0.78 UIU/ML (ref 0.38–5.33)

## 2024-02-02 PROCEDURE — 86235 NUCLEAR ANTIGEN ANTIBODY: CPT

## 2024-02-02 PROCEDURE — 86800 THYROGLOBULIN ANTIBODY: CPT

## 2024-02-02 PROCEDURE — 86038 ANTINUCLEAR ANTIBODIES: CPT

## 2024-02-02 PROCEDURE — 86376 MICROSOMAL ANTIBODY EACH: CPT

## 2024-02-02 PROCEDURE — 85652 RBC SED RATE AUTOMATED: CPT

## 2024-02-02 PROCEDURE — 84443 ASSAY THYROID STIM HORMONE: CPT

## 2024-02-02 PROCEDURE — 36415 COLL VENOUS BLD VENIPUNCTURE: CPT

## 2024-02-02 PROCEDURE — 86431 RHEUMATOID FACTOR QUANT: CPT

## 2024-02-02 PROCEDURE — 86140 C-REACTIVE PROTEIN: CPT

## 2024-02-04 LAB — THYROGLOB AB SERPL-ACNC: <0.9 IU/ML (ref 0–4)

## 2024-02-05 LAB
ENA SS-B IGG SER IA-ACNC: 0 AU/ML (ref 0–40)
NUCLEAR IGG SER QL IA: NORMAL
SSA52 R0ENA AB IGG Q0420: 1 AU/ML (ref 0–40)
SSA60 R0ENA AB IGG Q0419: 0 AU/ML (ref 0–40)

## 2024-06-10 ENCOUNTER — ANESTHESIA EVENT (OUTPATIENT)
Dept: SURGERY | Facility: MEDICAL CENTER | Age: 74
DRG: 854 | End: 2024-06-10
Payer: MEDICARE

## 2024-06-10 ENCOUNTER — APPOINTMENT (OUTPATIENT)
Dept: RADIOLOGY | Facility: MEDICAL CENTER | Age: 74
DRG: 854 | End: 2024-06-10
Attending: STUDENT IN AN ORGANIZED HEALTH CARE EDUCATION/TRAINING PROGRAM
Payer: MEDICARE

## 2024-06-10 ENCOUNTER — APPOINTMENT (OUTPATIENT)
Dept: RADIOLOGY | Facility: MEDICAL CENTER | Age: 74
DRG: 854 | End: 2024-06-10
Attending: UROLOGY
Payer: MEDICARE

## 2024-06-10 ENCOUNTER — HOSPITAL ENCOUNTER (INPATIENT)
Facility: MEDICAL CENTER | Age: 74
LOS: 4 days | DRG: 854 | End: 2024-06-14
Attending: STUDENT IN AN ORGANIZED HEALTH CARE EDUCATION/TRAINING PROGRAM | Admitting: INTERNAL MEDICINE
Payer: MEDICARE

## 2024-06-10 ENCOUNTER — ANESTHESIA (OUTPATIENT)
Dept: SURGERY | Facility: MEDICAL CENTER | Age: 74
DRG: 854 | End: 2024-06-10
Payer: MEDICARE

## 2024-06-10 DIAGNOSIS — R31.9 URINARY TRACT INFECTION WITH HEMATURIA, SITE UNSPECIFIED: ICD-10-CM

## 2024-06-10 DIAGNOSIS — N13.9 OBSTRUCTIVE UROPATHY: ICD-10-CM

## 2024-06-10 DIAGNOSIS — N20.1 URETEROLITHIASIS: ICD-10-CM

## 2024-06-10 DIAGNOSIS — N17.9 AKI (ACUTE KIDNEY INJURY) (HCC): ICD-10-CM

## 2024-06-10 DIAGNOSIS — N39.0 URINARY TRACT INFECTION WITH HEMATURIA, SITE UNSPECIFIED: ICD-10-CM

## 2024-06-10 DIAGNOSIS — N20.1 LEFT URETERAL CALCULUS: ICD-10-CM

## 2024-06-10 DIAGNOSIS — R10.32 LEFT LOWER QUADRANT ABDOMINAL PAIN: ICD-10-CM

## 2024-06-10 DIAGNOSIS — A41.9 SEPSIS, DUE TO UNSPECIFIED ORGANISM, UNSPECIFIED WHETHER ACUTE ORGAN DYSFUNCTION PRESENT (HCC): ICD-10-CM

## 2024-06-10 LAB
ALBUMIN SERPL BCP-MCNC: 4 G/DL (ref 3.2–4.9)
ALBUMIN/GLOB SERPL: 1.3 G/DL
ALP SERPL-CCNC: 99 U/L (ref 30–99)
ALT SERPL-CCNC: 19 U/L (ref 2–50)
ANION GAP SERPL CALC-SCNC: 15 MMOL/L (ref 7–16)
APPEARANCE UR: ABNORMAL
APPEARANCE UR: ABNORMAL
AST SERPL-CCNC: 32 U/L (ref 12–45)
BACTERIA #/AREA URNS HPF: ABNORMAL /HPF
BACTERIA #/AREA URNS HPF: ABNORMAL /HPF
BASOPHILS # BLD AUTO: 0.3 % (ref 0–1.8)
BASOPHILS # BLD: 0.05 K/UL (ref 0–0.12)
BILIRUB SERPL-MCNC: 0.7 MG/DL (ref 0.1–1.5)
BILIRUB UR QL STRIP.AUTO: ABNORMAL
BILIRUB UR QL STRIP.AUTO: ABNORMAL
BUN SERPL-MCNC: 17 MG/DL (ref 8–22)
CALCIUM ALBUM COR SERPL-MCNC: 9.4 MG/DL (ref 8.5–10.5)
CALCIUM SERPL-MCNC: 9.4 MG/DL (ref 8.4–10.2)
CHLORIDE SERPL-SCNC: 102 MMOL/L (ref 96–112)
CO2 SERPL-SCNC: 23 MMOL/L (ref 20–33)
COLOR UR: YELLOW
COLOR UR: YELLOW
CREAT SERPL-MCNC: 1.02 MG/DL (ref 0.5–1.4)
EKG IMPRESSION: NORMAL
EOSINOPHIL # BLD AUTO: 0.03 K/UL (ref 0–0.51)
EOSINOPHIL NFR BLD: 0.2 % (ref 0–6.9)
EPI CELLS #/AREA URNS HPF: ABNORMAL /HPF
EPI CELLS #/AREA URNS HPF: ABNORMAL /HPF
ERYTHROCYTE [DISTWIDTH] IN BLOOD BY AUTOMATED COUNT: 45.8 FL (ref 35.9–50)
GFR SERPLBLD CREATININE-BSD FMLA CKD-EPI: 58 ML/MIN/1.73 M 2
GLOBULIN SER CALC-MCNC: 3.1 G/DL (ref 1.9–3.5)
GLUCOSE SERPL-MCNC: 157 MG/DL (ref 65–99)
GLUCOSE UR STRIP.AUTO-MCNC: NEGATIVE MG/DL
GLUCOSE UR STRIP.AUTO-MCNC: NEGATIVE MG/DL
HCT VFR BLD AUTO: 42.9 % (ref 37–47)
HGB BLD-MCNC: 13.7 G/DL (ref 12–16)
IMM GRANULOCYTES # BLD AUTO: 0.07 K/UL (ref 0–0.11)
IMM GRANULOCYTES NFR BLD AUTO: 0.4 % (ref 0–0.9)
KETONES UR STRIP.AUTO-MCNC: 15 MG/DL
KETONES UR STRIP.AUTO-MCNC: ABNORMAL MG/DL
LACTATE SERPL-SCNC: 2.7 MMOL/L (ref 0.5–2)
LEUKOCYTE ESTERASE UR QL STRIP.AUTO: ABNORMAL
LEUKOCYTE ESTERASE UR QL STRIP.AUTO: NEGATIVE
LIPASE SERPL-CCNC: 23 U/L (ref 11–82)
LYMPHOCYTES # BLD AUTO: 1.29 K/UL (ref 1–4.8)
LYMPHOCYTES NFR BLD: 7.7 % (ref 22–41)
MCH RBC QN AUTO: 29.8 PG (ref 27–33)
MCHC RBC AUTO-ENTMCNC: 31.9 G/DL (ref 32.2–35.5)
MCV RBC AUTO: 93.5 FL (ref 81.4–97.8)
MICRO URNS: ABNORMAL
MICRO URNS: ABNORMAL
MONOCYTES # BLD AUTO: 1.13 K/UL (ref 0–0.85)
MONOCYTES NFR BLD AUTO: 6.7 % (ref 0–13.4)
MUCOUS THREADS #/AREA URNS HPF: ABNORMAL /HPF
NEUTROPHILS # BLD AUTO: 14.28 K/UL (ref 1.82–7.42)
NEUTROPHILS NFR BLD: 84.7 % (ref 44–72)
NITRITE UR QL STRIP.AUTO: NEGATIVE
NITRITE UR QL STRIP.AUTO: NEGATIVE
NRBC # BLD AUTO: 0 K/UL
NRBC BLD-RTO: 0 /100 WBC (ref 0–0.2)
PH UR STRIP.AUTO: 5 [PH] (ref 5–8)
PH UR STRIP.AUTO: 5.5 [PH] (ref 5–8)
PLATELET # BLD AUTO: 153 K/UL (ref 164–446)
PMV BLD AUTO: 10.4 FL (ref 9–12.9)
POTASSIUM SERPL-SCNC: 4.4 MMOL/L (ref 3.6–5.5)
PROT SERPL-MCNC: 7.1 G/DL (ref 6–8.2)
PROT UR QL STRIP: 100 MG/DL
PROT UR QL STRIP: NEGATIVE MG/DL
RBC # BLD AUTO: 4.59 M/UL (ref 4.2–5.4)
RBC # URNS HPF: ABNORMAL /HPF
RBC # URNS HPF: ABNORMAL /HPF
RBC UR QL AUTO: ABNORMAL
RBC UR QL AUTO: ABNORMAL
SODIUM SERPL-SCNC: 140 MMOL/L (ref 135–145)
SP GR UR STRIP.AUTO: >=1.03
SP GR UR STRIP.AUTO: >=1.03
TROPONIN T SERPL-MCNC: 11 NG/L (ref 6–19)
UNIDENT CRYS URNS QL MICRO: ABNORMAL /HPF
WBC # BLD AUTO: 16.9 K/UL (ref 4.8–10.8)
WBC #/AREA URNS HPF: ABNORMAL /HPF
WBC #/AREA URNS HPF: ABNORMAL /HPF

## 2024-06-10 PROCEDURE — 52332 CYSTOSCOPY AND TREATMENT: CPT | Mod: LT | Performed by: UROLOGY

## 2024-06-10 PROCEDURE — 160009 HCHG ANES TIME/MIN: Performed by: UROLOGY

## 2024-06-10 PROCEDURE — 700105 HCHG RX REV CODE 258: Performed by: STUDENT IN AN ORGANIZED HEALTH CARE EDUCATION/TRAINING PROGRAM

## 2024-06-10 PROCEDURE — 93005 ELECTROCARDIOGRAM TRACING: CPT | Performed by: STUDENT IN AN ORGANIZED HEALTH CARE EDUCATION/TRAINING PROGRAM

## 2024-06-10 PROCEDURE — 96375 TX/PRO/DX INJ NEW DRUG ADDON: CPT

## 2024-06-10 PROCEDURE — 74177 CT ABD & PELVIS W/CONTRAST: CPT

## 2024-06-10 PROCEDURE — 700117 HCHG RX CONTRAST REV CODE 255: Performed by: STUDENT IN AN ORGANIZED HEALTH CARE EDUCATION/TRAINING PROGRAM

## 2024-06-10 PROCEDURE — 160039 HCHG SURGERY MINUTES - EA ADDL 1 MIN LEVEL 3: Performed by: UROLOGY

## 2024-06-10 PROCEDURE — C2617 STENT, NON-COR, TEM W/O DEL: HCPCS | Performed by: UROLOGY

## 2024-06-10 PROCEDURE — 160002 HCHG RECOVERY MINUTES (STAT): Performed by: UROLOGY

## 2024-06-10 PROCEDURE — 36415 COLL VENOUS BLD VENIPUNCTURE: CPT

## 2024-06-10 PROCEDURE — 770020 HCHG ROOM/CARE - TELE (206)

## 2024-06-10 PROCEDURE — 0T778DZ DILATION OF LEFT URETER WITH INTRALUMINAL DEVICE, VIA NATURAL OR ARTIFICIAL OPENING ENDOSCOPIC: ICD-10-PCS | Performed by: UROLOGY

## 2024-06-10 PROCEDURE — 96374 THER/PROPH/DIAG INJ IV PUSH: CPT

## 2024-06-10 PROCEDURE — 99291 CRITICAL CARE FIRST HOUR: CPT

## 2024-06-10 PROCEDURE — 83605 ASSAY OF LACTIC ACID: CPT

## 2024-06-10 PROCEDURE — 81001 URINALYSIS AUTO W/SCOPE: CPT

## 2024-06-10 PROCEDURE — 85025 COMPLETE CBC W/AUTO DIFF WBC: CPT

## 2024-06-10 PROCEDURE — 87150 DNA/RNA AMPLIFIED PROBE: CPT

## 2024-06-10 PROCEDURE — 160048 HCHG OR STATISTICAL LEVEL 1-5: Performed by: UROLOGY

## 2024-06-10 PROCEDURE — 99223 1ST HOSP IP/OBS HIGH 75: CPT | Mod: AI | Performed by: INTERNAL MEDICINE

## 2024-06-10 PROCEDURE — 87077 CULTURE AEROBIC IDENTIFY: CPT | Mod: 91

## 2024-06-10 PROCEDURE — 700111 HCHG RX REV CODE 636 W/ 250 OVERRIDE (IP): Mod: JZ | Performed by: ANESTHESIOLOGY

## 2024-06-10 PROCEDURE — 160028 HCHG SURGERY MINUTES - 1ST 30 MINS LEVEL 3: Performed by: UROLOGY

## 2024-06-10 PROCEDURE — 80053 COMPREHEN METABOLIC PANEL: CPT

## 2024-06-10 PROCEDURE — 700105 HCHG RX REV CODE 258: Performed by: ANESTHESIOLOGY

## 2024-06-10 PROCEDURE — 74018 RADEX ABDOMEN 1 VIEW: CPT

## 2024-06-10 PROCEDURE — 160035 HCHG PACU - 1ST 60 MINS PHASE I: Performed by: UROLOGY

## 2024-06-10 PROCEDURE — 84484 ASSAY OF TROPONIN QUANT: CPT

## 2024-06-10 PROCEDURE — 700111 HCHG RX REV CODE 636 W/ 250 OVERRIDE (IP): Mod: JZ | Performed by: STUDENT IN AN ORGANIZED HEALTH CARE EDUCATION/TRAINING PROGRAM

## 2024-06-10 PROCEDURE — 83690 ASSAY OF LIPASE: CPT

## 2024-06-10 PROCEDURE — 87040 BLOOD CULTURE FOR BACTERIA: CPT

## 2024-06-10 PROCEDURE — 700101 HCHG RX REV CODE 250: Performed by: ANESTHESIOLOGY

## 2024-06-10 PROCEDURE — C1769 GUIDE WIRE: HCPCS | Performed by: UROLOGY

## 2024-06-10 PROCEDURE — 99221 1ST HOSP IP/OBS SF/LOW 40: CPT | Mod: 25 | Performed by: UROLOGY

## 2024-06-10 DEVICE — STENT UROLOGICAL POLARIS 6X26  ULTRA: Type: IMPLANTABLE DEVICE | Site: URETER | Status: FUNCTIONAL

## 2024-06-10 RX ORDER — ONDANSETRON 2 MG/ML
4 INJECTION INTRAMUSCULAR; INTRAVENOUS ONCE
Status: COMPLETED | OUTPATIENT
Start: 2024-06-10 | End: 2024-06-10

## 2024-06-10 RX ORDER — ROSUVASTATIN CALCIUM 10 MG/1
10 TABLET, COATED ORAL EVERY EVENING
Status: DISCONTINUED | OUTPATIENT
Start: 2024-06-10 | End: 2024-06-14 | Stop reason: HOSPADM

## 2024-06-10 RX ORDER — ACETAMINOPHEN 500 MG
1000 TABLET ORAL EVERY 6 HOURS PRN
Status: DISCONTINUED | OUTPATIENT
Start: 2024-06-10 | End: 2024-06-14 | Stop reason: HOSPADM

## 2024-06-10 RX ORDER — ONDANSETRON 2 MG/ML
INJECTION INTRAMUSCULAR; INTRAVENOUS PRN
Status: DISCONTINUED | OUTPATIENT
Start: 2024-06-10 | End: 2024-06-10 | Stop reason: SURG

## 2024-06-10 RX ORDER — POLYETHYLENE GLYCOL 3350 17 G/17G
1 POWDER, FOR SOLUTION ORAL
Status: DISCONTINUED | OUTPATIENT
Start: 2024-06-10 | End: 2024-06-14 | Stop reason: HOSPADM

## 2024-06-10 RX ORDER — SODIUM CHLORIDE, SODIUM LACTATE, POTASSIUM CHLORIDE, CALCIUM CHLORIDE 600; 310; 30; 20 MG/100ML; MG/100ML; MG/100ML; MG/100ML
1000 INJECTION, SOLUTION INTRAVENOUS ONCE
Status: COMPLETED | OUTPATIENT
Start: 2024-06-10 | End: 2024-06-10

## 2024-06-10 RX ORDER — LABETALOL HYDROCHLORIDE 5 MG/ML
10 INJECTION, SOLUTION INTRAVENOUS EVERY 4 HOURS PRN
Status: DISCONTINUED | OUTPATIENT
Start: 2024-06-10 | End: 2024-06-14 | Stop reason: HOSPADM

## 2024-06-10 RX ORDER — OXYCODONE HYDROCHLORIDE 5 MG/1
2.5 TABLET ORAL
Status: DISCONTINUED | OUTPATIENT
Start: 2024-06-10 | End: 2024-06-14 | Stop reason: HOSPADM

## 2024-06-10 RX ORDER — ONDANSETRON 4 MG/1
4 TABLET, ORALLY DISINTEGRATING ORAL EVERY 4 HOURS PRN
Status: DISCONTINUED | OUTPATIENT
Start: 2024-06-10 | End: 2024-06-14 | Stop reason: HOSPADM

## 2024-06-10 RX ORDER — ONDANSETRON 2 MG/ML
4 INJECTION INTRAMUSCULAR; INTRAVENOUS EVERY 4 HOURS PRN
Status: DISCONTINUED | OUTPATIENT
Start: 2024-06-10 | End: 2024-06-14 | Stop reason: HOSPADM

## 2024-06-10 RX ORDER — OXYCODONE HCL 5 MG/5 ML
5 SOLUTION, ORAL ORAL
Status: DISCONTINUED | OUTPATIENT
Start: 2024-06-10 | End: 2024-06-10 | Stop reason: HOSPADM

## 2024-06-10 RX ORDER — DONEPEZIL HYDROCHLORIDE 5 MG/1
10 TABLET, FILM COATED ORAL NIGHTLY
Status: DISCONTINUED | OUTPATIENT
Start: 2024-06-10 | End: 2024-06-14 | Stop reason: HOSPADM

## 2024-06-10 RX ORDER — OXYCODONE HCL 5 MG/5 ML
10 SOLUTION, ORAL ORAL
Status: DISCONTINUED | OUTPATIENT
Start: 2024-06-10 | End: 2024-06-10 | Stop reason: HOSPADM

## 2024-06-10 RX ORDER — ONDANSETRON 2 MG/ML
4 INJECTION INTRAMUSCULAR; INTRAVENOUS
Status: DISCONTINUED | OUTPATIENT
Start: 2024-06-10 | End: 2024-06-10 | Stop reason: HOSPADM

## 2024-06-10 RX ORDER — THYROID 30 MG/1
45 TABLET ORAL
Status: DISCONTINUED | OUTPATIENT
Start: 2024-06-11 | End: 2024-06-14 | Stop reason: HOSPADM

## 2024-06-10 RX ORDER — KETOROLAC TROMETHAMINE 15 MG/ML
15 INJECTION, SOLUTION INTRAMUSCULAR; INTRAVENOUS EVERY 6 HOURS PRN
Status: DISCONTINUED | OUTPATIENT
Start: 2024-06-10 | End: 2024-06-14 | Stop reason: HOSPADM

## 2024-06-10 RX ORDER — SODIUM CHLORIDE, SODIUM LACTATE, POTASSIUM CHLORIDE, AND CALCIUM CHLORIDE .6; .31; .03; .02 G/100ML; G/100ML; G/100ML; G/100ML
30 INJECTION, SOLUTION INTRAVENOUS
Status: DISCONTINUED | OUTPATIENT
Start: 2024-06-10 | End: 2024-06-14 | Stop reason: HOSPADM

## 2024-06-10 RX ORDER — SODIUM CHLORIDE, SODIUM LACTATE, POTASSIUM CHLORIDE, CALCIUM CHLORIDE 600; 310; 30; 20 MG/100ML; MG/100ML; MG/100ML; MG/100ML
INJECTION, SOLUTION INTRAVENOUS
Status: DISCONTINUED | OUTPATIENT
Start: 2024-06-10 | End: 2024-06-10 | Stop reason: SURG

## 2024-06-10 RX ORDER — SODIUM CHLORIDE, SODIUM LACTATE, POTASSIUM CHLORIDE, CALCIUM CHLORIDE 600; 310; 30; 20 MG/100ML; MG/100ML; MG/100ML; MG/100ML
INJECTION, SOLUTION INTRAVENOUS CONTINUOUS
Status: DISCONTINUED | OUTPATIENT
Start: 2024-06-10 | End: 2024-06-10 | Stop reason: HOSPADM

## 2024-06-10 RX ORDER — HYDROMORPHONE HYDROCHLORIDE 1 MG/ML
0.25 INJECTION, SOLUTION INTRAMUSCULAR; INTRAVENOUS; SUBCUTANEOUS
Status: DISCONTINUED | OUTPATIENT
Start: 2024-06-10 | End: 2024-06-14 | Stop reason: HOSPADM

## 2024-06-10 RX ORDER — MEMANTINE HYDROCHLORIDE 10 MG/1
10 TABLET ORAL 2 TIMES DAILY
Status: DISCONTINUED | OUTPATIENT
Start: 2024-06-10 | End: 2024-06-14 | Stop reason: HOSPADM

## 2024-06-10 RX ORDER — AMOXICILLIN 250 MG
2 CAPSULE ORAL EVERY EVENING
Status: DISCONTINUED | OUTPATIENT
Start: 2024-06-10 | End: 2024-06-14 | Stop reason: HOSPADM

## 2024-06-10 RX ORDER — HYDRALAZINE HYDROCHLORIDE 20 MG/ML
5 INJECTION INTRAMUSCULAR; INTRAVENOUS
Status: DISCONTINUED | OUTPATIENT
Start: 2024-06-10 | End: 2024-06-10 | Stop reason: HOSPADM

## 2024-06-10 RX ORDER — SODIUM CHLORIDE, SODIUM LACTATE, POTASSIUM CHLORIDE, CALCIUM CHLORIDE 600; 310; 30; 20 MG/100ML; MG/100ML; MG/100ML; MG/100ML
INJECTION, SOLUTION INTRAVENOUS CONTINUOUS
Status: ACTIVE | OUTPATIENT
Start: 2024-06-10 | End: 2024-06-11

## 2024-06-10 RX ORDER — OXYCODONE HYDROCHLORIDE 5 MG/1
5 TABLET ORAL
Status: DISCONTINUED | OUTPATIENT
Start: 2024-06-10 | End: 2024-06-14 | Stop reason: HOSPADM

## 2024-06-10 RX ORDER — SODIUM CHLORIDE, SODIUM LACTATE, POTASSIUM CHLORIDE, CALCIUM CHLORIDE 600; 310; 30; 20 MG/100ML; MG/100ML; MG/100ML; MG/100ML
1078 INJECTION, SOLUTION INTRAVENOUS ONCE
Status: COMPLETED | OUTPATIENT
Start: 2024-06-10 | End: 2024-06-10

## 2024-06-10 RX ORDER — DEXAMETHASONE SODIUM PHOSPHATE 4 MG/ML
INJECTION, SOLUTION INTRA-ARTICULAR; INTRALESIONAL; INTRAMUSCULAR; INTRAVENOUS; SOFT TISSUE PRN
Status: DISCONTINUED | OUTPATIENT
Start: 2024-06-10 | End: 2024-06-10 | Stop reason: SURG

## 2024-06-10 RX ORDER — SODIUM CHLORIDE 9 MG/ML
INJECTION, SOLUTION INTRAVENOUS CONTINUOUS
Status: DISCONTINUED | OUTPATIENT
Start: 2024-06-10 | End: 2024-06-12

## 2024-06-10 RX ORDER — EPHEDRINE SULFATE 50 MG/ML
5 INJECTION, SOLUTION INTRAVENOUS
Status: DISCONTINUED | OUTPATIENT
Start: 2024-06-10 | End: 2024-06-10 | Stop reason: HOSPADM

## 2024-06-10 RX ORDER — SODIUM CHLORIDE, SODIUM LACTATE, POTASSIUM CHLORIDE, AND CALCIUM CHLORIDE .6; .31; .03; .02 G/100ML; G/100ML; G/100ML; G/100ML
1000 INJECTION, SOLUTION INTRAVENOUS
Status: DISCONTINUED | OUTPATIENT
Start: 2024-06-10 | End: 2024-06-14 | Stop reason: HOSPADM

## 2024-06-10 RX ORDER — CEFAZOLIN SODIUM 1 G/3ML
INJECTION, POWDER, FOR SOLUTION INTRAMUSCULAR; INTRAVENOUS PRN
Status: DISCONTINUED | OUTPATIENT
Start: 2024-06-10 | End: 2024-06-10 | Stop reason: SURG

## 2024-06-10 RX ORDER — LIDOCAINE HYDROCHLORIDE 20 MG/ML
INJECTION, SOLUTION EPIDURAL; INFILTRATION; INTRACAUDAL; PERINEURAL PRN
Status: DISCONTINUED | OUTPATIENT
Start: 2024-06-10 | End: 2024-06-10 | Stop reason: SURG

## 2024-06-10 RX ORDER — CEFTRIAXONE 1 G/1
1000 INJECTION, POWDER, FOR SOLUTION INTRAMUSCULAR; INTRAVENOUS ONCE
Status: COMPLETED | OUTPATIENT
Start: 2024-06-10 | End: 2024-06-10

## 2024-06-10 RX ORDER — FOLIC ACID 1 MG/1
1 TABLET ORAL EVERY EVENING
Status: DISCONTINUED | OUTPATIENT
Start: 2024-06-10 | End: 2024-06-14 | Stop reason: HOSPADM

## 2024-06-10 RX ADMIN — DEXAMETHASONE SODIUM PHOSPHATE 4 MG: 4 INJECTION INTRA-ARTICULAR; INTRALESIONAL; INTRAMUSCULAR; INTRAVENOUS; SOFT TISSUE at 22:04

## 2024-06-10 RX ADMIN — CEFTRIAXONE SODIUM 1000 MG: 1 INJECTION, POWDER, FOR SOLUTION INTRAMUSCULAR; INTRAVENOUS at 20:49

## 2024-06-10 RX ADMIN — SODIUM CHLORIDE, POTASSIUM CHLORIDE, SODIUM LACTATE AND CALCIUM CHLORIDE: 600; 310; 30; 20 INJECTION, SOLUTION INTRAVENOUS at 21:59

## 2024-06-10 RX ADMIN — LIDOCAINE HYDROCHLORIDE 60 MG: 20 INJECTION, SOLUTION EPIDURAL; INFILTRATION; INTRACAUDAL at 22:03

## 2024-06-10 RX ADMIN — ONDANSETRON 4 MG: 2 INJECTION INTRAMUSCULAR; INTRAVENOUS at 22:27

## 2024-06-10 RX ADMIN — SODIUM CHLORIDE, POTASSIUM CHLORIDE, SODIUM LACTATE AND CALCIUM CHLORIDE 1000 ML: 600; 310; 30; 20 INJECTION, SOLUTION INTRAVENOUS at 19:45

## 2024-06-10 RX ADMIN — ONDANSETRON 4 MG: 2 INJECTION INTRAMUSCULAR; INTRAVENOUS at 19:49

## 2024-06-10 RX ADMIN — PROPOFOL 100 MG: 10 INJECTION, EMULSION INTRAVENOUS at 22:03

## 2024-06-10 RX ADMIN — SODIUM CHLORIDE, POTASSIUM CHLORIDE, SODIUM LACTATE AND CALCIUM CHLORIDE 1078 ML: 600; 310; 30; 20 INJECTION, SOLUTION INTRAVENOUS at 21:00

## 2024-06-10 RX ADMIN — CEFAZOLIN 2 G: 1 INJECTION, POWDER, FOR SOLUTION INTRAMUSCULAR; INTRAVENOUS at 22:04

## 2024-06-10 RX ADMIN — FENTANYL CITRATE 25 MCG: 50 INJECTION, SOLUTION INTRAMUSCULAR; INTRAVENOUS at 19:49

## 2024-06-10 RX ADMIN — FENTANYL CITRATE 50 MCG: 50 INJECTION, SOLUTION INTRAMUSCULAR; INTRAVENOUS at 22:03

## 2024-06-10 RX ADMIN — IOHEXOL 100 ML: 350 INJECTION, SOLUTION INTRAVENOUS at 20:20

## 2024-06-10 ASSESSMENT — PATIENT HEALTH QUESTIONNAIRE - PHQ9
SUM OF ALL RESPONSES TO PHQ9 QUESTIONS 1 AND 2: 0
1. LITTLE INTEREST OR PLEASURE IN DOING THINGS: NOT AT ALL
2. FEELING DOWN, DEPRESSED, IRRITABLE, OR HOPELESS: NOT AT ALL

## 2024-06-10 ASSESSMENT — PAIN DESCRIPTION - PAIN TYPE
TYPE: SURGICAL PAIN

## 2024-06-10 ASSESSMENT — FIBROSIS 4 INDEX: FIB4 SCORE: 1.4

## 2024-06-11 ENCOUNTER — HOSPITAL ENCOUNTER (INPATIENT)
Facility: MEDICAL CENTER | Age: 74
End: 2024-06-11
Attending: STUDENT IN AN ORGANIZED HEALTH CARE EDUCATION/TRAINING PROGRAM | Admitting: STUDENT IN AN ORGANIZED HEALTH CARE EDUCATION/TRAINING PROGRAM
Payer: MEDICARE

## 2024-06-11 PROBLEM — N17.9 AKI (ACUTE KIDNEY INJURY) (HCC): Status: ACTIVE | Noted: 2024-06-11

## 2024-06-11 PROBLEM — N13.9 OBSTRUCTIVE UROPATHY: Status: ACTIVE | Noted: 2024-06-11

## 2024-06-11 PROBLEM — D64.9 NORMOCYTIC ANEMIA: Status: ACTIVE | Noted: 2024-06-11

## 2024-06-11 LAB
ANION GAP SERPL CALC-SCNC: 13 MMOL/L (ref 7–16)
BASOPHILS # BLD AUTO: 0.3 % (ref 0–1.8)
BASOPHILS # BLD: 0.03 K/UL (ref 0–0.12)
BUN SERPL-MCNC: 14 MG/DL (ref 8–22)
CALCIUM SERPL-MCNC: 8.8 MG/DL (ref 8.4–10.2)
CHLORIDE SERPL-SCNC: 106 MMOL/L (ref 96–112)
CO2 SERPL-SCNC: 23 MMOL/L (ref 20–33)
CREAT SERPL-MCNC: 0.93 MG/DL (ref 0.5–1.4)
EOSINOPHIL # BLD AUTO: 0.02 K/UL (ref 0–0.51)
EOSINOPHIL NFR BLD: 0.2 % (ref 0–6.9)
ERYTHROCYTE [DISTWIDTH] IN BLOOD BY AUTOMATED COUNT: 45.1 FL (ref 35.9–50)
GFR SERPLBLD CREATININE-BSD FMLA CKD-EPI: 65 ML/MIN/1.73 M 2
GLUCOSE BLD STRIP.AUTO-MCNC: 103 MG/DL (ref 65–99)
GLUCOSE BLD STRIP.AUTO-MCNC: 107 MG/DL (ref 65–99)
GLUCOSE BLD STRIP.AUTO-MCNC: 130 MG/DL (ref 65–99)
GLUCOSE BLD STRIP.AUTO-MCNC: 90 MG/DL (ref 65–99)
GLUCOSE SERPL-MCNC: 100 MG/DL (ref 65–99)
HCT VFR BLD AUTO: 36.4 % (ref 37–47)
HGB BLD-MCNC: 11.8 G/DL (ref 12–16)
IMM GRANULOCYTES # BLD AUTO: 0.05 K/UL (ref 0–0.11)
IMM GRANULOCYTES NFR BLD AUTO: 0.4 % (ref 0–0.9)
INR PPP: 1.01 (ref 0.87–1.13)
LACTATE SERPL-SCNC: 0.8 MMOL/L (ref 0.5–2)
LACTATE SERPL-SCNC: 1.7 MMOL/L (ref 0.5–2)
LYMPHOCYTES # BLD AUTO: 1.47 K/UL (ref 1–4.8)
LYMPHOCYTES NFR BLD: 12.8 % (ref 22–41)
MCH RBC QN AUTO: 30.6 PG (ref 27–33)
MCHC RBC AUTO-ENTMCNC: 32.4 G/DL (ref 32.2–35.5)
MCV RBC AUTO: 94.5 FL (ref 81.4–97.8)
MONOCYTES # BLD AUTO: 0.27 K/UL (ref 0–0.85)
MONOCYTES NFR BLD AUTO: 2.3 % (ref 0–13.4)
NEUTROPHILS # BLD AUTO: 9.65 K/UL (ref 1.82–7.42)
NEUTROPHILS NFR BLD: 84 % (ref 44–72)
NRBC # BLD AUTO: 0 K/UL
NRBC BLD-RTO: 0 /100 WBC (ref 0–0.2)
PLATELET # BLD AUTO: 147 K/UL (ref 164–446)
PMV BLD AUTO: 9.9 FL (ref 9–12.9)
POTASSIUM SERPL-SCNC: 3.3 MMOL/L (ref 3.6–5.5)
PROTHROMBIN TIME: 13.8 SEC (ref 12–14.6)
RBC # BLD AUTO: 3.85 M/UL (ref 4.2–5.4)
SODIUM SERPL-SCNC: 142 MMOL/L (ref 135–145)
WBC # BLD AUTO: 11.5 K/UL (ref 4.8–10.8)

## 2024-06-11 PROCEDURE — 82962 GLUCOSE BLOOD TEST: CPT

## 2024-06-11 PROCEDURE — 97163 PT EVAL HIGH COMPLEX 45 MIN: CPT

## 2024-06-11 PROCEDURE — 700111 HCHG RX REV CODE 636 W/ 250 OVERRIDE (IP): Mod: JZ | Performed by: INTERNAL MEDICINE

## 2024-06-11 PROCEDURE — 770020 HCHG ROOM/CARE - TELE (206)

## 2024-06-11 PROCEDURE — 80048 BASIC METABOLIC PNL TOTAL CA: CPT

## 2024-06-11 PROCEDURE — 700105 HCHG RX REV CODE 258: Performed by: INTERNAL MEDICINE

## 2024-06-11 PROCEDURE — 97535 SELF CARE MNGMENT TRAINING: CPT

## 2024-06-11 PROCEDURE — 85025 COMPLETE CBC W/AUTO DIFF WBC: CPT

## 2024-06-11 PROCEDURE — 97167 OT EVAL HIGH COMPLEX 60 MIN: CPT

## 2024-06-11 PROCEDURE — A9270 NON-COVERED ITEM OR SERVICE: HCPCS | Performed by: INTERNAL MEDICINE

## 2024-06-11 PROCEDURE — 94760 N-INVAS EAR/PLS OXIMETRY 1: CPT

## 2024-06-11 PROCEDURE — 99233 SBSQ HOSP IP/OBS HIGH 50: CPT | Performed by: INTERNAL MEDICINE

## 2024-06-11 PROCEDURE — 700102 HCHG RX REV CODE 250 W/ 637 OVERRIDE(OP): Performed by: INTERNAL MEDICINE

## 2024-06-11 PROCEDURE — 92610 EVALUATE SWALLOWING FUNCTION: CPT

## 2024-06-11 PROCEDURE — 83605 ASSAY OF LACTIC ACID: CPT

## 2024-06-11 PROCEDURE — 99231 SBSQ HOSP IP/OBS SF/LOW 25: CPT | Performed by: UROLOGY

## 2024-06-11 PROCEDURE — 85610 PROTHROMBIN TIME: CPT

## 2024-06-11 RX ORDER — NYSTATIN 100000 [USP'U]/G
POWDER TOPICAL 3 TIMES DAILY
Status: DISCONTINUED | OUTPATIENT
Start: 2024-06-11 | End: 2024-06-14 | Stop reason: HOSPADM

## 2024-06-11 RX ORDER — ENOXAPARIN SODIUM 100 MG/ML
40 INJECTION SUBCUTANEOUS DAILY
Status: DISCONTINUED | OUTPATIENT
Start: 2024-06-11 | End: 2024-06-14 | Stop reason: HOSPADM

## 2024-06-11 RX ADMIN — DONEPEZIL HYDROCHLORIDE 10 MG: 5 TABLET, FILM COATED ORAL at 21:05

## 2024-06-11 RX ADMIN — THYROID 45 MG: 30 TABLET ORAL at 06:05

## 2024-06-11 RX ADMIN — CEFTRIAXONE SODIUM 1000 MG: 1 INJECTION, POWDER, FOR SOLUTION INTRAMUSCULAR; INTRAVENOUS at 17:51

## 2024-06-11 RX ADMIN — NYSTATIN: 100000 POWDER TOPICAL at 09:34

## 2024-06-11 RX ADMIN — FOLIC ACID 1 MG: 1 TABLET ORAL at 17:52

## 2024-06-11 RX ADMIN — SODIUM CHLORIDE: 9 INJECTION, SOLUTION INTRAVENOUS at 00:06

## 2024-06-11 RX ADMIN — SENNOSIDES AND DOCUSATE SODIUM 2 TABLET: 50; 8.6 TABLET ORAL at 17:53

## 2024-06-11 RX ADMIN — MEMANTINE HYDROCHLORIDE 10 MG: 10 TABLET ORAL at 06:05

## 2024-06-11 RX ADMIN — NYSTATIN: 100000 POWDER TOPICAL at 18:00

## 2024-06-11 RX ADMIN — MEMANTINE HYDROCHLORIDE 10 MG: 10 TABLET ORAL at 21:05

## 2024-06-11 RX ADMIN — ROSUVASTATIN 10 MG: 10 TABLET, FILM COATED ORAL at 17:53

## 2024-06-11 RX ADMIN — ENOXAPARIN SODIUM 40 MG: 100 INJECTION SUBCUTANEOUS at 17:53

## 2024-06-11 RX ADMIN — NYSTATIN: 100000 POWDER TOPICAL at 12:34

## 2024-06-11 ASSESSMENT — LIFESTYLE VARIABLES
DOES PATIENT WANT TO STOP DRINKING: NO
EVER HAD A DRINK FIRST THING IN THE MORNING TO STEADY YOUR NERVES TO GET RID OF A HANGOVER: NO
EVER FELT BAD OR GUILTY ABOUT YOUR DRINKING: NO
CONSUMPTION TOTAL: NEGATIVE
AVERAGE NUMBER OF DAYS PER WEEK YOU HAVE A DRINK CONTAINING ALCOHOL: 0
TOTAL SCORE: 0
HAVE PEOPLE ANNOYED YOU BY CRITICIZING YOUR DRINKING: NO
TOTAL SCORE: 0
HAVE YOU EVER FELT YOU SHOULD CUT DOWN ON YOUR DRINKING: NO
ON A TYPICAL DAY WHEN YOU DRINK ALCOHOL HOW MANY DRINKS DO YOU HAVE: 0
TOTAL SCORE: 0
ALCOHOL_USE: NO
HOW MANY TIMES IN THE PAST YEAR HAVE YOU HAD 5 OR MORE DRINKS IN A DAY: 0

## 2024-06-11 ASSESSMENT — COGNITIVE AND FUNCTIONAL STATUS - GENERAL
PERSONAL GROOMING: A LITTLE
SUGGESTED CMS G CODE MODIFIER MOBILITY: CL
CLIMB 3 TO 5 STEPS WITH RAILING: A LOT
EATING MEALS: A LITTLE
MOVING TO AND FROM BED TO CHAIR: A LOT
TURNING FROM BACK TO SIDE WHILE IN FLAT BAD: A LITTLE
HELP NEEDED FOR BATHING: A LOT
MOVING TO AND FROM BED TO CHAIR: A LOT
STANDING UP FROM CHAIR USING ARMS: A LOT
MOBILITY SCORE: 13
WALKING IN HOSPITAL ROOM: A LOT
TOILETING: A LOT
PERSONAL GROOMING: A LITTLE
STANDING UP FROM CHAIR USING ARMS: A LOT
MOBILITY SCORE: 13
WALKING IN HOSPITAL ROOM: A LOT
DRESSING REGULAR LOWER BODY CLOTHING: A LOT
DAILY ACTIVITIY SCORE: 14
HELP NEEDED FOR BATHING: A LOT
DRESSING REGULAR UPPER BODY CLOTHING: A LITTLE
SUGGESTED CMS G CODE MODIFIER DAILY ACTIVITY: CK
MOVING FROM LYING ON BACK TO SITTING ON SIDE OF FLAT BED: A LOT
SUGGESTED CMS G CODE MODIFIER MOBILITY: CL
DRESSING REGULAR UPPER BODY CLOTHING: A LOT
DRESSING REGULAR LOWER BODY CLOTHING: A LOT
TOILETING: A LOT
CLIMB 3 TO 5 STEPS WITH RAILING: A LOT
DAILY ACTIVITIY SCORE: 15
EATING MEALS: A LITTLE
TURNING FROM BACK TO SIDE WHILE IN FLAT BAD: A LITTLE
MOVING FROM LYING ON BACK TO SITTING ON SIDE OF FLAT BED: A LOT
SUGGESTED CMS G CODE MODIFIER DAILY ACTIVITY: CK

## 2024-06-11 ASSESSMENT — GAIT ASSESSMENTS
DISTANCE (FEET): 30
GAIT LEVEL OF ASSIST: MODERATE ASSIST
ASSISTIVE DEVICE: HAND HELD ASSIST
DISTANCE (FEET): 6

## 2024-06-11 ASSESSMENT — SOCIAL DETERMINANTS OF HEALTH (SDOH)
IN THE PAST 12 MONTHS, HAS THE ELECTRIC, GAS, OIL, OR WATER COMPANY THREATENED TO SHUT OFF SERVICE IN YOUR HOME?: NO
WITHIN THE PAST 12 MONTHS, YOU WORRIED THAT YOUR FOOD WOULD RUN OUT BEFORE YOU GOT THE MONEY TO BUY MORE: NEVER TRUE
WITHIN THE LAST YEAR, HAVE YOU BEEN AFRAID OF YOUR PARTNER OR EX-PARTNER?: NO
WITHIN THE PAST 12 MONTHS, THE FOOD YOU BOUGHT JUST DIDN'T LAST AND YOU DIDN'T HAVE MONEY TO GET MORE: NEVER TRUE
WITHIN THE LAST YEAR, HAVE YOU BEEN HUMILIATED OR EMOTIONALLY ABUSED IN OTHER WAYS BY YOUR PARTNER OR EX-PARTNER?: NO
WITHIN THE LAST YEAR, HAVE TO BEEN RAPED OR FORCED TO HAVE ANY KIND OF SEXUAL ACTIVITY BY YOUR PARTNER OR EX-PARTNER?: NO
WITHIN THE LAST YEAR, HAVE YOU BEEN KICKED, HIT, SLAPPED, OR OTHERWISE PHYSICALLY HURT BY YOUR PARTNER OR EX-PARTNER?: NO

## 2024-06-11 ASSESSMENT — PATIENT HEALTH QUESTIONNAIRE - PHQ9
2. FEELING DOWN, DEPRESSED, IRRITABLE, OR HOPELESS: NOT AT ALL
SUM OF ALL RESPONSES TO PHQ9 QUESTIONS 1 AND 2: 0
1. LITTLE INTEREST OR PLEASURE IN DOING THINGS: NOT AT ALL

## 2024-06-11 ASSESSMENT — PAIN DESCRIPTION - PAIN TYPE
TYPE: ACUTE PAIN
TYPE: ACUTE PAIN

## 2024-06-11 ASSESSMENT — PAIN SCALES - WONG BAKER: WONGBAKER_NUMERICALRESPONSE: DOESN'T HURT AT ALL

## 2024-06-11 ASSESSMENT — FIBROSIS 4 INDEX
FIB4 SCORE: 3.65
FIB4 SCORE: 3.65

## 2024-06-11 ASSESSMENT — ACTIVITIES OF DAILY LIVING (ADL): TOILETING: REQUIRES ASSIST

## 2024-06-11 NOTE — ASSESSMENT & PLAN NOTE
2.5 mm mid left ureteral stone and 1.5 mm left ureterovesicular junction stone resulting in urinary outflow tract obstructive changes noted on CT abdomen/pelvis  Associated with significant urinary tract infection (sepsis now resolved)  Status post left ureteral stent 6/10  DC mcdaniel  Pending mobiliy / DC home w   Change to ceftin

## 2024-06-11 NOTE — ED NOTES
Medication history reviewed with pts . Med rec is complete.  Allergies reviewed, per pts     Called pts  (Art) [phone] to verify all medications.  Pts  reports that pt is not taking ROSUVASTATIN 10MG    Patient has not had any outpatient antibiotics in the last 30 days.    Pt is not on any anticoagulants

## 2024-06-11 NOTE — HOSPITAL COURSE
History of Alzheimer's dementia, cared for by her , hypothyroidism, hyperlipidemia, prediabetes who was admitted for left flank pain found to have sepsis secondary to an infected obstructing left ureteral stone associated with acute kidney injury.

## 2024-06-11 NOTE — ASSESSMENT & PLAN NOTE
Secondary to a 2.5 mm mid left ureteral stone and a 1.5 mm left ureterovesicular junction stone  Stones are infected leading to a significant changes on urinalysis as well as sepsis  Postop day 1 status post cystoscopy, left ureteral stent placement  WBC and creatinine resolved  Continue antibiotics --> PO until stone is treated definitively

## 2024-06-11 NOTE — THERAPY
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 Pt has greater R visual field than left and tends to lead ambulation with RLE    Assessment  Patient is 73 y.o. female with a diagnosis of obstructive uropathy s/p uretal stent. PMH: Sepsis due to UTI and severe dementia. Pt presents today with significant impairments in functional ROM/strength, balance coordination, vision, safety awareness, cognition, activity tolerance, and motor planning/sequencing. Due to these impairments, pt has significant difficulty with all functional mobility. Pt required frequent VC, facilitation and initiation assistance with Russell. Pt required HHA with modA for ambulation with frequent VC. Pt tended to lead with the RLE due to vision loss L < R. Pt has deficits regarding stereognosia, visual agnosia, dysmetria and dysdiadochokinesia. Pt would continue to benefit from acute skilled PT in order to address current functional deficits. Recommend post-acute placement for continued physical therapy services prior to discharge home.        Plan    Physical Therapy Initial Treatment Plan   Treatment Plan : (P) Bed Mobility, Gait Training, Equipment, Neuro Re-Education / Balance, Self Care / Home Evaluation, Stair Training, Therapeutic Activities, Therapeutic Exercise  Treatment Frequency: (P) 3 Times per Week  Duration: (P) Until Therapy Goals Met    DC Equipment Recommendations: (P) Unable to determine at this time  Discharge Recommendations: (P) Recommend post-acute placement for additional physical therapy services prior to discharge home    Subjective    \"I feel like going to fall.\"     Objective     06/11/24 1453   Initial Contact Note    Initial Contact Note Order Received and Verified, Physical Therapy " "Evaluation in Progress with Full Report to Follow.   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 Pt has greater R visual field than left and tends to lead ambulation with RLE   Vitals   O2 Delivery Device None - Room Air   Pain 0 - 10 Group   Therapist Pain Assessment Prior to Activity;During Activity;Post Activity;Nurse Notified;0   Prior Living Situation   Prior Services Continuous (24 Hour) Care Giving Family   Housing / Facility 2 Story Apartment / Condo   Steps Into Home 15   Steps In Home 0   Rail Right Rail (Steps into Home)   Bathroom Set up Bathtub / Shower Combination  (Per Art, pt is able to clean herself in the shower and he stands by to assist if needed)   Equipment Owned None   Lives with - Patient's Self Care Capacity Significant Other   Comments Pt significant other states that he quit his job 2 years ago to take care of her full time   Prior Level of Functional Mobility   Bed Mobility Required Assist   Transfer Status Required Assist   Ambulation Required Assist   Ambulation Distance Per Art, community with assist to appts from Art. Otherwise pt stays at home when Art needs to go to his appts.   Stairs Required Assist   Comments Art was able to assist with all functional mobility if needed   History of Falls   History of Falls No   Cognition    Cognition / Consciousness X   Orientation Level Not Oriented to Year;Not Oriented to Month;Not Oriented to Day;Not Oriented to Place;Not Oriented to Reason   Level of Consciousness Alert   Ability To Follow Commands 1 Step   Safety Awareness Impaired   New Learning Impaired   Sequencing Impaired   Initiation Impaired   Comments Deficits due to severe dementia   Passive ROM Lower Body   Passive ROM Lower Body Not Tested   Active ROM Lower Body    Active ROM Lower Body  X   Comments Limited for functional mobility. Pt cannot fully understand \"kicking knee out\" to assess adequate knee " "ext ROM   Strength Lower Body   Lower Body Strength  X   Comments Generalized weakness equal bilaterally   Neurological Concerns   Comments Poor executive functioning. Impairments in dysmetria, dysdiadochokinesia, stereognosia, visual agnosia   Vision   Occulomotor Tracking;Scanning  (Impaired)   Vision Comments Blind per sig other Art   Balance Assessment   Sitting Balance (Static) Fair   Sitting Balance (Dynamic) Fair   Standing Balance (Static) Fair -   Standing Balance (Dynamic) Fair -   Weight Shift Sitting Fair   Weight Shift Standing Poor   Comments Pt has difficulty remaining standing without assist. Fears they are going to fall.   Bed Mobility    Supine to Sit Minimal Assist    Sit to Supine Standby Assist   Scooting Standby Assist   Gait Analysis   Gait Level Of Assist Moderate Assist   Assistive Device Hand Held Assist   Distance (Feet) 6   # of Times Distance was Traveled 1   Deviation Antalgic;Step To;Decreased Base Of Support;Bradykinetic;Shuffled Gait;Decreased Heel Strike;Decreased Toe Off;Other (Comment)  (Pt tended to lead with the RLE than the left.)   # of Stairs Climbed 0   Level of Assist with Stairs Unable to Participate   Weight Bearing Status FWB   Vision Deficits Impacting Mobility Blind per significant other   Comments Pt unable to ambulate extended distances because she \"feels like she is going to fall.\" Pt ambulated with R foot leading L foot.   Functional Mobility   Sit to Stand Contact Guard Assist   Bed, Chair, Wheelchair Transfer Minimal Assist   Transfer Method Stand Step   Mobility bed mobility, STS, minimal ambulation   6 Clicks Assessment - How much HELP from from another person do you currently need... (If the patient hasn't done an activity recently, how much help from another person do you think he/she would need if he/she tried?)   Turning from your back to your side while in a flat bed without using bedrails? 3   Moving from lying on your back to sitting on the side of a " flat bed without using bedrails? 2   Moving to and from a bed to a chair (including a wheelchair)? 2   Standing up from a chair using your arms (e.g., wheelchair, or bedside chair)? 2   Walking in hospital room? 2   Climbing 3-5 steps with a railing? 2   6 clicks Mobility Score 13   Activity Tolerance   Sitting Edge of Bed 4min   Standing 3min   Edema / Skin Assessment   Edema / Skin  WDL   Patient / Family Goals    Patient / Family Goal #1 Not Stated   Short Term Goals    Short Term Goal # 1 Pt will be able to perform all bed mobility with HOB flat and Russell in order to perform bed mobility upon dc by end of 6 visits.   Short Term Goal # 2 Pt will be able to ambulate 50ft with HHA and Russell in order to ambulate within the household by end of 6 visits.   Short Term Goal # 3 Pt will be able to ambulate 3 stairs with R handrail use and Russell in order to begin stair navigation into home by end of 6 visits   Education Group   Education Provided Role of Physical Therapist;Gait Training   Role of Physical Therapist Patient Response Patient;Family;Acceptance;Explanation;Demonstration;Verbal Demonstration   Gait Training Patient Response Patient;Acceptance;Explanation;Demonstration;Verbal Demonstration   Physical Therapy Initial Treatment Plan    Treatment Plan  Bed Mobility;Gait Training;Equipment;Neuro Re-Education / Balance;Self Care / Home Evaluation;Stair Training;Therapeutic Activities;Therapeutic Exercise   Treatment Frequency 3 Times per Week   Duration Until Therapy Goals Met   Problem List    Problems Impaired Bed Mobility;Impaired Transfers;Impaired Ambulation;Functional ROM Deficit;Functional Strength Deficit;Impaired Balance;Impaired Coordination;Impaired Vision;Safety Awareness Deficits / Cognition;Decreased Activity Tolerance;Motor Planning / Sequencing   Anticipated Discharge Equipment and Recommendations   DC Equipment Recommendations Unable to determine at this time   Discharge Recommendations Recommend  post-acute placement for additional physical therapy services prior to discharge home   Interdisciplinary Plan of Care Collaboration   IDT Collaboration with  Nursing   Patient Position at End of Therapy In Bed;Bed Alarm On;Call Light within Reach;Tray Table within Reach;Phone within Reach   Collaboration Comments Aware of session and recs   Session Information   Date / Session Number  6/11-1 (1/3, 6/17)

## 2024-06-11 NOTE — ANESTHESIA POSTPROCEDURE EVALUATION
Patient: Thuy Albert    Procedure Summary       Date: 06/10/24 Room / Location:  OR  / SURGERY [facility]    Anesthesia Start: 2159 Anesthesia Stop: 2238    Procedure: CYSTOSCOPY, WITH URETERAL STENT INSERTION (Left: Ureter) Diagnosis: (Left ureteral calculi)    Surgeons: Arturo Watson M.D. Responsible Provider: Bernabe Younger M.D.    Anesthesia Type: general ASA Status: 4 - Emergent            Final Anesthesia Type: general  Last vitals  BP   Blood Pressure : 125/45    Temp   36 °C (96.8 °F)    Pulse   74   Resp   16    SpO2   98 %      Anesthesia Post Evaluation    Patient location during evaluation: PACU  Patient participation: complete - patient participated  Level of consciousness: sleepy but conscious    Airway patency: patent  Anesthetic complications: no  Cardiovascular status: hemodynamically stable  Respiratory status: acceptable  Hydration status: balanced    PONV: none          No notable events documented.     Nurse Pain Score: 0 (NPRS)

## 2024-06-11 NOTE — DISCHARGE PLANNING
" note:  Art is at bedside as well as PT and he wants to see pt walk.   He said he promised pt that he would never put him in a nursing home but CM explained that this is temporary. CM also offerred \"food is medicine\" prescription for pantry food but Art declined stating they have plenty of food at home.   "

## 2024-06-11 NOTE — ANESTHESIA PREPROCEDURE EVALUATION
Case: [id number] Date/Time: 06/10/24 2200    Procedure: CYSTOSCOPY, WITH URETERAL STENT INSERTION (Left)    Location:  OR  / SURGERY [facility]    Surgeons: Arturo Watson M.D.            Relevant Problems   PULMONARY   (positive) Mild intermittent asthma without complication_controlled w/o meds       NEURO   (positive) Chronic migraine without aura without status migrainosus, not intractable      CARDIAC   (positive) Atherosclerosis of aorta (HCC)   (positive) Chronic migraine without aura without status migrainosus, not intractable   (positive) Sinus bradycardia   (positive) Stenosis of aorta      GI   (positive) GERD (gastroesophageal reflux disease)      ENDO   (positive) Hypothyroidism      Other   (positive) Obesity (BMI 30-39.9)   (positive) Sepsis (HCC)     Anes H&P:  PAST MEDICAL HISTORY:   73 y.o. female who presents for Procedure(s) (LRB):  CYSTOSCOPY, WITH URETERAL STENT INSERTION (Left).  She has current and past medical problems significant for:    Past Medical History:   Diagnosis Date    Arthritis     Asthma     Dementia (HCC)     DVT (deep venous thrombosis) ([facility])     DVT LEFT LEG-- STATES SHE HAD TAKEN COUMADIN FOR 8 WEEKS IN .    GERD (gastroesophageal reflux disease)     Thyroid disease     hypothyroidism       SMOKING/ALCOHOL/RECREATIONAL DRUG USE:  Social History     Tobacco Use    Smoking status: Former     Current packs/day: 0.00     Average packs/day: 1.5 packs/day for 40.0 years (60.0 ttl pk-yrs)     Types: Cigarettes     Start date: 1966     Quit date: 2006     Years since quittin.4    Smokeless tobacco: Never    Tobacco comments:     QUIT ,  USED TO TO SMOKE 2-3 PPD FOR 40 YRS.   Vaping Use    Vaping status: Never Used   Substance Use Topics    Alcohol use: No     Alcohol/week: 0.0 oz    Drug use: No     Social History     Substance and Sexual Activity   Drug Use No       PAST SURGICAL HISTORY:  Past Surgical History:   Procedure Laterality Date     APPENDECTOMY LAPAROSCOPIC  8/1/2011    Performed by JJ QUINTANILLA at SURGERY [facility]    APPENDECTOMY      CHOLECYSTECTOMY      OTHER ABDOMINAL SURGERY      ksenia    TONSILLECTOMY         ALLERGIES:   Allergies   Allergen Reactions    Cephalexin Rash     rash    Nabumetone      rash    Synthroid [Levothroid]      Nausea      Tetracycline Swelling       MEDICATIONS:  No current facility-administered medications on file prior to encounter.     Current Outpatient Medications on File Prior to Encounter   Medication Sig Dispense Refill    thyroid (ARMOUR THYROID) 15 MG Tab Take 3 Tablets by mouth every day. 270 Tablet 3    rosuvastatin (CRESTOR) 10 MG Tab Take 1 Tablet by mouth every evening. 90 Tablet 3    B Complex Vitamins (B COMPLEX PO) Take 1 Tablet by mouth every day.      Multiple Vitamins-Minerals (ZINC PO) Take 1 Tablet by mouth every day.      Cholecalciferol (D3 PO) Take 1 Capsule by mouth every day.      Bismuth Subsalicylate (PEPTO-BISMOL PO) Take 30 mL by mouth 2 times a day as needed (For upset stomach).      acetaminophen (TYLENOL) 500 MG Tab Take 1,000 mg by mouth every 6 hours as needed. Indications: Pain      Cyanocobalamin (VITAMIN B-12 PO) Take 1 Tablet by mouth every day.      Ascorbic Acid (VITAMIN C PO) Take 1 Tablet by mouth every day.      memantine (NAMENDA) 10 MG Tab Take 1 Tablet by mouth 2 times a day. 180 Tablet 3    donepezil (ARICEPT) 10 MG tablet Take 1 Tablet by mouth every day. (Patient taking differently: Take 10 mg by mouth every evening.) 90 Tablet 3    folic acid (FOLVITE) 1 MG Tab Take 1 Tablet by mouth every day. (Patient taking differently: Take 1 mg by mouth every evening.) 90 Tablet 1       LABS:  Lab Results   Component Value Date/Time    HEMOGLOBIN 13.7 06/10/2024 1826    HEMATOCRIT 42.9 06/10/2024 1826    WBC 16.9 (H) 06/10/2024 1826     Lab Results   Component Value Date/Time    SODIUM 140 06/10/2024 1826    POTASSIUM 4.4 06/10/2024 1826    CHLORIDE 102  06/10/2024 1826    CO2 23 06/10/2024 1826    GLUCOSE 157 (H) 06/10/2024 1826    BUN 17 06/10/2024 1826    CALCIUM 9.4 06/10/2024 1826         PREVIOUS ANESTHETICS: See EMR  __________________________________________      Physical Exam    Airway   Mallampati: II  TM distance: >3 FB  Neck ROM: full       Cardiovascular - normal exam  Rhythm: regular  Rate: normal  (-) murmur     Dental - normal exam  (+) upper dentures, lower dentures           Pulmonary - normal exam  Breath sounds clear to auscultation     Abdominal    Neurological - normal exam                   Anesthesia Plan    ASA 4- EMERGENT   ASA physical status 4 criteria: sepsisASA physical status emergent criteria: acute deteriorating condition due to infection    Plan - general       Airway plan will be LMA          Induction: intravenous      Pertinent diagnostic labs and testing reviewed    Informed Consent:    Anesthetic plan and risks discussed with patient and spouse.

## 2024-06-11 NOTE — ASSESSMENT & PLAN NOTE
Continue home Aricept and Namenda  Agitation improved, no restraints overnight  She is weak and lethargic, mobility is slower than baseline- declining SNF  Repeat PT OT and attempt stairs, possible home with home health if her mobility improves

## 2024-06-11 NOTE — ASSESSMENT & PLAN NOTE
Due to dehydration, ureteral obstruction and sepsis  It has improved  DC fluids  Repeat BMP in the morning

## 2024-06-11 NOTE — PROGRESS NOTES
Hospital Medicine Daily Progress Note    Date of Service  6/11/2024    Chief Complaint  Thuy Albert is a 73 y.o. female admitted 6/10/2024 with flank pain    Hospital Course  History of Alzheimer's dementia, cared for by her , hypothyroidism, hyperlipidemia, prediabetes who was admitted for left flank pain found to have sepsis secondary to an infected obstructing left ureteral stone associated with acute kidney injury.    Interval Problem Update  6/11: Urology consulted - cystoscopy with L ureteral stent placed.  She is moving slowly today, has been concerned about taking care of her at home.  Discussed at bedside. PT OT ordered.  Tolerating ceftriaxone, cultures are pending.    I have discussed this patient's plan of care and discharge plan at IDT rounds today with Case Management, Nursing, Nursing leadership, and other members of the IDT team.    Consultants/Specialty  Urology    Code Status  Full Code    Disposition  The patient is not medically cleared for discharge to home or a post-acute facility.  Anticipate discharge to: skilled nursing facility    I have placed the appropriate orders for post-discharge needs.    Review of Systems  Review of Systems   Unable to perform ROS: Mental status change        Physical Exam  Temp:  [36 °C (96.8 °F)-36.9 °C (98.4 °F)] 36.3 °C (97.3 °F)  Pulse:  [41-92] 45  Resp:  [11-34] 16  BP: ()/(38-79) 124/51  SpO2:  [91 %-100 %] 97 %    Physical Exam  Constitutional:       General: She is not in acute distress.     Comments: Slowly speaking   HENT:      Nose: Nose normal.      Mouth/Throat:      Mouth: Mucous membranes are dry.   Cardiovascular:      Rate and Rhythm: Normal rate and regular rhythm.      Pulses: Normal pulses.   Pulmonary:      Effort: Pulmonary effort is normal.      Breath sounds: Normal breath sounds.   Abdominal:      General: There is no distension.      Palpations: Abdomen is soft.      Tenderness: There is no abdominal tenderness.    Musculoskeletal:         General: No swelling.      Cervical back: No muscular tenderness.   Lymphadenopathy:      Cervical: No cervical adenopathy.   Skin:     General: Skin is warm and dry.      Coloration: Skin is pale.      Findings: No rash.   Neurological:      General: No focal deficit present.      Mental Status: She is disoriented.      Motor: Weakness present.   Psychiatric:         Speech: She is noncommunicative.         Fluids    Intake/Output Summary (Last 24 hours) at 6/11/2024 1450  Last data filed at 6/11/2024 0600  Gross per 24 hour   Intake 500 ml   Output 500 ml   Net 0 ml       Laboratory  Recent Labs     06/10/24  1826 06/11/24  0050   WBC 16.9* 11.5*   RBC 4.59 3.85*   HEMOGLOBIN 13.7 11.8*   HEMATOCRIT 42.9 36.4*   MCV 93.5 94.5   MCH 29.8 30.6   MCHC 31.9* 32.4   RDW 45.8 45.1   PLATELETCT 153* 147*   MPV 10.4 9.9     Recent Labs     06/10/24  1826 06/11/24  0050   SODIUM 140 142   POTASSIUM 4.4 3.3*   CHLORIDE 102 106   CO2 23 23   GLUCOSE 157* 100*   BUN 17 14   CREATININE 1.02 0.93   CALCIUM 9.4 8.8     Recent Labs     06/11/24  0050   INR 1.01               Imaging  DX-PORTABLE FLUOROSCOPY < 1 HOUR   Final Result      Portable fluoroscopy utilized for 7 seconds.         INTERPRETING LOCATION: [address] [place] [address], [id number]      [id number] VIEW   Final Result      Digitized intraoperative radiograph is submitted for review. This examination is not for diagnostic purpose but for guidance during a surgical procedure. Please see the patient's chart for full procedural details.         INTERPRETING LOCATION: [address] [place] [address], [id number]      CT-ABDOMEN-PELVIS WITH   Final Result         1.  2.5 mm mid left ureteral stone and 1.5 mm left ureterovesicular junction stone resulting in left urinary outflow tract obstructive changes.   2.  Diverticulosis           Assessment/Plan  * Obstructive uropathy- (present on admission)  Assessment & Plan  Secondary to a 2.5 mm mid left ureteral stone and  a 1.5 mm left ureterovesicular junction stone  Stones are infected leading to a significant changes on urinalysis as well as sepsis  Postop day 1 status post cystoscopy, left ureteral stent placement  WBC and creatinine improving  Continue IV antibiotics    Left ureteral calculus- (present on admission)  Assessment & Plan  2.5 mm mid left ureteral stone and 1.5 mm left ureterovesicular junction stone resulting in urinary outflow tract obstructive changes noted on CT abdomen/pelvis  Associated with significant urinary tract infection  Associated with sepsis  Status post left ureteral stent 6/11  Advance diet  Pending PT OT, she may need SNF    Normocytic anemia  Assessment & Plan  No evidence of bleeding  Repeat in a.m.    NATALY (acute kidney injury) (HCC)- (present on admission)  Assessment & Plan  Due to dehydration, ureteral obstruction and sepsis  Continue  It has improved  Repeat BMP in the morning    Sepsis (HCC)- (present on admission)  Assessment & Plan  Due to ureteral stone  Stone has been removed  Continue ceftriaxone  Follow cultures  WBC is decreasing    Severe early onset Alzheimer's dementia with other behavioral disturbance (HCC)- (present on admission)  Assessment & Plan  I am unable to get anything from the patient at this time  Family at bedside wants full code  Continue home Aricept and Namenda  Significant risk of severe agitation overnight, if this occurs we will give as needed Haldol  She is weak and lethargic, mobility is slower than baseline  Order PT OT, she would likely benefit from SNF    Prediabetes- (present on admission)  Assessment & Plan  Start insulin sliding scale  Adjust as needed    Mixed hyperlipidemia - (present on admission)  Assessment & Plan  Continue home statin    Hypothyroidism- (present on admission)  Assessment & Plan  Continue home [place] Thyroid 45 mg daily  Most recent TSH was approximately 4 months ago and was normal at 0.78         VTE prophylaxis: Lovenox    I have  performed a physical exam and reviewed and updated ROS and Plan today (6/11/2024). In review of yesterday's note (6/10/2024), there are no changes except as documented above.    Total time:  51 minutes.  I spent greater than 50% of the time for patient care, counseling, and coordination on this date, including unit/floor time, and face-to-face time with the patient as per interval events and assessment and plan above

## 2024-06-11 NOTE — ASSESSMENT & PLAN NOTE
Continue home [place] Thyroid 45 mg daily  Most recent TSH was approximately 4 months ago and was normal at 0.78

## 2024-06-11 NOTE — DISCHARGE PLANNING
note:  Mobility six click score 13  Waiting for PT/OT eval to be completed.   CM sent SNF referrals. LVM for significant other.    PASRR under manual review.    Addendum: received a call back from Robbie. He is coming to see pt and how she walks. Robbie said that pt has always had a funny gait so he will need to see her walk. He said that he does not want her to go to SNF.

## 2024-06-11 NOTE — CARE PLAN
The patient is Stable - Low risk of patient condition declining or worsening    Shift Goals  Clinical Goals: Manage pain, monitor I & O, Monitor labs  Patient Goals: sleep  Family Goals: SPRING    Progress made toward(s) clinical / shift goals:    Problem: Fall Risk  Goal: Patient will remain free from falls  Description: Target End Date:  Prior to discharge or change in level of care    Document interventions on the [facility] Fall Risk Assessment    1.  Assess for fall risk factors  2.  Implement fall precautions  Outcome: Progressing  Note: Patient remained free of falls this shift     Problem: Urinary - Renal Perfusion  Goal: Ability to achieve and maintain adequate renal perfusion and functioning will improve  Description: Target End Date:  Prior to discharge or change in level of care    Document on I/O and Assessment flowsheet    1.  Urine output will remain greater than 0.5ml/Kg/HR  2.  Monitor amount and/or characteristics of urine per order/policy. Specific gravity per order/policy  3.  Assess signs and symptoms of renal dysfunction  Outcome: Progressing  Note: Patient had good urinary output over night (500 mL)        Patient is not progressing towards the following goals:      Problem: Knowledge Deficit - Standard  Goal: Patient and family/care givers will demonstrate understanding of plan of care, disease process/condition, diagnostic tests and medications  Description: Target End Date:  1-3 days or as soon as patient condition allows    Document in Patient Education    1.  Patient and family/caregiver oriented to unit, equipment, visitation policy and means for communicating concern  2.  Complete/review Learning Assessment  3.  Assess knowledge level of disease process/condition, treatment plan, diagnostic tests and medications  4.  Explain disease process/condition, treatment plan, diagnostic tests and medications  Outcome: Not Progressing  Note: Patient is ANO x 1. Patient does not know where she is  or why she is in the hospital

## 2024-06-11 NOTE — H&P
Hospital Medicine History & Physical Note    Date of Service  6/10/2024    Primary Care Physician  Pramod Lu M.D.    Consultants  urology    Specialist Names: Dr Watson    Code Status  Full Code    Chief Complaint  Chief Complaint   Patient presents with    Abdominal Pain     Suprapubic with decreased appetite since last night. Denies V/D, dysuria, fevers.        History of Presenting Illness  Thuy Albert is a 73 y.o. female who presented 6/10/2024 with abdominal pain.  Patient has significant dementia, is agitated and tearful, otherwise I am unable to get any significant information from her.  Information obtained from family at bedside.  She began having abdominal pain which seem to be lower quadrant earlier today and seem to worsen through the day.  Did become severe so she was brought to the emergency department.  Upon arrival, imaging did show an infected stone.  Because of this, ERP discussed the case with urology who plans on intervention tonight.  I did discuss the case including labs and imaging with the ER physician.    I discussed the plan of care with family and bedside RN.    Review of Systems  Review of Systems   Unable to perform ROS: Dementia       Past Medical History   has a past medical history of Arthritis, Asthma, Dementia ([facility]), DVT (deep venous thrombosis) ([facility]) (2003), GERD (gastroesophageal reflux disease), and Thyroid disease.    Surgical History   has a past surgical history that includes other abdominal surgery; appendectomy laparoscopic (8/1/2011); cholecystectomy; appendectomy; and tonsillectomy.     Family History  family history includes Diabetes in her sister; Genetic Disorder in her mother; Heart Disease in her sister; Heart Disease (age of onset: 55) in her brother; Hypertension in her sister; No Known Problems in her maternal grandfather, maternal grandmother, paternal grandfather, and paternal grandmother; Other in her sister; Stroke in her father.   Family  history reviewed with patient. There is no family history that is pertinent to the chief complaint.     Social History   reports that she quit smoking about 18 years ago. Her smoking use included cigarettes. She started smoking about 58 years ago. She has a 60 pack-year smoking history. She has never used smokeless tobacco. She reports that she does not drink alcohol and does not use drugs.    Allergies  Allergies   Allergen Reactions    Cephalexin Rash     rash    Nabumetone      rash    Synthroid [Levothroid]      Nausea      Tetracycline Swelling       Medications  Prior to Admission Medications   Prescriptions Last Dose Informant Patient Reported? Taking?   Ascorbic Acid (VITAMIN C PO)  Significant Other Yes No   Sig: Take 1 Tablet by mouth every day.   B Complex Vitamins (B COMPLEX PO)  Significant Other Yes No   Sig: Take 1 Tablet by mouth every day.   Bismuth Subsalicylate (PEPTO-BISMOL PO)  Significant Other Yes No   Sig: Take 30 mL by mouth 2 times a day as needed (For upset stomach).   Cholecalciferol (D3 PO)  Significant Other Yes No   Sig: Take 1 Capsule by mouth every day.   Cyanocobalamin (VITAMIN B-12 PO)  Significant Other Yes No   Sig: Take 1 Tablet by mouth every day.   Multiple Vitamins-Minerals (ZINC PO)  Significant Other Yes No   Sig: Take 1 Tablet by mouth every day.   acetaminophen (TYLENOL) 500 MG Tab  Significant Other Yes No   Sig: Take 1,000 mg by mouth every 6 hours as needed. Indications: Pain   donepezil (ARICEPT) 10 MG tablet  Significant Other No No   Sig: Take 1 Tablet by mouth every day.   Patient taking differently: Take 10 mg by mouth every evening.   folic acid (FOLVITE) 1 MG Tab  Significant Other No No   Sig: Take 1 Tablet by mouth every day.   Patient taking differently: Take 1 mg by mouth every evening.   memantine (NAMENDA) 10 MG Tab  Significant Other No No   Sig: Take 1 Tablet by mouth 2 times a day.   rosuvastatin (CRESTOR) 10 MG Tab   No No   Sig: Take 1 Tablet by mouth  every evening.   thyroid (ARMOUR THYROID) 15 MG Tab   No No   Sig: Take 3 Tablets by mouth every day.      Facility-Administered Medications: None       Physical Exam  Temp:  [36.3 °C (97.3 °F)] 36.3 °C (97.3 °F)  Pulse:  [50-92] 83  Resp:  [11-34] 19  BP: ()/(38-79) 102/77  SpO2:  [93 %-99 %] 99 %  Blood Pressure : 102/77   Temperature: 36.3 °C (97.3 °F)   Pulse: 83   Respiration: 19   Pulse Oximetry: 99 %       Physical Exam  Vitals and nursing note reviewed.   Constitutional:       General: She is not in acute distress.     Appearance: She is well-developed. She is not diaphoretic.   HENT:      Head: Normocephalic and atraumatic.      Right Ear: External ear normal.      Left Ear: External ear normal.      Nose: Nose normal. No congestion or rhinorrhea.      Mouth/Throat:      Mouth: Mucous membranes are dry.      Pharynx: No oropharyngeal exudate.   Eyes:      General:         Right eye: No discharge.         Left eye: No discharge.   Neck:      Trachea: No tracheal deviation.   Cardiovascular:      Rate and Rhythm: Normal rate and regular rhythm.      Heart sounds: Murmur heard.      No friction rub. No gallop.   Pulmonary:      Effort: Pulmonary effort is normal. No respiratory distress.      Breath sounds: Normal breath sounds. No stridor. No wheezing or rales.   Abdominal:      General: Bowel sounds are normal. There is no distension.      Palpations: Abdomen is soft.   Musculoskeletal:      Cervical back: Neck supple.      Right lower leg: No edema.      Left lower leg: No edema.   Lymphadenopathy:      Cervical: No cervical adenopathy.   Skin:     General: Skin is warm and dry.      Findings: No erythema or rash.   Neurological:      Comments: Awake but confused   Psychiatric:         Mood and Affect: Affect is tearful.         Behavior: Behavior is agitated.         Cognition and Memory: Cognition is impaired. Memory is impaired.         Laboratory:  Recent Labs     06/10/24  1826   WBC 16.9*   RBC  "4.59   HEMOGLOBIN 13.7   HEMATOCRIT 42.9   MCV 93.5   MCH 29.8   MCHC 31.9*   RDW 45.8   PLATELETCT 153*   MPV 10.4     Recent Labs     06/10/24  1826   SODIUM 140   POTASSIUM 4.4   CHLORIDE 102   CO2 23   GLUCOSE 157*   BUN 17   CREATININE 1.02   CALCIUM 9.4     Recent Labs     06/10/24  1826   ALTSGPT 19   ASTSGOT 32   ALKPHOSPHAT 99   TBILIRUBIN 0.7   LIPASE 23   GLUCOSE 157*         No results for input(s): \"NTPROBNP\" in the last 72 hours.      Recent Labs     06/10/24  1826   TROPONINT 11       Imaging:  CT-ABDOMEN-PELVIS WITH   Final Result         1.  2.5 mm mid left ureteral stone and 1.5 mm left ureterovesicular junction stone resulting in left urinary outflow tract obstructive changes.   2.  Diverticulosis      DX-PORTABLE FLUOROSCOPY < 1 HOUR    (Results Pending)   UQ-[id number]-8 VIEW    (Results Pending)       EKG:  I have personally reviewed the images and compared with prior images.    Assessment/Plan:  Justification for Admission Status  I anticipate this patient will require at least two midnights for appropriate medical management, necessitating inpatient admission because sepsis secondary to infected stone    Patient will need a Telemetry bed on MEDICAL service .  The need is secondary to sepsis.    * Sepsis (HCC)- (present on admission)  Assessment & Plan  This is Sepsis Present on admission  SIRS criteria identified on my evaluation include: Tachypnea, with respirations greater than 20 per minute and Leukocytosis, with WBC greater than 12,000  Clinical indicators of end organ dysfunction include Lactic Acid greater than 2  Source is urinary tract infection  Sepsis protocol initiated  Crystalloid Fluid Administration: Fluid resuscitation ordered per standard protocol - 30 mL/kg per current or ideal body weight  IV antibiotics as appropriate for source of sepsis  Reassessment: I have reassessed the patient's hemodynamic status  Start IV Rocephin  Await culture results  Trend lactic acid " level  Patient is at significant risk of worsening, closely monitor her hemodynamics    Left ureteral calculus- (present on admission)  Assessment & Plan  2.5 mm mid left ureteral stone and 1.5 mm left ureterovesicular junction stone resulting in urinary outflow tract obstructive changes noted on CT abdomen/pelvis  Associated with significant urinary tract infection  Associated with sepsis  Urology is planning on intervention this evening  Keep patient n.p.o.    Obstructive uropathy- (present on admission)  Assessment & Plan  Secondary to a 2.5 mm mid left ureteral stone and a 1.5 mm left ureterovesicular junction stone  Stones are infected leading to a significant changes on urinalysis as well as sepsis  Plan for intervention by urology tonight    NATALY (acute kidney injury) (HCC)- (present on admission)  Assessment & Plan  Due to dehydration and sepsis  Start IV fluids  Repeat BMP in the morning    Severe early onset Alzheimer's dementia with other behavioral disturbance (HCC)- (present on admission)  Assessment & Plan  I am unable to get anything from the patient at this time  Family at bedside wants full code  Continue home Aricept and Namenda  Significant risk of severe agitation overnight, if this occurs we will give as needed Haldol    Prediabetes- (present on admission)  Assessment & Plan  Start insulin sliding scale  Adjust as needed    Mixed hyperlipidemia - (present on admission)  Assessment & Plan  Continue home statin    Hypothyroidism- (present on admission)  Assessment & Plan  Continue home [place] Thyroid 45 mg daily  Most recent TSH was approximately 4 months ago and was normal at 0.78        VTE prophylaxis: SCDs/TEDs

## 2024-06-11 NOTE — ED PROVIDER NOTES
ED Provider Note    CHIEF COMPLAINT  Chief Complaint   Patient presents with    Abdominal Pain     Suprapubic with decreased appetite since last night. Denies V/D, dysuria, fevers.        EXTERNAL RECORDS REVIEWED  Outpatient Notes patient seen by family practitioner on 12/13/2023 for follow-up of multiple medical problems including hypothyroidism, hyperlipidemia, early onset Alzheimer's with behavioral disturbance and delusional disorders. patient saw her cardiologist on 10/25/2023 as a new patient to establish care and evaluate for sinus bradycardia.  Notably also with history of atherosclerosis of the aorta and hyperlipidemia.    HPI/ROS  LIMITATION TO HISTORY   Select: : None  OUTSIDE HISTORIAN(S):  Family significant other at bedside who reports that the patient was complaining of abdominal pain yesterday which improved after she ate something but pain again returned this morning and has been worse.    Thuy Albert is a 73 y.o. female who presents to the emergency department for evaluation of left lower quadrant and suprapubic abdominal pain which has been increasing throughout the day today and is currently quite severe.  Patient denies any pain with urination or increased urinary frequency.  She denies nausea or vomiting.  She has had a few episodes of diarrhea today.  She denies any fevers or chills, chest pain or shortness of breath.  She has never had similar pain before.    PAST MEDICAL HISTORY   has a past medical history of Arthritis, Asthma, Dementia ([facility]), DVT (deep venous thrombosis) ([facility]) (2003), GERD (gastroesophageal reflux disease), and Thyroid disease.    SURGICAL HISTORY   has a past surgical history that includes other abdominal surgery; appendectomy laparoscopic (8/1/2011); cholecystectomy; appendectomy; and tonsillectomy.    FAMILY HISTORY  Family History   Problem Relation Age of Onset    Hypertension Sister     Diabetes Sister     Heart Disease Sister     Other Sister          loss vision complete    Heart Disease Brother 55        MI    Genetic Disorder Mother     Stroke Father     No Known Problems Maternal Grandmother     No Known Problems Maternal Grandfather     No Known Problems Paternal Grandmother     No Known Problems Paternal Grandfather        SOCIAL HISTORY  Social History     Tobacco Use    Smoking status: Former     Current packs/day: 0.00     Average packs/day: 1.5 packs/day for 40.0 years (60.0 ttl pk-yrs)     Types: Cigarettes     Start date: 1966     Quit date: 2006     Years since quittin.4    Smokeless tobacco: Never    Tobacco comments:     QUIT ,  USED TO TO SMOKE 2-3 PPD FOR 40 YRS.   Vaping Use    Vaping status: Never Used   Substance and Sexual Activity    Alcohol use: No     Alcohol/week: 0.0 oz    Drug use: No    Sexual activity: Never       CURRENT MEDICATIONS  Home Medications       Reviewed by Tonya Stephens R.N. (Registered Nurse) on 06/10/24 at 1707  Med List Status: Not Addressed     Medication Last Dose Status   acetaminophen (TYLENOL) 500 MG Tab  Active   Ascorbic Acid (VITAMIN C PO)  Active   B Complex Vitamins (B COMPLEX PO)  Active   Bismuth Subsalicylate (PEPTO-BISMOL PO)  Active   Cholecalciferol (D3 PO)  Active   Cyanocobalamin (VITAMIN B-12 PO)  Active   donepezil (ARICEPT) 10 MG tablet  Active   folic acid (FOLVITE) 1 MG Tab  Active   memantine (NAMENDA) 10 MG Tab  Active   Multiple Vitamins-Minerals (ZINC PO)  Active   rosuvastatin (CRESTOR) 10 MG Tab  Active   thyroid (ARMOUR THYROID) 15 MG Tab  Active                    ALLERGIES  Allergies   Allergen Reactions    Cephalexin Rash     rash    Nabumetone      rash    Synthroid [Levothroid]      Nausea      Tetracycline Swelling       PHYSICAL EXAM  VITAL SIGNS: /79   Pulse 79   Temp 36.3 °C (97.3 °F) (Temporal)   Resp 20   Ht 1.524 m (5')   Wt 72.6 kg (160 lb)   SpO2 93%   BMI 31.25 kg/m²    Constitutional: Frail elderly pleasant female ill-appearing  HEENT:  Atraumatic, normocephalic, pupils are equal round reactive to light, nose normal, mouth shows dry mucous membranes  Neck: Supple, no JVD, no tracheal deviation  Cardiovascular: Regular rate and rhythm, no murmur, rub or gallop, 2+ pulses peripherally x4  Thorax & Lungs: No respiratory distress, no wheezes, rales or rhonchi, no chest wall tenderness.  GI: Soft, suprapubic distention with tenderness in the suprapubic and left lower quadrant with guarding, no rebound  Skin: Warm, dry, no acute rash or lesion  Musculoskeletal: Moving all extremities, no acute deformity, no edema, no tenderness  Neurologic: A&Ox3, at baseline mentation, moving all extremities  Psychiatric: Appropriate affect for situation at this time      EKG/LABS  Labs Reviewed   CBC WITH DIFFERENTIAL - Abnormal; Notable for the following components:       Result Value    WBC 16.9 (*)     MCHC 31.9 (*)     Platelet Count 153 (*)     Neutrophils-Polys 84.70 (*)     Lymphocytes 7.70 (*)     Neutrophils (Absolute) 14.28 (*)     Monos (Absolute) 1.13 (*)     All other components within normal limits   COMP METABOLIC PANEL - Abnormal; Notable for the following components:    Glucose 157 (*)     All other components within normal limits   URINALYSIS - Abnormal; Notable for the following components:    Character Cloudy (*)     Ketones 15 (*)     Protein 100 (*)     Bilirubin Moderate (*)     Leukocyte Esterase Small (*)     Occult Blood Large (*)     All other components within normal limits   URINE MICROSCOPIC (W/UA) - Abnormal; Notable for the following components:    WBC  (*)     RBC 10-20 (*)     Bacteria Moderate (*)     Epithelial Cells Moderate (*)     All other components within normal limits   ESTIMATED GFR - Abnormal; Notable for the following components:    GFR (CKD-EPI) 58 (*)     All other components within normal limits   LIPASE   TROPONIN   LACTIC ACID   BLOOD CULTURE   BLOOD CULTURE   URINALYSIS,CULTURE IF INDICATED     Results for  orders placed or performed during the hospital encounter of 10/03/23   EKG   Result Value Ref Range    Report       [facility] Emergency Dept.    Test Date:  2023-10-03  Pt Name:    DARYA SANTOS Department: EDS  MRN:        [id number]                      Room:  Gender:     Female                       Technician: [id number]  :        1950                   Requested By:ER TRIAGE PROTOCOL  Order #:    [id number]                    Reading MD:    Measurements  Intervals                                Axis  Rate:       47                           P:          37  VT:         165                          QRS:        37  QRSD:       79                           T:          68  QT:         443  QTc:        392    Interpretive Statements  Sinus bradycardia  Compared to ECG 2022 16:42:17  No significant changes         I have independently interpreted this EKG    RADIOLOGY/PROCEDURES   I have independently interpreted the diagnostic imaging associated with this visit and am waiting the final reading from the radiologist.   My preliminary interpretation is as follows: CT scan demonstrates a 2.5 mm left ureteral stone and 1.5 mm left UVJ stone with left urinary outflow obstructive changes.    Radiologist interpretation:  CT-ABDOMEN-PELVIS WITH   Final Result         1.  2.5 mm mid left ureteral stone and 1.5 mm left ureterovesicular junction stone resulting in left urinary outflow tract obstructive changes.   2.  Diverticulosis          COURSE & MEDICAL DECISION MAKING    ASSESSMENT, COURSE AND PLAN  Care Narrative:     7:14 PM patient is a 73-year-old female with prior surgical history including cholecystectomy and appendectomy presenting to the emergency department for evaluation of suprapubic and left lower quadrant abdominal pain.  Patient is ill-appearing and quite tender on examination.  Fortunately vital signs are stable and she is afebrile currently.  Broad  differential diagnosis including sepsis from an intra-abdominal infection, bowel perforation, bowel obstruction, UTI, pyelonephritis, kidney stone, infected obstructing stone.  Will plan for broad laboratory workup, urinalysis, CT scan of the abdomen pelvis with contrast to further assess.  Will treat symptomatically with fentanyl and Zofran and provide IV fluid bolus.    8:40 PM Labs remarkable for a significant leukocytosis to 17 with a left shift.  CMP with mild NATALY.  Urinalysis with large leukocyte esterase, blood, bacteria and white blood cells concerning for UTI.  CT scan with left-sided ureteral stones and resultant obstructive changes.  At this point I am concerned for infected obstructing stone and will discuss with urology.  IV fluids ordered to complete 30 cc/kg and ceftriaxone ordered.    8:45 PM Case discussed with on-call urologist Dr. Watson who will plan to evaluate the patient at bedside and take the patient emergently to the operating room for ureteral stenting.    8:55 PM on reassessment patient with rigors and hypotension with systolic blood pressures in the low 90s.  IV fluids pressure bagged.    9:00 PM blood pressure improved.  Patient feeling better.    9:10 PM Case discussed with hospitalist Dr. Burroughs who accepts patient for admission pending operative intervention as above.    9:15 PM patient reassessed.  Vital signs stable.    CRITICAL CARE  The very real possibilty of a deterioration of this patient's condition required the highest level of my preparedness for sudden, emergent intervention.  I provided critical care services, which included medication orders, frequent reevaluations of the patient's condition and response to treatment, ordering and reviewing test results, and discussing the case with various consultants.  The critical care time associated with the care of the patient was 35 minutes. Review chart for interventions. This time is exclusive of any other billable procedures.          Sepsis: Infection was suspected 8:40 PM (Time). Sepsis pathway was initiated. 30cc/kg bolus given. Antibiotics were given per protocol.          ADDITIONAL PROBLEMS MANAGED  None    DISPOSITION AND DISCUSSIONS  I have discussed management of the patient with the following physicians and ANITA's: Dr. Watson, urology and Dr. Burroughs, hospitalist    Discussion of management with other [facility] or appropriate source(s): None     Decision tools and prescription drugs considered including, but not limited to: Antibiotics ceftriaxone .  Pain medication low-dose fentanyl    FINAL DIAGNOSIS  1. Urinary tract infection with hematuria, site unspecified    2. Ureterolithiasis    3. NATALY (acute kidney injury) (HCC)    4. Left lower quadrant abdominal pain    5. Sepsis, due to unspecified organism, unspecified whether acute organ dysfunction present (HCC)    6.      Critical care time 35 minutes       Electronically signed by: Star Barth M.D., 6/10/2024 7:14 PM

## 2024-06-11 NOTE — ED NOTES
IV established. Blood sent to lab. Pt educated on notifying staff for IV removal prior to leaving ED.   Urinalysis collected and sent to lab

## 2024-06-11 NOTE — PROGRESS NOTES
Telemetry Shift Summary    Rhythm SB  HR Range 45-59 (lowest of 42)  Ectopy R PVC, R PAC  Measurements 0.18/0.08/0.44      Normal Values  Rhythm SR  HR Range:   Measurements: 0.12-0.20/0.06-0.10/0.30-0.52

## 2024-06-11 NOTE — DISCHARGE PLANNING
" note:  CM observed pt walking with Art and PT. Pt able to do leg exercises.  PT said however that she still recommends SNF. Art is undecided.   Notified him that several SNF have already accepted.   But he will let CM know by tomorrow.     \"[employer] \" referral left at bedside. Art aware.  "

## 2024-06-11 NOTE — CONSULTS
Surgery Urology Consultation    Date of Service  6/10/2024    Referring Physician  Davian Burroughs D.O.    Consulting Physician  Arturo Watson M.D.    Reason for Consultation  Left ureteral calculi    History of Presenting Illness  73 y.o. female who presented 6/10/2024 with left ureteral calculi, rigors, WBC 15, Creatinine 1.02 and left flank pain. Admitted to the hospital service and in need of left ureteral stent placement.     Review of Systems  ROS    Past Medical History   has a past medical history of Arthritis, Asthma, Dementia ([facility]), DVT (deep venous thrombosis) ([facility]) (2003), GERD (gastroesophageal reflux disease), and Thyroid disease.    Surgical History   has a past surgical history that includes other abdominal surgery; appendectomy laparoscopic (8/1/2011); cholecystectomy; appendectomy; and tonsillectomy.    Family History  family history includes Diabetes in her sister; Genetic Disorder in her mother; Heart Disease in her sister; Heart Disease (age of onset: 55) in her brother; Hypertension in her sister; No Known Problems in her maternal grandfather, maternal grandmother, paternal grandfather, and paternal grandmother; Other in her sister; Stroke in her father.    Social History   reports that she quit smoking about 18 years ago. Her smoking use included cigarettes. She started smoking about 58 years ago. She has a 60 pack-year smoking history. She has never used smokeless tobacco. She reports that she does not drink alcohol and does not use drugs.    Medications  Prior to Admission Medications   Prescriptions Last Dose Informant Patient Reported? Taking?   Ascorbic Acid (VITAMIN C PO)  Significant Other Yes No   Sig: Take 1 Tablet by mouth every day.   B Complex Vitamins (B COMPLEX PO)  Significant Other Yes No   Sig: Take 1 Tablet by mouth every day.   Bismuth Subsalicylate (PEPTO-BISMOL PO)  Significant Other Yes No   Sig: Take 30 mL by mouth 2 times a day as needed (For upset  stomach).   Cholecalciferol (D3 PO)  Significant Other Yes No   Sig: Take 1 Capsule by mouth every day.   Cyanocobalamin (VITAMIN B-12 PO)  Significant Other Yes No   Sig: Take 1 Tablet by mouth every day.   Multiple Vitamins-Minerals (ZINC PO)  Significant Other Yes No   Sig: Take 1 Tablet by mouth every day.   acetaminophen (TYLENOL) 500 MG Tab  Significant Other Yes No   Sig: Take 1,000 mg by mouth every 6 hours as needed. Indications: Pain   donepezil (ARICEPT) 10 MG tablet  Significant Other No No   Sig: Take 1 Tablet by mouth every day.   Patient taking differently: Take 10 mg by mouth every evening.   folic acid (FOLVITE) 1 MG Tab  Significant Other No No   Sig: Take 1 Tablet by mouth every day.   Patient taking differently: Take 1 mg by mouth every evening.   memantine (NAMENDA) 10 MG Tab  Significant Other No No   Sig: Take 1 Tablet by mouth 2 times a day.   rosuvastatin (CRESTOR) 10 MG Tab   No No   Sig: Take 1 Tablet by mouth every evening.   thyroid (ARMOUR THYROID) 15 MG Tab   No No   Sig: Take 3 Tablets by mouth every day.      Facility-Administered Medications: None       Allergies  Allergies   Allergen Reactions    Cephalexin Rash     rash    Nabumetone      rash    Synthroid [Levothroid]      Nausea      Tetracycline Swelling       Physical Exam  Temp:  [36.3 °C (97.3 °F)] 36.3 °C (97.3 °F)  Pulse:  [50-92] 83  Resp:  [11-34] 19  BP: ()/(38-79) 102/77  SpO2:  [93 %-99 %] 99 %    Patient with dementia and not oriented. Looks uncomfortable. Abdomen with tenderness on left flank.       Fluids      Laboratory  Recent Labs     06/10/24  1826   WBC 16.9*   RBC 4.59   HEMOGLOBIN 13.7   HEMATOCRIT 42.9   MCV 93.5   MCH 29.8   MCHC 31.9*   RDW 45.8   PLATELETCT 153*   MPV 10.4     Recent Labs     06/10/24  1826   SODIUM 140   POTASSIUM 4.4   CHLORIDE 102   CO2 23   GLUCOSE 157*   BUN 17   CREATININE 1.02   CALCIUM 9.4                     Imaging  CT-ABDOMEN-PELVIS WITH   Final Result         1.  2.5 mm  mid left ureteral stone and 1.5 mm left ureterovesicular junction stone resulting in left urinary outflow tract obstructive changes.   2.  Diverticulosis      DX-PORTABLE FLUOROSCOPY < 1 HOUR    (Results Pending)   [id number] VIEW    (Results Pending)       Assessment/Plan  I have reviewed the CT and met with the  and patient. He has power or  secondary to her dementia. I explained the risks and benefits of a left ureteral stent placement. I advised that it will help her symptoms to relieve the ureteral obstruction and provide urinary drainage and decrease the risk of urosepsis. I explained the risks to include sepsis, stent pain, septic shock and even death. He understands and agrees that I should proceed.     Arturo Watson MD, FACS

## 2024-06-11 NOTE — OP REPORT
SURGEON: Dr. Bairon Watson       PROCEDURE PERFORMED: left ureteral stent placement    PREOPERATIVE DIAGNOSIS: Left ureteral calculus    POSTOPERATIVE DIAGNOSIS: Left ureteral calculus    ANESTHESIA: General (general endotracheal tube)       ANTIBIOTICS: Ancef     LINES, TUBES, AND DRAINS: 16Fr catheter.     COMPLICATIONS: None.     BLOOD LOSS: 0 mL.     SPECIMENS: None.     INTRAOPERATIVE FINDINGS:     1. Successful placement of 6x26cm left ureteral stent    INDICATION FOR PROCEDURE: Thuy Albert is a 73 y.o. female whose hsuband agreed to above procedure for further management of left ureteral stent placement after complete discussion of risks, benefits, and alternatives. The patient understands the main risks to be infection, sepsis, ureteral injury, need for additional surgeries or procedures.     PROCEDURE IN DETAIL: Informed consent was obtained. The patient was properly identified and placed in supine position per OR protocol. The patient was given a prophylactic dose of ancef 2 grams. General (general endotracheal tube) was administered. The patient was then converted to the lithotomy position and was prepped and draped in the standard sterile fashion. All the pressure points were confirmed to be padded.  A time-out was then conducted with all parties in agreement.      A 25F cystoscope was placed into the bladder and it was drained. An angled sensor wire and 6F exchange catheter were placed into the left ureter. The wire was advanced under fluoroscopy into the left renal pelvis. Next a [id number] stent was placed into the left renal pelvis. The wire was removed after xray confirmation of position. The bladder was drained and a 16F mcdaniel placed. The patient was taken out of lithotomy, extubated and taken to PACU in stable condition.       DISPOSITION: The patient will be admitted to the Hospital Service for management. I will arrange follow up in Urology for planning of definitive stone treatment.      Arturo Watson MD, FACS

## 2024-06-11 NOTE — ANESTHESIA PROCEDURE NOTES
Airway    Date/Time: 6/10/2024 10:03 PM    Performed by: Bernabe Younger M.D.  Authorized by: Bernabe Younger M.D.    Location:  OR  Urgency:  Elective  Difficult Airway: No    Indications for Airway Management:  Anesthesia      Spontaneous Ventilation: absent    Sedation Level:  Deep  Preoxygenated: Yes    Mask Difficulty Assessment:  0 - not attempted  Final Airway Type:  Supraglottic airway  Final Supraglottic Airway:  Standard LMA    SGA Size:  3  Number of Attempts at Approach:  1

## 2024-06-11 NOTE — ANESTHESIA TIME REPORT
Anesthesia Start and Stop Event Times       Date Time Event    6/10/2024 2155 Ready for Procedure     2159 Anesthesia Start     2238 Anesthesia Stop          Responsible Staff  06/10/24      Name Role Begin End    Bernabe Younger M.D. Anesth 2159 2238          Overtime Reason:  no overtime (within assigned shift)    Comments:

## 2024-06-11 NOTE — DISCHARGE PLANNING
"  Met with Art who has been pt's 24 year partner. He is pt's main caregiver. Pt does not normally use any DMEs for ambulation. She is blind. He helps her with ADLs and IADLs. She can shower herself once she is in the shower. He drives pt to appts. Art said he does not have any safety concerns.     CM received a consult order for CM from RN. CM talked to Art again about food. Art said they have food stamps and have lots of food at home. Art said he is having a procedure tomorrow but he is not sure what it is \"it might be a shot to my hip\" so normally, per Art if he has an appt then he leaves Jesse at home and she sits infront of the TV and he said that it would be ideal to dc her today. Art does not want HH services for pt. His main concern is whether pt can walk so PT/OT to see pt.     Discussed with MD during IDT rounds. MD wanted to know how the appt for Urology can be made. JARAD called Minnie at scheduling and she said that MD needs to place a referral order for Urology and  will get an appt for pt. MD notified through voalte.       Confirmed information on facesheet as correct.   PCP is :Pramod Lu MD  Pharmacy is : [facility] on [address]  DME:none  Social Support:common law partnet Art  Anticipate dc plan and resources needed:Home, Art refused homehealth services.              Care Transition Team Assessment    Information Source  Orientation Level: Oriented X4  Information Given By: Significant Other  Informant's Name: Art  Who is responsible for making decisions for patient? : Patient    Readmission Evaluation  Is this a readmission?: No    Elopement Risk  Legal Hold: No  Ambulatory or Self Mobile in Wheelchair: Yes  Disoriented: No  Psychiatric Symptoms: None  History of Wandering: No  Elopement this Admit: No  Vocalizing Wanting to Leave: No  Displays Behaviors, Body Language Wanting to Leave: No-Not at Risk for Elopement  Elopement Risk: Not at Risk for Elopement    Interdisciplinary " Discharge Planning  Does Admitting Nurse Feel This Could be a Complex Discharge?: No  Primary Care Physician: Dr. Pramod Lu  Lives with - Patient's Self Care Capacity: Significant Other  Patient or legal guardian wants to designate a caregiver: Yes  Caregiver name: Joe Man  Caregiver contact info: [phone]  ([facility] [place]) Authorization for Release of Health Information has been completed: Yes  Support Systems: Spouse / Significant Other  Housing / Facility: 2 Story Apartment / Condo  Do You Take your Prescribed Medications Regularly: Yes  Able to Return to Previous ADL's: Yes  Mobility Issues: No  Prior Services: Continuous (24 Hour) Care Giving Family  Patient Prefers to be Discharged to:: Home  Assistance Needed: Yes  Durable Medical Equipment: Not Applicable    Discharge Preparedness  What is your plan after discharge?: Home with help  What are your discharge supports?: Partner  Prior Functional Level: Ambulatory, Needs Assist with Activities of Daily Living, Needs Assist with Medication Management  Difficulity with ADLs: Bathing, Dressing  Difficulity with IADLs: Cooking, Driving, Laundry, Shopping    Functional Assesment  Prior Functional Level: Ambulatory, Needs Assist with Activities of Daily Living, Needs Assist with Medication Management    Finances  Financial Barriers to Discharge: No  Prescription Coverage: Yes    Vision / Hearing Impairment  Vision Impairment : No  Hearing Impairment : No    Values / Beliefs / Concerns  Values / Beliefs Concerns : No    Advance Directive  Advance Directive?: Living Will, DPOA for Health Care    Domestic Abuse  Possible Abuse/Neglect Reported to:: Not Applicable         Discharge Risks or Barriers  Discharge risks or barriers?: No  Patient risk factors: Cognitive / sensory / physical deficit    Anticipated Discharge Information  Discharge Disposition: Discharged to home/self care (01)

## 2024-06-11 NOTE — ED TRIAGE NOTES
Chief Complaint   Patient presents with    Abdominal Pain     Suprapubic with decreased appetite since last night. Denies V/D, dysuria, fevers.      Physical Exam  Constitutional:       Appearance: She is ill-appearing.   Pulmonary:      Effort: Pulmonary effort is normal.   Skin:     General: Skin is warm and dry.      Coloration: Skin is pale.   Neurological:      Mental Status: She is alert.

## 2024-06-11 NOTE — CARE PLAN
Problem: Fall Risk  Goal: Patient will remain free from falls  Outcome: Progressing     Problem: Knowledge Deficit - Standard  Goal: Patient and family/care givers will demonstrate understanding of plan of care, disease process/condition, diagnostic tests and medications  Outcome: Progressing   The patient is Stable - Low risk of patient condition declining or worsening    Shift Goals  Clinical Goals: monior I&O, monitor labs  Patient Goals: sleep  Family Goals: SPRING    Progress made toward(s) clinical / shift goals:    Problem: Hemodynamics  Goal: Patient's hemodynamics, fluid balance and neurologic status will be stable or improve  Outcome: Progressing     Problem: Fluid Volume  Goal: Fluid volume balance will be maintained  Outcome: Progressing     Problem: Urinary - Renal Perfusion  Goal: Ability to achieve and maintain adequate renal perfusion and functioning will improve  Outcome: Progressing       Patient is not progressing towards the following goals:

## 2024-06-11 NOTE — THERAPY
Speech Language Pathology   Clinical Swallow Evaluation     Patient Name: Thuy Albert  AGE:  73 y.o., SEX:  female  Medical Record #: [id number]  Date of Service: 6/11/2024      History of Present Illness  73 y.o. female who presents to the emergency department for evaluation of left lower quadrant and suprapubic abdominal pain, found to have left ureteral calculi, now s/p left ureteral stent placement    PMHx: Arthritis, Asthma, Dementia, DVT, GERD, and Thyroid disease.    No history of SLP in EPIC    General Information:  Vitals  O2 (LPM): 2  O2 Delivery Device: Nasal Cannula  Level of Consciousness: Alert  Patient Behaviors: Confused  Follows Directives: Yes - simple commands only      Prior Living Situation & Level of Function:  Comments: patient is an unreliable historian, unable to state  Communication: hx of dementia, able to communicate basic wants/needs and follow simple commands during session  Swallowing: no history in chart, patient is unreliable        Oral Mechanism Evaluation:  Dentition: Upper denture, Lower denture   Facial Symmetry: Equal  Facial Sensation: Pt did not follow commands to assess     Labial Observations: WFL   Lingual Observations: Midline  Motor Speech: WFL  Comments: unable to swallow to command         Laryngeal Function:  Secretion Management: Adequate  Voice Quality: WFL  Cough: Perceptually WNL       Subjective  Patient asleep on contact, alerts to tactile stimuli, pleasant and agreeable to tasks.      Assessment  Current Method of Nutrition: NPO until cleared by speech pathology  Positioning: Ramos's (60-90 degrees)  Bolus Administration: SLP (patient confusion limits effective self feeding with patient putting hands and non food items in mouth)  O2 (LPM): 2 O2 Delivery Device: Nasal Cannula  Factor(s) Affecting Performance: Impaired mental status, Impaired command following     Swallowing Trials:  Swallowing Trials  Ice: Impaired (spits out)  Thin Liquid  (TN0): Impaired  Liquidised (LQ3): Impaired  Pureed (PU4): Impaired  Minced & Moist (MM5): Impaired      Comments:     Oral phase: Uncoordinated bolus acceptance from spoon, cup, and straw. Patient confused and putting non food items in mouth when asked to self feed (e.g., 02 sat lead, fingers, sheets). Requires 1:1 feeding. Anterior loss of liquids from spoon and cup. Poor coordination for lip seal around straw, but once achieved able to suction liquid from cup effectively. Uncoordinated bolus prep and formation resulting in prolonged oral phase. Patient spits out pieces of shay cracker when mixed with applesauce.     Pharyngeal phase: laryngeal elevation appreciated, no overt signs of pen/asp.       Clinical Impressions  Patient presents with heightened risk for aspiration due to primarily oral phase dysphagia.  Aspiration risk is further elevated by patient unable to consistently follow commands, altered mental status/confusion, and dependence on caregivers for assist with feeding. Performance is consistent with dysphagia amongst patients with dementia. Deficits may be acutely exacerbated by recent anesthesia event for procedure as well as acute illness. Recommend initiation of modified diet as detailed below. Given advanced dementia documented in chart, prognosis for improvement and potential diet advancement is guarded.        Recommendations  Diet Consistency: puree solid,s thin liquids (cardiac modification added per RN)  Instrumentation: None indicated at this time  Medication: Crush with pudding/puree, as appropriate  Supervision: 1:1 feeding with constant supervision (assist as needed, encourage self feeding as able)  Positioning: Fully upright and midline during oral intake  Risk Management : Small bites/sips, Alternate bites and sips, Slow rate of intake, Reduce environmental distractions  Oral Care: Q6h      SLP Treatment Plan  Treatment Plan: Dysphagia Treatment  SLP Frequency: 4x Per Week  Estimated  Duration: Until Therapy Goals Met      Anticipated Discharge Needs  Discharge Recommendations: Anticipate that the patient will have no further speech therapy needs after discharge from the hospital          Patient / Family Goals  Patient / Family Goal #1: unable to state  Short Term Goals  Short Term Goal # 1: Patient will tolerate puree solids and thin liquids without signs of airway invasion      Dena El, SLP

## 2024-06-11 NOTE — PROGRESS NOTES
4 Eyes Skin Assessment Completed by ANDREW Welch and ANDREW Barr.    Head WDL  Ears WDL  Nose WDL  Mouth WDL  Neck WDL  Breast/Chest Redness and Blanching (under left breast, see picture)  Shoulder Blades WDL  Spine WDL  (R) Arm/Elbow/Hand WDL  (L) Arm/Elbow/Hand WDL  Abdomen Redness and Blanching, under skin fold of stomach (see photo  Groin Redness and Blanching (left side of groin, see picture)   Scrotum/Coccyx/Buttocks Redness and Blanching  (R) Leg WDL  (L) Leg WDL  (R) Heel/Foot/Toe WDL  (L) Heel/Foot/Toe WDL          Devices In Places Tele Box and Pulse Ox      Interventions In Place Gray Ear Foams and Pillows    Possible Skin Injury No    Pictures Uploaded Into Epic Yes  Wound Consult Placed Yes  RN Wound Prevention Protocol Ordered No

## 2024-06-11 NOTE — OR NURSING
2234 To PACU from OR by a bed; pt sleeping, respirations spontaneous and nonlabored.     2249 pt rouses to voice, denies pain or discomfort,VSS. Urinary catheter in place, draining clear, pink tinged urine. Significant other at bedside.     2304 VSS, pt has no needs at this time.    2309 60 ml of pink tinged urine emptied from catheter. Pt meets criteria to transfer to room

## 2024-06-11 NOTE — ASSESSMENT & PLAN NOTE
Due to ureteral stone  Stone has been removed w cystoscopy on 6/10  Change antibiotics to Ceftin 500 3 times daily, to continue until she is definitively treated for her stone  Follow cultures  WBC has normalized

## 2024-06-11 NOTE — PROGRESS NOTES
Surgical Progress Note    Author: Arturo Watson M.D. Date & Time created: 2024   1:37 PM     Interval Events:  Cllinically much improved. WBC 11.5, acidosis resolved, renal function stable.     ROS  Hemodynamics:  Temp (24hrs), Av.4 °C (97.6 °F), Min:36 °C (96.8 °F), Max:36.9 °C (98.4 °F)  Temperature: 36.3 °C (97.3 °F)  Pulse  Av.9  Min: 41  Max: 92   Blood Pressure : 124/51     Respiratory:    Respiration: 16, Pulse Oximetry: 97 %     Work Of Breathing / Effort: Within Normal Limits     Neuro:  GCS       Fluids:    Intake/Output Summary (Last 24 hours) at 2024 1337  Last data filed at 2024 0600  Gross per 24 hour   Intake 500 ml   Output 500 ml   Net 0 ml     Weight: 65.4 kg (144 lb 2.9 oz)  Current Diet Order   Procedures    Diet Order Diet: Level 4 - Pureed; Liquid level: Level 0 - Thin; Second Modifier: (optional): Cardiac; Tray Modifications (optional): SLP - 1:1 Supervision by Nursing, SLP - Deliver to Nursing Station     Physical Exam  Labs:  Recent Results (from the past 24 hour(s))   CBC with Differential    Collection Time: 06/10/24  6:26 PM   Result Value Ref Range    WBC 16.9 (H) 4.8 - 10.8 K/uL    RBC 4.59 4.20 - 5.40 M/uL    Hemoglobin 13.7 12.0 - 16.0 g/dL    Hematocrit 42.9 37.0 - 47.0 %    MCV 93.5 81.4 - 97.8 fL    MCH 29.8 27.0 - 33.0 pg    MCHC 31.9 (L) 32.2 - 35.5 g/dL    RDW 45.8 35.9 - 50.0 fL    Platelet Count 153 (L) 164 - 446 K/uL    MPV 10.4 9.0 - 12.9 fL    Neutrophils-Polys 84.70 (H) 44.00 - 72.00 %    Lymphocytes 7.70 (L) 22.00 - 41.00 %    Monocytes 6.70 0.00 - 13.40 %    Eosinophils 0.20 0.00 - 6.90 %    Basophils 0.30 0.00 - 1.80 %    Immature Granulocytes 0.40 0.00 - 0.90 %    Nucleated RBC 0.00 0.00 - 0.20 /100 WBC    Neutrophils (Absolute) 14.28 (H) 1.82 - 7.42 K/uL    Lymphs (Absolute) 1.29 1.00 - 4.80 K/uL    Monos (Absolute) 1.13 (H) 0.00 - 0.85 K/uL    Eos (Absolute) 0.03 0.00 - 0.51 K/uL    Baso (Absolute) 0.05 0.00 - 0.12 K/uL    Immature  Granulocytes (abs) 0.07 0.00 - 0.11 K/uL    NRBC (Absolute) 0.00 K/uL   Complete Metabolic Panel    Collection Time: 06/10/24  6:26 PM   Result Value Ref Range    Sodium 140 135 - 145 mmol/L    Potassium 4.4 3.6 - 5.5 mmol/L    Chloride 102 96 - 112 mmol/L    Co2 23 20 - 33 mmol/L    Anion Gap 15.0 7.0 - 16.0    Glucose 157 (H) 65 - 99 mg/dL    Bun 17 8 - 22 mg/dL    Creatinine 1.02 0.50 - 1.40 mg/dL    Calcium 9.4 8.4 - 10.2 mg/dL    Correct Calcium 9.4 8.5 - 10.5 mg/dL    AST(SGOT) 32 12 - 45 U/L    ALT(SGPT) 19 2 - 50 U/L    Alkaline Phosphatase 99 30 - 99 U/L    Total Bilirubin 0.7 0.1 - 1.5 mg/dL    Albumin 4.0 3.2 - 4.9 g/dL    Total Protein 7.1 6.0 - 8.2 g/dL    Globulin 3.1 1.9 - 3.5 g/dL    A-G Ratio 1.3 g/dL   Lipase    Collection Time: 06/10/24  6:26 PM   Result Value Ref Range    Lipase 23 11 - 82 U/L   ESTIMATED GFR    Collection Time: 06/10/24  6:26 PM   Result Value Ref Range    GFR (CKD-EPI) 58 (A) >60 mL/min/1.73 m 2   TROPONIN    Collection Time: 06/10/24  6:26 PM   Result Value Ref Range    Troponin T 11 6 - 19 ng/L   Urinalysis    Collection Time: 06/10/24  6:38 PM    Specimen: Urine   Result Value Ref Range    Color Yellow     Character Cloudy (A)     Specific Gravity >=1.030 <1.035    Ph 5.0 5.0 - 8.0    Glucose Negative Negative mg/dL    Ketones 15 (A) Negative mg/dL    Protein 100 (A) Negative mg/dL    Bilirubin Moderate (A) Negative    Nitrite Negative Negative    Leukocyte Esterase Small (A) Negative    Occult Blood Large (A) Negative    Micro Urine Req Microscopic    URINE MICROSCOPIC (W/UA)    Collection Time: 06/10/24  6:38 PM   Result Value Ref Range    WBC  (A) /hpf    RBC 10-20 (A) /hpf    Bacteria Moderate (A) None /hpf    Epithelial Cells Moderate (A) Few /hpf    Mucous Threads Few /hpf    Urine Crystals Few Amorphous /hpf   EKG (Now)    Collection Time: 06/10/24  7:34 PM   Result Value Ref Range    Report       [facility]    Test Date:   2024-06-10  Pt Name:    DARYA SANTOS Department: [facility]  MRN:        [id number]                      [facility]  Gender:     Female                       Technician: [id number]  :        [id number]                   Requested By:ALEAH BARTH  Order #:    [id number]                    Reading MD: Aleah Barth    Measurements  Intervals                                Axis  Rate:       54                           P:          27  MD:         152                          QRS:        60  QRSD:       78                           T:          59  QT:         444  QTc:        421    Interpretive Statements  Sinus rhythm  Compared to ECG 10/03/2023 15:27:38  Sinus bradycardia no longer present  Electronically Signed On 06- 19:58:07 PDT by Aleah Barth     URINALYSIS,CULTURE IF INDICATED    Collection Time: 06/10/24  7:37 PM    Specimen: Urine, Straight Cath; Blood   Result Value Ref Range    Color Yellow     Character Cloudy (A)     Specific Gravity >=1.030 <1.035    Ph 5.5 5.0 - 8.0    Glucose Negative Negative mg/dL    Ketones Trace (A) Negative mg/dL    Protein Negative Negative mg/dL    Bilirubin Small (A) Negative    Nitrite Negative Negative    Leukocyte Esterase Negative Negative    Occult Blood Large (A) Negative    Micro Urine Req Microscopic    URINE MICROSCOPIC (W/UA)    Collection Time: 06/10/24  7:37 PM   Result Value Ref Range    WBC 2-5 /hpf    RBC  (A) /hpf    Bacteria Few (A) None /hpf    Epithelial Cells Moderate (A) Few /hpf   LACTIC ACID    Collection Time: 06/10/24  7:57 PM   Result Value Ref Range    Lactic Acid 2.7 (H) 0.5 - 2.0 mmol/L   POCT glucose device results    Collection Time: 24 12:34 AM   Result Value Ref Range    POC Glucose, Blood 103 (H) 65 - 99 mg/dL   Repeat Lactic Acid    Collection Time: 24 12:50 AM   Result Value Ref Range    Lactic Acid 1.7 0.5 - 2.0 mmol/L   CBC with Differential    Collection Time: 24 12:50 AM   Result  Value Ref Range    WBC 11.5 (H) 4.8 - 10.8 K/uL    RBC 3.85 (L) 4.20 - 5.40 M/uL    Hemoglobin 11.8 (L) 12.0 - 16.0 g/dL    Hematocrit 36.4 (L) 37.0 - 47.0 %    MCV 94.5 81.4 - 97.8 fL    MCH 30.6 27.0 - 33.0 pg    MCHC 32.4 32.2 - 35.5 g/dL    RDW 45.1 35.9 - 50.0 fL    Platelet Count 147 (L) 164 - 446 K/uL    MPV 9.9 9.0 - 12.9 fL    Neutrophils-Polys 84.00 (H) 44.00 - 72.00 %    Lymphocytes 12.80 (L) 22.00 - 41.00 %    Monocytes 2.30 0.00 - 13.40 %    Eosinophils 0.20 0.00 - 6.90 %    Basophils 0.30 0.00 - 1.80 %    Immature Granulocytes 0.40 0.00 - 0.90 %    Nucleated RBC 0.00 0.00 - 0.20 /100 WBC    Neutrophils (Absolute) 9.65 (H) 1.82 - 7.42 K/uL    Lymphs (Absolute) 1.47 1.00 - 4.80 K/uL    Monos (Absolute) 0.27 0.00 - 0.85 K/uL    Eos (Absolute) 0.02 0.00 - 0.51 K/uL    Baso (Absolute) 0.03 0.00 - 0.12 K/uL    Immature Granulocytes (abs) 0.05 0.00 - 0.11 K/uL    NRBC (Absolute) 0.00 K/uL   Basic Metabolic Panel (BMP)    Collection Time: 06/11/24 12:50 AM   Result Value Ref Range    Sodium 142 135 - 145 mmol/L    Potassium 3.3 (L) 3.6 - 5.5 mmol/L    Chloride 106 96 - 112 mmol/L    Co2 23 20 - 33 mmol/L    Glucose 100 (H) 65 - 99 mg/dL    Bun 14 8 - 22 mg/dL    Creatinine 0.93 0.50 - 1.40 mg/dL    Calcium 8.8 8.4 - 10.2 mg/dL    Anion Gap 13.0 7.0 - 16.0   Prothrombin Time    Collection Time: 06/11/24 12:50 AM   Result Value Ref Range    PT 13.8 12.0 - 14.6 sec    INR 1.01 0.87 - 1.13   ESTIMATED GFR    Collection Time: 06/11/24 12:50 AM   Result Value Ref Range    GFR (CKD-EPI) 65 >60 mL/min/1.73 m 2   Repeat Lactic Acid    Collection Time: 06/11/24  5:07 AM   Result Value Ref Range    Lactic Acid 0.8 0.5 - 2.0 mmol/L   POCT glucose device results    Collection Time: 06/11/24  6:09 AM   Result Value Ref Range    POC Glucose, Blood 130 (H) 65 - 99 mg/dL   POCT glucose device results    Collection Time: 06/11/24 12:31 PM   Result Value Ref Range    POC Glucose, Blood 107 (H) 65 - 99 mg/dL     Medical Decision  Making, by Problem:  Active Hospital Problems    Diagnosis     NATALY (acute kidney injury) (HCC) [N17.9]     Obstructive uropathy [N13.9]     Sepsis (HCC) [A41.9]     Left ureteral calculus [N20.1]     Severe early onset Alzheimer's dementia with other behavioral disturbance (HCC) [G30.0, F02.C18]      MRI 4/10/2023 showed  Moderate to severe diffuse cerebral volume loss. There is disproportionate severe temporal lobe volume loss indicating the possibility of Alzheimer's disease.    Seeing neurology.  Lives at home with her  who takes care of her.  On donepezil 10 mg daily and Namenda 10 mg 2 times daily      Prediabetes [R73.03]        Last A1c came back normal at 5.5.  Has history of prediabetes    Lab Results   Component Value Date/Time    HBA1C 5.5 08/23/2022 01:36 PM          Hypothyroidism [E03.9]      Unable to tolerate Levothyroxine.  She is currently on Harmon Thyroid which has been able to control her symptoms and her thyroid function has been in normal range.  T4 is mildly low, TSH is normal      Mixed hyperlipidemia  [E78.2]      The 10-year ASCVD risk score (Belgica JULIO, et al., 2019) is: 11.4%    Values used to calculate the score:      Age: 73 years      Sex: Female      Is Non- : No      Diabetic: No      Tobacco smoker: No      Systolic Blood Pressure: 120 mmHg      Is BP treated: No      HDL Cholesterol: 40 mg/dL      Total Cholesterol: 166 mg/dL       Lab Results   Component Value Date/Time    CHOLSTRLTOT 166 11/03/2023 1533    TRIGLYCERIDE 193 (H) 11/03/2023 1533    HDL 40 11/03/2023 1533    LDL 87 11/03/2023 1533      She is currently on Crestor 10 mg daily which she has not started medication       Plan:  I have arranged for follow up in Urology clinic, within the next month with one of my partners who treat calculi. I will sign off.     Quality Measures:  Quality-Core Measures    Discussed patient condition with Hospitalist      Arturo Watson MD, FACS

## 2024-06-12 PROBLEM — R00.1 BRADYCARDIA: Status: ACTIVE | Noted: 2024-06-12

## 2024-06-12 LAB
ALBUMIN SERPL BCP-MCNC: 3 G/DL (ref 3.2–4.9)
BASOPHILS # BLD AUTO: 0.3 % (ref 0–1.8)
BASOPHILS # BLD: 0.04 K/UL (ref 0–0.12)
BUN SERPL-MCNC: 13 MG/DL (ref 8–22)
CALCIUM ALBUM COR SERPL-MCNC: 9.2 MG/DL (ref 8.5–10.5)
CALCIUM SERPL-MCNC: 8.4 MG/DL (ref 8.4–10.2)
CHLORIDE SERPL-SCNC: 108 MMOL/L (ref 96–112)
CO2 SERPL-SCNC: 19 MMOL/L (ref 20–33)
CREAT SERPL-MCNC: 0.79 MG/DL (ref 0.5–1.4)
EKG IMPRESSION: NORMAL
EOSINOPHIL # BLD AUTO: 0.05 K/UL (ref 0–0.51)
EOSINOPHIL NFR BLD: 0.4 % (ref 0–6.9)
ERYTHROCYTE [DISTWIDTH] IN BLOOD BY AUTOMATED COUNT: 47.2 FL (ref 35.9–50)
GFR SERPLBLD CREATININE-BSD FMLA CKD-EPI: 79 ML/MIN/1.73 M 2
GLUCOSE BLD STRIP.AUTO-MCNC: 102 MG/DL (ref 65–99)
GLUCOSE BLD STRIP.AUTO-MCNC: 72 MG/DL (ref 65–99)
GLUCOSE BLD STRIP.AUTO-MCNC: 85 MG/DL (ref 65–99)
GLUCOSE BLD STRIP.AUTO-MCNC: 88 MG/DL (ref 65–99)
GLUCOSE BLD STRIP.AUTO-MCNC: 94 MG/DL (ref 65–99)
GLUCOSE SERPL-MCNC: 75 MG/DL (ref 65–99)
HCT VFR BLD AUTO: 35.5 % (ref 37–47)
HGB BLD-MCNC: 11.1 G/DL (ref 12–16)
IMM GRANULOCYTES # BLD AUTO: 0.05 K/UL (ref 0–0.11)
IMM GRANULOCYTES NFR BLD AUTO: 0.4 % (ref 0–0.9)
LYMPHOCYTES # BLD AUTO: 3.73 K/UL (ref 1–4.8)
LYMPHOCYTES NFR BLD: 27.4 % (ref 22–41)
MAGNESIUM SERPL-MCNC: 1.9 MG/DL (ref 1.5–2.5)
MCH RBC QN AUTO: 30.2 PG (ref 27–33)
MCHC RBC AUTO-ENTMCNC: 31.3 G/DL (ref 32.2–35.5)
MCV RBC AUTO: 96.7 FL (ref 81.4–97.8)
MONOCYTES # BLD AUTO: 0.71 K/UL (ref 0–0.85)
MONOCYTES NFR BLD AUTO: 5.2 % (ref 0–13.4)
NEUTROPHILS # BLD AUTO: 9.02 K/UL (ref 1.82–7.42)
NEUTROPHILS NFR BLD: 66.3 % (ref 44–72)
NRBC # BLD AUTO: 0 K/UL
NRBC BLD-RTO: 0 /100 WBC (ref 0–0.2)
PHOSPHATE SERPL-MCNC: 2.9 MG/DL (ref 2.5–4.5)
PLATELET # BLD AUTO: 155 K/UL (ref 164–446)
PMV BLD AUTO: 9.9 FL (ref 9–12.9)
POTASSIUM SERPL-SCNC: 3.7 MMOL/L (ref 3.6–5.5)
RBC # BLD AUTO: 3.67 M/UL (ref 4.2–5.4)
SODIUM SERPL-SCNC: 140 MMOL/L (ref 135–145)
WBC # BLD AUTO: 13.6 K/UL (ref 4.8–10.8)

## 2024-06-12 PROCEDURE — 92526 ORAL FUNCTION THERAPY: CPT

## 2024-06-12 PROCEDURE — 700105 HCHG RX REV CODE 258: Performed by: INTERNAL MEDICINE

## 2024-06-12 PROCEDURE — 700111 HCHG RX REV CODE 636 W/ 250 OVERRIDE (IP): Mod: JZ | Performed by: INTERNAL MEDICINE

## 2024-06-12 PROCEDURE — 93010 ELECTROCARDIOGRAM REPORT: CPT | Performed by: INTERNAL MEDICINE

## 2024-06-12 PROCEDURE — 99233 SBSQ HOSP IP/OBS HIGH 50: CPT | Performed by: INTERNAL MEDICINE

## 2024-06-12 PROCEDURE — 82962 GLUCOSE BLOOD TEST: CPT

## 2024-06-12 PROCEDURE — 700102 HCHG RX REV CODE 250 W/ 637 OVERRIDE(OP): Performed by: INTERNAL MEDICINE

## 2024-06-12 PROCEDURE — 94760 N-INVAS EAR/PLS OXIMETRY 1: CPT

## 2024-06-12 PROCEDURE — 97602 WOUND(S) CARE NON-SELECTIVE: CPT

## 2024-06-12 PROCEDURE — 85025 COMPLETE CBC W/AUTO DIFF WBC: CPT

## 2024-06-12 PROCEDURE — 770020 HCHG ROOM/CARE - TELE (206)

## 2024-06-12 PROCEDURE — 93005 ELECTROCARDIOGRAM TRACING: CPT | Performed by: INTERNAL MEDICINE

## 2024-06-12 PROCEDURE — 80069 RENAL FUNCTION PANEL: CPT

## 2024-06-12 PROCEDURE — 83735 ASSAY OF MAGNESIUM: CPT

## 2024-06-12 PROCEDURE — A9270 NON-COVERED ITEM OR SERVICE: HCPCS | Performed by: INTERNAL MEDICINE

## 2024-06-12 RX ORDER — MAGNESIUM SULFATE HEPTAHYDRATE 40 MG/ML
2 INJECTION, SOLUTION INTRAVENOUS ONCE
Status: COMPLETED | OUTPATIENT
Start: 2024-06-12 | End: 2024-06-12

## 2024-06-12 RX ORDER — HALOPERIDOL 5 MG/ML
1 INJECTION INTRAMUSCULAR
Status: DISCONTINUED | OUTPATIENT
Start: 2024-06-12 | End: 2024-06-14 | Stop reason: HOSPADM

## 2024-06-12 RX ADMIN — DONEPEZIL HYDROCHLORIDE 10 MG: 5 TABLET, FILM COATED ORAL at 21:58

## 2024-06-12 RX ADMIN — FOLIC ACID 1 MG: 1 TABLET ORAL at 17:25

## 2024-06-12 RX ADMIN — NYSTATIN: 100000 POWDER TOPICAL at 11:43

## 2024-06-12 RX ADMIN — NYSTATIN: 100000 POWDER TOPICAL at 17:34

## 2024-06-12 RX ADMIN — OXYCODONE HYDROCHLORIDE 5 MG: 5 TABLET ORAL at 02:40

## 2024-06-12 RX ADMIN — THYROID 45 MG: 30 TABLET ORAL at 05:44

## 2024-06-12 RX ADMIN — MAGNESIUM SULFATE HEPTAHYDRATE 2 G: 2 INJECTION, SOLUTION INTRAVENOUS at 08:20

## 2024-06-12 RX ADMIN — ENOXAPARIN SODIUM 40 MG: 100 INJECTION SUBCUTANEOUS at 17:24

## 2024-06-12 RX ADMIN — SENNOSIDES AND DOCUSATE SODIUM 2 TABLET: 50; 8.6 TABLET ORAL at 17:24

## 2024-06-12 RX ADMIN — MEMANTINE HYDROCHLORIDE 10 MG: 10 TABLET ORAL at 17:24

## 2024-06-12 RX ADMIN — HALOPERIDOL LACTATE 1 MG: 5 INJECTION, SOLUTION INTRAMUSCULAR at 08:43

## 2024-06-12 RX ADMIN — MEMANTINE HYDROCHLORIDE 10 MG: 10 TABLET ORAL at 05:44

## 2024-06-12 RX ADMIN — CEFTRIAXONE SODIUM 1000 MG: 1 INJECTION, POWDER, FOR SOLUTION INTRAMUSCULAR; INTRAVENOUS at 17:26

## 2024-06-12 RX ADMIN — ROSUVASTATIN 10 MG: 10 TABLET, FILM COATED ORAL at 17:24

## 2024-06-12 RX ADMIN — HALOPERIDOL LACTATE 1 MG: 5 INJECTION, SOLUTION INTRAMUSCULAR at 02:13

## 2024-06-12 RX ADMIN — NYSTATIN: 100000 POWDER TOPICAL at 05:40

## 2024-06-12 ASSESSMENT — PAIN DESCRIPTION - PAIN TYPE
TYPE: ACUTE PAIN
TYPE: ACUTE PAIN

## 2024-06-12 ASSESSMENT — PAIN SCALES - WONG BAKER: WONGBAKER_NUMERICALRESPONSE: HURTS AS MUCH AS POSSIBLE

## 2024-06-12 ASSESSMENT — FIBROSIS 4 INDEX: FIB4 SCORE: 3.46

## 2024-06-12 NOTE — THERAPY
"Speech Language Pathology   Daily Treatment     Patient Name: Thuy Albert  AGE:  73 y.o., SEX:  female  Medical Record #: [id number]  Date of Service: 6/12/2024      Precautions:  Precautions: Fall Risk, Swallow Precautions     Subjective  RN cleared patient for dysphagia tx session, reporting she did not want to eat this morning. Patient received awake, alert, and in bed, pleasantly confused but reported being upset about \"these things on my hands\" referring to wrist restraints.     Assessment  Patient consumed PO trials of liquidized purees, pudding, and thin liquids via straw. Patient frequently reporting dislike or not wanting PO trials, but when brought to her mouth, she was agreeable. Patient did demonstrated reduced bolus acceptance but had adequate containment. Presumed slow A-P bolus transit evidenced by delayed initiation of pharyngeal swallow to palpation. Complete oral clearance achieved. No s/sx of aspiration noted on any textures trialed, though participation was limited d/t oral aversion at times, report of wanting to go home, missing her dog, etc.     Clinical Impressions  Patient presents with clinical indicators of oral dysphagia, consistent with known advanced dementia. Patient appears appropriate to continue PU4/TN0 diet with 1:1 feeding. Please encourage PO intake. Suspect current diet may be patient's baseline at least during hospital stay. SLP is following to ensure diet tolerance and trial upgrades as able/appropriate and as patient is willing.     Recommendations  Treatment Completed: Dysphagia Treatment     Dysphagia Treatment  Diet Consistency: PU4/TN0 (purees w/ thin liquids)  Instrumentation: None indicated at this time  Medication: Crush with applesauce, as appropriate, Crush with pudding/puree, as appropriate  Supervision: 1:1 feeding with constant supervision  Positioning: Fully upright and midline during oral intake, Meals sitting upright in a chair, as tolerated  Risk " "Management : Small bites/sips, Slow rate of intake, Liquids by teaspoon only, Reduce environmental distractions, Physical mobility, as tolerated  Oral Care: Q6h    SLP Treatment Plan  Treatment Plan: Dysphagia Treatment  SLP Frequency: 4x Per Week  Estimated Duration: Until Therapy Goals Met    Anticipated Discharge Needs  Discharge Recommendations: Anticipate that the patient will have no further speech therapy needs after discharge from the hospital  Therapy Recommendations Upon DC: Dysphagia Training, Community Re-Integration, Patient / Family / Caregiver Education    Patient / Family Goals  Patient / Family Goal #1: NEW 6/12: \"Ooh I don't like that\"  Short Term Goals  Short Term Goal # 1: Patient will tolerate puree solids and thin liquids without signs of airway invasion  Goal Outcome # 1: Progressing as expected    Minnie Chandler, SLP  "

## 2024-06-12 NOTE — CARE PLAN
The patient is Stable - Low risk of patient condition declining or worsening    Shift Goals  Clinical Goals: safety, monitor labs and vitals, mobilize  Patient Goals: rest, stay comfortable  Family Goals: Sleep good    Progress made toward(s) clinical / shift goals:  Rodriges catheter removed, pt urinating without difficulty, has been resting most of the shift, was calm after removal of indwelling catheter. Plan of care discussed with  Robbie. Art would like pt to discharge home and not go to SNF. This was endorsed to Dr. Moody and case management team.  Problem: Fall Risk  Goal: Patient will remain free from falls  Outcome: Progressing     Problem: Knowledge Deficit - Standard  Goal: Patient and family/care givers will demonstrate understanding of plan of care, disease process/condition, diagnostic tests and medications  Outcome: Progressing     Problem: Urinary - Renal Perfusion  Goal: Ability to achieve and maintain adequate renal perfusion and functioning will improve  Outcome: Progressing     Problem: Respiratory  Goal: Patient will achieve/maintain optimum respiratory ventilation and gas exchange  Outcome: Progressing     Problem: Skin Integrity  Goal: Skin integrity is maintained or improved  Outcome: Progressing       Patient is not progressing towards the following goals:

## 2024-06-12 NOTE — PROGRESS NOTES
Notified Dr. Moody that lab does not have an adequate amount of urine to run culture from sample sent at admission. Per Dr. Moody, another urine culture is not needed.

## 2024-06-12 NOTE — THERAPY
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 female admitted 6/10/2024 with left ureteral calculi, rigors, and left flank pain. Admitted to the hospital service and in need of left ureteral stent placement.  H/o Arthritis, Asthma, Dementia, DVT, GERD, and Thyroid disease.  Pt is currently limited by decreased functional mobility, activity tolerance, cognition, sensation, strength, AROM, coordination, balance, adherence to precautions, and pain which are affecting her ability to complete ADLs/IADLs at baseline. See grid for details. Pt will benefit from further inpt post acute therapy prior to home.  It is possible that SO will refuse inpt therapy, and if so, pt will benefit from HH therapy at a minimum.  They have 15 steps to enter the home and she is fully dep on him for mobility at this time. Will continue to follow.      Plan    Occupational Therapy Initial Treatment Plan   Treatment Interventions: Self Care / Activities of Daily Living, Adaptive Equipment, Neuro Re-Education / Balance, Therapeutic Exercises, Therapeutic Activity, Family / Caregiver Training  Treatment Frequency: 3 Times per Week  Duration: Until Therapy Goals Met    DC Equipment Recommendations: Tub / Shower Seat  Discharge Recommendations: Recommend post-acute placement for additional occupational therapy services prior to discharge home (if SO disagrees with this, HH therapy at a minimum)     Subjective    \"Look at me I'm dancing.\"     Objective       06/11/24 1710   Prior Living Situation   Prior Services Continuous (24 Hour) Care Giving Family   Housing / Facility 2 Story Apartment / Condo   Steps Into Home 15   Steps In Home 0   Rail Right Rail (Steps into Home)   Bathroom Set up " "Bathtub / Shower Combination;Shower Glass Doors  (pt normally stands in the shower, Art her SO reports she can normally shower herself with SBA)   Equipment Owned None   Lives with - Patient's Self Care Capacity Significant Other  (Art)   Comments Pt lives with SO Robbie, who has been with her >25 years, and reports he quit working 2 years ago because he knew she needed full time care.  He is very attentive to her needs, but also verbalizes being resistant to any help coming into the home (like HH)  because of \"we have a lot of clutter and we have dogs.\"   Prior Level of ADL Function   Self Feeding Requires Assist  (intermittent assist reqired)   Grooming / Hygiene Requires Assist   Bathing Requires Assist   Dressing Requires Assist   Toileting Requires Assist   Comments Art reports he helps her step into the tub, and will help her with drying off if needed.  He helps her dress.  She wears depends and has an occasional accident which he helps her clean up, otherwise she toilets herself with SBA from Art.  He reports they have a bright pink rug in front of the toilet to help her see where it is to get lined up correctly   Prior Level of IADL Function   Medication Management Requires Assist   Laundry Requires Assist   Kitchen Mobility Requires Assist   Finances Requires Assist   Home Management Requires Assist   Shopping Requires Assist   Prior Level Of Mobility Supervision Without Device in Home  (hand held assist from SO)   Driving / Transportation Relatives / Others Provide Transportation   Occupation (Pre-Hospital Vocational) Retired Due To Age   Leisure Interests Pets  (listening to movies, music)   Precautions   Precautions Fall Risk;Swallow Precautions   Comments Dementia, 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  Noted to be having visual hallucinationsduring session, SO reports she talks to people that aren't in the room.  pt having difficulty following very simple directions like \"turn left\".  Pt demos impulsivity and impaired safety awareness (when asked to lift foot like she was marching or taking a step she lifted leg and started swinging it in the air saying \"I'm dancing!\" SO appears very accepting of her behavior and does not appear concerned.   Active ROM Upper Body   Active ROM Upper Body  X   Dominant Hand Right   Comments   (moves RUE purposefully, appears to be less functional with LUE.  She appears to have L sided inattention, and SO confirms \"It seems like she forgets she has a left arm sometimes, and has a hard time moving to her left.\"  SO denies h/o stroke)   Strength Upper Body   Upper Body Strength  X   Gross Strength Generalized Weakness, Equal Bilaterally.    Comments L appears worse than R due to inattention   Upper Body Muscle Tone   Upper Body Muscle Tone  WDL   Coordination Upper Body   Coordination X   Comments mildly impaired LUE from observation.   Balance Assessment   Sitting Balance (Static) Fair   Sitting Balance (Dynamic) Fair   Standing Balance (Static) Fair -   Standing Balance (Dynamic) Fair -   Weight Shift Sitting Fair   Weight Shift Standing Fair   Comments Pt needs CGA at all times when standing, walking.  She was able to hold balance standing briefly, but is fearful due to hre lack of vision   Bed Mobility    Supine to Sit Minimal Assist   Sit to Supine Standby Assist  (max verbal cues to scoot back on bed and swing legs in- she normally crawls in on her hands and knees.  " Did not assess this during eval 2/2 jonas)   ADL Assessment   Eating Minimal Assist  (drink from water bottle)   Upper Body Dressing Minimal Assist   Lower Body Dressing Maximal Assist   Toileting Maximal Assist  (lesley mcdaniel perseverating on needing to pee and not wanting to wet the bed)   How much help from another person does the patient currently need...   Putting on and taking off regular lower body clothing? 2   Bathing (including washing, rinsing, and drying)? 2   Toileting, which includes using a toilet, bedpan, or urinal? 2   Putting on and taking off regular upper body clothing? 2   Taking care of personal grooming such as brushing teeth? 3   Eating meals? 3   6 Clicks Daily Activity Score 14   Functional Mobility   Sit to Stand Contact Guard Assist   Bed, Chair, Wheelchair Transfer Contact Guard Assist   Transfer Method Stand Step   Mobility bed mob walk in room out past door, back to bed   Distance (Feet) 30   # of Times Distance was Traveled 1   Comments Tried to simlate lifiting up legs to step up stairs (at SO request) as they have 15 to enter.  Pt was able to lift both legs high enough to get up a step bt unable to assess strength to be able to ascend them safely.  Pt appeared to walk better with CGA holding SO Art's hand with her R hand, and he was leading her around the room.  She appears to do better walking with hime as she trusts him and he knows how to direct her with her impaired vision   Visual Perception   Visual Perception  X   Visual Acuity Impaired Bilaterally   Neglect Mild Left   Comments appears to have L inattention without confirmed h/o CVA   Activity Tolerance   Sitting Edge of Bed 5 min   Standing 5-6 min   Patient / Family Goals   Patient / Family Goal #1 home if she can do the stairs   Short Term Goals   Short Term Goal # 1 Pt will tolerate UIC 3 hours day fo Adl's in 3 visits   Short Term Goal # 2 Pt will dress with Russell from seated in chair in 3 visits   Short Term Goal # 3 Pt  will stand 8 min at sink to groom with CGA in 3 visits   Education Group   Education Provided Home Safety;Role of Occupational Therapist;Activities of Daily Living;Adaptive Equipment   Role of Occupational Therapist Patient Response Patient;Significant Other;Acceptance;Explanation;Verbal Demonstration   Home Safety Patient Response Patient;Significant Other;Acceptance;Explanation;Verbal Demonstration   ADL Patient Response Patient;Significant Other;Acceptance;Explanation;Verbal Demonstration   Adaptive Equipment Patient Response Patient;Significant Other;Acceptance;Explanation;Demonstration;Handout;Verbal Demonstration   Additional Comments Rec shower chair, provided with equip handout and info on [facility] to be able to obtain medical equipment.  Educated on role of short term Rehab in helping her get stronger, or HH therapy if he would not agree to SNF/Rehab

## 2024-06-12 NOTE — PROGRESS NOTES
Hospital Medicine Daily Progress Note    Date of Service  6/12/2024    Chief Complaint  Thuy Albert is a 73 y.o. female admitted 6/10/2024 with flank pain    Hospital Course  History of Alzheimer's dementia, cared for by her , hypothyroidism, hyperlipidemia, prediabetes who was admitted for left flank pain found to have sepsis secondary to an infected obstructing left ureteral stone associated with acute kidney injury.    Interval Problem Update  6/11: Urology consulted - cystoscopy with L ureteral stent placed.  She is moving slowly today, has been concerned about taking care of her at home.  Discussed at bedside. PT OT ordered.  Tolerating ceftriaxone, cultures are pending.    6/12: Confusion overnight, required restraints.  Rodriges catheter discontinued which helped mental status.  Restraints removed.  PT recommended SNF,  is hesitant, pending third midnight.  3-second pause, asymptomatic    I have discussed this patient's plan of care and discharge plan at IDT rounds today with Case Management, Nursing, Nursing leadership, and other members of the IDT team.    Consultants/Specialty  Urology    Code Status  Full Code    Disposition  The patient is not medically cleared for discharge to home or a post-acute facility.  Anticipate discharge to: home with organized home healthcare and close outpatient follow-up    I have placed the appropriate orders for post-discharge needs.    Review of Systems  Review of Systems   Unable to perform ROS: Mental status change        Physical Exam  Temp:  [36.3 °C (97.3 °F)-37.1 °C (98.8 °F)] 36.6 °C (97.8 °F)  Pulse:  [43-60] 58  Resp:  [16-18] 16  BP: ()/(41-68) 123/54  SpO2:  [88 %-100 %] 100 %    Physical Exam  Constitutional:       General: She is not in acute distress.     Comments: Makes eye contact, confused, anxious   HENT:      Nose: Nose normal.      Mouth/Throat:      Mouth: Mucous membranes are dry.   Cardiovascular:      Rate and Rhythm:  Regular rhythm. Bradycardia present.      Pulses: Normal pulses.   Pulmonary:      Effort: Pulmonary effort is normal.      Breath sounds: Normal breath sounds.   Abdominal:      General: There is no distension.      Palpations: Abdomen is soft.      Tenderness: There is no abdominal tenderness.   Musculoskeletal:         General: No swelling.      Cervical back: No muscular tenderness.   Lymphadenopathy:      Cervical: No cervical adenopathy.   Skin:     General: Skin is warm and dry.      Coloration: Skin is pale.      Findings: No rash.   Neurological:      General: No focal deficit present.      Mental Status: She is disoriented.      Motor: Weakness present.   Psychiatric:         Speech: She is noncommunicative.         Cognition and Memory: Cognition is impaired. Memory is impaired.         Fluids    Intake/Output Summary (Last 24 hours) at 6/12/2024 1441  Last data filed at 6/12/2024 0600  Gross per 24 hour   Intake 240 ml   Output 925 ml   Net -685 ml       Laboratory  Recent Labs     06/10/24  1826 06/11/24  0050 06/12/24  0117   WBC 16.9* 11.5* 13.6*   RBC 4.59 3.85* 3.67*   HEMOGLOBIN 13.7 11.8* 11.1*   HEMATOCRIT 42.9 36.4* 35.5*   MCV 93.5 94.5 96.7   MCH 29.8 30.6 30.2   MCHC 31.9* 32.4 31.3*   RDW 45.8 45.1 47.2   PLATELETCT 153* 147* 155*   MPV 10.4 9.9 9.9     Recent Labs     06/10/24  1826 06/11/24  0050 06/12/24  0117   SODIUM 140 142 140   POTASSIUM 4.4 3.3* 3.7   CHLORIDE 102 106 108   CO2 23 23 19*   GLUCOSE 157* 100* 75   BUN 17 14 13   CREATININE 1.02 0.93 0.79   CALCIUM 9.4 8.8 8.4     Recent Labs     06/11/24  0050   INR 1.01               Imaging  DX-PORTABLE FLUOROSCOPY < 1 HOUR   Final Result      Portable fluoroscopy utilized for 7 seconds.         INTERPRETING LOCATION: [address] [place] [address], [id number]      [id number] VIEW   Final Result      Digitized intraoperative radiograph is submitted for review. This examination is not for diagnostic purpose but for guidance during a surgical  procedure. Please see the patient's chart for full procedural details.         INTERPRETING LOCATION: [address] [facility], [place], [address]      CT-ABDOMEN-PELVIS WITH   Final Result         1.  2.5 mm mid left ureteral stone and 1.5 mm left ureterovesicular junction stone resulting in left urinary outflow tract obstructive changes.   2.  Diverticulosis           Assessment/Plan  * Obstructive uropathy- (present on admission)  Assessment & Plan  Secondary to a 2.5 mm mid left ureteral stone and a 1.5 mm left ureterovesicular junction stone  Stones are infected leading to a significant changes on urinalysis as well as sepsis  Postop day 1 status post cystoscopy, left ureteral stent placement  WBC and creatinine improving  Continue IV antibiotics --> PO on DC until stone is treated definitively     Left ureteral calculus- (present on admission)  Assessment & Plan  2.5 mm mid left ureteral stone and 1.5 mm left ureterovesicular junction stone resulting in urinary outflow tract obstructive changes noted on CT abdomen/pelvis  Associated with significant urinary tract infection  Associated with sepsis  Status post left ureteral stent 6/10  DC mcdaniel  Pending PT OT rec SNF    Bradycardia  Assessment & Plan  She had a 3 second pause, asymptomatic  Continue tele    Normocytic anemia  Assessment & Plan  No evidence of bleeding  Repeat in a.m.    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 (HCC)- (present on admission)  Assessment & Plan  I am unable to get anything from the patient at this time  Family at bedside wants full code  Continue home  Aricept and Namenda  Severe agitation overnight, required Haldol and restraints briefly  She is weak and lethargic, mobility is slower than baseline  Order PT OT recommended SNF ( hesitant) - needs 3 MNs  DC mcdaniel helped mental status - restraints DC'd    Prediabetes- (present on admission)  Assessment & Plan  Insulin sliding scale  Adjust as needed    Mixed hyperlipidemia - (present on admission)  Assessment & Plan  Continue home statin    Hypothyroidism- (present on admission)  Assessment & Plan  Continue home [place] Thyroid 45 mg daily  Most recent TSH was approximately 4 months ago and was normal at 0.78         VTE prophylaxis: Lovenox    I have performed a physical exam and reviewed and updated ROS and Plan today (6/12/2024). In review of yesterday's note (6/11/2024), there are no changes except as documented above.    Total time:  53 minutes.  I spent greater than 50% of the time for patient care, counseling, and coordination on this date, including unit/floor time, and face-to-face time with the patient as per interval events and assessment and plan above

## 2024-06-12 NOTE — PROGRESS NOTES
Pt's POA verbalized to this RN that between the two of them, they make roughly $2000/ month and that their rent is $1600, leaving them $400 to live on. CM provided the POA with list of food bank in which to receive free food and POA declined. This RN will make another attempt to give list to POA.

## 2024-06-12 NOTE — PROGRESS NOTES
Telemetry Shift Summary     Rhythm SR/SB  HR Range 50s  Ectopy rPVCs, rPACs  Measurements .20/.08/.48  Per strip printed 1600     Normal Values  Rhythm SR  HR Range    Measurements 0.12-0.20 / 0.06-0.10  / 0.30-0.52

## 2024-06-12 NOTE — PROGRESS NOTES
"Pt has been yelling out, stating \"get this thing out of me!\" And trying to pull out catheter and IV lines. Pt pulled off SCDs and pulse ox monitor and states there are people in her room trying to hurt her. Attempted to de-escalate situation. Offered pt breakfast and water for which she refused. Pt yelling at staff when staff is performing care. MD notified. Haldol given at 0843 for agitation, pt is still yelling out \"Why are you doing this to me? Get these things out of me!\" And pulling at catheter and IV lines. Unable to reorient patient. Bilateral soft wrist restraints applied and Dr. Moody notified to come assess.  Art called and given updates.  "

## 2024-06-12 NOTE — PROGRESS NOTES
Telemetry shift summary:  Rhythm: SR, SB     HR Range: 40's to 80's    Down to 39      Measurements from strip printed at 00:43:11   NM: 0.16   QRS 0.08   QT 0.11     Ectopies (As per Telemetry Tech report) Rare to frequent PAC, rare PVC.

## 2024-06-12 NOTE — PROGRESS NOTES
Discussed plan of care with Dr. Moody. Received order to remove urinary catheter.  Catheter removed, pt no longer attempting to pull at lines and is resting. Bilateral wrist restraints discontinued at this time. Full bed linen change completed and external purewick catheter applied.

## 2024-06-12 NOTE — PROGRESS NOTES
Critical lab result: Blood culture collected on 6/10 positive for gram positive cocci, possible staphylococcus sp, negative for staphylococcus aureus and MRSA. Dr Burroughs notified

## 2024-06-12 NOTE — PROGRESS NOTES
"Bedside report received from Germain RN and assumed Pt's cares. Call light within reach. Safety measures in place.    00:30 Pt's urinary output 150 ml for the past 5 hrs. Urine is brownish-red. Pt is confused and disoriented. Won't let this RN assess for bladder distention or to perform a bladder scan. Dr Burroughs notified. Charge RN flushed catheter and had good return. Will monitor Pt and update MD.     01:30 flushed mcdaniel cath again without braking the seal. Urine started slowly flowing out, about 50 ml out immediately.    02:15 Pt more confused at this time, combative, trying to hit this RN, yells that we are trying to kill her, and tried to get out of bed. Dr Burroughs notified, order received for Haldol, medication given.     02:40 Pt Yelling \"It hurts, it hurts a lot, it's killing me\" and tries to pull mcdaniel cath out. Oxycodone 5 mg po given.     06:56 As per Telemetry Monitor tech, Pt had a 3.1 sec pause Pt is asymptomatic. VS WDL except HT 40's to 50's. Stat EKG requested, Dr Moody notified.     07:15 Bedside report given to next shift RN. Care released.  "

## 2024-06-13 ENCOUNTER — APPOINTMENT (OUTPATIENT)
Dept: MEDICAL GROUP | Facility: MEDICAL CENTER | Age: 74
End: 2024-06-13
Payer: MEDICARE

## 2024-06-13 LAB
ALBUMIN SERPL BCP-MCNC: 3.2 G/DL (ref 3.2–4.9)
BACTERIA BLD CULT: ABNORMAL
BACTERIA BLD CULT: ABNORMAL
BASOPHILS # BLD AUTO: 0.4 % (ref 0–1.8)
BASOPHILS # BLD: 0.04 K/UL (ref 0–0.12)
BUN SERPL-MCNC: 8 MG/DL (ref 8–22)
CALCIUM ALBUM COR SERPL-MCNC: 8.9 MG/DL (ref 8.5–10.5)
CALCIUM SERPL-MCNC: 8.3 MG/DL (ref 8.4–10.2)
CHLORIDE SERPL-SCNC: 108 MMOL/L (ref 96–112)
CO2 SERPL-SCNC: 25 MMOL/L (ref 20–33)
CREAT SERPL-MCNC: 0.6 MG/DL (ref 0.5–1.4)
EOSINOPHIL # BLD AUTO: 0.08 K/UL (ref 0–0.51)
EOSINOPHIL NFR BLD: 0.8 % (ref 0–6.9)
ERYTHROCYTE [DISTWIDTH] IN BLOOD BY AUTOMATED COUNT: 43.3 FL (ref 35.9–50)
GFR SERPLBLD CREATININE-BSD FMLA CKD-EPI: 94 ML/MIN/1.73 M 2
GLUCOSE BLD STRIP.AUTO-MCNC: 108 MG/DL (ref 65–99)
GLUCOSE BLD STRIP.AUTO-MCNC: 109 MG/DL (ref 65–99)
GLUCOSE BLD STRIP.AUTO-MCNC: 95 MG/DL (ref 65–99)
GLUCOSE SERPL-MCNC: 99 MG/DL (ref 65–99)
HCT VFR BLD AUTO: 36.5 % (ref 37–47)
HGB BLD-MCNC: 12 G/DL (ref 12–16)
IMM GRANULOCYTES # BLD AUTO: 0.05 K/UL (ref 0–0.11)
IMM GRANULOCYTES NFR BLD AUTO: 0.5 % (ref 0–0.9)
LYMPHOCYTES # BLD AUTO: 1.97 K/UL (ref 1–4.8)
LYMPHOCYTES NFR BLD: 18.9 % (ref 22–41)
MAGNESIUM SERPL-MCNC: 2.3 MG/DL (ref 1.5–2.5)
MCH RBC QN AUTO: 30 PG (ref 27–33)
MCHC RBC AUTO-ENTMCNC: 32.9 G/DL (ref 32.2–35.5)
MCV RBC AUTO: 91.3 FL (ref 81.4–97.8)
MONOCYTES # BLD AUTO: 0.64 K/UL (ref 0–0.85)
MONOCYTES NFR BLD AUTO: 6.1 % (ref 0–13.4)
NEUTROPHILS # BLD AUTO: 7.64 K/UL (ref 1.82–7.42)
NEUTROPHILS NFR BLD: 73.3 % (ref 44–72)
NRBC # BLD AUTO: 0 K/UL
NRBC BLD-RTO: 0 /100 WBC (ref 0–0.2)
PHOSPHATE SERPL-MCNC: 2.7 MG/DL (ref 2.5–4.5)
PLATELET # BLD AUTO: 144 K/UL (ref 164–446)
PMV BLD AUTO: 9.4 FL (ref 9–12.9)
POTASSIUM SERPL-SCNC: 3.3 MMOL/L (ref 3.6–5.5)
RBC # BLD AUTO: 4 M/UL (ref 4.2–5.4)
SIGNIFICANT IND 70042: ABNORMAL
SITE SITE: ABNORMAL
SODIUM SERPL-SCNC: 143 MMOL/L (ref 135–145)
SOURCE SOURCE: ABNORMAL
WBC # BLD AUTO: 10.4 K/UL (ref 4.8–10.8)

## 2024-06-13 PROCEDURE — 700102 HCHG RX REV CODE 250 W/ 637 OVERRIDE(OP): Performed by: INTERNAL MEDICINE

## 2024-06-13 PROCEDURE — A9270 NON-COVERED ITEM OR SERVICE: HCPCS | Performed by: INTERNAL MEDICINE

## 2024-06-13 PROCEDURE — 92526 ORAL FUNCTION THERAPY: CPT

## 2024-06-13 PROCEDURE — 85025 COMPLETE CBC W/AUTO DIFF WBC: CPT

## 2024-06-13 PROCEDURE — 83735 ASSAY OF MAGNESIUM: CPT

## 2024-06-13 PROCEDURE — 94760 N-INVAS EAR/PLS OXIMETRY 1: CPT

## 2024-06-13 PROCEDURE — 97116 GAIT TRAINING THERAPY: CPT

## 2024-06-13 PROCEDURE — 700111 HCHG RX REV CODE 636 W/ 250 OVERRIDE (IP): Mod: JZ | Performed by: INTERNAL MEDICINE

## 2024-06-13 PROCEDURE — 97530 THERAPEUTIC ACTIVITIES: CPT

## 2024-06-13 PROCEDURE — 99233 SBSQ HOSP IP/OBS HIGH 50: CPT | Performed by: INTERNAL MEDICINE

## 2024-06-13 PROCEDURE — 80069 RENAL FUNCTION PANEL: CPT

## 2024-06-13 PROCEDURE — 770020 HCHG ROOM/CARE - TELE (206)

## 2024-06-13 PROCEDURE — 82962 GLUCOSE BLOOD TEST: CPT | Mod: 91

## 2024-06-13 RX ORDER — CEFUROXIME AXETIL 250 MG/1
250 TABLET ORAL EVERY 12 HOURS
Status: DISCONTINUED | OUTPATIENT
Start: 2024-06-13 | End: 2024-06-14 | Stop reason: HOSPADM

## 2024-06-13 RX ADMIN — FOLIC ACID 1 MG: 1 TABLET ORAL at 17:45

## 2024-06-13 RX ADMIN — ROSUVASTATIN 10 MG: 10 TABLET, FILM COATED ORAL at 17:45

## 2024-06-13 RX ADMIN — MEMANTINE HYDROCHLORIDE 10 MG: 10 TABLET ORAL at 05:30

## 2024-06-13 RX ADMIN — THYROID 45 MG: 30 TABLET ORAL at 05:30

## 2024-06-13 RX ADMIN — DONEPEZIL HYDROCHLORIDE 10 MG: 5 TABLET, FILM COATED ORAL at 21:49

## 2024-06-13 RX ADMIN — ENOXAPARIN SODIUM 40 MG: 100 INJECTION SUBCUTANEOUS at 17:46

## 2024-06-13 RX ADMIN — POTASSIUM BICARBONATE 25 MEQ: 978 TABLET, EFFERVESCENT ORAL at 09:21

## 2024-06-13 RX ADMIN — NYSTATIN: 100000 POWDER TOPICAL at 05:30

## 2024-06-13 RX ADMIN — NYSTATIN: 100000 POWDER TOPICAL at 17:46

## 2024-06-13 RX ADMIN — CEFUROXIME AXETIL 250 MG: 250 TABLET, FILM COATED ORAL at 17:45

## 2024-06-13 RX ADMIN — NYSTATIN: 100000 POWDER TOPICAL at 11:58

## 2024-06-13 RX ADMIN — MEMANTINE HYDROCHLORIDE 10 MG: 10 TABLET ORAL at 21:49

## 2024-06-13 ASSESSMENT — PAIN DESCRIPTION - PAIN TYPE
TYPE: ACUTE PAIN
TYPE: ACUTE PAIN

## 2024-06-13 ASSESSMENT — GAIT ASSESSMENTS
ASSISTIVE DEVICE: HAND HELD ASSIST
DISTANCE (FEET): 20
GAIT LEVEL OF ASSIST: MINIMAL ASSIST
DEVIATION: SHUFFLED GAIT

## 2024-06-13 ASSESSMENT — ENCOUNTER SYMPTOMS
FALLS: 0
HEARTBURN: 0
MYALGIAS: 0

## 2024-06-13 NOTE — CARE PLAN
The patient is Stable - Low risk of patient condition declining or worsening    Shift Goals  Clinical Goals: safety, mobilize, q2 turns  Patient Goals: rest, stay comfortable  Family Goals: Sleep good    Progress made toward(s) clinical / shift goals:    Problem: Fall Risk  Goal: Patient will remain free from falls  Outcome: Progressing     Problem: Knowledge Deficit - Standard  Goal: Patient and family/care givers will demonstrate understanding of plan of care, disease process/condition, diagnostic tests and medications  Outcome: Progressing       Patient is not progressing towards the following goals:

## 2024-06-13 NOTE — PROGRESS NOTES
Hospital Medicine Daily Progress Note    Date of Service  6/13/2024    Chief Complaint  Thuy Albert is a 73 y.o. female admitted 6/10/2024 with flank pain    Hospital Course  History of Alzheimer's dementia, cared for by her , hypothyroidism, hyperlipidemia, prediabetes who was admitted for left flank pain found to have sepsis secondary to an infected obstructing left ureteral stone associated with acute kidney injury.    Interval Problem Update  6/11: Urology consulted - cystoscopy with L ureteral stent placed.  She is moving slowly today, has been concerned about taking care of her at home.  Discussed at bedside. PT OT ordered.  Tolerating ceftriaxone, cultures are pending.    6/12: Confusion overnight, required restraints.  Rodriges catheter discontinued which helped mental status.  Restraints removed.  PT recommended SNF,  is hesitant, pending third midnight.  3-second pause, asymptomatic    6/13:  declined SNF, will need reeval to see if she can tolerate stairs.  Change to PO ceftin.  HR stable. No restraints overnight.    I have discussed this patient's plan of care and discharge plan at IDT rounds today with Case Management, Nursing, Nursing leadership, and other members of the IDT team.    Consultants/Specialty  Urology    Code Status  Full Code    Disposition  The patient is not medically cleared for discharge to home or a post-acute facility.  Anticipate discharge to: home with organized home healthcare and close outpatient follow-up    I have placed the appropriate orders for post-discharge needs.    Review of Systems  Review of Systems   Unable to perform ROS: Mental status change   Gastrointestinal:  Negative for heartburn.   Musculoskeletal:  Negative for falls and myalgias.        Physical Exam  Temp:  [36.2 °C (97.1 °F)-37.2 °C (98.9 °F)] 36.2 °C (97.1 °F)  Pulse:  [55-71] 71  Resp:  [16-18] 16  BP: (117-153)/(52-65) 117/64  SpO2:  [92 %-99 %] 92 %    Physical  Exam  Constitutional:       General: She is not in acute distress.     Comments: Makes eye contact, confused, anxious   HENT:      Nose: Nose normal.      Mouth/Throat:      Mouth: Mucous membranes are dry.   Cardiovascular:      Rate and Rhythm: Regular rhythm. Bradycardia present.      Pulses: Normal pulses.      Heart sounds: No murmur heard.  Pulmonary:      Effort: Pulmonary effort is normal.      Breath sounds: Normal breath sounds.   Abdominal:      General: There is no distension.      Palpations: Abdomen is soft.      Tenderness: There is no abdominal tenderness.   Musculoskeletal:         General: No swelling.      Cervical back: No muscular tenderness.   Lymphadenopathy:      Cervical: No cervical adenopathy.   Skin:     General: Skin is warm and dry.      Coloration: Skin is pale.      Findings: No rash.   Neurological:      General: No focal deficit present.      Mental Status: She is disoriented.      Motor: Weakness present.   Psychiatric:         Speech: She is noncommunicative.         Cognition and Memory: Cognition is impaired. Memory is impaired.      Comments: Makes eye contact, answers questions.  Somewhat dazed expression         Fluids    Intake/Output Summary (Last 24 hours) at 6/13/2024 1504  Last data filed at 6/13/2024 1150  Gross per 24 hour   Intake --   Output 2200 ml   Net -2200 ml       Laboratory  Recent Labs     06/11/24  0050 06/12/24  0117 06/13/24  0301   WBC 11.5* 13.6* 10.4   RBC 3.85* 3.67* 4.00*   HEMOGLOBIN 11.8* 11.1* 12.0   HEMATOCRIT 36.4* 35.5* 36.5*   MCV 94.5 96.7 91.3   MCH 30.6 30.2 30.0   MCHC 32.4 31.3* 32.9   RDW 45.1 47.2 43.3   PLATELETCT 147* 155* 144*   MPV 9.9 9.9 9.4     Recent Labs     06/11/24  0050 06/12/24  0117 06/13/24  0301   SODIUM 142 140 143   POTASSIUM 3.3* 3.7 3.3*   CHLORIDE 106 108 108   CO2 23 19* 25   GLUCOSE 100* 75 99   BUN 14 13 8   CREATININE 0.93 0.79 0.60   CALCIUM 8.8 8.4 8.3*     Recent Labs     06/11/24  0050   INR 1.01                Imaging  DX-PORTABLE FLUOROSCOPY < 1 HOUR   Final Result      Portable fluoroscopy utilized for 7 seconds.         INTERPRETING LOCATION: [address], [place], [address]      [id number] VIEW   Final Result      Digitized intraoperative radiograph is submitted for review. This examination is not for diagnostic purpose but for guidance during a surgical procedure. Please see the patient's chart for full procedural details.         INTERPRETING LOCATION: [address], [place], [address]      CT-ABDOMEN-PELVIS WITH   Final Result         1.  2.5 mm mid left ureteral stone and 1.5 mm left ureterovesicular junction stone resulting in left urinary outflow tract obstructive changes.   2.  Diverticulosis           Assessment/Plan  * Obstructive uropathy- (present on admission)  Assessment & Plan  Secondary to a 2.5 mm mid left ureteral stone and a 1.5 mm left ureterovesicular junction stone  Stones are infected leading to a significant changes on urinalysis as well as sepsis  Postop day 1 status post cystoscopy, left ureteral stent placement  WBC and creatinine resolved  Continue antibiotics --> PO until stone is treated definitively     Left ureteral calculus- (present on admission)  Assessment & Plan  2.5 mm mid left ureteral stone and 1.5 mm left ureterovesicular junction stone resulting in urinary outflow tract obstructive changes noted on CT abdomen/pelvis  Associated with significant urinary tract infection (sepsis now resolved)  Status post left ureteral stent 6/10  DC mcdaniel  Pending mobiliy / DC home w HH  Change to ceftin    Bradycardia  Assessment & Plan  She had a 3 second pause (6/11), asymptomatic  HR now in 50-60 range  Continue tele    Normocytic anemia  Assessment & Plan  No evidence of bleeding  Repeat in a.m.    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 (HCC)- (present on admission)  Assessment & Plan  Continue home Aricept and Namenda  Agitation improved, no restraints overnight  She is weak and lethargic, mobility is slower than baseline- declining SNF  Repeat PT OT and attempt stairs, possible home with home health if her mobility improves    Prediabetes- (present on admission)  Assessment & Plan  Insulin sliding scale  Adjust as needed    Mixed hyperlipidemia - (present on admission)  Assessment & Plan  Continue home statin    Hypothyroidism- (present on admission)  Assessment & Plan  Continue home [place] Thyroid 45 mg daily  Most recent TSH was approximately 4 months ago and was normal at 0.78         VTE prophylaxis: Lovenox    I have performed a physical exam and reviewed and updated ROS and Plan today (6/13/2024). In review of yesterday's note (6/12/2024), there are no changes except as documented above.    Total time:  54 minutes.  I spent greater than 50% of the time for patient care, counseling, and coordination on this date, including unit/floor time, and face-to-face time with the patient as per interval events and assessment and plan above

## 2024-06-13 NOTE — PROGRESS NOTES
Spoke with patients  JENNIFER. during my assessment this evening. JENNIFER. does not want the patient to go to SNIF unit. Discussed home health and home PT with BETO, he was a little resistant at first. However, he is realizing he may need a little more help taking care of the patient. [email or web address] state he would be willing to discuss the possibility of home health with treatment team.

## 2024-06-13 NOTE — THERAPY
"Speech Language Pathology   Daily Treatment     Patient Name: Thuy Albert  AGE:  73 y.o., SEX:  female  Medical Record #: [id number]  Date of Service: 6/13/2024      Precautions:  Precautions: Fall Risk, Swallow Precautions     Subjective  RN cleared patient for dysphagia tx session. Patient received sleeping, but roused easily to verbal and tactile cues. Patient confused, but pleasant and cooperative.     Assessment  Patient consumed PO trials of 4 oz soft solids and several sips of thins via straw. Patient required 1:1 feeding for all PO trials d/t confusion and blindness. Patient demonstrated appropriate bolus acceptance and containment. Mastication mildly prolonged but functional with complete oral clearance. No overt s/sx of aspiration noted on any textures trialed. No spitting out of bolus noted which was present during last SLP session when chewable solids were tested. Patient reported \"This is good! That other food was yucky.\"     Clinical Impressions  Patient overall appears to be improving and appears appropriate for diet upgrade to SB6/TN0 diet (soft/bite-sized w/ thin liquids) w/ 1:1 feeding. Please encourage PO intake. SLP is following to ensure diet tolerance.     Recommendations  Treatment Completed: Dysphagia Treatment     Dysphagia Treatment  Diet Consistency: SB6/TN0 (soft/bite-sized solids w/ thin liquids)  Instrumentation: None indicated at this time  Medication: Crush with applesauce, as appropriate, Crush with pudding/puree, as appropriate  Supervision: 1:1 feeding with constant supervision  Positioning: Fully upright and midline during oral intake, Meals sitting upright in a chair, as tolerated  Risk Management : Small bites/sips, Slow rate of intake, Reduce environmental distractions, Physical mobility, as tolerated  Oral Care: Q6h    SLP Treatment Plan  Treatment Plan: Dysphagia Treatment  SLP Frequency: 4x Per Week  Estimated Duration: Until Therapy Goals Met    Anticipated " "Discharge Needs  Discharge Recommendations: Anticipate that the patient will have no further speech therapy needs after discharge from the hospital  Therapy Recommendations Upon DC: Dysphagia Training, Community Re-Integration, Patient / Family / Caregiver Education    Patient / Family Goals  Patient / Family Goal #1: NEW 6/12: \"Ooh I don't like that\"  Goal #1 Outcome: Progressing as expected  Short Term Goals  Short Term Goal # 1: Patient will tolerate puree solids and thin liquids without signs of airway invasion  Goal Outcome # 1: Goal met, new goal added  Short Term Goal # 1 B : NEW 6/13: Patient will consume SB6/TN0 diet with 1:1 feeding with no overt s/sx of aspiration or decline in respiratory status.    Minnie Chandler, SLP  "

## 2024-06-13 NOTE — DISCHARGE PLANNING
Received Choice Form at: 1523  Agency/Facility Name: [facility]   Outcome: Scanned to media only as no orders placed

## 2024-06-13 NOTE — PROGRESS NOTES
Telemetry Shift Summary    Rhythm SB-SR  HR Range 55-74  Ectopy R PVC, R PAC  Measurements 0.20/0.06/0.48      Normal Values  Rhythm SR  HR Range:   Measurements: 0.12-0.20/0.06-0.10/0.30-0.52

## 2024-06-13 NOTE — CARE PLAN
The patient is Stable - Low risk of patient condition declining or worsening    Shift Goals  Clinical Goals: safety, monitor labs and vitals, mobilize  Patient Goals: rest, stay comfortable  Family Goals: Sleep good    Progress made toward(s) clinical / shift goals:    Problem: Urinary - Renal Perfusion  Goal: Ability to achieve and maintain adequate renal perfusion and functioning will improve  Description: Target End Date:  Prior to discharge or change in level of care    Document on I/O and Assessment flowsheet    1.  Urine output will remain greater than 0.5ml/Kg/HR  2.  Monitor amount and/or characteristics of urine per order/policy. Specific gravity per order/policy  3.  Assess signs and symptoms of renal dysfunction  Outcome: Progressing  Note: Patient has had good urine output this shift.      Problem: Respiratory  Goal: Patient will achieve/maintain optimum respiratory ventilation and gas exchange  Description: Target End Date:  Prior to discharge or change in level of care    Document on Assessment flowsheet    1.  Assess and monitor rate, rhythm, depth and effort of respiration  2.  Breath sounds assessed qshift and/or as needed  3.  Assess O2 saturation, administer/titrate oxygen as ordered  4.  Position patient for maximum ventilatory efficiency  5.  Turn, cough, and deep breath with splinting to improve effectiveness  6.  Collaborate with RT to administer medication/treatments per order  7.  Encourage use of incentive spirometer and encourage patient to cough after use and utilize splinting techniques if applicable  8.  Airway suctioning  9.  Monitor sputum production for changes in color, consistency and frequency  10. Perform frequent oral hygiene  11. Alternate physical activity with rest periods  Outcome: Progressing  Flowsheets (Taken 6/13/2024 0319 by Delia Moffett, C.N.A.)  O2 Delivery Device: None - Room Air  Note: Patient has maintained good oxygen levels overnight, and not needing  supplemental O2     Problem: Pain - Standard  Goal: Alleviation of pain or a reduction in pain to the patient’s comfort goal  Description: Target End Date:  Prior to discharge or change in level of care    Document on Vitals flowsheet    1.  Document pain using the appropriate pain scale per order or unit policy  2.  Educate and implement non-pharmacologic comfort measures (i.e. relaxation, distraction, massage, cold/heat therapy, etc.)  3.  Pain management medications as ordered  4.  Reassess pain after pain med administration per policy  5.  If opiods administered assess patient's response to pain medication is appropriate per POSS sedation scale  6.  Follow pain management plan developed in collaboration with patient and interdisciplinary team (including palliative care or pain specialists if applicable)  Outcome: Progressing  Note: Patient has not had pain this evening. Patient has been able to rest comfortably tonight.        Patient is not progressing towards the following goals:      Problem: Knowledge Deficit - Standard  Goal: Patient and family/care givers will demonstrate understanding of plan of care, disease process/condition, diagnostic tests and medications  Description: Target End Date:  1-3 days or as soon as patient condition allows    Document in Patient Education    1.  Patient and family/caregiver oriented to unit, equipment, visitation policy and means for communicating concern  2.  Complete/review Learning Assessment  3.  Assess knowledge level of disease process/condition, treatment plan, diagnostic tests and medications  4.  Explain disease process/condition, treatment plan, diagnostic tests and medications  Outcome: Not Progressing  Note: Patient is not able to understand plan of care. Plan discussed with .  understands plan of care.

## 2024-06-13 NOTE — DISCHARGE PLANNING
Case Management Discharge Planning    Admission Date: 6/10/2024  GMLOS: 5.2  ALOS: 3    6-Clicks ADL Score: 14  6-Clicks Mobility Score: 13  PT and/or OT Eval ordered: Yes  Post-acute Referrals Ordered: Yes  Post-acute Choice Obtained: No  Has referral(s) been sent to post-acute provider:  Yes      Anticipated Discharge Dispo: Discharge Disposition: Discharged to home with home health care (01) vs SNF    DME Needed: No    Action(s) Taken: Updated Provider/Nurse on Discharge Plan  Discussed in discharge rounds. Patient's  wishes to take patient home, but team feels patient needs therapy and SNF.   Plan to f/u with .   0940 PASRR [id number]. LOC submitted. #[id number]   VMM left for Art, SO and POA, requesting a return phone call or to visit with him when he comes in, to discuss discharge plans.  0950 Art returned my call. They live in a 2 story apartment. Patient will need to get up and down steps in order to discharge home. He adamantly refuses SNF. He is open to home health care. If she continues to require care, he would prefer she stay here. I explained SNF for strengthening.   He will be in at 1300. I encouraged him to speak with the doctor. I will leave HH choice in the room. Call lasted 15 minutes.   1515 Met with Art and bedside RN. He provided choice for home health care. He is concerned with patient's sleeping and discussed medication administration times with bedside RN.   PT to work with patient and work on stairs.   Art plans on being here at 2PM 6/14/24 to pick patient up and will have a friend meet them at their apartment to help get her up the stairs.     Escalations Completed: None    Medically Clear: No    Next Steps: follow for plan for home with HH vs SNF.     Barriers to Discharge: Medical clearance and Pending Placement    Is the patient up for discharge tomorrow: yes -  is transporting her home.

## 2024-06-13 NOTE — WOUND TEAM
[facility]  Inpatient Services  Initial Wound and Skin Care Evaluation    Admission Date: 6/10/2024     Last order of IP CONSULT TO WOUND CARE was found on 2024 from Hospital Encounter on 6/10/2024     HPI, PMH, SH: Reviewed    Past Surgical History:   Procedure Laterality Date    IN CYSTOSCOPY,INSERT URETERAL STENT Left 6/10/2024    Procedure: CYSTOSCOPY, WITH URETERAL STENT INSERTION;  Surgeon: Arturo Watson M.D.;  Location: [facility];  Service: Urology    APPENDECTOMY LAPAROSCOPIC  2011    Performed by JJ QUINTANILLA at [facility] ORS    APPENDECTOMY      CHOLECYSTECTOMY      OTHER ABDOMINAL SURGERY      ksenia    TONSILLECTOMY       Social History     Tobacco Use    Smoking status: Former     Current packs/day: 0.00     Average packs/day: 1.5 packs/day for 40.0 years (60.0 ttl pk-yrs)     Types: Cigarettes     Start date: 1966     Quit date: 2006     Years since quittin.4    Smokeless tobacco: Never    Tobacco comments:     QUIT ,  USED TO TO SMOKE 2-3 PPD FOR 40 YRS.   Substance Use Topics    Alcohol use: No     Alcohol/week: 0.0 oz     Chief Complaint   Patient presents with    Abdominal Pain     Suprapubic with decreased appetite since last night. Denies V/D, dysuria, fevers.      Diagnosis: Sepsis (HCC) [A41.9]    Unit where seen by Wound Team:      WOUND CONSULT RELATED TO:  skin folds of L breast, L groin and abdomen distal skin     WOUND TEAM PLAN OF CARE - Frequency of Follow-up:   Nursing to follow dressing orders written for wound care. Contact wound team if area fails to progress, deteriorates or with any questions/concerns if something comes up before next scheduled follow up (See below as to whether wound is following and frequency of wound follow up)   Not following, consult as needed  - kin folds of L breast, L groin and abdomen distal skin     WOUND HISTORY:   Pt unable to state when wounds were first present. RN skin  assessment noted irritant contact dermatitis to these areas.       WOUND ASSESSMENT/LDA  Moisture Associated Skin Damage 06/10/24 Breast;Groin;Panus (Active)   First Observed Date: 06/10/24   Present on Original Admission: Yes  Laterality: Left  Wound Location : Breast;Groin;Panus      Assessments 6/12/2024  3:50 PM     L breast       Abd skin fold above suprapubic       L groin   NEXT Weekly Photo (Inpatient Only) 06/18/24   Drainage Amount None   Periwound Assessment Pink;Satellite lesions   IAD Cleansing Foam Cleanser/Washcloth   Periwound Protectant Antifungal Therapy   IAD Containment Device Purewick   Length (cm) 2.6   Width (cm) 7.3        Vascular:    TERENCE:   No results found.    Lab Values:    Lab Results   Component Value Date/Time    WBC 13.6 (H) 06/12/2024 01:17 AM    RBC 3.67 (L) 06/12/2024 01:17 AM    HEMOGLOBIN 11.1 (L) 06/12/2024 01:17 AM    HEMATOCRIT 35.5 (L) 06/12/2024 01:17 AM    CREACTPROT 0.42 02/02/2024 01:38 PM    SEDRATEWES 21 02/02/2024 01:38 PM    HBA1C 5.2 11/03/2023 03:33 PM         Culture Results show:  No results found for this or any previous visit (from the past 720 hour(s)).    Pain Level/Medicated:  None, Tolerated without pain medication       INTERVENTIONS BY WOUND TEAM:  Chart and images reviewed. Discussed with bedside RN. All areas of concern (based on picture review, LDA review and discussion with bedside RN) have been thoroughly assessed. Documentation of areas based on significant findings. This RN in to assess patient. Performed standard wound care which includes appropriate positioning, dressing removal and non-selective debridement. Pictures and measurements obtained weekly if/when required.    Wound:  L breast, L groin, sbd skin fold  Preparation for Dressing removal: Open to air  Cleansed/Non-selectively Debrided with:  No rinse foam soap and Moist warm washcloth  Jamee wound: Cleansed with No rinse foam soap and Moist warm washcloth, Prepped with Nystatin  Powder  Primary Dressing:  SHRADDHA  Wicking cloth as ordered    Advanced Wound Care Discharge Planning  Number of Clinicians necessary to complete wound care: 1  Is patient requiring IV pain medications for dressing changes:  No   Length of time for dressing change 30 min. (This does not include chart review, pre-medication time, set up, clean up or time spent charting.)    Interdisciplinary consultation: Patient, Bedside RN (Holly)    EVALUATION / RATIONALE FOR TREATMENT:     Date:  06/12/24  Wound Status:  Initial evaluation    Pt has contact dermatitis r/t moisture in skin folds. The tissue is pink with some satellite lesions, antifungal powder ordered.Wicking cloth also ordered to draw moisture away from areas of concern.          Goals: Steady decrease in wound area and depth weekly.    NURSING PLAN OF CARE ORDERS:  RN Prevention Protocol    NUTRITION RECOMMENDATIONS   Wound Team Recommendations:  N/A    DIET ORDERS (From admission to next 24h)       Start     Ordered    06/11/24 0848  Diet Order Diet: Level 4 - Pureed; Liquid level: Level 0 - Thin; Second Modifier: (optional): Cardiac; Tray Modifications (optional): SLP - 1:1 Supervision by Nursing, SLP - Deliver to Nursing Station  ALL MEALS        Question Answer Comment   Diet: Level 4 - Pureed    Liquid level Level 0 - Thin    Second Modifier: (optional) Cardiac    Tray Modifications (optional) SLP - 1:1 Supervision by Nursing    Tray Modifications (optional) SLP - Deliver to Nursing Station        06/11/24 0847                    PREVENTATIVE INTERVENTIONS:    Q shift Mike - performed per nursing policy  Q shift pressure point assessments - performed per nursing policy    Surface/Positioning  Standard/trauma mattress - Currently in Place  Reposition q 2 hours - Currently in Place  TAPs Turning system - Currently in Place    Offloading/Redistribution  Float Heels off Bed with Pillows - Currently in Place           Respiratory  Silicone O2 tubing -  Currently in Place  Gray Foam Ear protectors - Currently in Place    Containment/Moisture Prevention    Purwick/Condom Cath - Currently in Place  Barrier paste - Currently in Place    Anticipated discharge plans:  TBD        Vac Discharge Needs:  Vac Discharge plan is purely a recommendation from wound team and not a requirement for discharge unless otherwise stated by physician.  Not Applicable Pt not on a wound vac

## 2024-06-13 NOTE — THERAPY
Physical Therapy   Daily Treatment     Patient Name: Thuy Albert  Age:  73 y.o., Sex:  female  Medical Record #: [id number]  Today's Date: 6/13/2024     Precautions  Precautions: Fall Risk;Swallow Precautions    Assessment    Pt is safe ambulating with hand held assistance but she fatigues after 20 feet.Ideally SNF stay would be most beneficial but  is refusing.He reports Pt is much more lethargic than normal    Plan    Treatment Plan Status:    Type of Treatment: Bed Mobility, Gait Training, Equipment, Neuro Re-Education / Balance, Self Care / Home Evaluation, Stair Training, Therapeutic Activities, Therapeutic Exercise  Treatment Frequency: 3 Times per Week  Treatment Duration: Until Therapy Goals Met    DC Equipment Recommendations: Unable to determine at this time  Discharge Recommendations: (P)  (post acute V Home with HH)       Objective       06/13/24 1522   Charge Group   PT Gait Training (Units) 1   PT Therapeutic Activities (Units) 1   Vitals   Pulse 93   Patient BP Position Sitting   Blood Pressure  129/55   Respiration 16   Pulse Oximetry 93 %   O2 (LPM) 0   O2 Delivery Device None - Room Air   Balance   Sitting Balance (Static) Fair   Sitting Balance (Dynamic) Fair   Standing Balance (Static) Fair   Standing Balance (Dynamic) Fair   Weight Shift Sitting Fair   Weight Shift Standing Fair   Bed Mobility    Supine to Sit Minimal Assist   Sit to Supine Moderate Assist   Scooting Moderate Assist   Gait Analysis   Gait Level Of Assist Minimal Assist   Assistive Device Hand Held Assist   Distance (Feet) 20   # of Times Distance was Traveled 2   Deviation Shuffled Gait   # of Stairs Climbed 0   Level of Assist with Stairs Unable to Participate   Weight Bearing Status full   Functional Mobility   Sit to Stand Contact Guard Assist   Bed, Chair, Wheelchair Transfer Contact Guard Assist   Transfer Method Stand Step   Activity Tolerance   Sitting Edge of Bed 10   Standing 2 x 4 mins   Short Term Goals     Short Term Goal # 1 Pt will be able to perform all bed mobility with Russell in order to perform bed mobility upon dc by end of 6 visits.   Goal Outcome # 1 Progressing slower than expected   Short Term Goal # 2 Pt will be able to ambulate 50ft with HHA and Russell in order to ambulate within the household by end of 6 visits.   Goal Outcome # 2 Progressing slower than expected   Short Term Goal # 3 Pt will be able to ambulate 3 stairs with R handrail use and Russell in order to begin stair navigation into home by end of 6 visits   Goal Outcome # 3 Goal not met   Anticipated Discharge Equipment and Recommendations   Discharge Recommendations   (post acute V Home with HH)   Interdisciplinary Plan of Care Collaboration   IDT Collaboration with  Nursing   Session Information   Date / Session Number  6/13-2 2/3 6/17

## 2024-06-14 ENCOUNTER — HOME HEALTH ADMISSION (OUTPATIENT)
Dept: HOME HEALTH SERVICES | Facility: HOME HEALTHCARE | Age: 74
End: 2024-06-14
Payer: MEDICARE

## 2024-06-14 VITALS
DIASTOLIC BLOOD PRESSURE: 65 MMHG | HEIGHT: 60 IN | RESPIRATION RATE: 18 BRPM | OXYGEN SATURATION: 95 % | WEIGHT: 152.12 LBS | SYSTOLIC BLOOD PRESSURE: 145 MMHG | HEART RATE: 60 BPM | TEMPERATURE: 96.5 F | BODY MASS INDEX: 29.86 KG/M2

## 2024-06-14 LAB
ALBUMIN SERPL BCP-MCNC: 3.2 G/DL (ref 3.2–4.9)
BASOPHILS # BLD AUTO: 0.2 % (ref 0–1.8)
BASOPHILS # BLD: 0.02 K/UL (ref 0–0.12)
BUN SERPL-MCNC: 9 MG/DL (ref 8–22)
CALCIUM ALBUM COR SERPL-MCNC: 9.3 MG/DL (ref 8.5–10.5)
CALCIUM SERPL-MCNC: 8.7 MG/DL (ref 8.4–10.2)
CHLORIDE SERPL-SCNC: 105 MMOL/L (ref 96–112)
CO2 SERPL-SCNC: 25 MMOL/L (ref 20–33)
CREAT SERPL-MCNC: 0.63 MG/DL (ref 0.5–1.4)
EOSINOPHIL # BLD AUTO: 0.09 K/UL (ref 0–0.51)
EOSINOPHIL NFR BLD: 0.8 % (ref 0–6.9)
ERYTHROCYTE [DISTWIDTH] IN BLOOD BY AUTOMATED COUNT: 43.8 FL (ref 35.9–50)
GFR SERPLBLD CREATININE-BSD FMLA CKD-EPI: 93 ML/MIN/1.73 M 2
GLUCOSE BLD STRIP.AUTO-MCNC: 100 MG/DL (ref 65–99)
GLUCOSE BLD STRIP.AUTO-MCNC: 99 MG/DL (ref 65–99)
GLUCOSE SERPL-MCNC: 95 MG/DL (ref 65–99)
HCT VFR BLD AUTO: 37.4 % (ref 37–47)
HGB BLD-MCNC: 12.2 G/DL (ref 12–16)
IMM GRANULOCYTES # BLD AUTO: 0.05 K/UL (ref 0–0.11)
IMM GRANULOCYTES NFR BLD AUTO: 0.5 % (ref 0–0.9)
LYMPHOCYTES # BLD AUTO: 2.7 K/UL (ref 1–4.8)
LYMPHOCYTES NFR BLD: 24.9 % (ref 22–41)
MAGNESIUM SERPL-MCNC: 2.2 MG/DL (ref 1.5–2.5)
MCH RBC QN AUTO: 30 PG (ref 27–33)
MCHC RBC AUTO-ENTMCNC: 32.6 G/DL (ref 32.2–35.5)
MCV RBC AUTO: 92.1 FL (ref 81.4–97.8)
MONOCYTES # BLD AUTO: 0.67 K/UL (ref 0–0.85)
MONOCYTES NFR BLD AUTO: 6.2 % (ref 0–13.4)
NEUTROPHILS # BLD AUTO: 7.32 K/UL (ref 1.82–7.42)
NEUTROPHILS NFR BLD: 67.4 % (ref 44–72)
NRBC # BLD AUTO: 0 K/UL
NRBC BLD-RTO: 0 /100 WBC (ref 0–0.2)
PHOSPHATE SERPL-MCNC: 2.4 MG/DL (ref 2.5–4.5)
PLATELET # BLD AUTO: 134 K/UL (ref 164–446)
PMV BLD AUTO: 10 FL (ref 9–12.9)
POTASSIUM SERPL-SCNC: 3.5 MMOL/L (ref 3.6–5.5)
RBC # BLD AUTO: 4.06 M/UL (ref 4.2–5.4)
SODIUM SERPL-SCNC: 141 MMOL/L (ref 135–145)
WBC # BLD AUTO: 10.9 K/UL (ref 4.8–10.8)

## 2024-06-14 PROCEDURE — 94760 N-INVAS EAR/PLS OXIMETRY 1: CPT

## 2024-06-14 PROCEDURE — A9270 NON-COVERED ITEM OR SERVICE: HCPCS | Performed by: INTERNAL MEDICINE

## 2024-06-14 PROCEDURE — 92526 ORAL FUNCTION THERAPY: CPT

## 2024-06-14 PROCEDURE — 700102 HCHG RX REV CODE 250 W/ 637 OVERRIDE(OP): Performed by: INTERNAL MEDICINE

## 2024-06-14 PROCEDURE — 85025 COMPLETE CBC W/AUTO DIFF WBC: CPT

## 2024-06-14 PROCEDURE — 83735 ASSAY OF MAGNESIUM: CPT

## 2024-06-14 PROCEDURE — 99239 HOSP IP/OBS DSCHRG MGMT >30: CPT | Performed by: INTERNAL MEDICINE

## 2024-06-14 PROCEDURE — 80069 RENAL FUNCTION PANEL: CPT

## 2024-06-14 PROCEDURE — 82962 GLUCOSE BLOOD TEST: CPT

## 2024-06-14 RX ORDER — CEFUROXIME AXETIL 250 MG/1
250 TABLET ORAL EVERY 12 HOURS
Qty: 28 TABLET | Refills: 0 | Status: ON HOLD | OUTPATIENT
Start: 2024-06-14 | End: 2024-06-30

## 2024-06-14 RX ADMIN — THYROID 45 MG: 30 TABLET ORAL at 05:50

## 2024-06-14 RX ADMIN — NYSTATIN: 100000 POWDER TOPICAL at 06:16

## 2024-06-14 RX ADMIN — MEMANTINE HYDROCHLORIDE 10 MG: 10 TABLET ORAL at 05:50

## 2024-06-14 RX ADMIN — NYSTATIN: 100000 POWDER TOPICAL at 12:19

## 2024-06-14 RX ADMIN — POTASSIUM BICARBONATE 50 MEQ: 978 TABLET, EFFERVESCENT ORAL at 08:53

## 2024-06-14 RX ADMIN — CEFUROXIME AXETIL 250 MG: 250 TABLET, FILM COATED ORAL at 05:51

## 2024-06-14 ASSESSMENT — PAIN DESCRIPTION - PAIN TYPE: TYPE: ACUTE PAIN

## 2024-06-14 NOTE — CARE PLAN
The patient is Stable - Low risk of patient condition declining or worsening    Shift Goals  Clinical Goals: .Safety, mobilize, bed bath, edge of bed  Patient Goals: rest, comfort  Family Goals: SPRING    Progress made toward(s) clinical / shift goals:      Problem: Fall Risk  Goal: Patient will remain free from falls  Description: Target End Date:  Prior to discharge or change in level of care    Document interventions on the Annel Giordano Fall Risk Assessment    1.  Assess for fall risk factors  2.  Implement fall precautions  Outcome: Progressing  Note: Patient remains free of falls this shift. Patient stood at bedside with this RN for approx. 10 minutes. Patient also sat edge of bed for approx 20 minutes.      Problem: Urinary - Renal Perfusion  Goal: Ability to achieve and maintain adequate renal perfusion and functioning will improve  Description: Target End Date:  Prior to discharge or change in level of care    Document on I/O and Assessment flowsheet    1.  Urine output will remain greater than 0.5ml/Kg/HR  2.  Monitor amount and/or characteristics of urine per order/policy. Specific gravity per order/policy  3.  Assess signs and symptoms of renal dysfunction  Outcome: Progressing  Note: Patient continues to have good urine output.      Problem: Skin Integrity  Goal: Skin integrity is maintained or improved  Description: Target End Date:  Prior to discharge or change in level of care    Document interventions on Skin Risk/Mike flowsheet groups and corresponding LDA    1.  Assess and monitor skin integrity, appearance and/or temperature  2.  Assess risk factors for impaired skin integrity and/or pressures ulcers  3.  Implement precautions to protect skin integrity in collaboration with interdisciplinary team  4.  Implement pressure ulcer prevention protocol if at risk for skin breakdown  5.  Confirm wound care consult if at risk for skin breakdown  6.  Ensure patient use of pressure relieving devices  (Low air  loss bed, waffle overlay, heel protectors, ROHO cushion, etc)  Outcome: Progressing  Note: Patients area of redness continue to improve. Will continue use of Nystatin powder.      Problem: Pain - Standard  Goal: Alleviation of pain or a reduction in pain to the patient’s comfort goal  Description: Target End Date:  Prior to discharge or change in level of care    Document on Vitals flowsheet    1.  Document pain using the appropriate pain scale per order or unit policy  2.  Educate and implement non-pharmacologic comfort measures (i.e. relaxation, distraction, massage, cold/heat therapy, etc.)  3.  Pain management medications as ordered  4.  Reassess pain after pain med administration per policy  5.  If opiods administered assess patient's response to pain medication is appropriate per POSS sedation scale  6.  Follow pain management plan developed in collaboration with patient and interdisciplinary team (including palliative care or pain specialists if applicable)  Outcome: Progressing  Note: Patient has remained free of pain this shift.

## 2024-06-14 NOTE — DISCHARGE PLANNING
Case Management Discharge Planning    Admission Date: 6/10/2024  GMLOS: 5.2  ALOS: 4    6-Clicks ADL Score: 14  6-Clicks Mobility Score: 13  PT and/or OT Eval ordered: Yes  Post-acute Referrals Ordered: Yes  Post-acute Choice Obtained: Yes  Has referral(s) been sent to post-acute provider:  Yes      Anticipated Discharge Dispo: Discharge Disposition: D/T to home under HHA care in anticipation of covered skilled care (06)    DME Needed: No    Action(s) Taken:     1025: HH order received. Referral sent to [facility] per choice form.    1250: RNCM reached out to [facility] to f/u regarding acceptance. Per transitional care specialist, referral needs to be reviewed by supervisor for appropriateness.     1434: RNCM reached out to Manager of Quality for [facility] to follow-up on HH acceptance. RNCM referred to Mara from [facility].     1436: RNCM reached out to Mara via phone call, no answer.     1440: RNCM escalated case to Sr Admin of [facility].     1450: Per chart, [facility] has accepted.    Escalations Completed: [facility]    Medically Clear: Yes    Next Steps: Plan to DC home today with     Barriers to Discharge: None

## 2024-06-14 NOTE — DISCHARGE PLANNING
Per floor RN, RADHA placed call to [facility] inquiring about referral approval. LVM arlene Modi requesting a return call. 14:16

## 2024-06-14 NOTE — PROGRESS NOTES
Telemetry Shift Summary    Rhythm SB-SR  HR Range 49-80  Ectopy R PAC/R PVC/R Coup  Measurements 0.16/0.08/0.38      Normal Values  Rhythm SR  HR Range:   Measurements: 0.12-0.20/0.06-0.10/0.30-0.52

## 2024-06-14 NOTE — PROGRESS NOTES
Patient discharged via private vehicle with spouse. Discharge education completed with spouse, spouse verbalized understanding. All personal belongings sent with patient.

## 2024-06-14 NOTE — DISCHARGE PLANNING
ATTN: Case Management  RE: Referral for Home Health    As of 6/14/24, we have accepted the Home Health referral for the patient listed above.    A [facility] clinician will be out to see the patient within 48 hours. If you have any questions or concerns regarding the patient's transition to Home Health, please do not hesitate to contact us at [phone].      We look forward to collaborating with you,  [facility] – part of the [facility] Home Health Team

## 2024-06-14 NOTE — PROGRESS NOTES
Patient was given a bed bath and complete linen change this shift. Patient sat edge of bed for approximately 20 minutes. Patient supported herself while seated at edge of bed. Patient stood at bedside with handheld assist. Patient stood bedside for about 5 -10 minutes before she needed to sit. After sitting for a few minutes patient stood for an additional 2-3 minutes. Patient kept her eyes closed unless she was asked to open her eyes and look at you. Patient has slept most of this shift.

## 2024-06-14 NOTE — THERAPY
"Speech Language Pathology   Daily Treatment     Patient Name: Thuy Albert  AGE:  73 y.o., SEX:  female  Medical Record #: [id number]  Date of Service: 6/14/2024      Precautions:  Precautions: Fall Risk, Swallow Precautions    Subjective  RN cleared patient for dysphagia tx session. Patient awake, alert, and eating breakfast with direct feeding from CNA. Patient       Assessment        Clinical Impressions        Recommendations  Treatment Completed: Dysphagia Treatment    Dysphagia Treatment  Diet Consistency: SB6/TN0 (soft/bite-sized solids w/ thin liquids)  Instrumentation: None indicated at this time  Medication: Crush with applesauce, as appropriate, Crush with pudding/puree, as appropriate  Supervision: 1:1 feeding with constant supervision  Positioning: Fully upright and midline during oral intake, Meals sitting upright in a chair, as tolerated  Risk Management : Small bites/sips, Slow rate of intake, Reduce environmental distractions, Physical mobility, as tolerated  Oral Care: Q6h    SLP Treatment Plan  Treatment Plan: Dysphagia Treatment  SLP Frequency: 4x Per Week  Estimated Duration: Until Therapy Goals Met    Anticipated Discharge Needs  Discharge Recommendations: Anticipate that the patient will have no further speech therapy needs after discharge from the hospital  Therapy Recommendations Upon DC: Dysphagia Training, Community Re-Integration, Patient / Family / Caregiver Education    Patient / Family Goals  Patient / Family Goal #1: NEW 6/12: \"Ooh I don't like that\"  Goal #1 Outcome: Progressing as expected  Short Term Goals  Short Term Goal # 1: Patient will tolerate puree solids and thin liquids without signs of airway invasion  Goal Outcome # 1: Goal met, new goal added  Short Term Goal # 1 B : NEW 6/13: Patient will consume SB6/TN0 diet with 1:1 feeding with no overt s/sx of aspiration or decline in respiratory status.  Goal Outcome  # 1 B: Progressing as expected    Minnie Chandler " SLP

## 2024-06-15 LAB — GLUCOSE BLD STRIP.AUTO-MCNC: 112 MG/DL (ref 65–99)

## 2024-06-15 NOTE — DISCHARGE SUMMARY
Discharge Summary    CHIEF COMPLAINT ON ADMISSION  Chief Complaint   Patient presents with    Abdominal Pain     Suprapubic with decreased appetite since last night. Denies V/D, dysuria, fevers.        Reason for Admission  Abdominal Pain     Admission Date  6/10/2024    CODE STATUS  Full    HPI & HOSPITAL COURSE  This is a 73 y.o. female with a History of Alzheimer's dementia, cared for by her , hypothyroidism, hyperlipidemia, prediabetes who was admitted for left flank pain found to have sepsis secondary to an infected obstructing left ureteral stone associated with acute kidney injury.    Urology was consulted and she underwent cystoscopy with placement of left ureteral stent on 6/10/2024.  There was evidence of an infected stone therefore she was continued on ceftriaxone therapy.  On postop day 1 she was lethargic and weak.  Therefore PT was ordered.  They recommended skilled nursing facility however the patient required 3 midnights in order to be considered for SNF.    She developed confusion overnight and did require restraints, however, once the Rodriges catheter was removed her mental status significantly improved.  She required 3    On the day the patient was preparing to DC her  felt very strongly about taking her home rather than to a SNF.  She was reevaluated by physical therapy to see if she can tolerate stairs and they felt that she would be able to likely navigate stairs at home with the assistance of her  and home health 3 days a week.  She was changed to oral Ceftin and home health was ordered.  She was still weak and debilitated however was at her baseline therefore was determined stable for discharge    Therefore, she is discharged in fair and stable condition to home with organized home healthcare and close outpatient follow-up.    The patient met 2-midnight criteria for an inpatient stay at the time of discharge.    Discharge Date  6/14/2024    FOLLOW UP ITEMS POST  DISCHARGE  Follow-up with urology in 2 weeks for definitive treatment    DISCHARGE DIAGNOSES  Principal Problem:    Obstructive uropathy (POA: Yes)  Active Problems:    Left ureteral calculus (POA: Yes)    Hypothyroidism (Chronic) (POA: Yes)      Overview: Unable to tolerate Levothyroxine.  She is currently on Blaine Thyroid       which has been able to control her symptoms and her thyroid function has       been in normal range.  T4 is mildly low, TSH is normal    Mixed hyperlipidemia  (POA: Yes)      Overview: The 10-year ASCVD risk score (Belgica JULIO, et al., 2019) is: 11.4%        Values used to calculate the score:          Age: 73 years          Sex: Female          Is Non- : No          Diabetic: No          Tobacco smoker: No          Systolic Blood Pressure: 120 mmHg          Is BP treated: No          HDL Cholesterol: 40 mg/dL          Total Cholesterol: 166 mg/dL                   Lab Results       Component Value Date/Time        CHOLSTRLTOT 166 11/03/2023 1533        TRIGLYCERIDE 193 (H) 11/03/2023 1533        HDL 40 11/03/2023 1533        LDL 87 11/03/2023 1533              She is currently on Crestor 10 mg daily which she has not started       medication    Prediabetes (POA: Yes)      Overview:       Last A1c came back normal at 5.5.  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 There is disproportionate       severe temporal lobe volume loss indicating the possibility of Alzheimer's       disease.            Seeing neurology.  Lives at home with her  who takes care of her.        On donepezil 10 mg daily and Namenda 10 mg 2 times daily    Sepsis (HCC) (POA: Yes)    NATALY (acute kidney injury) (HCC) (POA: Yes)     Normocytic anemia (POA: Unknown)    Bradycardia (POA: Unknown)  Resolved Problems:    * No resolved hospital problems. *      FOLLOW UP  Future Appointments   Date Time Provider Department Center   7/1/2024  1:30 PM Yenni Lane M.D. [facility] None     Pramod Lu M.D.  [address]  [address]  [place] [id number] [phone]    Schedule an appointment as soon as possible for a visit in 2 week(s)      [facility]  [address]  [place] [address]  [phone]          MEDICATIONS ON DISCHARGE     Medication List        START taking these medications        Instructions   cefUROXime 250 MG Tabs  Commonly known as: Ceftin   Doctor's comments: Continue until stone therapy is complete by urology  Take 1 Tablet by mouth every 12 hours for 14 days.  Dose: 250 mg     rosuvastatin 10 MG Tabs  Commonly known as: Crestor   Take 1 Tablet by mouth every evening.  Dose: 10 mg            CHANGE how you take these medications        Instructions   donepezil 10 MG tablet  What changed: when to take this  Commonly known as: Aricept   Take 1 Tablet by mouth every day.  Dose: 10 mg            CONTINUE taking these medications        Instructions   B COMPLEX PO   Take 1 Tablet by mouth every day.  Dose: 1 Tablet     folic acid 1 MG Tabs  Commonly known as: Folvite   Take 1 Tablet by mouth every day.  Dose: 1 mg     memantine 10 MG Tabs  Commonly known as: Namenda   Doctor's comments: DX Code Needed  REFILL.  Take 1 Tablet by mouth 2 times a day.  Dose: 10 mg     PEPTO-BISMOL PO   Take 30 mL by mouth 2 times a day as needed (For upset stomach).  Dose: 30 mL     thyroid 15 MG Tabs  Commonly known as: [place] Thyroid   Take 3 Tablets by mouth every day.  Dose: 45 mg     VITAMIN B-12 PO   Take 1 Tablet by mouth every day.  Dose: 1 Tablet     VITAMIN C PO   Take 1 Tablet by mouth every day.  Dose: 1 Tablet     ZINC PO   Take 1 Tablet by mouth every day.  Dose: 1 Tablet              Allergies  Allergies    Allergen Reactions    Cephalexin Rash     rash    Nabumetone      rash    Synthroid [Levothroid]      Nausea      Tetracycline Swelling       DIET  As tolerated    ACTIVITY  As tolerated.  Weight bearing as tolerated    CONSULTATIONS  Urology    PROCEDURES  6/10/2024: Cystoscopy with left ureteral stent placement for left ureteral calculus    LABORATORY  Lab Results   Component Value Date    SODIUM 141 06/14/2024    POTASSIUM 3.5 (L) 06/14/2024    CHLORIDE 105 06/14/2024    CO2 25 06/14/2024    GLUCOSE 95 06/14/2024    BUN 9 06/14/2024    CREATININE 0.63 06/14/2024        Lab Results   Component Value Date    WBC 10.9 (H) 06/14/2024    HEMOGLOBIN 12.2 06/14/2024    HEMATOCRIT 37.4 06/14/2024    PLATELETCT 134 (L) 06/14/2024        Total time of the discharge process exceeds 41 minutes.

## 2024-06-16 LAB
BACTERIA BLD CULT: NORMAL
SIGNIFICANT IND 70042: NORMAL
SITE SITE: NORMAL
SOURCE SOURCE: NORMAL

## 2024-06-17 ENCOUNTER — PATIENT OUTREACH (OUTPATIENT)
Dept: MEDICAL GROUP | Facility: MEDICAL CENTER | Age: 74
End: 2024-06-17
Payer: MEDICARE

## 2024-06-17 ENCOUNTER — TELEPHONE (OUTPATIENT)
Dept: UROLOGY | Facility: MEDICAL CENTER | Age: 74
End: 2024-06-17
Payer: MEDICARE

## 2024-06-17 NOTE — PROGRESS NOTES
Transitional Care Management  TCM Outreach Date and Time: Filed (6/17/2024 11:41 AM)    Discharge Questions  Actual Discharge Date: 06/14/24  Now that you are home, how are you feeling?: Good  Did you receive any new prescriptions?: Yes (Cefuroxime, Rosuvastatin,)  Were you able to get them filled?: Yes  Meds to Bed or Pharmacy filled?: Pharmacy  Do you have any questions about your current medications or new medications (Review Med Rec)?: -- (Crestor was already on her medication list-- states she is not taking it)  Did you have any durable medical equipment ordered?: No  Do you have a follow up appointment scheduled with your PCP?: No  Was an appointment scheduled for the patient?: Yes ( requested appt later than 3pm)  Appointment Date: 06/26/24  Appointment Time: 1540  Any issues or paperwork you wish to discuss with your PCP?: No  Are you (patient) able to get to the appointment?: Yes  If Home Health was ordered, have they contacted you (Patient): No (Give phone number) ([facility] [phone])  Did you have enough support after your last discharge?: Yes  Does this patient qualify for the CCM program?: No    Transitional Care  Number of attempts made to contact patient: 1  Current or previous attempts completed within two business days of discharge? : Yes  Provided education regarding treatment plan, medications, self-management, ADLs?: Yes ( encouraged to accept home health and utilize social work services. He has a lot of stressors at the moment and sounds like he is struggling. Verbalized upcoming urology appts, procedure)  Has patient completed an Advanced Directive?: Yes  Is the patient's advanced directive on file?: Yes  Has the Care Manager's phone number provided?: No  Is there anything else I can help you with?: No    Discharge Summary  Chief Complaint: abdominal pain  Admitting Diagnosis: Sepsis ([facility]) (A41.9)  Discharge Diagnosis: Obstructive uropathy

## 2024-06-21 ENCOUNTER — HOSPITAL ENCOUNTER (INPATIENT)
Facility: MEDICAL CENTER | Age: 74
LOS: 9 days | DRG: 660 | End: 2024-06-30
Attending: STUDENT IN AN ORGANIZED HEALTH CARE EDUCATION/TRAINING PROGRAM | Admitting: HOSPITALIST
Payer: MEDICARE

## 2024-06-21 ENCOUNTER — APPOINTMENT (OUTPATIENT)
Dept: RADIOLOGY | Facility: MEDICAL CENTER | Age: 74
DRG: 660 | End: 2024-06-21
Attending: STUDENT IN AN ORGANIZED HEALTH CARE EDUCATION/TRAINING PROGRAM
Payer: MEDICARE

## 2024-06-21 DIAGNOSIS — F02.C18 SEVERE EARLY ONSET ALZHEIMER'S DEMENTIA WITH OTHER BEHAVIORAL DISTURBANCE (HCC): ICD-10-CM

## 2024-06-21 DIAGNOSIS — G30.0 SEVERE EARLY ONSET ALZHEIMER'S DEMENTIA WITH OTHER BEHAVIORAL DISTURBANCE (HCC): ICD-10-CM

## 2024-06-21 PROBLEM — N39.0 COMPLICATED UTI (URINARY TRACT INFECTION): Status: ACTIVE | Noted: 2024-06-21

## 2024-06-21 PROBLEM — Z71.89 ACP (ADVANCE CARE PLANNING): Status: ACTIVE | Noted: 2024-06-21

## 2024-06-21 PROBLEM — E87.6 HYPOKALEMIA: Status: ACTIVE | Noted: 2024-06-21

## 2024-06-21 LAB
ALBUMIN SERPL BCP-MCNC: 3.7 G/DL (ref 3.2–4.9)
ALBUMIN/GLOB SERPL: 1.2 G/DL
ALP SERPL-CCNC: 109 U/L (ref 30–99)
ALT SERPL-CCNC: 40 U/L (ref 2–50)
ANION GAP SERPL CALC-SCNC: 15 MMOL/L (ref 7–16)
APPEARANCE UR: ABNORMAL
AST SERPL-CCNC: 36 U/L (ref 12–45)
BACTERIA #/AREA URNS HPF: ABNORMAL /HPF
BASOPHILS # BLD AUTO: 0.4 % (ref 0–1.8)
BASOPHILS # BLD: 0.06 K/UL (ref 0–0.12)
BILIRUB SERPL-MCNC: 0.6 MG/DL (ref 0.1–1.5)
BILIRUB UR QL STRIP.AUTO: NEGATIVE
BUN SERPL-MCNC: 19 MG/DL (ref 8–22)
CALCIUM ALBUM COR SERPL-MCNC: 9.6 MG/DL (ref 8.5–10.5)
CALCIUM SERPL-MCNC: 9.4 MG/DL (ref 8.4–10.2)
CHLORIDE SERPL-SCNC: 106 MMOL/L (ref 96–112)
CO2 SERPL-SCNC: 23 MMOL/L (ref 20–33)
COLOR UR: ABNORMAL
CREAT SERPL-MCNC: 1.02 MG/DL (ref 0.5–1.4)
EOSINOPHIL # BLD AUTO: 0.23 K/UL (ref 0–0.51)
EOSINOPHIL NFR BLD: 1.4 % (ref 0–6.9)
EPI CELLS #/AREA URNS HPF: ABNORMAL /HPF
ERYTHROCYTE [DISTWIDTH] IN BLOOD BY AUTOMATED COUNT: 44.9 FL (ref 35.9–50)
GFR SERPLBLD CREATININE-BSD FMLA CKD-EPI: 58 ML/MIN/1.73 M 2
GLOBULIN SER CALC-MCNC: 3.1 G/DL (ref 1.9–3.5)
GLUCOSE SERPL-MCNC: 125 MG/DL (ref 65–99)
GLUCOSE UR STRIP.AUTO-MCNC: NEGATIVE MG/DL
HCT VFR BLD AUTO: 40.8 % (ref 37–47)
HGB BLD-MCNC: 13.2 G/DL (ref 12–16)
HYALINE CASTS #/AREA URNS LPF: ABNORMAL /LPF
IMM GRANULOCYTES # BLD AUTO: 0.1 K/UL (ref 0–0.11)
IMM GRANULOCYTES NFR BLD AUTO: 0.6 % (ref 0–0.9)
INR PPP: 1.02 (ref 0.87–1.13)
KETONES UR STRIP.AUTO-MCNC: 15 MG/DL
LACTATE SERPL-SCNC: 0.8 MMOL/L (ref 0.5–2)
LACTATE SERPL-SCNC: 0.9 MMOL/L (ref 0.5–2)
LACTATE SERPL-SCNC: 1.2 MMOL/L (ref 0.5–2)
LACTATE SERPL-SCNC: 3.7 MMOL/L (ref 0.5–2)
LEUKOCYTE ESTERASE UR QL STRIP.AUTO: ABNORMAL
LYMPHOCYTES # BLD AUTO: 3.9 K/UL (ref 1–4.8)
LYMPHOCYTES NFR BLD: 24.6 % (ref 22–41)
MCH RBC QN AUTO: 30 PG (ref 27–33)
MCHC RBC AUTO-ENTMCNC: 32.4 G/DL (ref 32.2–35.5)
MCV RBC AUTO: 92.7 FL (ref 81.4–97.8)
MICRO URNS: ABNORMAL
MONOCYTES # BLD AUTO: 0.84 K/UL (ref 0–0.85)
MONOCYTES NFR BLD AUTO: 5.3 % (ref 0–13.4)
MUCOUS THREADS #/AREA URNS HPF: ABNORMAL /HPF
NEUTROPHILS # BLD AUTO: 10.74 K/UL (ref 1.82–7.42)
NEUTROPHILS NFR BLD: 67.7 % (ref 44–72)
NITRITE UR QL STRIP.AUTO: POSITIVE
NRBC # BLD AUTO: 0 K/UL
NRBC BLD-RTO: 0 /100 WBC (ref 0–0.2)
PH UR STRIP.AUTO: 6.5 [PH] (ref 5–8)
PLATELET # BLD AUTO: 242 K/UL (ref 164–446)
PMV BLD AUTO: 9.7 FL (ref 9–12.9)
POTASSIUM SERPL-SCNC: 3.4 MMOL/L (ref 3.6–5.5)
PROT SERPL-MCNC: 6.8 G/DL (ref 6–8.2)
PROT UR QL STRIP: >=300 MG/DL
PROTHROMBIN TIME: 13.8 SEC (ref 12–14.6)
RBC # BLD AUTO: 4.4 M/UL (ref 4.2–5.4)
RBC # URNS HPF: ABNORMAL /HPF
RBC UR QL AUTO: ABNORMAL
SODIUM SERPL-SCNC: 144 MMOL/L (ref 135–145)
SP GR UR STRIP.AUTO: 1.02
UNIDENT CRYS URNS QL MICRO: ABNORMAL /HPF
WBC # BLD AUTO: 15.9 K/UL (ref 4.8–10.8)
WBC #/AREA URNS HPF: ABNORMAL /HPF

## 2024-06-21 PROCEDURE — 83605 ASSAY OF LACTIC ACID: CPT

## 2024-06-21 PROCEDURE — 80053 COMPREHEN METABOLIC PANEL: CPT

## 2024-06-21 PROCEDURE — 85610 PROTHROMBIN TIME: CPT

## 2024-06-21 PROCEDURE — 36415 COLL VENOUS BLD VENIPUNCTURE: CPT

## 2024-06-21 PROCEDURE — 96374 THER/PROPH/DIAG INJ IV PUSH: CPT

## 2024-06-21 PROCEDURE — A9270 NON-COVERED ITEM OR SERVICE: HCPCS | Performed by: HOSPITALIST

## 2024-06-21 PROCEDURE — 85025 COMPLETE CBC W/AUTO DIFF WBC: CPT

## 2024-06-21 PROCEDURE — 87077 CULTURE AEROBIC IDENTIFY: CPT

## 2024-06-21 PROCEDURE — 99223 1ST HOSP IP/OBS HIGH 75: CPT | Mod: 25,AI | Performed by: HOSPITALIST

## 2024-06-21 PROCEDURE — 81001 URINALYSIS AUTO W/SCOPE: CPT

## 2024-06-21 PROCEDURE — 87040 BLOOD CULTURE FOR BACTERIA: CPT

## 2024-06-21 PROCEDURE — 99285 EMERGENCY DEPT VISIT HI MDM: CPT

## 2024-06-21 PROCEDURE — 770001 HCHG ROOM/CARE - MED/SURG/GYN PRIV*

## 2024-06-21 PROCEDURE — 97165 OT EVAL LOW COMPLEX 30 MIN: CPT

## 2024-06-21 PROCEDURE — 94760 N-INVAS EAR/PLS OXIMETRY 1: CPT

## 2024-06-21 PROCEDURE — 700111 HCHG RX REV CODE 636 W/ 250 OVERRIDE (IP): Mod: JZ | Performed by: STUDENT IN AN ORGANIZED HEALTH CARE EDUCATION/TRAINING PROGRAM

## 2024-06-21 PROCEDURE — 97162 PT EVAL MOD COMPLEX 30 MIN: CPT

## 2024-06-21 PROCEDURE — 99497 ADVNCD CARE PLAN 30 MIN: CPT | Performed by: HOSPITALIST

## 2024-06-21 PROCEDURE — 96375 TX/PRO/DX INJ NEW DRUG ADDON: CPT

## 2024-06-21 PROCEDURE — 700105 HCHG RX REV CODE 258: Performed by: STUDENT IN AN ORGANIZED HEALTH CARE EDUCATION/TRAINING PROGRAM

## 2024-06-21 PROCEDURE — 87086 URINE CULTURE/COLONY COUNT: CPT

## 2024-06-21 PROCEDURE — 74176 CT ABD & PELVIS W/O CONTRAST: CPT

## 2024-06-21 PROCEDURE — 700102 HCHG RX REV CODE 250 W/ 637 OVERRIDE(OP): Performed by: HOSPITALIST

## 2024-06-21 RX ORDER — SODIUM CHLORIDE, SODIUM LACTATE, POTASSIUM CHLORIDE, AND CALCIUM CHLORIDE .6; .31; .03; .02 G/100ML; G/100ML; G/100ML; G/100ML
500 INJECTION, SOLUTION INTRAVENOUS
Status: DISCONTINUED | OUTPATIENT
Start: 2024-06-21 | End: 2024-06-30 | Stop reason: HOSPADM

## 2024-06-21 RX ORDER — DONEPEZIL HYDROCHLORIDE 5 MG/1
10 TABLET, FILM COATED ORAL NIGHTLY
Status: DISCONTINUED | OUTPATIENT
Start: 2024-06-21 | End: 2024-06-21

## 2024-06-21 RX ORDER — ONDANSETRON 4 MG/1
4 TABLET, ORALLY DISINTEGRATING ORAL EVERY 4 HOURS PRN
Status: DISCONTINUED | OUTPATIENT
Start: 2024-06-21 | End: 2024-06-30 | Stop reason: HOSPADM

## 2024-06-21 RX ORDER — THYROID 30 MG/1
45 TABLET ORAL
Status: DISCONTINUED | OUTPATIENT
Start: 2024-06-21 | End: 2024-06-21

## 2024-06-21 RX ORDER — FOLIC ACID 1 MG/1
1 TABLET ORAL DAILY
Status: DISCONTINUED | OUTPATIENT
Start: 2024-06-21 | End: 2024-06-30 | Stop reason: HOSPADM

## 2024-06-21 RX ORDER — CEFTRIAXONE 2 G/1
2000 INJECTION, POWDER, FOR SOLUTION INTRAMUSCULAR; INTRAVENOUS ONCE
Status: COMPLETED | OUTPATIENT
Start: 2024-06-21 | End: 2024-06-21

## 2024-06-21 RX ORDER — MEMANTINE HYDROCHLORIDE 10 MG/1
10 TABLET ORAL 2 TIMES DAILY
Status: DISCONTINUED | OUTPATIENT
Start: 2024-06-21 | End: 2024-06-21

## 2024-06-21 RX ORDER — DONEPEZIL HYDROCHLORIDE 5 MG/1
10 TABLET, FILM COATED ORAL DAILY
Status: DISCONTINUED | OUTPATIENT
Start: 2024-06-22 | End: 2024-06-30 | Stop reason: HOSPADM

## 2024-06-21 RX ORDER — DONEPEZIL HYDROCHLORIDE 5 MG/1
10 TABLET, FILM COATED ORAL DAILY
Status: DISCONTINUED | OUTPATIENT
Start: 2024-06-22 | End: 2024-06-21

## 2024-06-21 RX ORDER — POTASSIUM CHLORIDE 20 MEQ/1
20 TABLET, EXTENDED RELEASE ORAL ONCE
Status: COMPLETED | OUTPATIENT
Start: 2024-06-21 | End: 2024-06-21

## 2024-06-21 RX ORDER — ACETAMINOPHEN 325 MG/1
650 TABLET ORAL EVERY 6 HOURS PRN
Status: DISCONTINUED | OUTPATIENT
Start: 2024-06-21 | End: 2024-06-30 | Stop reason: HOSPADM

## 2024-06-21 RX ORDER — MEMANTINE HYDROCHLORIDE 10 MG/1
10 TABLET ORAL 2 TIMES DAILY
Status: DISCONTINUED | OUTPATIENT
Start: 2024-06-21 | End: 2024-06-30 | Stop reason: HOSPADM

## 2024-06-21 RX ORDER — SODIUM CHLORIDE 9 MG/ML
1000 INJECTION, SOLUTION INTRAVENOUS ONCE
Status: COMPLETED | OUTPATIENT
Start: 2024-06-21 | End: 2024-06-21

## 2024-06-21 RX ORDER — ONDANSETRON 2 MG/ML
4 INJECTION INTRAMUSCULAR; INTRAVENOUS EVERY 4 HOURS PRN
Status: DISCONTINUED | OUTPATIENT
Start: 2024-06-21 | End: 2024-06-30 | Stop reason: HOSPADM

## 2024-06-21 RX ORDER — THYROID 30 MG/1
45 TABLET ORAL
Status: DISCONTINUED | OUTPATIENT
Start: 2024-06-21 | End: 2024-06-30 | Stop reason: HOSPADM

## 2024-06-21 RX ADMIN — FENTANYL CITRATE 50 MCG: 50 INJECTION, SOLUTION INTRAMUSCULAR; INTRAVENOUS at 01:42

## 2024-06-21 RX ADMIN — SODIUM CHLORIDE 1000 ML: 9 INJECTION, SOLUTION INTRAVENOUS at 02:21

## 2024-06-21 RX ADMIN — CEFTRIAXONE SODIUM 2000 MG: 2 INJECTION, POWDER, FOR SOLUTION INTRAMUSCULAR; INTRAVENOUS at 02:21

## 2024-06-21 RX ADMIN — POTASSIUM CHLORIDE 20 MEQ: 1500 TABLET, EXTENDED RELEASE ORAL at 06:07

## 2024-06-21 RX ADMIN — MEMANTINE HYDROCHLORIDE 10 MG: 10 TABLET ORAL at 21:47

## 2024-06-21 RX ADMIN — THYROID 45 MG: 30 TABLET ORAL at 09:47

## 2024-06-21 RX ADMIN — FOLIC ACID 1 MG: 1 TABLET ORAL at 06:07

## 2024-06-21 RX ADMIN — MEMANTINE HYDROCHLORIDE 10 MG: 10 TABLET ORAL at 09:46

## 2024-06-21 ASSESSMENT — LIFESTYLE VARIABLES
ON A TYPICAL DAY WHEN YOU DRINK ALCOHOL HOW MANY DRINKS DO YOU HAVE: 0
ALCOHOL_USE: NO
CONSUMPTION TOTAL: NEGATIVE
HAVE PEOPLE ANNOYED YOU BY CRITICIZING YOUR DRINKING: NO
TOTAL SCORE: 0
EVER FELT BAD OR GUILTY ABOUT YOUR DRINKING: NO
TOTAL SCORE: 0
HAVE YOU EVER FELT YOU SHOULD CUT DOWN ON YOUR DRINKING: NO
EVER HAD A DRINK FIRST THING IN THE MORNING TO STEADY YOUR NERVES TO GET RID OF A HANGOVER: NO
AVERAGE NUMBER OF DAYS PER WEEK YOU HAVE A DRINK CONTAINING ALCOHOL: 0
HOW MANY TIMES IN THE PAST YEAR HAVE YOU HAD 5 OR MORE DRINKS IN A DAY: 0
TOTAL SCORE: 0

## 2024-06-21 ASSESSMENT — PAIN SCALES - PAIN ASSESSMENT IN ADVANCED DEMENTIA (PAINAD)
TOTALSCORE: 2
FACIALEXPRESSION: SMILING OR INEXPRESSIVE
BREATHING: NORMAL
TOTALSCORE: 2
BREATHING: NORMAL
FACIALEXPRESSION: SMILING OR INEXPRESSIVE
BREATHING: NORMAL
CONSOLABILITY: NO NEED TO CONSOLE
BODYLANGUAGE: TENSE, DISTRESSED PACING, FIDGETING
TOTALSCORE: 2
FACIALEXPRESSION: SMILING OR INEXPRESSIVE
CONSOLABILITY: NO NEED TO CONSOLE
BODYLANGUAGE: TENSE, DISTRESSED PACING, FIDGETING
NEGVOCALIZATION: OCCASIONAL MOAN/GROAN, LOW SPEECH, NEGATIVE/DISAPPROVING QUALITY
BREATHING: NORMAL
BODYLANGUAGE: RELAXED
CONSOLABILITY: NO NEED TO CONSOLE
NEGVOCALIZATION: OCCASIONAL MOAN/GROAN, LOW SPEECH, NEGATIVE/DISAPPROVING QUALITY
NEGVOCALIZATION: OCCASIONAL MOAN/GROAN, LOW SPEECH, NEGATIVE/DISAPPROVING QUALITY
FACIALEXPRESSION: SMILING OR INEXPRESSIVE
TOTALSCORE: 0
BODYLANGUAGE: TENSE, DISTRESSED PACING, FIDGETING
CONSOLABILITY: NO NEED TO CONSOLE

## 2024-06-21 ASSESSMENT — ENCOUNTER SYMPTOMS
VOMITING: 0
WEAKNESS: 0
INSOMNIA: 0
SHORTNESS OF BREATH: 0
PALPITATIONS: 0
MYALGIAS: 0
DOUBLE VISION: 0
BRUISES/BLEEDS EASILY: 0
DIZZINESS: 0
HEADACHES: 0
FLANK PAIN: 1
FEVER: 0
SORE THROAT: 0
NAUSEA: 0
DEPRESSION: 0
COUGH: 0
BLURRED VISION: 0
NECK PAIN: 0

## 2024-06-21 ASSESSMENT — COGNITIVE AND FUNCTIONAL STATUS - GENERAL
CLIMB 3 TO 5 STEPS WITH RAILING: A LOT
MOVING TO AND FROM BED TO CHAIR: A LITTLE
DRESSING REGULAR LOWER BODY CLOTHING: A LITTLE
STANDING UP FROM CHAIR USING ARMS: A LITTLE
MOBILITY SCORE: 17
DAILY ACTIVITIY SCORE: 18
WALKING IN HOSPITAL ROOM: A LITTLE
TURNING FROM BACK TO SIDE WHILE IN FLAT BAD: A LITTLE
TOILETING: A LITTLE
DRESSING REGULAR UPPER BODY CLOTHING: A LITTLE
EATING MEALS: A LITTLE
MOVING FROM LYING ON BACK TO SITTING ON SIDE OF FLAT BED: A LITTLE
SUGGESTED CMS G CODE MODIFIER DAILY ACTIVITY: CK
HELP NEEDED FOR BATHING: A LITTLE
PERSONAL GROOMING: A LITTLE
SUGGESTED CMS G CODE MODIFIER MOBILITY: CK

## 2024-06-21 ASSESSMENT — PATIENT HEALTH QUESTIONNAIRE - PHQ9
2. FEELING DOWN, DEPRESSED, IRRITABLE, OR HOPELESS: NOT AT ALL
1. LITTLE INTEREST OR PLEASURE IN DOING THINGS: NOT AT ALL
SUM OF ALL RESPONSES TO PHQ9 QUESTIONS 1 AND 2: 0

## 2024-06-21 ASSESSMENT — GAIT ASSESSMENTS
GAIT LEVEL OF ASSIST: CONTACT GUARD ASSIST
DISTANCE (FEET): 15
ASSISTIVE DEVICE: HAND HELD ASSIST

## 2024-06-21 ASSESSMENT — PAIN DESCRIPTION - PAIN TYPE
TYPE: ACUTE PAIN

## 2024-06-21 ASSESSMENT — FIBROSIS 4 INDEX: FIB4 SCORE: 1.72

## 2024-06-21 ASSESSMENT — ACTIVITIES OF DAILY LIVING (ADL): TOILETING: REQUIRES ASSIST

## 2024-06-21 NOTE — PROGRESS NOTES
4 Eyes Skin Assessment Completed by ANDREW CAAL and ANDREW Hamlin.    Head WDL  Ears WDL  Nose WDL  Mouth WDL  Neck WDL  Breast/Chest WDL  Shoulder Blades WDL  Spine WDL  (R) Arm/Elbow/Hand Bruising, long fingernails  (L) Arm/Elbow/Hand Bruising, long fingernails  Abdomen WDL  Groin Redness and Blanching  Scrotum/Coccyx/Buttocks Redness and Blanching  (R) Leg Dry, Flaky  (L) Leg Dry, Flaky  (R) Heel/Foot/Toe Dry, Flaky, long nails  (L) Heel/Foot/Toe Dry, Flaky, long nails          Devices In Places Pulse Ox      Interventions In Place Waffle Overlay, TAP System, and Barrier Cream    Possible Skin Injury No    Pictures Uploaded Into Epic N/A  Wound Consult Placed N/A  RN Wound Prevention Protocol Ordered Yes

## 2024-06-21 NOTE — CARE PLAN
The patient is Stable - Low risk of patient condition declining or worsening    Shift Goals  Clinical Goals: Pt will remain free from falls or injury throughout the shift.  Patient Goals: get warm blanket and rest  Family Goals: pt to get better    Progress made toward(s) clinical / shift goals:  Pt confused and observed to have hallucinations. Warm blanket provided. Fall precautions in place. Pt needs max assistance and 1:1 feeding. Pt is free from falls or injury throughout the shift. Hourly rounding in progress.     Patient is not progressing towards the following goals: n/a

## 2024-06-21 NOTE — ASSESSMENT & PLAN NOTE
Per previous hospitalist:  -Patient takes memantine and will not pursue.  Family requested medications to be given on specific timing.  She takes Namenda 10 mg twice a day at 10 AM and 10 PM and donepezil at 10 PM.  I called pharmacy and medications times are adjusted per request.    6/29:  Placement vs home with  tomorrow.

## 2024-06-21 NOTE — DISCHARGE PLANNING
Case Management Discharge Planning    Admission Date: 6/21/2024  GMLOS: 2.9  ALOS: 0    6-Clicks ADL Score:    6-Clicks Mobility Score:    PT and/or OT Eval ordered: Yes  Post-acute Referrals Ordered: No  Post-acute Choice Obtained: NA  Has referral(s) been sent to post-acute provider:  DUT[facility]      Anticipated Discharge Dispo: Discharge Disposition: Discharged to home/self care (01)  Discharge Address: [address] [place], [place] [address]  Discharge Contact Phone Number: [phone]    DME Needed: No    Action(s) Taken: DC Assessment Complete (See below)    Pt's  reported she is scheduled for surgery to remove a stent and kidney stones on 7/5/24, and a pre-op appointment on 7/1/24. Pt's  expressed concern regarding if pt will be able to have surgery.   LSW notified Dr of pt's concern.     Escalations Completed: None    Medically Clear: No    Next Steps: LSW to follow    Barriers to Discharge: Medical clearance    Is the patient up for discharge tomorrow: No          Care Transition Team Assessment    LSW spoke with pt's  via phone to complete discharge planning assessment.     Pt's  reported they currently live in an apartment on the second floor.   Pt's preferred pharmacy is [facility] [address] [facility].   Pt's  reported he helps her get dressed and feeds her, and stated he also helps her get in and out of the shower.   Pt's  reported pt is blind.   Pt's  reported she has no DME at baseline.   Pt was hospitalized approx a couple of weeks ago.    Information Source  Orientation Level: Disoriented to place, Disoriented to time, Disoriented to situation  Who is responsible for making decisions for patient? : Spouse  Name(s) of Primary Decision Maker: Robbie Man    Readmission Evaluation  Is this a readmission?: Yes - unplanned readmission    Elopement Risk  Legal Hold: No  Ambulatory or Self Mobile in Wheelchair: No-Not an Elopement Risk  Elopement Risk: Not at Risk for  Elopement    Discharge Preparedness  What is your plan after discharge?: Home with help  What are your discharge supports?: Spouse  Prior Functional Level: Needs Assist with Activities of Daily Living, Needs Assist with Medication Management, Ambulatory  Difficulity with ADLs: Bathing, Dressing, Eating  Difficulity with IADLs: Cooking, Driving, Managing medication    Functional Assesment  Prior Functional Level: Needs Assist with Activities of Daily Living, Needs Assist with Medication Management, Ambulatory    Finances  Financial Barriers to Discharge: No  Prescription Coverage: Yes    Vision / Hearing Impairment  Vision Impairment : Yes  Right Eye Vision: Impaired  Left Eye Vision: Impaired  Hearing Impairment : No    Advance Directive  Advance Directive?: DPOA for Health Care  Durable Power of  Name and Contact : Robbie Man [phone]    Domestic Abuse  Have you ever been the victim of abuse or violence?: No    Psychological Assessment  History of Substance Abuse: None  History of Psychiatric Problems: No  Non-compliant with Treatment: No  Newly Diagnosed Illness: No    Discharge Risks or Barriers  Discharge risks or barriers?: Complex medical needs    Anticipated Discharge Information  Discharge Disposition: Discharged to home/self care (01)  Discharge Address: [address] [place] [address]  Discharge Contact Phone Number: [phone]

## 2024-06-21 NOTE — THERAPY
Physical Therapy   Initial Evaluation     Patient Name: Thuy Albert  Age:  73 y.o., Sex:  female  Medical Record #: [id number]  Today's Date: 6/21/2024     Precautions  Precautions: Fall Risk  Comments: Severe dementia    Assessment  Patient is 73 y.o. female with a diagnosis of complicated UTI. PMH: Legally blind, s/p cystoscopy and ureteral stenting, dementia/Alzheimer's, pelvic pain. Pt is not A&O, has difficulty with initiation, sequencing, new learning, and presents with pain and vision impairments. Due to these deficits, pt has impaired bed mobility, functional ambulation, and stair navigation. Pt was given VC, TC, and facilitation in order to perform bed mobility with min/modA. Pt required HHA for ambulation to transfer to toilet and was given VC. Pt significant other stated that he would be able to assist with all functional mobility and would not like to put pt in SNF at this time though recommended for safety concerns. Pt would continue to benefit from skilled acute PT at this time. Recommend post-acute placement for additional physical therapy services prior to discharge home but per pt significant other/caregiver, would prefer to take pt home with HH.    Plan    Physical Therapy Initial Treatment Plan   Treatment Plan : Bed Mobility, Gait Training, Neuro Re-Education / Balance, Self Care / Home Evaluation, Stair Training, Therapeutic Activities, Therapeutic Exercise  Treatment Frequency: 3 Times per Week  Duration: Until Therapy Goals Met    DC Equipment Recommendations: Unable to determine at this time  Discharge Recommendations: Recommend post-acute placement for additional physical therapy services prior to discharge home (but per pt significant other/caregiver, would prefer to take pt home with HH.)       Objective       06/21/24 1508   Initial Contact Note    Initial Contact Note Order Received and Verified, Physical Therapy Evaluation in Progress with Full Report to Follow.   Precautions  "  Precautions Fall Risk   Comments Severe dementia   Pain 0 - 10 Group   Therapist Pain Assessment Prior to Activity;0;During Activity;Post Activity;Nurse Notified  (min/mod glute area)   Prior Living Situation   Prior Services Continuous (24 Hour) Care Giving Family   Housing / Facility 1 Story Apartment / Condo   Steps Into Home 15   Steps In Home 0   Rail Right Rail (Steps into Home)   Bathroom Set up Bathtub / Shower Combination   Equipment Owned None   Lives with - Patient's Self Care Capacity Significant Other   Comments Pt sig other has significant pain in hip and utilizes cane in order to ambulate.   Prior Level of Functional Mobility   Bed Mobility Required Assist   Transfer Status Required Assist   Ambulation Required Assist   Ambulation Distance Household   Assistive Devices Used None  (Handheld)   Stairs Required Assist   Comments Pt sig other reports helping with all mobility at home   History of Falls   History of Falls Yes   Date of Last Fall   (\"She almost fell at 1am this morning when I was bringing her to the hospital. I had to catch her.\" -Pt sig other)   Cognition    Cognition / Consciousness X   Orientation Level Other (Comment)  (Severely demented)   Level of Consciousness Alert   Ability To Follow Commands 1 Step   Safety Awareness Impaired   New Learning Impaired   Sequencing Impaired   Initiation Impaired   Passive ROM Lower Body   Passive ROM Lower Body WDL   Active ROM Lower Body    Active ROM Lower Body  WDL   Strength Lower Body   Lower Body Strength  X   Gross Strength Generalized Weakness, Equal Bilaterally   Vision   Vision Comments Legally blind   Balance Assessment   Sitting Balance (Static) Fair +   Sitting Balance (Dynamic) Fair   Standing Balance (Static) Fair -   Standing Balance (Dynamic) Fair -   Weight Shift Sitting Fair   Weight Shift Standing Poor   Bed Mobility    Supine to Sit Standby Assist   Sit to Supine Minimal Assist   Scooting Minimal Assist   Gait Analysis   Gait " Level Of Assist Contact Guard Assist  (Hand held assist)   Assistive Device Hand Held Assist   Distance (Feet) 15   # of Times Distance was Traveled 2   Deviation Antalgic;Decreased Base Of Support;Step To;Bradykinetic;Shuffled Gait;Decreased Heel Strike;Decreased Toe Off   # of Stairs Climbed 0   Weight Bearing Status FWB   Functional Mobility   Sit to Stand Standby Assist   Bed, Chair, Wheelchair Transfer Contact Guard Assist   Toilet Transfers Contact Guard Assist   Transfer Method Stand Step   Mobility bed mobility, STS, ambulation, transfer to toilet   6 Clicks Assessment - How much HELP from from another person do you currently need... (If the patient hasn't done an activity recently, how much help from another person do you think he/she would need if he/she tried?)   Turning from your back to your side while in a flat bed without using bedrails? 3   Moving from lying on your back to sitting on the side of a flat bed without using bedrails? 3   Moving to and from a bed to a chair (including a wheelchair)? 3   Standing up from a chair using your arms (e.g., wheelchair, or bedside chair)? 3   Walking in hospital room? 3   Climbing 3-5 steps with a railing? 2   6 clicks Mobility Score 17   Activity Tolerance   Sitting Edge of Bed 3min   Standing 8min   Edema / Skin Assessment   Edema / Skin  Not Assessed   Patient / Family Goals    Patient / Family Goal #1 Home   Short Term Goals    Short Term Goal # 1 Pt will be able to perform all bed mobility Russell in order to perform bed mobility upon dc by end of 6 visits   Short Term Goal # 2 Pt will be able to ambulate 150ft with HHA in order to ambulate household distances at home by end of 6 visits   Short Term Goal # 3 Pt will be able to navigate 2 stairs with R handrail/HHA in order to begin navigating stairs at home by end of 6 visits   Education Group   Education Provided Role of Physical Therapist;Gait Training;Exercises - Seated;Exercises - Supine   Role of Physical  Therapist Patient Response Patient;Family;Acceptance;Explanation;Demonstration;Verbal Demonstration   Gait Training Patient Response Patient;Family;Acceptance;Explanation;Demonstration;Action Demonstration;Reinforcement Needed   Exercises - Supine Patient Response Patient;Family;Acceptance;Explanation;Demonstration;Action Demonstration;Reinforcement Needed   Exercises - Seated Patient Response Patient;Family;Acceptance;Explanation;Demonstration;Action Demonstration;Reinforcement Needed   Physical Therapy Initial Treatment Plan    Treatment Plan  Bed Mobility;Gait Training;Neuro Re-Education / Balance;Self Care / Home Evaluation;Stair Training;Therapeutic Activities;Therapeutic Exercise   Treatment Frequency 3 Times per Week   Duration Until Therapy Goals Met   Problem List    Problems Pain;Impaired Bed Mobility;Impaired Transfers;Impaired Ambulation;Functional ROM Deficit;Functional Strength Deficit;Impaired Balance;Impaired Vision;Decreased Activity Tolerance   Anticipated Discharge Equipment and Recommendations   DC Equipment Recommendations Unable to determine at this time   Discharge Recommendations Recommend post-acute placement for additional physical therapy services prior to discharge home  (but per pt significant other/caregiver, would prefer to take pt home with HH.)   Interdisciplinary Plan of Care Collaboration   IDT Collaboration with  Nursing   Patient Position at End of Therapy In Bed;Bed Alarm On;Call Light within Reach;Tray Table within Reach;Phone within Reach;Family / Friend in Room   Collaboration Comments Aware of session and recs   Session Information   Date / Session Number  6/21-1 (1/3, 6/27)

## 2024-06-21 NOTE — ASSESSMENT & PLAN NOTE
-WBC is 11.1  Source is controlled, okay to stop antibiotics, discussed with urology yesterday  Monitor for 1 more day in the hospital and if afebrile, plan to dc tomorrow.

## 2024-06-21 NOTE — ED PROVIDER NOTES
CHIEF COMPLAINT  No chief complaint on file.      LIMITATION TO HISTORY   Select: History of Alzheimer's    HPI    Thuy Albert is a 73 y.o. female who presents to the Emergency Department evaluation of the left lower abdominal pain which is started this evening.  She was recently seen and admitted for an obstructing ureteral like the cyst status post cystoscopy and ureteral stenting.  Was then discharged home on oral antibiotics which she has been compliant with.  Denies any dysuria increased urinary frequency urgency. Denies any diarrhea or bloody stools mucousy stools.  No fevers.    OUTSIDE HISTORIAN(S):  Select:  stated that the patient had return and worsening flank pain prompting the visit to the ER states she is at her baseline mentation and mobility    EXTERNAL RECORDS REVIEWED  Select: Other discharge summary 6/14/2024 patient was recently seen and admitted for an infected ureteral like the cyst status post stenting, is oral and oral Ceftin and      PAST MEDICAL HISTORY  Past Medical History:   Diagnosis Date    Arthritis     Asthma     Dementia (HCC)     DVT (deep venous thrombosis) (Formerly [facility]) 2003    DVT LEFT LEG-- STATES SHE HAD TAKEN COUMADIN FOR 8 WEEKS IN 2003.    GERD (gastroesophageal reflux disease)     Thyroid disease     hypothyroidism     .    SURGICAL HISTORY  Past Surgical History:   Procedure Laterality Date    VT CYSTOSCOPY,INSERT URETERAL STENT Left 6/10/2024    Procedure: CYSTOSCOPY, WITH URETERAL STENT INSERTION;  Surgeon: Arturo Watson M.D.;  Location: [facility];  Service: Urology    APPENDECTOMY LAPAROSCOPIC  8/1/2011    Performed by JJ QUINTANILLA at [facility] ORS    APPENDECTOMY      CHOLECYSTECTOMY      OTHER ABDOMINAL SURGERY      ksenia    TONSILLECTOMY           FAMILY HISTORY  Family History   Problem Relation Age of Onset    Hypertension Sister     Diabetes Sister     Heart Disease Sister     Other Sister         loss vision complete     Heart Disease Brother 55        MI    Genetic Disorder Mother     Stroke Father     No Known Problems Maternal Grandmother     No Known Problems Maternal Grandfather     No Known Problems Paternal Grandmother     No Known Problems Paternal Grandfather           SOCIAL HISTORY  Social History     Socioeconomic History    Marital status:      Spouse name: Not on file    Number of children: Not on file    Years of education: Not on file    Highest education level: Some college, no degree   Occupational History    Occupation: other     Comment:  and , 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   Vaping Use    Vaping status: Never Used   Substance and Sexual Activity    Alcohol use: No     Alcohol/week: 0.0 oz    Drug use: No    Sexual activity: Never   Other Topics Concern    Not on file   Social History Narrative    Not on file     Social Determinants of Health     Financial Resource Strain: Medium Risk (2023)    Overall Financial Resource Strain (CARDIA)     Difficulty of Paying Living Expenses: Somewhat hard   Food Insecurity: No Food Insecurity (2024)    Hunger Vital Sign     Worried About Running Out of Food in the Last Year: Never true     Ran Out of Food in the Last Year: Never true   Transportation Needs: No Transportation Needs (2024)    PRAPARE - Transportation     Lack of Transportation (Medical): No     Lack of Transportation (Non-Medical): No   Physical Activity: Inactive (2023)    Exercise Vital Sign     Days of Exercise per Week: 0 days     Minutes of Exercise per Session: 10 min   Stress: No Stress Concern Present (2023)    Citizen of [place] of  Occupational Health - Occupational Stress Questionnaire     Feeling of Stress : Not at all   Social Connections: Socially Isolated (6/25/2023)    Social Connection and Isolation Panel [NHANES]     Frequency of Communication with Friends and Family: Never     Frequency of Social Gatherings with Friends and Family: Never     Attends [employer]ist Services: Never     Active Member of Clubs or Organizations: No     Attends Club or Organization Meetings: Never     Marital Status: Living with partner   Intimate Partner Violence: Not At Risk (6/11/2024)    Humiliation, Afraid, Rape, and Kick questionnaire     Fear of Current or Ex-Partner: No     Emotionally Abused: No     Physically Abused: No     Sexually Abused: No   Housing Stability: Low Risk  (6/11/2024)    Housing Stability Vital Sign     Unable to Pay for Housing in the Last Year: No     Number of Places Lived in the Last Year: 1     Unstable Housing in the Last Year: No         CURRENT MEDICATIONS  No current facility-administered medications on file prior to encounter.     Current Outpatient Medications on File Prior to Encounter   Medication Sig Dispense Refill    cefUROXime (CEFTIN) 250 MG Tab Take 1 Tablet by mouth every 12 hours for 14 days. 28 Tablet 0    thyroid (ARMOUR THYROID) 15 MG Tab Take 3 Tablets by mouth every day. 270 Tablet 3    rosuvastatin (CRESTOR) 10 MG Tab Take 1 Tablet by mouth every evening. (Patient not taking: Reported on 6/17/2024) 90 Tablet 3    B Complex Vitamins (B COMPLEX PO) Take 1 Tablet by mouth every day.      Multiple Vitamins-Minerals (ZINC PO) Take 1 Tablet by mouth every day.      Bismuth Subsalicylate (PEPTO-BISMOL PO) Take 30 mL by mouth 2 times a day as needed (For upset stomach).      Cyanocobalamin (VITAMIN B-12 PO) Take 1 Tablet by mouth every day.      Ascorbic Acid (VITAMIN C PO) Take 1 Tablet by mouth every day.      memantine (NAMENDA) 10 MG Tab Take 1 Tablet by mouth 2 times a day. 180 Tablet 3    donepezil (ARICEPT)  "10 MG tablet Take 1 Tablet by mouth every day. (Patient taking differently: Take 10 mg by mouth every evening.) 90 Tablet 3    folic acid (FOLVITE) 1 MG Tab Take 1 Tablet by mouth every day. 90 Tablet 1           ALLERGIES  Allergies   Allergen Reactions    Cephalexin Rash     rash    Nabumetone      rash    Synthroid [Levothroid]      Nausea      Tetracycline Swelling       PHYSICAL EXAM  VITAL SIGNS:Temp (!) 35.7 °C (96.3 °F) (Temporal)   Ht 1.549 m (5' 1\")   BMI 28.74 kg/m²       VITALS - vital signs documented prior to this note have been reviewed and noted,  GENERAL - awake, alert, oriented, GCS 15, no apparent distress, non-toxic  appearing  HEENT - normocephalic, atraumatic, pupils equal, sclera anicteric, mucus  membranes moist  NECK - supple, no meningismus, full active range of motion, trachea midline  CARDIOVASCULAR - regular rate/rhythm, no murmurs/gallops/rubs  PULMONARY - no respiratory distress, speaking in full sentences, clear to  auscultation bilaterally, no wheezing/ronchi/rales, no accessory muscle use  GASTROINTESTINAL - Mild tenderness with palpation of the left lower quadrant otherwise soft, non-tender, non-distended, no rebound, guarding,  or peritonitis  GENITOURINARY - Deferred  NEUROLOGIC - Awake alert, normal mental status, speech fluid, cognition  normal, moves all extremities  MUSCULOSKELETAL - no obvious asymmetry or deformities present  EXTREMITIES - warm, well-perfused, no cyanosis or significant edema  DERMATOLOGIC - warm, dry, no rashes, no jaundice  PSYCHIATRIC - normal affect, normal insight, normal concentration    DIAGNOSTIC STUDIES / PROCEDURES      LABS  Labs Reviewed   CBC WITH DIFFERENTIAL   COMP METABOLIC PANEL   URINALYSIS       Urinalysis is nitrate positive, there is a few bacteria, concerning for possible urinary tract infection.  Does have a leukocytosis though there is no evidence of endorgan dysfunction, not consistent with sepsis    RADIOLOGY  I have " independently interpreted the diagnostic imaging associated with this visit and am waiting the final reading from the radiologist.   My preliminary interpretation is as follows: CT renal colic study does not reveal any significant hydronephrosis.      Radiologist interpretation:   No orders to display        COURSE & MEDICAL DECISION MAKING    ED COURSE:        INTERVENTIONS BY ME:  Medications   fentaNYL (Sublimaze) injection 50 mcg (has no administration in time range)       Response on recheck:  is power of  at bedside is agreeable for admission..        Sepsis: Infection was suspected 0200 (Time). Sepsis pathway was initiated. Fluids not needed (no hypotension or lactate greater than or equal to 4). Antibiotics were given per protocol.     INITIAL ASSESSMENT, COURSE AND PLAN  Care Narrative: Patient presented for evaluation of left flank pain which started this evening.  Was recently seen and treated for an obstructing a ureteral stone had a ureteral stent placed at that time.  Was discharged home after she improved.  She was discharged on antibiotics has been compliant with them.  By repeat labs today show a leukocytosis to 15.9 with a left shift  was septic workup was initiated was remarkable for a lactic acid of 3.7.  Her urinalysis is positive for nitrates with moderate leukocyte Estrace and rare bacteria  in the setting of the leukocytosis and lactic acidosis is concerning for likely urinary tract infection.  Did repeat her CT renal colic study which showed the stent in good position with no significant obstructive uropathy.  Previously visualized ureteral stones were not appreciated on the scan today.  Urology was consulted Dr. Green quickly returned the page. Plan was made for admission and antibiotics.  Patient was given a dose of Rocephin, spoke with Dr. Scott who graciously agreed to accept her service patient is admitted in stable condition.             ADDITIONAL PROBLEM LIST  History of  dementia  DISPOSITION AND DISCUSSIONS  I have discussed management of the patient with the following physicians and ANITA's: Dr. Scott, Dr. Green    Decision tools and prescription drugs considered including, but not limited to: Antibiotics   .    FINAL DIAGNOSIS  1 urinary tract infection  2.  Leukocytosis  3.  Left lower quadrant abdominal pain  4.  Dehydration  5.  Lactic acidosis       Electronically signed by: Jorge Dimas DO ,1:21 AM 06/21/24

## 2024-06-21 NOTE — PROGRESS NOTES
Med Rec completed   Allergies reviewed    Patient started a 14 day course of Ceftin on 6/14/2024    Patient is not taking anticoagulants

## 2024-06-21 NOTE — PROGRESS NOTES
4 Eyes Skin Assessment Completed by ANDREW Oliver and ANDREW Alejandro.    Head WDL  Ears WDL  Nose WDL  Mouth WDL  Neck WDL  Breast/Chest WDL  Shoulder Blades WDL  Spine WDL  (R) Arm/Elbow/Hand WDL  (L) Arm/Elbow/Hand WDL  Abdomen WDL  Groin WDL  Scrotum/Coccyx/Buttocks Redness and Blanching  (R) Leg WDL  (L) Leg WDL  (R) Heel/Foot/Toe WDL  (L) Heel/Foot/Toe WDL          Devices In Places [place]      Interventions In Place Waffle Overlay, TAP System, and Pillows    Possible Skin Injury No    Pictures Uploaded Into Epic N/A  Wound Consult Placed N/A  RN Wound Prevention Protocol Ordered No

## 2024-06-21 NOTE — CARE PLAN
Problem: Knowledge Deficit - Standard  Goal: Patient and family/care givers will demonstrate understanding of plan of care, disease process/condition, diagnostic tests and medications  Outcome: Progressing     Problem: Skin Integrity  Goal: Skin integrity is maintained or improved  Outcome: Progressing     Problem: Fall Risk  Goal: Patient will remain free from falls  Outcome: Progressing     The patient is Stable - Low risk of patient condition declining or worsening    Shift Goals  Clinical Goals: Pt will remain free from falls or injury throughout shift  Patient Goals: SPRING  Family Goals: n/a    Progress made toward(s) clinical / shift goals:  Pt remained free from falls throughout shift. Pt's spouse included in discharge planning and care planning.     Patient is not progressing towards the following goals: n/a

## 2024-06-21 NOTE — ASSESSMENT & PLAN NOTE
Per previous hospitalist  -She takes [place] Thyroid.  Family requested medication to be given at 9 AM which I accommodated with pharmacy over the phone.  -Her last TSH check was in February and 0.78.

## 2024-06-21 NOTE — PROGRESS NOTES
Received report from ED nurse on duty.   Pt arrived via gurney, admitted to room 208 from ED at 0425. Pt assisted to bed and placed on comfortable position.   Pt is confused, oriented to self. Pt on room air; not in respiratory distress. Pt denies pain and 0/10 on PAINAD scale. Oriented to room and call light. Needs attended well. Fall precaution in place. Bed locked. Bet at lowest position. Call light within reach. Bed alarm and strip alarm on. Encouraged pt the importance to call for assistance.   Pt able to answer some of the admitting questions.  Hourly rounding in progress.

## 2024-06-21 NOTE — PROGRESS NOTES
"This RN to bedside to assess pt, during assessment pt stated \"my whole family hates everything, but especially me,\" and \"my family hurts me.\" When asked to clarify these statements pt stated: \"I have a little dog. I used to have a kitten, but my family shredded it.\" Hospitalist and Case Management updated of the above; Case Management consult pending.  "

## 2024-06-21 NOTE — ASSESSMENT & PLAN NOTE
-She takes [place] Thyroid.  Family requested medication to be given at 9 AM which I accommodated with pharmacy over the phone.

## 2024-06-21 NOTE — ASSESSMENT & PLAN NOTE
-Inpatient status to medical floor.  -Recently admitted to the hospital with sepsis due to obstructing ureteral stone on 6/10/2024.  She had left ureteral stent placed the day of admission  -Patient has positive UTI.  CT imaging showed ureteral stent in the left side in good position.  There is no obstructing uropathy.  -ERP discussed this case with urologist on-call, Dr. Green.  There is no recommendation for any acute intervention/procedure at this time.  -Patient was started on IV antibiotics IV Rocephin and I was called for admission  -Patient has leukocytosis of 15.9 but there is no other SIRS criteria.  There is also no endorgan dysfunction therefore she is not septic on admission.    6/25: Continue antibiotics, follow cultures.  We appreciate further recommendations by urology.    6/26: I discussed case with urology, the patient with stents since 6/10/2024.  Urology will plan to manage stent and stone while hospitalized.  Tentatively this could happen on Friday.  We appreciate further recommendations.  For now we will continue antibiotics.    6/27:  OR tomorrow for stent exchange and ? lithotripsy tomorrow.    6/28:  OR today at 1400    6/29:  POD 1 from stent exchange and lithotripsy, stent to be removed at home by  in 2 weeks, discussed with urology yesterday.  OK to dc antibiotics an watch 1 more day, if afebrile and no increase in WBC, plan to dc tomorrow.

## 2024-06-21 NOTE — ASSESSMENT & PLAN NOTE
"As per previous hospitalist:  \"-I had detailed conversation with  regarding advance care planing and CODE STATUS. Patient has dementia and  is MPOA. He states that patient would like to be Full Code. He also said that has Advance Directives and was not sure about POLST form. He declined signing paperwork now. Full Code Status added. Total amount of time on CODE STATUS and advance care planning 16 minutes\"  "

## 2024-06-21 NOTE — ASSESSMENT & PLAN NOTE
-Added PT/OT.  states he is in the process to have Home Health established. He declines SNF last admission. Seems to have trouble caring for her and himself.

## 2024-06-21 NOTE — H&P
Hospital Medicine History & Physical Note    Date of Service  6/21/2024    Primary Care Physician  Pramod Lu M.D.    Consultants  urology    Specialist Names: ERP discussed with Dr. Green    Code Status  Full Code: I discussed CODE STATUS on admission. See Assessment and plan for more details    Chief Complaint  Chief Complaint   Patient presents with    Pelvic Pain     Pt was recently discharged for the same. She had a ureter stent placed, and is now having pelvic pain. Pt has been taking her antibiotics like she should per .        History of Presenting Illness  Patient has severe dementia and history was obtained with her , who was present by the bedside on admission.    Thuy Albert is a 73 y.o. female, with h/o Alzheimer's dementia, HLD, Hypothyroidism and prediabetes, who was recently admitted to the hospital with sepsis due to obstructing ureteral stone on 6/10/2024.  She had left ureteral stent placed the day of admission.  She had intermittent confusion also during hospitalization and recommendation per PT was discharged to SNF however  decided to take her home. She completed antibiotic therapy, was feeling better for a few days, however over the last week has progressive weakness and today, started complaining of abdominal pain/flank pain again reason why presented to the emergency department on 6/21/2024 for further evaluation. No fever    I discussed the plan of care with patient and ERP Dr. Giordano .    Review of Systems  Review of Systems   Constitutional:  Positive for malaise/fatigue. Negative for fever.   HENT:  Negative for congestion and sore throat.    Eyes:  Negative for blurred vision and double vision.   Respiratory:  Negative for cough and shortness of breath.    Cardiovascular:  Negative for chest pain and palpitations.   Gastrointestinal:  Negative for nausea and vomiting.   Genitourinary:  Positive for dysuria and flank pain. Negative for urgency.    Musculoskeletal:  Negative for myalgias and neck pain.   Skin:  Negative for itching and rash.   Neurological:  Negative for dizziness, weakness and headaches.   Endo/Heme/Allergies:  Does not bruise/bleed easily.   Psychiatric/Behavioral:  Negative for depression. The patient does not have insomnia.        Past Medical History   has a past medical history of Arthritis, Asthma, Dementia ([facility]), DVT (deep venous thrombosis) ([facility]) (2003), GERD (gastroesophageal reflux disease), and Thyroid disease.    Surgical History   has a past surgical history that includes other abdominal surgery; appendectomy laparoscopic (8/1/2011); cholecystectomy; appendectomy; tonsillectomy; and pr cystoscopy,insert ureteral stent (Left, 6/10/2024).     Family History  family history includes Diabetes in her sister; Genetic Disorder in her mother; Heart Disease in her sister; Heart Disease (age of onset: 55) in her brother; Hypertension in her sister; No Known Problems in her maternal grandfather, maternal grandmother, paternal grandfather, and paternal grandmother; Other in her sister; Stroke in her father.   Family history reviewed with patient. There is no family history that is pertinent to the chief complaint.     Social History   reports that she quit smoking about 18 years ago. Her smoking use included cigarettes. She started smoking about 58 years ago. She has a 60 pack-year smoking history. She has never used smokeless tobacco. She reports that she does not drink alcohol and does not use drugs.    Allergies  Allergies   Allergen Reactions    Cephalexin Rash     rash    Nabumetone      rash    Synthroid [Levothroid]      Nausea      Tetracycline Swelling       Medications  Prior to Admission Medications   Prescriptions Last Dose Informant Patient Reported? Taking?   Ascorbic Acid (VITAMIN C PO)  Significant Other Yes No   Sig: Take 1 Tablet by mouth every day.   B Complex Vitamins (B COMPLEX PO)  Significant Other Yes No   Sig: Take  1 Tablet by mouth every day.   Bismuth Subsalicylate (PEPTO-BISMOL PO)  Significant Other Yes No   Sig: Take 30 mL by mouth 2 times a day as needed (For upset stomach).   Cyanocobalamin (VITAMIN B-12 PO)  Significant Other Yes No   Sig: Take 1 Tablet by mouth every day.   Multiple Vitamins-Minerals (ZINC PO)  Significant Other Yes No   Sig: Take 1 Tablet by mouth every day.   cefUROXime (CEFTIN) 250 MG Tab   No No   Sig: Take 1 Tablet by mouth every 12 hours for 14 days.   donepezil (ARICEPT) 10 MG tablet  Significant Other No No   Sig: Take 1 Tablet by mouth every day.   Patient taking differently: Take 10 mg by mouth every evening.   folic acid (FOLVITE) 1 MG Tab  Significant Other No No   Sig: Take 1 Tablet by mouth every day.   memantine (NAMENDA) 10 MG Tab  Significant Other No No   Sig: Take 1 Tablet by mouth 2 times a day.   rosuvastatin (CRESTOR) 10 MG Tab  Significant Other No No   Sig: Take 1 Tablet by mouth every evening.   Patient not taking: Reported on 6/17/2024   thyroid (ARMOUR THYROID) 15 MG Tab  Significant Other No No   Sig: Take 3 Tablets by mouth every day.      Facility-Administered Medications: None       Physical Exam  Temp:  [35.7 °C (96.3 °F)] 35.7 °C (96.3 °F)  Pulse:  [67] 67  Resp:  [18] 18  BP: (118)/(60) 118/60  SpO2:  [97 %] 97 %  Blood Pressure : 118/60   Temperature: (!) 35.7 °C (96.3 °F)   Pulse: 67   Respiration: 18   Pulse Oximetry: 97 %       Physical Exam  Constitutional:       Appearance: Normal appearance.   HENT:      Head: Normocephalic and atraumatic.      Mouth/Throat:      Mouth: Mucous membranes are moist.      Pharynx: Oropharynx is clear.   Eyes:      Comments: Blindness   Cardiovascular:      Rate and Rhythm: Normal rate and regular rhythm.      Heart sounds: Normal heart sounds.   Pulmonary:      Effort: Pulmonary effort is normal.      Breath sounds: Normal breath sounds.   Abdominal:      General: Abdomen is flat. Bowel sounds are normal.      Palpations: Abdomen  is soft.   Musculoskeletal:      Cervical back: Normal range of motion and neck supple.   Skin:     General: Skin is warm and dry.   Neurological:      General: No focal deficit present.      Mental Status: She is alert and oriented to person, place, and time.   Psychiatric:         Mood and Affect: Mood normal.         Behavior: Behavior normal.         Laboratory:  Recent Labs     06/21/24  0125   WBC 15.9*   RBC 4.40   HEMOGLOBIN 13.2   HEMATOCRIT 40.8   MCV 92.7   MCH 30.0   MCHC 32.4   RDW 44.9   PLATELETCT 242   MPV 9.7     Recent Labs     06/21/24  0125   SODIUM 144   POTASSIUM 3.4*   CHLORIDE 106   CO2 23   GLUCOSE 125*   BUN 19   CREATININE 1.02   CALCIUM 9.4     Recent Labs     06/21/24  0125   ALTSGPT 40   ASTSGOT 36   ALKPHOSPHAT 109*   TBILIRUBIN 0.6   GLUCOSE 125*         Imaging:  CT-RENAL COLIC EVALUATION(A/P W/O)   Final Result      1. Left-sided double-J ureteral stent in good position with mild dilatation of the left renal pelvis .      2. Small 3 mm nonobstructing calculus upper pole left kidney.      3. No evidence of right-sided hydronephrosis or renal calculi.      4. Moderate atherosclerotic changes of the abdominal aorta and iliac arteries..          X-Ray:  I have personally reviewed the images and compared with prior images. and My impression is: CT Renal showing left-sided double-J ureteral stent in good position.  Small 3 mm nonobstructing calculus in the upper pole of the left kidney.  No right-sided hydronephrosis.    Assessment/Plan:  Justification for Admission Status  I anticipate this patient will require at least two midnights for appropriate medical management, necessitating inpatient admission because 73-year-old female, coming to the hospital with complicated UTI under the context of recent insertion of ureteral stent early June.        * Complicated UTI (urinary tract infection)- (present on admission)  Assessment & Plan  -Inpatient status to medical floor.  -Recently admitted  to the hospital with sepsis due to obstructing ureteral stone on 6/10/2024.  She had left ureteral stent placed the day of admission  -Patient has positive UTI.  CT imaging showed ureteral stent in the left side in good position.  There is no obstructing uropathy.  -ERP discussed this case with urologist on-call, Dr. Green.  There is no recommendation for any acute intervention/procedure at this time.  -Patient was started on IV antibiotics IV Rocephin and I was called for admission  -Patient has leukocytosis of 15.9 but there is no other SIRS criteria.  There is also no endorgan dysfunction therefore she is not septic on admission.    Leukocytosis- (present on admission)  Assessment & Plan  -WBC is 15.9.  This is likely secondary to underlying UTI.  She is a starting on IV antibiotics.  Plan as above.    ACP (advance care planning)  Assessment & Plan  -I had detailed conversation with  regarding advance care planing and CODE STATUS. Patient has dementia and  is MPOA. He states that patient would like to be Full Code. He also said that has Advance Directives and was not sure about POLST form. He declined signing paperwork now. Full Code Status added. Total amount of time on CODE STATUS and advance care planning 16 minutes    Hypokalemia  Assessment & Plan  -On admission, potassium is 3.4.  I added oral potassium chloride replacement.  Monitor chemistry panel in the morning.    Abnormal gait- (present on admission)  Assessment & Plan  -Added PT/OT.  states he is in the process to have Home Health established. He declines SNF last admission. Seems to have trouble caring for her and himself.     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  Family requested medications to be given on specific timing.  She takes Namenda 10 mg twice a day at 10 AM and 10 PM and donepezil at 10  PM.  I called pharmacy and medications times are adjusted per request.    Prediabetes- (present on admission)  Assessment & Plan  -Glucose is 125 on admission. Hgb 5.2 11/2023.    Mixed hyperlipidemia - (present on admission)  Assessment & Plan  -No longer taking rosuvastatin    Hypothyroidism- (present on admission)  Assessment & Plan  -She takes [place] Thyroid.  Family requested medication to be given at 9 AM which I accommodated with pharmacy over the phone.  -Her last TSH check was in February and 0.78.        VTE prophylaxis: SCDs/TEDs

## 2024-06-21 NOTE — PROGRESS NOTES
Patient admitted earlier by Dr. Jimenez.  For further details please review her H&P.    Patient seen at bedside this morning.  Patient is confused, most likely due to her underlying dementia.  No family member present with the room evaluation.  However I was able to talk to the patient's  over the phone it seems that the patient is confused at home but at times she is lucid.  I suspect due to underlying infection the patient might have worsening mentation.    For now we will continue antibiotics, follow cultures.

## 2024-06-21 NOTE — ED TRIAGE NOTES
"Chief Complaint   Patient presents with    Pelvic Pain     Pt was recently discharged for the same. She had a ureter stent placed, and is now having pelvic pain. Pt has been taking her antibiotics like she should per .      /60   Pulse 67   Temp (!) 35.7 °C (96.3 °F) (Temporal)   Ht 1.549 m (5' 1\")   SpO2 97%   BMI 28.74 kg/m²       "

## 2024-06-21 NOTE — PROGRESS NOTES
Bedside report received from night RN. Assumed care of patient. Daily plan of care discussed. Pt remains confused, oriented to self only but able to follow commands when prompted. Fall precautions and skin breakdown prevention precautions in place. 12 hour chart check complete. Hourly rounding in place.

## 2024-06-21 NOTE — ASSESSMENT & PLAN NOTE
-Glucose is 125 on admission. Hgb 5.2 11/2023.    6/22: could have been reactive as glucose this morning is 85

## 2024-06-22 LAB
ANION GAP SERPL CALC-SCNC: 11 MMOL/L (ref 7–16)
BUN SERPL-MCNC: 12 MG/DL (ref 8–22)
CALCIUM SERPL-MCNC: 8.8 MG/DL (ref 8.4–10.2)
CHLORIDE SERPL-SCNC: 107 MMOL/L (ref 96–112)
CO2 SERPL-SCNC: 23 MMOL/L (ref 20–33)
CREAT SERPL-MCNC: 0.61 MG/DL (ref 0.5–1.4)
ERYTHROCYTE [DISTWIDTH] IN BLOOD BY AUTOMATED COUNT: 45 FL (ref 35.9–50)
GFR SERPLBLD CREATININE-BSD FMLA CKD-EPI: 94 ML/MIN/1.73 M 2
GLUCOSE SERPL-MCNC: 85 MG/DL (ref 65–99)
HCT VFR BLD AUTO: 36.7 % (ref 37–47)
HCT VFR BLD AUTO: 36.7 % (ref 37–47)
HGB BLD-MCNC: 11.8 G/DL (ref 12–16)
HGB BLD-MCNC: 12.1 G/DL (ref 12–16)
MCH RBC QN AUTO: 29.9 PG (ref 27–33)
MCHC RBC AUTO-ENTMCNC: 32.2 G/DL (ref 32.2–35.5)
MCV RBC AUTO: 93.1 FL (ref 81.4–97.8)
PLATELET # BLD AUTO: 201 K/UL (ref 164–446)
PMV BLD AUTO: 9.4 FL (ref 9–12.9)
POTASSIUM SERPL-SCNC: 3.6 MMOL/L (ref 3.6–5.5)
RBC # BLD AUTO: 3.94 M/UL (ref 4.2–5.4)
SODIUM SERPL-SCNC: 141 MMOL/L (ref 135–145)
WBC # BLD AUTO: 8.6 K/UL (ref 4.8–10.8)

## 2024-06-22 PROCEDURE — 700111 HCHG RX REV CODE 636 W/ 250 OVERRIDE (IP): Mod: JZ | Performed by: INTERNAL MEDICINE

## 2024-06-22 PROCEDURE — 700111 HCHG RX REV CODE 636 W/ 250 OVERRIDE (IP): Performed by: HOSPITALIST

## 2024-06-22 PROCEDURE — 51798 US URINE CAPACITY MEASURE: CPT

## 2024-06-22 PROCEDURE — 700102 HCHG RX REV CODE 250 W/ 637 OVERRIDE(OP): Mod: JZ | Performed by: INTERNAL MEDICINE

## 2024-06-22 PROCEDURE — 36415 COLL VENOUS BLD VENIPUNCTURE: CPT

## 2024-06-22 PROCEDURE — 85018 HEMOGLOBIN: CPT

## 2024-06-22 PROCEDURE — 99233 SBSQ HOSP IP/OBS HIGH 50: CPT | Performed by: INTERNAL MEDICINE

## 2024-06-22 PROCEDURE — 700105 HCHG RX REV CODE 258: Performed by: HOSPITALIST

## 2024-06-22 PROCEDURE — 94760 N-INVAS EAR/PLS OXIMETRY 1: CPT

## 2024-06-22 PROCEDURE — 700102 HCHG RX REV CODE 250 W/ 637 OVERRIDE(OP): Performed by: HOSPITALIST

## 2024-06-22 PROCEDURE — A9270 NON-COVERED ITEM OR SERVICE: HCPCS | Mod: JZ | Performed by: INTERNAL MEDICINE

## 2024-06-22 PROCEDURE — 80048 BASIC METABOLIC PNL TOTAL CA: CPT

## 2024-06-22 PROCEDURE — 85014 HEMATOCRIT: CPT

## 2024-06-22 PROCEDURE — A9270 NON-COVERED ITEM OR SERVICE: HCPCS | Performed by: HOSPITALIST

## 2024-06-22 PROCEDURE — 770001 HCHG ROOM/CARE - MED/SURG/GYN PRIV*

## 2024-06-22 PROCEDURE — 85027 COMPLETE CBC AUTOMATED: CPT

## 2024-06-22 RX ORDER — ENOXAPARIN SODIUM 100 MG/ML
40 INJECTION SUBCUTANEOUS DAILY
Status: DISCONTINUED | OUTPATIENT
Start: 2024-06-22 | End: 2024-06-30 | Stop reason: HOSPADM

## 2024-06-22 RX ORDER — POTASSIUM CHLORIDE 20 MEQ/1
40 TABLET, EXTENDED RELEASE ORAL ONCE
Status: COMPLETED | OUTPATIENT
Start: 2024-06-22 | End: 2024-06-22

## 2024-06-22 RX ADMIN — MEMANTINE HYDROCHLORIDE 10 MG: 10 TABLET ORAL at 21:23

## 2024-06-22 RX ADMIN — DONEPEZIL HYDROCHLORIDE 10 MG: 5 TABLET, FILM COATED ORAL at 21:23

## 2024-06-22 RX ADMIN — CEFAZOLIN 1 G: 1 INJECTION, POWDER, FOR SOLUTION INTRAMUSCULAR; INTRAVENOUS at 21:17

## 2024-06-22 RX ADMIN — CEFAZOLIN 1 G: 1 INJECTION, POWDER, FOR SOLUTION INTRAMUSCULAR; INTRAVENOUS at 07:57

## 2024-06-22 RX ADMIN — CEFAZOLIN 1 G: 1 INJECTION, POWDER, FOR SOLUTION INTRAMUSCULAR; INTRAVENOUS at 13:49

## 2024-06-22 RX ADMIN — THYROID 45 MG: 30 TABLET ORAL at 09:20

## 2024-06-22 RX ADMIN — FOLIC ACID 1 MG: 1 TABLET ORAL at 06:18

## 2024-06-22 RX ADMIN — MEMANTINE HYDROCHLORIDE 10 MG: 10 TABLET ORAL at 09:20

## 2024-06-22 RX ADMIN — ENOXAPARIN SODIUM 40 MG: 100 INJECTION SUBCUTANEOUS at 18:18

## 2024-06-22 RX ADMIN — POTASSIUM CHLORIDE 40 MEQ: 1500 TABLET, EXTENDED RELEASE ORAL at 11:16

## 2024-06-22 ASSESSMENT — PAIN DESCRIPTION - PAIN TYPE
TYPE: ACUTE PAIN
TYPE: ACUTE PAIN

## 2024-06-22 ASSESSMENT — PAIN SCALES - PAIN ASSESSMENT IN ADVANCED DEMENTIA (PAINAD)
BODYLANGUAGE: RELAXED
FACIALEXPRESSION: SMILING OR INEXPRESSIVE
BREATHING: NORMAL
TOTALSCORE: 0
BREATHING: NORMAL
CONSOLABILITY: NO NEED TO CONSOLE
CONSOLABILITY: NO NEED TO CONSOLE
FACIALEXPRESSION: SMILING OR INEXPRESSIVE
BODYLANGUAGE: RELAXED
TOTALSCORE: 0

## 2024-06-22 NOTE — CARE PLAN
Problem: Fluid Volume  Goal: Fluid volume balance will be maintained  Outcome: Progressing     Problem: Respiratory  Goal: Patient will achieve/maintain optimum respiratory ventilation and gas exchange  Outcome: Progressing     Problem: Skin Integrity  Goal: Skin integrity is maintained or improved  Outcome: Progressing     The patient is Stable - Low risk of patient condition declining or worsening    Shift Goals  Clinical Goals: Free from falls  Patient Goals: rest  Family Goals: n/a    Progress made toward(s) clinical / shift goals:  Pt remained free from falls throughout shift. Restful environment promoted; pt tolerating IV abx.    Patient is not progressing towards the following goals: n/a

## 2024-06-22 NOTE — CARE PLAN
The patient is Stable - Low risk of patient condition declining or worsening    Shift Goals  Clinical Goals: Zero fall during the shift  Patient Goals: Rest and sleep  Family Goals: n/a    Progress made toward(s) clinical / shift goals: Pt was free of fall during the shift. Pt seen sleeping during the rounds.     Patient is not progressing towards the following goals: N/A

## 2024-06-22 NOTE — PROGRESS NOTES
1912 Received report from day shift RN. Patient is awake and alert, resting in bed. A&O X1 self, be able to follow commands. Room air, Unlabored respiration. Family art bedside.Waffle overlay in place. Care plan discussed. Call light within reach. Personal belongings within reach. No needs required at this time. Safety precautions in place.     0620 attempted 2x to reinsert IV cath.; unsuccessful.

## 2024-06-22 NOTE — PROGRESS NOTES
"Hospital Medicine Daily Progress Note    Date of Service  6/22/2024    Chief Complaint  Thuy Albert is a 73 y.o. female admitted 6/21/2024 with pelvic pain    Hospital Course  As per chart review:  \"Patient has severe dementia and history was obtained with her , who was present by the bedside on admission.     Thuy Albert is a 73 y.o. female, with h/o Alzheimer's dementia, HLD, Hypothyroidism and prediabetes, who was recently admitted to the hospital with sepsis due to obstructing ureteral stone on 6/10/2024.  She had left ureteral stent placed the day of admission.  She had intermittent confusion also during hospitalization and recommendation per PT was discharged to SNF however  decided to take her home. She completed antibiotic therapy, was feeling better for a few days, however over the last week has progressive weakness and today, started complaining of abdominal pain/flank pain again reason why presented to the emergency department on 6/21/2024 for further evaluation. No fever\"    Interval Problem Update  6/22: Patient seen at bedside this morning.  She seems to be more calm and little bit more lucid than yesterday, however still with dementia and confused unable to fully have a full conversation with her.  No family member present at the time of my evaluation.  I did discuss case with urology, for now we will continue antibiotics.  We appreciate further recommendations.  Follow cultures.  We are pending repeat hemoglobin however there are no signs of overt bleeding at this time.    I have discussed this patient's plan of care and discharge plan at IDT rounds today with Case Management, Nursing, Nursing leadership, and other members of the IDT team.    Consultants/Specialty  urology    Code Status  Full Code    Disposition  The patient is not medically cleared for discharge to home or a post-acute facility.        Review of Systems  Review of Systems   Unable to perform ROS: " Dementia        Physical Exam  Temp:  [36.1 °C (97 °F)-36.4 °C (97.6 °F)] 36.1 °C (97 °F)  Pulse:  [60-80] 80  Resp:  [16] 16  BP: (113-138)/(54-83) 132/54  SpO2:  [90 %-97 %] 90 %    Physical Exam  Vitals and nursing note reviewed.   Constitutional:       General: She is not in acute distress.     Appearance: She is ill-appearing.   HENT:      Head: Normocephalic and atraumatic.      Right Ear: External ear normal.      Left Ear: External ear normal.      Mouth/Throat:      Pharynx: No oropharyngeal exudate or posterior oropharyngeal erythema.   Eyes:      General:         Right eye: No discharge.         Left eye: No discharge.   Cardiovascular:      Rate and Rhythm: Normal rate and regular rhythm.      Pulses: Normal pulses.      Heart sounds: No murmur heard.     No gallop.   Pulmonary:      Effort: Pulmonary effort is normal. No respiratory distress.      Breath sounds: Normal breath sounds. No wheezing or rhonchi.   Abdominal:      General: Bowel sounds are normal. There is no distension.      Palpations: Abdomen is soft.      Tenderness: There is no abdominal tenderness. There is no guarding.   Musculoskeletal:         General: No swelling or tenderness. Normal range of motion.      Cervical back: Normal range of motion and neck supple. No tenderness.   Skin:     General: Skin is warm and dry.   Neurological:      General: No focal deficit present.      Mental Status: She is alert. Mental status is at baseline. She is disoriented.      Motor: No weakness.   Psychiatric:         Mood and Affect: Mood normal.         Behavior: Behavior normal.         Fluids    Intake/Output Summary (Last 24 hours) at 6/22/2024 1018  Last data filed at 6/22/2024 0914  Gross per 24 hour   Intake 900 ml   Output --   Net 900 ml       Laboratory  Recent Labs     06/21/24  0125 06/22/24  0153   WBC 15.9* 8.6   RBC 4.40 3.94*   HEMOGLOBIN 13.2 11.8*   HEMATOCRIT 40.8 36.7*   MCV 92.7 93.1   MCH 30.0 29.9   MCHC 32.4 32.2   RDW 44.9  "45.0   PLATELETCT 242 201   MPV 9.7 9.4     Recent Labs     06/21/24  0125 06/22/24  0153   SODIUM 144 141   POTASSIUM 3.4* 3.6   CHLORIDE 106 107   CO2 23 23   GLUCOSE 125* 85   BUN 19 12   CREATININE 1.02 0.61   CALCIUM 9.4 8.8     Recent Labs     06/21/24  0125   INR 1.02               Imaging  CT-RENAL COLIC EVALUATION(A/P W/O)   Final Result      1. Left-sided double-J ureteral stent in good position with mild dilatation of the left renal pelvis .      2. Small 3 mm nonobstructing calculus upper pole left kidney.      3. No evidence of right-sided hydronephrosis or renal calculi.      4. Moderate atherosclerotic changes of the abdominal aorta and iliac arteries..           Assessment/Plan  * Complicated UTI (urinary tract infection)- (present on admission)  Assessment & Plan  -Inpatient status to medical floor.  -Recently admitted to the hospital with sepsis due to obstructing ureteral stone on 6/10/2024.  She had left ureteral stent placed the day of admission  -Patient has positive UTI.  CT imaging showed ureteral stent in the left side in good position.  There is no obstructing uropathy.  -ERP discussed this case with urologist on-call, Dr. Green.  There is no recommendation for any acute intervention/procedure at this time.  -Patient was started on IV antibiotics IV Rocephin and I was called for admission  -Patient has leukocytosis of 15.9 but there is no other SIRS criteria.  There is also no endorgan dysfunction therefore she is not septic on admission.    6/22: Continue antibiotics, follow cultures.  We appreciate further recommendations by urology.    ACP (advance care planning)  Assessment & Plan  As per previous hospitalist:  \"-I had detailed conversation with  regarding advance care planing and CODE STATUS. Patient has dementia and  is MPOA. He states that patient would like to be Full Code. He also said that has Advance Directives and was not sure about POLST form. He declined signing " "paperwork now. Full Code Status added. Total amount of time on CODE STATUS and advance care planning 16 minutes\"    Hypokalemia  Assessment & Plan  Replace as needed  monitor    Anemia- (present on admission)  Assessment & Plan  6/22: Hb this morning is 11.8, no signs of overt bleeding. We will repeat Hb. Monitor.    Abnormal gait- (present on admission)  Assessment & Plan  -Added PT/OT.  states he is in the process to have Home Health established. He declines SNF last admission. Seems to have trouble caring for her and himself.     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  Family requested medications to be given on specific timing.  She takes Namenda 10 mg twice a day at 10 AM and 10 PM and donepezil at 10 PM.  I called pharmacy and medications times are adjusted per request.    6/22: PT/OT    Leukocytosis- (present on admission)  Assessment & Plan  -WBC is 15.9.  This is likely secondary to underlying UTI.  She is a starting on IV antibiotics.    6/22: WBC has improved, continue antibiotics.    Prediabetes- (present on admission)  Assessment & Plan  -Glucose is 125 on admission. Hgb 5.2 11/2023.    6/22: could have been reactive asn glucose this morning is 85    Mixed hyperlipidemia - (present on admission)  Assessment & Plan  -No longer taking rosuvastatin    Hypothyroidism- (present on admission)  Assessment & Plan  Per previous hospitalist  -She takes [place] Thyroid.  Family requested medication to be given at 9 AM which I accommodated with pharmacy over the phone.  -Her last TSH check was in February and 0.78.         VTE prophylaxis: Lovenox    I have performed a physical exam and reviewed and updated ROS and Plan today (6/22/2024). In review of yesterday's note (6/21/2024), there are no changes except as documented above.      I spend at least 51 " minutes providing care for this patient.  This includes face-to-face interview, physical examination.  Review lab work including CBC, BMP, lactic acid, previous CMP.  Consulting and discussing with urology.  Discussing with multidisciplinary team including case management, nursing staff and pharmacy.  Creating plan of care, reviewing orders.

## 2024-06-22 NOTE — PROGRESS NOTES
4 Eyes Skin Assessment Completed by Lane RN and TEN RN.    Head WDL  Ears WDL  Nose WDL  Mouth WDL  Neck WDL  Breast/Chest WDL  Shoulder Blades WDL  Spine WDL  (R) Arm/Elbow/Hand scabs  (L) Arm/Elbow/Hand WDL  Abdomen WDL  Groin WDL  Scrotum/Coccyx/Buttocks Redness and Blanching  (R) Leg WDL  (L) Leg WDL  (R) Heel/Foot/Toe Redness and Blanching  (L) Heel/Foot/Toe WDL          Devices In Places SCD's      Interventions In Place Waffle Overlay, TAP System, Pillows, and Barrier Cream    Possible Skin Injury No    Pictures Uploaded Into Epic N/A  Wound Consult Placed N/A  RN Wound Prevention Protocol Ordered Yes

## 2024-06-22 NOTE — ASSESSMENT & PLAN NOTE
6/22: Hb this morning is 11.8, no signs of overt bleeding. We will repeat Hb. Monitor.    6/25: Hb seems to be stable. Monitor

## 2024-06-22 NOTE — THERAPY
Occupational Therapy   Initial Evaluation     Patient Name: Thuy Albert  Age:  73 y.o., Sex:  female  Medical Record #: [id number]  Today's Date: 6/21/2024     Precautions: Fall Risk  Comments: Severe dementia    Assessment  Patient is 73 y.o. female with a diagnosis of complicated UTI. Additional factors influencing patient status / progress: Alzheimer's, recent admit. Pt is A&Ox1, legally blind. Lives with SO who assists pt with ADLs and provides HHA during ambulation. Pt is limited by impaired cognition, impaired vision, decreased initiation, decreased activity tolerance during ADLs; see below for CLOF OT wll follow while in house. Recommend post acute upon dc; SO is declining.        Plan  Occupational Therapy Initial Treatment Plan   Treatment Interventions: (P) Self Care / Activities of Daily Living, Neuro Re-Education / Balance, Therapeutic Activity  Treatment Frequency: (P) 3 Times per Week  Duration: (P) Until Therapy Goals Met    DC Equipment Recommendations: Unable to determine at this time  Discharge Recommendations: Recommend post-acute placement for additional occupational therapy services prior to discharge home (Spouse is declining SNF.)     Subjective  Agreeable     Objective   06/21/24 1339   Prior Living Situation   Prior Services Continuous (24 Hour) Care Giving Family   Housing / Facility 1 Story Apartment / Condo   Bathroom Set up Walk In Shower   Equipment Owned None   Lives with - Patient's Self Care Capacity Significant Other   Comments Pt with Alzheimers. SO helps pt with all ADL's, SBA when she is walking. SO walks with cane; reports hip pain.   Prior Level of ADL Function   Self Feeding Requires Assist   Grooming / Hygiene Requires Assist   Bathing Requires Assist   Dressing Requires Assist   Toileting Requires Assist   Prior Level of IADL Function   Medication Management Requires Assist   Laundry Requires Assist   Kitchen Mobility Requires Assist   Finances Dependent   Home  Management Requires Assist   Shopping Dependent   Prior Level Of Mobility Independent Without Device in Home   Driving / Transportation Relatives / Others Provide Transportation   Occupation (Pre-Hospital Vocational) Not Employed   Leisure Interests Unable To Determine At This Time   History of Falls   History of Falls Yes   Precautions   Precautions Fall Risk   Vitals   O2 Delivery Device None - Room Air   Pain 0 - 10 Group   Therapist Pain Assessment 0   Cognition    Cognition / Consciousness X   Orientation Level Not Oriented to Year;Not Oriented to Month;Not Oriented to Day;Not Oriented to Time;Not Oriented to Place;Not Oriented to Reason   Level of Consciousness Alert   Ability To Follow Commands 1 Step   Safety Awareness Impaired   New Learning Impaired   Sequencing Impaired   Initiation Impaired   Passive ROM Upper Body   Passive ROM Upper Body WDL   Active ROM Upper Body   Active ROM Upper Body  WDL   Strength Upper Body   Upper Body Strength  Not Tested   Sensation Upper Body   Upper Extremity Sensation  Not Tested   Upper Body Muscle Tone   Upper Body Muscle Tone  WDL   Coordination Upper Body   Coordination X   Balance Assessment   Sitting Balance (Static) Fair +   Sitting Balance (Dynamic) Fair   Weight Shift Sitting Fair   Bed Mobility    Supine to Sit Standby Assist   Sit to Supine Contact Guard Assist   ADL Assessment   Eating Minimal Assist   Grooming Minimal Assist;Seated   Upper Body Dressing Minimal Assist   Comments max cues   How much help from another person does the patient currently need...   Putting on and taking off regular lower body clothing? 3   Bathing (including washing, rinsing, and drying)? 3   Toileting, which includes using a toilet, bedpan, or urinal? 3   Putting on and taking off regular upper body clothing? 3   Taking care of personal grooming such as brushing teeth? 3   Eating meals? 3   6 Clicks Daily Activity Score 18   Visual Perception   Visual Perception  X   Comments  legally blind   Activity Tolerance   Sitting Edge of Bed 5   Patient / Family Goals   Patient / Family Goal #1 Home   Short Term Goals   Short Term Goal # 1 Pt will perform feeding with CGA within 5 days   Short Term Goal # 2 Pt will perform ADL transfer with HHA within 5 days   Short Term Goal # 3 Pt will perform Ub dressing with SBA, v/c's within 5 days   Short Term Goal # 4 Pt will perform toileting in br with CGA within 5 days   Education Group   Role of Occupational Therapist Patient Response Patient;Family;Acceptance;Explanation;Verbal Demonstration   Occupational Therapy Initial Treatment Plan    Treatment Interventions Self Care / Activities of Daily Living;Neuro Re-Education / Balance;Therapeutic Activity   Treatment Frequency 3 Times per Week   Duration Until Therapy Goals Met   Problem List   Problem List Decreased Active Daily Living Skills;Impaired Cognitive Function;Safety Awareness Deficits / Cognition;Decreased Activity Tolerance   Anticipated Discharge Equipment and Recommendations   DC Equipment Recommendations Unable to determine at this time   Discharge Recommendations Recommend post-acute placement for additional occupational therapy services prior to discharge home  (Spouse is declining SNF.)   Interdisciplinary Plan of Care Collaboration   IDT Collaboration with  Nursing;Family / Caregiver   Patient Position at End of Therapy In Bed;Bed Alarm On;Family / Friend in Room;Phone within Reach;Tray Table within Reach;Call Light within Reach   Collaboration Comments OT Eval. SO is declining snf.

## 2024-06-23 LAB
ANION GAP SERPL CALC-SCNC: 14 MMOL/L (ref 7–16)
BACTERIA UR CULT: ABNORMAL
BACTERIA UR CULT: ABNORMAL
BUN SERPL-MCNC: 9 MG/DL (ref 8–22)
CALCIUM SERPL-MCNC: 9.1 MG/DL (ref 8.4–10.2)
CHLORIDE SERPL-SCNC: 106 MMOL/L (ref 96–112)
CO2 SERPL-SCNC: 24 MMOL/L (ref 20–33)
CREAT SERPL-MCNC: 0.71 MG/DL (ref 0.5–1.4)
ERYTHROCYTE [DISTWIDTH] IN BLOOD BY AUTOMATED COUNT: 46.1 FL (ref 35.9–50)
GFR SERPLBLD CREATININE-BSD FMLA CKD-EPI: 89 ML/MIN/1.73 M 2
GLUCOSE SERPL-MCNC: 84 MG/DL (ref 65–99)
HCT VFR BLD AUTO: 41 % (ref 37–47)
HGB BLD-MCNC: 13.2 G/DL (ref 12–16)
MAGNESIUM SERPL-MCNC: 2.1 MG/DL (ref 1.5–2.5)
MCH RBC QN AUTO: 29.9 PG (ref 27–33)
MCHC RBC AUTO-ENTMCNC: 32.2 G/DL (ref 32.2–35.5)
MCV RBC AUTO: 93 FL (ref 81.4–97.8)
PLATELET # BLD AUTO: 260 K/UL (ref 164–446)
PMV BLD AUTO: 9.8 FL (ref 9–12.9)
POTASSIUM SERPL-SCNC: 3.5 MMOL/L (ref 3.6–5.5)
RBC # BLD AUTO: 4.41 M/UL (ref 4.2–5.4)
SIGNIFICANT IND 70042: ABNORMAL
SITE SITE: ABNORMAL
SODIUM SERPL-SCNC: 144 MMOL/L (ref 135–145)
SOURCE SOURCE: ABNORMAL
WBC # BLD AUTO: 14.8 K/UL (ref 4.8–10.8)

## 2024-06-23 PROCEDURE — 36415 COLL VENOUS BLD VENIPUNCTURE: CPT

## 2024-06-23 PROCEDURE — 700102 HCHG RX REV CODE 250 W/ 637 OVERRIDE(OP): Performed by: HOSPITALIST

## 2024-06-23 PROCEDURE — 80048 BASIC METABOLIC PNL TOTAL CA: CPT

## 2024-06-23 PROCEDURE — 700105 HCHG RX REV CODE 258: Performed by: HOSPITALIST

## 2024-06-23 PROCEDURE — 700111 HCHG RX REV CODE 636 W/ 250 OVERRIDE (IP): Performed by: HOSPITALIST

## 2024-06-23 PROCEDURE — 94760 N-INVAS EAR/PLS OXIMETRY 1: CPT

## 2024-06-23 PROCEDURE — 700111 HCHG RX REV CODE 636 W/ 250 OVERRIDE (IP): Mod: JZ | Performed by: INTERNAL MEDICINE

## 2024-06-23 PROCEDURE — 770001 HCHG ROOM/CARE - MED/SURG/GYN PRIV*

## 2024-06-23 PROCEDURE — 83735 ASSAY OF MAGNESIUM: CPT

## 2024-06-23 PROCEDURE — 85027 COMPLETE CBC AUTOMATED: CPT

## 2024-06-23 PROCEDURE — 99232 SBSQ HOSP IP/OBS MODERATE 35: CPT | Performed by: INTERNAL MEDICINE

## 2024-06-23 PROCEDURE — A9270 NON-COVERED ITEM OR SERVICE: HCPCS | Performed by: HOSPITALIST

## 2024-06-23 RX ADMIN — MEMANTINE HYDROCHLORIDE 10 MG: 10 TABLET ORAL at 09:25

## 2024-06-23 RX ADMIN — CEFAZOLIN 1 G: 1 INJECTION, POWDER, FOR SOLUTION INTRAMUSCULAR; INTRAVENOUS at 05:32

## 2024-06-23 RX ADMIN — ENOXAPARIN SODIUM 40 MG: 100 INJECTION SUBCUTANEOUS at 17:07

## 2024-06-23 RX ADMIN — CEFAZOLIN 1 G: 1 INJECTION, POWDER, FOR SOLUTION INTRAMUSCULAR; INTRAVENOUS at 13:36

## 2024-06-23 RX ADMIN — THYROID 45 MG: 30 TABLET ORAL at 09:25

## 2024-06-23 RX ADMIN — MEMANTINE HYDROCHLORIDE 10 MG: 10 TABLET ORAL at 21:31

## 2024-06-23 RX ADMIN — FOLIC ACID 1 MG: 1 TABLET ORAL at 05:33

## 2024-06-23 RX ADMIN — DONEPEZIL HYDROCHLORIDE 10 MG: 5 TABLET, FILM COATED ORAL at 21:31

## 2024-06-23 RX ADMIN — CEFAZOLIN 1 G: 1 INJECTION, POWDER, FOR SOLUTION INTRAMUSCULAR; INTRAVENOUS at 21:29

## 2024-06-23 ASSESSMENT — PAIN DESCRIPTION - PAIN TYPE
TYPE: ACUTE PAIN
TYPE: ACUTE PAIN

## 2024-06-23 ASSESSMENT — PAIN SCALES - PAIN ASSESSMENT IN ADVANCED DEMENTIA (PAINAD)
BREATHING: NORMAL
BODYLANGUAGE: RELAXED
FACIALEXPRESSION: SMILING OR INEXPRESSIVE
TOTALSCORE: 0
CONSOLABILITY: NO NEED TO CONSOLE

## 2024-06-23 NOTE — PROGRESS NOTES
1920 Received report from day shift RN. Patient is sleeping in bed. Room air, Unlabored respiration observed.Waffle overlay in place.  Call light within reach. Personal belongings within reach. No needs required at this time. Safety precautions in place.

## 2024-06-23 NOTE — CARE PLAN
The patient is Stable - Low risk of patient condition declining or worsening    Shift Goals  Clinical Goals: Zero fall during the shift. Monitor urine output .  Patient Goals: rest and sleep  Family Goals: n/a    Progress made toward(s) clinical / shift goals:  Pt has zero fall during the shift. Urine is bloody. Pt needs frequent reorientation. Pt seen sleeping intermittently during the rounds.     Patient is not progressing towards the following goals: N/A

## 2024-06-23 NOTE — PROGRESS NOTES
4 Eyes Skin Assessment Completed by Lane RN and TEN RN.     Head WDL  Ears WDL  Nose WDL  Mouth WDL  Neck WDL  Breast/Chest WDL  Shoulder Blades WDL  Spine WDL  (R) Arm/Elbow/Hand scabs  (L) Arm/Elbow/Hand WDL  Abdomen WDL  Groin WDL  Scrotum/Coccyx/Buttocks Redness and Blanching  (R) Leg WDL  (L) Leg WDL  (R) Heel/Foot/Toe WDL  (L) Heel/Foot/Toe WDL              Devices In Places n/a        Interventions In Place Waffle Overlay, TAP System, Pillows, and Barrier Cream     Possible Skin Injury No     Pictures Uploaded Into Epic N/A  Wound Consult Placed N/A  RN Wound Prevention Protocol Ordered Yes

## 2024-06-23 NOTE — PROGRESS NOTES
"Hospital Medicine Daily Progress Note    Date of Service  6/23/2024    Chief Complaint  Thuy Albert is a 73 y.o. female admitted 6/21/2024 with pelvic pain    Hospital Course  As per chart review:  \"Patient has severe dementia and history was obtained with her , who was present by the bedside on admission.     Thuy Albert is a 73 y.o. female, with h/o Alzheimer's dementia, HLD, Hypothyroidism and prediabetes, who was recently admitted to the hospital with sepsis due to obstructing ureteral stone on 6/10/2024.  She had left ureteral stent placed the day of admission.  She had intermittent confusion also during hospitalization and recommendation per PT was discharged to SNF however  decided to take her home. She completed antibiotic therapy, was feeling better for a few days, however over the last week has progressive weakness and today, started complaining of abdominal pain/flank pain again reason why presented to the emergency department on 6/21/2024 for further evaluation. No fever\"    Interval Problem Update  6/22: Patient seen at bedside this morning.  She seems to be more calm and little bit more lucid than yesterday, however still with dementia and confused unable to fully have a full conversation with her.  No family member present at the time of my evaluation.  I did discuss case with urology, for now we will continue antibiotics.  We appreciate further recommendations.  Follow cultures.  We are pending repeat hemoglobin however there are no signs of overt bleeding at this time.    6/23: Patient pleasant but confused, her only statement to me was that she felt cold and wanted to be covered up.  WBC count did increase from 8.6-14.8.  Patient did have a large bowel movement and constipation could have been contributing to this in addition to the underlying infection.  If WBC count remains elevated tomorrow then antibiotics may need to be adjusted.    I have discussed this " patient's plan of care and discharge plan at IDT rounds today with Case Management, Nursing, Nursing leadership, and other members of the IDT team.    Consultants/Specialty  urology    Code Status  Full Code    Disposition  The patient is not medically cleared for discharge to home or a post-acute facility.        Review of Systems  Review of Systems   Unable to perform ROS: Dementia        Physical Exam  Temp:  [36.2 °C (97.1 °F)-36.7 °C (98 °F)] 36.2 °C (97.1 °F)  Pulse:  [51-82] 53  Resp:  [16] 16  BP: (117-136)/(41-60) 122/60  SpO2:  [92 %-95 %] 95 %    Physical Exam  Vitals and nursing note reviewed.   Constitutional:       General: She is not in acute distress.     Appearance: Normal appearance. She is not ill-appearing.   HENT:      Head: Normocephalic and atraumatic.      Right Ear: External ear normal.      Left Ear: External ear normal.      Nose: Nose normal.      Mouth/Throat:      Pharynx: No oropharyngeal exudate or posterior oropharyngeal erythema.   Eyes:      General:         Right eye: No discharge.         Left eye: No discharge.   Cardiovascular:      Rate and Rhythm: Normal rate and regular rhythm.      Pulses: Normal pulses.      Heart sounds: Normal heart sounds. No murmur heard.     No gallop.   Pulmonary:      Effort: Pulmonary effort is normal. No respiratory distress.      Breath sounds: Normal breath sounds. No wheezing or rhonchi.   Abdominal:      General: Bowel sounds are normal. There is no distension.      Palpations: Abdomen is soft.      Tenderness: There is no abdominal tenderness. There is no guarding.   Musculoskeletal:         General: No swelling or tenderness. Normal range of motion.      Cervical back: Normal range of motion and neck supple. No tenderness.   Skin:     General: Skin is warm and dry.   Neurological:      General: No focal deficit present.      Mental Status: She is alert. Mental status is at baseline. She is disoriented.      Motor: No weakness.      Comments:  Oriented to self only   Psychiatric:         Mood and Affect: Mood normal.         Behavior: Behavior normal.         Fluids    Intake/Output Summary (Last 24 hours) at 6/23/2024 1312  Last data filed at 6/23/2024 0902  Gross per 24 hour   Intake 420 ml   Output 1110 ml   Net -690 ml       Laboratory  Recent Labs     06/21/24  0125 06/22/24  0153 06/22/24  1031 06/23/24  0045   WBC 15.9* 8.6  --  14.8*   RBC 4.40 3.94*  --  4.41   HEMOGLOBIN 13.2 11.8* 12.1 13.2   HEMATOCRIT 40.8 36.7* 36.7* 41.0   MCV 92.7 93.1  --  93.0   MCH 30.0 29.9  --  29.9   MCHC 32.4 32.2  --  32.2   RDW 44.9 45.0  --  46.1   PLATELETCT 242 201  --  260   MPV 9.7 9.4  --  9.8     Recent Labs     06/21/24  0125 06/22/24  0153 06/23/24  0045   SODIUM 144 141 144   POTASSIUM 3.4* 3.6 3.5*   CHLORIDE 106 107 106   CO2 23 23 24   GLUCOSE 125* 85 84   BUN 19 12 9   CREATININE 1.02 0.61 0.71   CALCIUM 9.4 8.8 9.1     Recent Labs     06/21/24  0125   INR 1.02               Imaging  CT-RENAL COLIC EVALUATION(A/P W/O)   Final Result      1. Left-sided double-J ureteral stent in good position with mild dilatation of the left renal pelvis .      2. Small 3 mm nonobstructing calculus upper pole left kidney.      3. No evidence of right-sided hydronephrosis or renal calculi.      4. Moderate atherosclerotic changes of the abdominal aorta and iliac arteries..           Assessment/Plan  * Complicated UTI (urinary tract infection)- (present on admission)  Assessment & Plan  -Inpatient status to medical floor.  -Recently admitted to the hospital with sepsis due to obstructing ureteral stone on 6/10/2024.  She had left ureteral stent placed the day of admission  -Patient has positive UTI.  CT imaging showed ureteral stent in the left side in good position.  There is no obstructing uropathy.  -ERP discussed this case with urologist on-call, Dr. Green.  There is no recommendation for any acute intervention/procedure at this time.  -Patient was started on IV  "antibiotics IV Rocephin and I was called for admission  -Patient has leukocytosis of 15.9 but there is no other SIRS criteria.  There is also no endorgan dysfunction therefore she is not septic on admission.    6/22: Continue antibiotics, follow cultures.  We appreciate further recommendations by urology.    ACP (advance care planning)  Assessment & Plan  As per previous hospitalist:  \"-I had detailed conversation with  regarding advance care planing and CODE STATUS. Patient has dementia and  is MPOA. He states that patient would like to be Full Code. He also said that has Advance Directives and was not sure about POLST form. He declined signing paperwork now. Full Code Status added. Total amount of time on CODE STATUS and advance care planning 16 minutes\"    Hypokalemia  Assessment & Plan  Replace as needed  monitor    Anemia- (present on admission)  Assessment & Plan  6/22: Hb this morning is 11.8, no signs of overt bleeding. We will repeat Hb. Monitor.    Abnormal gait- (present on admission)  Assessment & Plan  -Added PT/OT.  states he is in the process to have Home Health established. He declines SNF last admission. Seems to have trouble caring for her and himself.     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  Family requested medications to be given on specific timing.  She takes Namenda 10 mg twice a day at 10 AM and 10 PM and donepezil at 10 PM.  I called pharmacy and medications times are adjusted per request.    6/22: PT/OT    Leukocytosis- (present on admission)  Assessment & Plan  -WBC is 15.9.  This is likely secondary to underlying UTI.  She is a starting on IV antibiotics.    6/22: WBC has improved, continue antibiotics.    Prediabetes- (present on admission)  Assessment & Plan  -Glucose is 125 on admission. Hgb 5.2 " 11/2023.    6/22: could have been reactive asn glucose this morning is 85    Mixed hyperlipidemia - (present on admission)  Assessment & Plan  -No longer taking rosuvastatin    Hypothyroidism- (present on admission)  Assessment & Plan  Per previous hospitalist  -She takes [place] Thyroid.  Family requested medication to be given at 9 AM which I accommodated with pharmacy over the phone.  -Her last TSH check was in February and 0.78.         VTE prophylaxis: Lovenox    I have performed a physical exam and reviewed and updated ROS and Plan today (6/23/2024). In review of yesterday's note (6/22/2024), there are no changes except as documented above.      I spend at least 55 minutes providing care for this patient.  This includes face-to-face interview, physical examination.  Review lab work including CBC, BMP, lactic acid, previous CMP.  Consulting and discussing with urology.  Discussing with multidisciplinary team including case management, nursing staff and pharmacy.  Creating plan of care, reviewing orders.     enoxaparin ppx

## 2024-06-23 NOTE — PROGRESS NOTES
Received bedside report from NOC RN, assumed care of patient. Patient is sleeping, chest rise and fall observed, on room air. Bed locked in lowest position with alarm active. Hourly rounding in place.

## 2024-06-24 ENCOUNTER — APPOINTMENT (OUTPATIENT)
Dept: ADMISSIONS | Facility: MEDICAL CENTER | Age: 74
End: 2024-06-24
Payer: MEDICARE

## 2024-06-24 LAB
ANION GAP SERPL CALC-SCNC: 12 MMOL/L (ref 7–16)
BUN SERPL-MCNC: 11 MG/DL (ref 8–22)
CALCIUM SERPL-MCNC: 8.8 MG/DL (ref 8.4–10.2)
CHLORIDE SERPL-SCNC: 106 MMOL/L (ref 96–112)
CO2 SERPL-SCNC: 23 MMOL/L (ref 20–33)
CREAT SERPL-MCNC: 0.73 MG/DL (ref 0.5–1.4)
ERYTHROCYTE [DISTWIDTH] IN BLOOD BY AUTOMATED COUNT: 45 FL (ref 35.9–50)
GFR SERPLBLD CREATININE-BSD FMLA CKD-EPI: 86 ML/MIN/1.73 M 2
GLUCOSE SERPL-MCNC: 87 MG/DL (ref 65–99)
HCT VFR BLD AUTO: 36.7 % (ref 37–47)
HGB BLD-MCNC: 12 G/DL (ref 12–16)
MCH RBC QN AUTO: 30 PG (ref 27–33)
MCHC RBC AUTO-ENTMCNC: 32.7 G/DL (ref 32.2–35.5)
MCV RBC AUTO: 91.8 FL (ref 81.4–97.8)
PLATELET # BLD AUTO: 249 K/UL (ref 164–446)
PMV BLD AUTO: 9.6 FL (ref 9–12.9)
POTASSIUM SERPL-SCNC: 3.8 MMOL/L (ref 3.6–5.5)
RBC # BLD AUTO: 4 M/UL (ref 4.2–5.4)
SODIUM SERPL-SCNC: 141 MMOL/L (ref 135–145)
WBC # BLD AUTO: 11.2 K/UL (ref 4.8–10.8)

## 2024-06-24 PROCEDURE — 85027 COMPLETE CBC AUTOMATED: CPT

## 2024-06-24 PROCEDURE — A9270 NON-COVERED ITEM OR SERVICE: HCPCS | Performed by: HOSPITALIST

## 2024-06-24 PROCEDURE — 770001 HCHG ROOM/CARE - MED/SURG/GYN PRIV*

## 2024-06-24 PROCEDURE — 700102 HCHG RX REV CODE 250 W/ 637 OVERRIDE(OP): Performed by: HOSPITALIST

## 2024-06-24 PROCEDURE — 36415 COLL VENOUS BLD VENIPUNCTURE: CPT

## 2024-06-24 PROCEDURE — 99233 SBSQ HOSP IP/OBS HIGH 50: CPT | Performed by: INTERNAL MEDICINE

## 2024-06-24 PROCEDURE — 700102 HCHG RX REV CODE 250 W/ 637 OVERRIDE(OP): Performed by: INTERNAL MEDICINE

## 2024-06-24 PROCEDURE — A9270 NON-COVERED ITEM OR SERVICE: HCPCS | Performed by: INTERNAL MEDICINE

## 2024-06-24 PROCEDURE — 700111 HCHG RX REV CODE 636 W/ 250 OVERRIDE (IP): Mod: JZ | Performed by: INTERNAL MEDICINE

## 2024-06-24 PROCEDURE — 94760 N-INVAS EAR/PLS OXIMETRY 1: CPT

## 2024-06-24 PROCEDURE — 700105 HCHG RX REV CODE 258: Performed by: HOSPITALIST

## 2024-06-24 PROCEDURE — 700111 HCHG RX REV CODE 636 W/ 250 OVERRIDE (IP): Performed by: HOSPITALIST

## 2024-06-24 PROCEDURE — 80048 BASIC METABOLIC PNL TOTAL CA: CPT

## 2024-06-24 RX ORDER — CEFUROXIME AXETIL 250 MG/1
250 TABLET ORAL EVERY 12 HOURS
Status: DISCONTINUED | OUTPATIENT
Start: 2024-06-24 | End: 2024-06-30

## 2024-06-24 RX ADMIN — ENOXAPARIN SODIUM 40 MG: 100 INJECTION SUBCUTANEOUS at 17:24

## 2024-06-24 RX ADMIN — CEFAZOLIN 1 G: 1 INJECTION, POWDER, FOR SOLUTION INTRAMUSCULAR; INTRAVENOUS at 05:43

## 2024-06-24 RX ADMIN — CEFUROXIME AXETIL 250 MG: 250 TABLET ORAL at 17:24

## 2024-06-24 RX ADMIN — CEFUROXIME AXETIL 250 MG: 250 TABLET ORAL at 11:30

## 2024-06-24 RX ADMIN — THYROID 45 MG: 30 TABLET ORAL at 09:22

## 2024-06-24 RX ADMIN — DONEPEZIL HYDROCHLORIDE 10 MG: 5 TABLET, FILM COATED ORAL at 21:16

## 2024-06-24 RX ADMIN — FOLIC ACID 1 MG: 1 TABLET ORAL at 05:42

## 2024-06-24 RX ADMIN — MEMANTINE HYDROCHLORIDE 10 MG: 10 TABLET ORAL at 21:17

## 2024-06-24 RX ADMIN — MEMANTINE HYDROCHLORIDE 10 MG: 10 TABLET ORAL at 09:22

## 2024-06-24 ASSESSMENT — PAIN DESCRIPTION - PAIN TYPE
TYPE: ACUTE PAIN
TYPE: ACUTE PAIN

## 2024-06-24 ASSESSMENT — PAIN SCALES - PAIN ASSESSMENT IN ADVANCED DEMENTIA (PAINAD)
BREATHING: NORMAL
BREATHING: NORMAL
TOTALSCORE: 0
BODYLANGUAGE: RELAXED
BODYLANGUAGE: RELAXED
TOTALSCORE: 0
CONSOLABILITY: NO NEED TO CONSOLE
CONSOLABILITY: NO NEED TO CONSOLE
FACIALEXPRESSION: SMILING OR INEXPRESSIVE
FACIALEXPRESSION: SMILING OR INEXPRESSIVE

## 2024-06-24 NOTE — CARE PLAN
The patient is Stable - Low risk of patient condition declining or worsening    Shift Goals  Clinical Goals: up in chair for portions of the day, verbalize pain  Patient Goals: rest and update on POC  Family Goals: n/a    Progress made toward(s) clinical / shift goals:  up in chair for lunch, tolerating it will. Patient has been able to state if she is in pain    Patient is not progressing towards the following goals: n/a

## 2024-06-24 NOTE — ANTIMICROBIAL STEWARDSHIP
Antimicrobial Stewardship Rounds Note    Date  6/24/2024    Assessment  Patient chart reviewed during antimicrobial stewardship rounds with antimicrobial stewardship medical director Dr. Kg Arreola. Patient currently on cefazolin for obstructive UTI s/p stent placement during prior admission from 6/10-6/14. Does not appear stones have been treated yet. Patient was placed on cefuroxime after that admission x2 weeks, no urine culture was performed during that time. Per H&P, patient was doing well, then a few days after cefuroxime completed complained of abdominal pain and noted to have weakness. Repeat urine culture was collected here with usual urogenital celi and growth of C. Jeikeium. Discussed with Dr. Phoenix, difficult to ascertain exactly how patient is feeling given her baseline dementia, though she was able to state she is in a hospital today. She is taking oral medication, and Dr. Phoenix does not have concern that patient has a worsening infection. Given this information, suspect that the C. Jeikeium in urine culture is likely a contaminant from skin celi vs. Colonizer, particularly after prolonged antibiotics outpatient pending stone treatment.      Recommendation  Based on the assessment above, the antimicrobial stewardship team recommends switching to cefuroxime 250 mg PO BID to continue until the stone is treated outpatient. Do not recommend treating the C. Jeikeium unless patient has worsening signs of infection. Discussed with Dr. Phoenix, who agreed. Orders were updated in the chart by Dr. Phoenix.     Angela Vernon, PharmD, BCPS, BCIDP  Infectious Diseases Pharmacy Clinical Specialist

## 2024-06-24 NOTE — CARE PLAN
The patient is Stable - Low risk of patient condition declining or worsening    Shift Goals  Clinical Goals: pt will not sustain a fall this shift.  Patient Goals: SPRING  Family Goals: n/a    Progress made toward(s) clinical / shift goals:      Pt placed on high fall precautions. No falls this shift.    Problem: Knowledge Deficit - Standard  Goal: Patient and family/care givers will demonstrate understanding of plan of care, disease process/condition, diagnostic tests and medications  Outcome: Progressing     Problem: Skin Integrity  Goal: Skin integrity is maintained or improved  Outcome: Progressing     Problem: Fall Risk  Goal: Patient will remain free from falls  Outcome: Progressing     Problem: Pain - Standard  Goal: Alleviation of pain or a reduction in pain to the patient’s comfort goal  Outcome: Progressing       Patient is not progressing towards the following goals:

## 2024-06-24 NOTE — PROGRESS NOTES
4 Eyes Skin Assessment Completed by ANDREW Patel and Nithin RN.     Head WDL  Ears WDL  Nose WDL  Mouth WDL  Neck WDL  Breast/Chest WDL  Shoulder Blades WDL  Spine WDL  (R) Arm/Elbow/Hand scabs  (L) Arm/Elbow/Hand WDL  Abdomen WDL  Groin WDL  Scrotum/Coccyx/Buttocks Redness and Blanching  (R) Leg WDL  (L) Leg WDL  (R) Heel/Foot/Toe WDL  (L) Heel/Foot/Toe WDL              Devices In Places n/a        Interventions In Place Waffle Overlay, TAP System, Pillows, and Barrier Cream     Possible Skin Injury No     Pictures Uploaded Into Epic N/A  Wound Consult Placed N/A  RN Wound Prevention Protocol Ordered Yes

## 2024-06-24 NOTE — PROGRESS NOTES
"Hospital Medicine Daily Progress Note    Date of Service  6/24/2024    Chief Complaint  Thuy Albert is a 73 y.o. female admitted 6/21/2024 with pelvic pain    Hospital Course  As per chart review:  \"Patient has severe dementia and history was obtained with her , who was present by the bedside on admission.     Thuy Albert is a 73 y.o. female, with h/o Alzheimer's dementia, HLD, Hypothyroidism and prediabetes, who was recently admitted to the hospital with sepsis due to obstructing ureteral stone on 6/10/2024.  She had left ureteral stent placed the day of admission.  She had intermittent confusion also during hospitalization and recommendation per PT was discharged to SNF however  decided to take her home. She completed antibiotic therapy, was feeling better for a few days, however over the last week has progressive weakness and today, started complaining of abdominal pain/flank pain again reason why presented to the emergency department on 6/21/2024 for further evaluation. No fever\"    Interval Problem Update  6/22: Patient seen at bedside this morning.  She seems to be more calm and little bit more lucid than yesterday, however still with dementia and confused unable to fully have a full conversation with her.  No family member present at the time of my evaluation.  I did discuss case with urology, for now we will continue antibiotics.  We appreciate further recommendations.  Follow cultures.  We are pending repeat hemoglobin however there are no signs of overt bleeding at this time.    6/23: Patient pleasant but confused, her only statement to me was that she felt cold and wanted to be covered up.  WBC count did increase from 8.6-14.8.  Patient did have a large bowel movement and constipation could have been contributing to this in addition to the underlying infection.  If WBC count remains elevated tomorrow then antibiotics may need to be adjusted.    PATIENT SEEN BY PREVIOUS " HOSPITALIST ON 6/23 6/24: Patient still confused, however she seems to be more lucid than 2 days ago when I last saw her.  She is able to tell me that she is in the hospital, however not much more of having a conversation with her.  No family members present at the time of my evaluation.  Will continue with antibiotics, follow cultures.  Patient will most likely require placement upon discharge.    I have discussed this patient's plan of care and discharge plan at IDT rounds today with Case Management, Nursing, Nursing leadership, and other members of the IDT team.    Consultants/Specialty  urology    Code Status  Full Code    Disposition  The patient is not medically cleared for discharge to home or a post-acute facility.        Review of Systems  Review of Systems   Unable to perform ROS: Dementia        Physical Exam  Temp:  [36.1 °C (97 °F)-36.7 °C (98.1 °F)] 36.1 °C (97 °F)  Pulse:  [50-64] 51  Resp:  [16] 16  BP: (103-130)/(53-60) 106/53  SpO2:  [94 %-96 %] 96 %    Physical Exam  Vitals and nursing note reviewed.   Constitutional:       General: She is not in acute distress.     Appearance: Normal appearance. She is not ill-appearing.   HENT:      Head: Normocephalic and atraumatic.      Right Ear: External ear normal.      Left Ear: External ear normal.      Nose: Nose normal.      Mouth/Throat:      Pharynx: No oropharyngeal exudate or posterior oropharyngeal erythema.   Eyes:      General:         Right eye: No discharge.         Left eye: No discharge.   Cardiovascular:      Rate and Rhythm: Normal rate and regular rhythm.      Pulses: Normal pulses.      Heart sounds: Normal heart sounds. No murmur heard.     No gallop.   Pulmonary:      Effort: Pulmonary effort is normal. No respiratory distress.      Breath sounds: Normal breath sounds. No wheezing or rhonchi.   Abdominal:      General: Bowel sounds are normal. There is no distension.      Palpations: Abdomen is soft.      Tenderness: There is no  abdominal tenderness. There is no guarding.   Musculoskeletal:         General: No swelling or tenderness. Normal range of motion.      Cervical back: Normal range of motion and neck supple. No tenderness.   Skin:     General: Skin is warm and dry.   Neurological:      General: No focal deficit present.      Mental Status: She is alert. Mental status is at baseline. She is disoriented.      Motor: No weakness.      Comments: Oriented to self only   Psychiatric:         Mood and Affect: Mood normal.         Behavior: Behavior normal.         Fluids    Intake/Output Summary (Last 24 hours) at 6/24/2024 0958  Last data filed at 6/24/2024 0830  Gross per 24 hour   Intake 540 ml   Output 600 ml   Net -60 ml       Laboratory  Recent Labs     06/22/24  0153 06/22/24  1031 06/23/24  0045 06/24/24  0112   WBC 8.6  --  14.8* 11.2*   RBC 3.94*  --  4.41 4.00*   HEMOGLOBIN 11.8* 12.1 13.2 12.0   HEMATOCRIT 36.7* 36.7* 41.0 36.7*   MCV 93.1  --  93.0 91.8   MCH 29.9  --  29.9 30.0   MCHC 32.2  --  32.2 32.7   RDW 45.0  --  46.1 45.0   PLATELETCT 201  --  260 249   MPV 9.4  --  9.8 9.6     Recent Labs     06/22/24  0153 06/23/24  0045 06/24/24  0112   SODIUM 141 144 141   POTASSIUM 3.6 3.5* 3.8   CHLORIDE 107 106 106   CO2 23 24 23   GLUCOSE 85 84 87   BUN 12 9 11   CREATININE 0.61 0.71 0.73   CALCIUM 8.8 9.1 8.8                     Imaging  CT-RENAL COLIC EVALUATION(A/P W/O)   Final Result      1. Left-sided double-J ureteral stent in good position with mild dilatation of the left renal pelvis .      2. Small 3 mm nonobstructing calculus upper pole left kidney.      3. No evidence of right-sided hydronephrosis or renal calculi.      4. Moderate atherosclerotic changes of the abdominal aorta and iliac arteries..           Assessment/Plan  * Complicated UTI (urinary tract infection)- (present on admission)  Assessment & Plan  -Inpatient status to medical floor.  -Recently admitted to the hospital with sepsis due to obstructing  "ureteral stone on 6/10/2024.  She had left ureteral stent placed the day of admission  -Patient has positive UTI.  CT imaging showed ureteral stent in the left side in good position.  There is no obstructing uropathy.  -ERP discussed this case with urologist on-call, Dr. Green.  There is no recommendation for any acute intervention/procedure at this time.  -Patient was started on IV antibiotics IV Rocephin and I was called for admission  -Patient has leukocytosis of 15.9 but there is no other SIRS criteria.  There is also no endorgan dysfunction therefore she is not septic on admission.    6/24: Continue antibiotics, follow cultures.  We appreciate further recommendations by urology.    ACP (advance care planning)  Assessment & Plan  As per previous hospitalist:  \"-I had detailed conversation with  regarding advance care planing and CODE STATUS. Patient has dementia and  is MPOA. He states that patient would like to be Full Code. He also said that has Advance Directives and was not sure about POLST form. He declined signing paperwork now. Full Code Status added. Total amount of time on CODE STATUS and advance care planning 16 minutes\"    Hypokalemia  Assessment & Plan  Replace as needed  monitor    Anemia- (present on admission)  Assessment & Plan  6/22: Hb this morning is 11.8, no signs of overt bleeding. We will repeat Hb. Monitor.    6/24: Hb seems to be stable. Monitor    Abnormal gait- (present on admission)  Assessment & Plan  -Added PT/OT.  states he is in the process to have Home Health established. He declines SNF last admission. Seems to have trouble caring for her and himself.     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  Family requested medications to be given on specific timing.  She takes Namenda 10 mg twice a day at 10 AM and 10 " PM and donepezil at 10 PM.  I called pharmacy and medications times are adjusted per request.    6/22: PT/OT    6/24: Pending placement    Leukocytosis- (present on admission)  Assessment & Plan  -WBC is 15.9.  This is likely secondary to underlying UTI.  She is a starting on IV antibiotics.    6/24: continue antibiotics.    Prediabetes- (present on admission)  Assessment & Plan  -Glucose is 125 on admission. Hgb 5.2 11/2023.    6/22: could have been reactive asn glucose this morning is 85    Mixed hyperlipidemia - (present on admission)  Assessment & Plan  -No longer taking rosuvastatin    Hypothyroidism- (present on admission)  Assessment & Plan  Per previous hospitalist  -She takes [place] Thyroid.  Family requested medication to be given at 9 AM which I accommodated with pharmacy over the phone.  -Her last TSH check was in February and 0.78.         VTE prophylaxis: Lovenox    I have performed a physical exam and reviewed and updated ROS and Plan today (6/24/2024). In review of yesterday's note (6/23/2024), there are no changes except as documented above.      I spend at least 52 minutes providing care for this patient.  This includes face-to-face interview, physical examination.  Review lab work including CBC, and BMP.  Discussing with multidisciplinary team including case management, nursing staff and pharmacy.  Creating plan of care, reviewing orders.

## 2024-06-24 NOTE — DISCHARGE PLANNING
Case Management Discharge Planning    Admission Date: 6/21/2024  GMLOS: 2.9  ALOS: 3    6-Clicks ADL Score: 18  6-Clicks Mobility Score: 17  PT and/or OT Eval ordered: Yes  PT/OT: Recommending post acute placement   Post-acute Referrals Ordered: Yes  Post-acute Choice Obtained: NA  Has referral(s) been sent to post-acute provider:  NA      Anticipated Discharge Dispo: Discharge Disposition: D/T to home under A care in anticipation of covered skilled care (06)  Discharge Address: [address] [place] [address]  Discharge Contact Phone Number: [phone]    DME Needed: Pending hospital course     Action(s) Taken: Chart reviewed spouse is declining placement. Pt discussed during IDT rounds pt to continue ABX. SNF vs HH. SNF referrals to be sent if spouse becomes open to placement.     Escalations Completed: None    Medically Clear: No    Next Steps: f/u with pt and medical team to discuss dc needs and barriers.    Barriers to Discharge: Medical clearance

## 2024-06-24 NOTE — PROGRESS NOTES
Pt is awake in bed. VSS. Pt has no c/o pain or n/v at this time. Pt remains A&Ox1. Pt is requesting to rest at this time. RN verified high fall precautions are in place. No other needs noted. RN positioned pt for comfort. Waffle overlay and TAPs in place.

## 2024-06-24 NOTE — PROGRESS NOTES
0700 - Bedside report received from ANDREW Felix. Patient resting with eyes closed, on RA in no acute respiratory distress. Daily plan of care discussed.  No other needs at this time. Call light and personal belongings within reach. Hourly rounding in place. Chart reviewed for recent labs, notes, and orders.

## 2024-06-25 ENCOUNTER — TELEPHONE (OUTPATIENT)
Dept: MEDICAL GROUP | Facility: MEDICAL CENTER | Age: 74
End: 2024-06-25
Payer: MEDICARE

## 2024-06-25 LAB
ALBUMIN SERPL BCP-MCNC: 3.3 G/DL (ref 3.2–4.9)
ALBUMIN/GLOB SERPL: 1.1 G/DL
ALP SERPL-CCNC: 97 U/L (ref 30–99)
ALT SERPL-CCNC: 14 U/L (ref 2–50)
ANION GAP SERPL CALC-SCNC: 11 MMOL/L (ref 7–16)
AST SERPL-CCNC: 21 U/L (ref 12–45)
BILIRUB SERPL-MCNC: 0.5 MG/DL (ref 0.1–1.5)
BUN SERPL-MCNC: 13 MG/DL (ref 8–22)
CALCIUM ALBUM COR SERPL-MCNC: 9.6 MG/DL (ref 8.5–10.5)
CALCIUM SERPL-MCNC: 9 MG/DL (ref 8.4–10.2)
CHLORIDE SERPL-SCNC: 107 MMOL/L (ref 96–112)
CO2 SERPL-SCNC: 24 MMOL/L (ref 20–33)
CREAT SERPL-MCNC: 0.84 MG/DL (ref 0.5–1.4)
ERYTHROCYTE [DISTWIDTH] IN BLOOD BY AUTOMATED COUNT: 45.7 FL (ref 35.9–50)
GFR SERPLBLD CREATININE-BSD FMLA CKD-EPI: 73 ML/MIN/1.73 M 2
GLOBULIN SER CALC-MCNC: 2.9 G/DL (ref 1.9–3.5)
GLUCOSE SERPL-MCNC: 86 MG/DL (ref 65–99)
HCT VFR BLD AUTO: 38.7 % (ref 37–47)
HGB BLD-MCNC: 12.6 G/DL (ref 12–16)
MCH RBC QN AUTO: 30.3 PG (ref 27–33)
MCHC RBC AUTO-ENTMCNC: 32.6 G/DL (ref 32.2–35.5)
MCV RBC AUTO: 93 FL (ref 81.4–97.8)
PLATELET # BLD AUTO: 244 K/UL (ref 164–446)
PMV BLD AUTO: 9.6 FL (ref 9–12.9)
POTASSIUM SERPL-SCNC: 3.8 MMOL/L (ref 3.6–5.5)
PROT SERPL-MCNC: 6.2 G/DL (ref 6–8.2)
RBC # BLD AUTO: 4.16 M/UL (ref 4.2–5.4)
SODIUM SERPL-SCNC: 142 MMOL/L (ref 135–145)
WBC # BLD AUTO: 10.9 K/UL (ref 4.8–10.8)

## 2024-06-25 PROCEDURE — 36415 COLL VENOUS BLD VENIPUNCTURE: CPT

## 2024-06-25 PROCEDURE — 700102 HCHG RX REV CODE 250 W/ 637 OVERRIDE(OP): Performed by: HOSPITALIST

## 2024-06-25 PROCEDURE — A9270 NON-COVERED ITEM OR SERVICE: HCPCS | Performed by: HOSPITALIST

## 2024-06-25 PROCEDURE — 770001 HCHG ROOM/CARE - MED/SURG/GYN PRIV*

## 2024-06-25 PROCEDURE — 700111 HCHG RX REV CODE 636 W/ 250 OVERRIDE (IP): Mod: JZ | Performed by: INTERNAL MEDICINE

## 2024-06-25 PROCEDURE — A9270 NON-COVERED ITEM OR SERVICE: HCPCS | Performed by: INTERNAL MEDICINE

## 2024-06-25 PROCEDURE — 85027 COMPLETE CBC AUTOMATED: CPT

## 2024-06-25 PROCEDURE — 99221 1ST HOSP IP/OBS SF/LOW 40: CPT | Performed by: UROLOGY

## 2024-06-25 PROCEDURE — 99233 SBSQ HOSP IP/OBS HIGH 50: CPT | Performed by: INTERNAL MEDICINE

## 2024-06-25 PROCEDURE — 80053 COMPREHEN METABOLIC PANEL: CPT

## 2024-06-25 PROCEDURE — 700102 HCHG RX REV CODE 250 W/ 637 OVERRIDE(OP): Performed by: INTERNAL MEDICINE

## 2024-06-25 RX ADMIN — CEFUROXIME AXETIL 250 MG: 250 TABLET ORAL at 05:55

## 2024-06-25 RX ADMIN — FOLIC ACID 1 MG: 1 TABLET ORAL at 05:55

## 2024-06-25 RX ADMIN — MEMANTINE HYDROCHLORIDE 10 MG: 10 TABLET ORAL at 23:44

## 2024-06-25 RX ADMIN — MEMANTINE HYDROCHLORIDE 10 MG: 10 TABLET ORAL at 10:00

## 2024-06-25 RX ADMIN — THYROID 45 MG: 30 TABLET ORAL at 09:00

## 2024-06-25 RX ADMIN — CEFUROXIME AXETIL 250 MG: 250 TABLET ORAL at 17:26

## 2024-06-25 RX ADMIN — ACETAMINOPHEN 650 MG: 325 TABLET ORAL at 17:24

## 2024-06-25 RX ADMIN — DONEPEZIL HYDROCHLORIDE 10 MG: 5 TABLET, FILM COATED ORAL at 23:44

## 2024-06-25 RX ADMIN — ENOXAPARIN SODIUM 40 MG: 100 INJECTION SUBCUTANEOUS at 17:36

## 2024-06-25 ASSESSMENT — PAIN SCALES - PAIN ASSESSMENT IN ADVANCED DEMENTIA (PAINAD)
TOTALSCORE: 2
NEGVOCALIZATION: OCCASIONAL MOAN/GROAN, LOW SPEECH, NEGATIVE/DISAPPROVING QUALITY
BODYLANGUAGE: RELAXED
BREATHING: NORMAL
BODYLANGUAGE: TENSE, DISTRESSED PACING, FIDGETING
BODYLANGUAGE: RELAXED
CONSOLABILITY: DISTRACTED OR REASSURED BY VOICE/TOUCH
FACIALEXPRESSION: SMILING OR INEXPRESSIVE
NEGVOCALIZATION: OCCASIONAL MOAN/GROAN, LOW SPEECH, NEGATIVE/DISAPPROVING QUALITY
BREATHING: NORMAL
CONSOLABILITY: NO NEED TO CONSOLE
TOTALSCORE: 0
FACIALEXPRESSION: SMILING OR INEXPRESSIVE

## 2024-06-25 ASSESSMENT — PAIN DESCRIPTION - PAIN TYPE
TYPE: ACUTE PAIN
TYPE: ACUTE PAIN

## 2024-06-25 NOTE — CARE PLAN
The patient is Stable - Low risk of patient condition declining or worsening    Shift Goals  Clinical Goals: pt will not sustain a fall this shift.  Patient Goals: sleep comfortably  Family Goals: n/a    Progress made toward(s) clinical / shift goals:      Pt placed on high fall precautions. No falls this shift.    Problem: Knowledge Deficit - Standard  Goal: Patient and family/care givers will demonstrate understanding of plan of care, disease process/condition, diagnostic tests and medications  Outcome: Progressing     Problem: Hemodynamics  Goal: Patient's hemodynamics, fluid balance and neurologic status will be stable or improve  Outcome: Progressing     Problem: Skin Integrity  Goal: Skin integrity is maintained or improved  Outcome: Progressing     Problem: Fall Risk  Goal: Patient will remain free from falls  Outcome: Progressing     Problem: Pain - Standard  Goal: Alleviation of pain or a reduction in pain to the patient’s comfort goal  Outcome: Progressing       Patient is not progressing towards the following goals:

## 2024-06-25 NOTE — PROGRESS NOTES
"Hospital Medicine Daily Progress Note    Date of Service  6/25/2024    Chief Complaint  Thuy Albert is a 73 y.o. female admitted 6/21/2024 with pelvic pain    Hospital Course  As per chart review:  \"Patient has severe dementia and history was obtained with her , who was present by the bedside on admission.     Thuy Albert is a 73 y.o. female, with h/o Alzheimer's dementia, HLD, Hypothyroidism and prediabetes, who was recently admitted to the hospital with sepsis due to obstructing ureteral stone on 6/10/2024.  She had left ureteral stent placed the day of admission.  She had intermittent confusion also during hospitalization and recommendation per PT was discharged to SNF however  decided to take her home. She completed antibiotic therapy, was feeling better for a few days, however over the last week has progressive weakness and today, started complaining of abdominal pain/flank pain again reason why presented to the emergency department on 6/21/2024 for further evaluation. No fever\"    Interval Problem Update  6/22: Patient seen at bedside this morning.  She seems to be more calm and little bit more lucid than yesterday, however still with dementia and confused unable to fully have a full conversation with her.  No family member present at the time of my evaluation.  I did discuss case with urology, for now we will continue antibiotics.  We appreciate further recommendations.  Follow cultures.  We are pending repeat hemoglobin however there are no signs of overt bleeding at this time.    6/23: Patient pleasant but confused, her only statement to me was that she felt cold and wanted to be covered up.  WBC count did increase from 8.6-14.8.  Patient did have a large bowel movement and constipation could have been contributing to this in addition to the underlying infection.  If WBC count remains elevated tomorrow then antibiotics may need to be adjusted.    PATIENT SEEN BY PREVIOUS " HOSPITALIST ON 6/23 6/24: Patient still confused, however she seems to be more lucid than 2 days ago when I last saw her.  She is able to tell me that she is in the hospital, however not much more of having a conversation with her.  No family members present at the time of my evaluation.  Will continue with antibiotics, follow cultures.  Patient will most likely require placement upon discharge.    6/25: Patient seen at bedside this morning.  Still confused, however suspect this is her baseline.  No family member present at the time of my evaluation.  I did discuss case with urology for further recommendations regarding antibiotic treatment and stent management.  We appreciate further recommendations.  The patient will most likely require placement upon discharge.  Continue antibiotics for now.    I have discussed this patient's plan of care and discharge plan at IDT rounds today with Case Management, Nursing, Nursing leadership, and other members of the IDT team.    Consultants/Specialty  urology    Code Status  Full Code    Disposition  The patient is not medically cleared for discharge to home or a post-acute facility.        Review of Systems  Review of Systems   Unable to perform ROS: Dementia        Physical Exam  Temp:  [36 °C (96.8 °F)-36.7 °C (98.1 °F)] 36 °C (96.8 °F)  Pulse:  [53-60] 60  Resp:  [16-17] 16  BP: (114-141)/(48-69) 119/48  SpO2:  [94 %-96 %] 96 %    Physical Exam  Vitals and nursing note reviewed.   Constitutional:       General: She is not in acute distress.     Appearance: Normal appearance. She is not ill-appearing.   HENT:      Head: Normocephalic and atraumatic.      Right Ear: External ear normal.      Left Ear: External ear normal.      Nose: Nose normal.      Mouth/Throat:      Pharynx: No oropharyngeal exudate or posterior oropharyngeal erythema.   Eyes:      General:         Right eye: No discharge.         Left eye: No discharge.   Cardiovascular:      Rate and Rhythm: Normal  rate and regular rhythm.      Pulses: Normal pulses.      Heart sounds: Normal heart sounds. No murmur heard.     No gallop.   Pulmonary:      Effort: Pulmonary effort is normal. No respiratory distress.      Breath sounds: Normal breath sounds. No wheezing or rhonchi.   Abdominal:      General: Bowel sounds are normal. There is no distension.      Palpations: Abdomen is soft.      Tenderness: There is no abdominal tenderness. There is no guarding.   Musculoskeletal:         General: No swelling or tenderness. Normal range of motion.      Cervical back: Normal range of motion and neck supple. No tenderness.   Skin:     General: Skin is warm and dry.   Neurological:      General: No focal deficit present.      Mental Status: She is alert. Mental status is at baseline. She is disoriented.      Motor: No weakness.      Comments: Oriented to self only   Psychiatric:         Mood and Affect: Mood normal.         Behavior: Behavior normal.         Fluids    Intake/Output Summary (Last 24 hours) at 6/25/2024 1112  Last data filed at 6/25/2024 0949  Gross per 24 hour   Intake 1411.08 ml   Output 200 ml   Net 1211.08 ml       Laboratory  Recent Labs     06/23/24  0045 06/24/24  0112 06/25/24  0112   WBC 14.8* 11.2* 10.9*   RBC 4.41 4.00* 4.16*   HEMOGLOBIN 13.2 12.0 12.6   HEMATOCRIT 41.0 36.7* 38.7   MCV 93.0 91.8 93.0   MCH 29.9 30.0 30.3   MCHC 32.2 32.7 32.6   RDW 46.1 45.0 45.7   PLATELETCT 260 249 244   MPV 9.8 9.6 9.6     Recent Labs     06/23/24  0045 06/24/24  0112 06/25/24  0112   SODIUM 144 141 142   POTASSIUM 3.5* 3.8 3.8   CHLORIDE 106 106 107   CO2 24 23 24   GLUCOSE 84 87 86   BUN 9 11 13   CREATININE 0.71 0.73 0.84   CALCIUM 9.1 8.8 9.0                     Imaging  CT-RENAL COLIC EVALUATION(A/P W/O)   Final Result      1. Left-sided double-J ureteral stent in good position with mild dilatation of the left renal pelvis .      2. Small 3 mm nonobstructing calculus upper pole left kidney.      3. No evidence of  "right-sided hydronephrosis or renal calculi.      4. Moderate atherosclerotic changes of the abdominal aorta and iliac arteries..           Assessment/Plan  * Complicated UTI (urinary tract infection)- (present on admission)  Assessment & Plan  -Inpatient status to medical floor.  -Recently admitted to the hospital with sepsis due to obstructing ureteral stone on 6/10/2024.  She had left ureteral stent placed the day of admission  -Patient has positive UTI.  CT imaging showed ureteral stent in the left side in good position.  There is no obstructing uropathy.  -ERP discussed this case with urologist on-call, Dr. Green.  There is no recommendation for any acute intervention/procedure at this time.  -Patient was started on IV antibiotics IV Rocephin and I was called for admission  -Patient has leukocytosis of 15.9 but there is no other SIRS criteria.  There is also no endorgan dysfunction therefore she is not septic on admission.    6/25: Continue antibiotics, follow cultures.  We appreciate further recommendations by urology.    ACP (advance care planning)  Assessment & Plan  As per previous hospitalist:  \"-I had detailed conversation with  regarding advance care planing and CODE STATUS. Patient has dementia and  is MPOA. He states that patient would like to be Full Code. He also said that has Advance Directives and was not sure about POLST form. He declined signing paperwork now. Full Code Status added. Total amount of time on CODE STATUS and advance care planning 16 minutes\"    Hypokalemia  Assessment & Plan  Replace as needed  monitor    Anemia- (present on admission)  Assessment & Plan  6/22: Hb this morning is 11.8, no signs of overt bleeding. We will repeat Hb. Monitor.    6/25: Hb seems to be stable. Monitor    Abnormal gait- (present on admission)  Assessment & Plan  -Added PT/OT.  states he is in the process to have Home Health established. He declines SNF last admission. Seems to have " trouble caring for her and himself.     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  Family requested medications to be given on specific timing.  She takes Namenda 10 mg twice a day at 10 AM and 10 PM and donepezil at 10 PM.  I called pharmacy and medications times are adjusted per request.    6/22: PT/OT    6/25: Pending placement    Leukocytosis- (present on admission)  Assessment & Plan  -WBC is 15.9.  This is likely secondary to underlying UTI.  She is a starting on IV antibiotics.    6/25: continue antibiotics.    Prediabetes- (present on admission)  Assessment & Plan  -Glucose is 125 on admission. Hgb 5.2 11/2023.    6/22: could have been reactive as glucose this morning is 85    Mixed hyperlipidemia - (present on admission)  Assessment & Plan  -No longer taking rosuvastatin    Hypothyroidism- (present on admission)  Assessment & Plan  Per previous hospitalist  -She takes [place] Thyroid.  Family requested medication to be given at 9 AM which I accommodated with pharmacy over the phone.  -Her last TSH check was in February and 0.78.         VTE prophylaxis: Lovenox    I have performed a physical exam and reviewed and updated ROS and Plan today (6/25/2024). In review of yesterday's note (6/24/2024), there are no changes except as documented above.      I spend at least 51 minutes providing care for this patient.  This includes face-to-face interview, physical examination.  Review lab work including CBC, and CMP. Discussing case with urology. Discussing with multidisciplinary team including case management, nursing staff and pharmacy.  Creating plan of care, reviewing orders.     No risk alerts present

## 2024-06-25 NOTE — PROGRESS NOTES
4 Eyes Skin Assessment Completed by Isidro RN and Familia RN.     Head WDL  Ears WDL  Nose WDL  Mouth WDL  Neck WDL  Breast/Chest WDL  Shoulder Blades WDL  Spine WDL  (R) Arm/Elbow/Hand scabs  (L) Arm/Elbow/Hand WDL  Abdomen WDL  Groin WDL  Scrotum/Coccyx/Buttocks Redness and Blanching  (R) Leg WDL  (L) Leg WDL  (R) Heel/Foot/Toe WDL  (L) Heel/Foot/Toe WDL              Devices In Place N/A        Interventions In Place Waffle Overlay, TAP System, Pillows, and Barrier Cream     Possible Skin Injury No     Pictures Uploaded Into Epic N/A  Wound Consult Placed N/A  RN Wound Prevention Protocol Ordered Yes

## 2024-06-25 NOTE — PROGRESS NOTES
4 Eyes Skin Assessment Completed by ANDREW Bazzi and ANDREW Patel.    Head WDL  Ears WDL  Nose WDL  Mouth WDL  Neck WDL  Breast/Chest WDL  Shoulder Blades WDL  Spine WDL  (R) Arm/Elbow/Hand Scab  (L) Arm/Elbow/Hand WDL  Abdomen WDL  Groin WDL  Scrotum/Coccyx/Buttocks Redness  (R) Leg WDL  (L) Leg WDL  (R) Heel/Foot/Toe WDL  (L) Heel/Foot/Toe WDL          Devices In Places N/A      Interventions In Place Waffle Overlay, taps, pillows, and barrier cream    Possible Skin Injury No    Pictures Uploaded Into Epic N/A  Wound Consult Placed N/A  RN Wound Prevention Protocol Ordered Yes

## 2024-06-25 NOTE — CONSULTS
Urology Consultation    Date of Service  6/25/2024    Referring Physician  Isidro Alaniz*    Consulting Physician  Rob Green M.D.    Reason for Consultation  Left kidney stones    History of Presenting Illness  73 y.o. female who presented 6/21/2024 with history of L kidney stones s/p stent  - Emergent stent placed on 6/10 when she presented septic  - Plan was for outpatient antibiotics and surgery on 7/5  - Readmitted with constitutional symptoms and abdominal/flank pain    - Patient with significant dementia at baseline, her  is her caregiver and provided the history  - According to her , he mental status is currently at/near her baseline    Review of Systems  Review of Systems   Reason unable to perform ROS: due to dementia, ROS obtained from her .       Past Medical History   has a past medical history of Arthritis, Asthma, Dementia ([facility]), DVT (deep venous thrombosis) ([facility]) (2003), GERD (gastroesophageal reflux disease), and Thyroid disease.    Surgical History   has a past surgical history that includes other abdominal surgery; appendectomy laparoscopic (8/1/2011); cholecystectomy; appendectomy; tonsillectomy; and pr cystoscopy,insert ureteral stent (Left, 6/10/2024).    Family History  family history includes Diabetes in her sister; Genetic Disorder in her mother; Heart Disease in her sister; Heart Disease (age of onset: 55) in her brother; Hypertension in her sister; No Known Problems in her maternal grandfather, maternal grandmother, paternal grandfather, and paternal grandmother; Other in her sister; Stroke in her father.    Social History   reports that she quit smoking about 18 years ago. Her smoking use included cigarettes. She started smoking about 58 years ago. She has a 60 pack-year smoking history. She has never used smokeless tobacco. She reports that she does not drink alcohol and does not use drugs.    Medications  Current Facility-Administered Medications    Medication Dose Route Frequency Provider Last Rate Last Admin    cefUROXime (Ceftin) tablet 250 mg  250 mg Oral Q12HRS Isidro Jackson M.D.   250 mg at 06/25/24 0555    enoxaparin (Lovenox) inj 40 mg  40 mg Subcutaneous DAILY AT 1800 Isidro Jackson M.D.   40 mg at 06/24/24 1724    folic acid (Folvite) tablet 1 mg  1 mg Oral DAILY Maribeth Jimenez M.D.   1 mg at 06/25/24 0555    acetaminophen (Tylenol) tablet 650 mg  650 mg Oral Q6HRS PRN Maribeth Jimenez M.D.        ondansetron (Zofran) syringe/vial injection 4 mg  4 mg Intravenous Q4HRS PRN Maribeth Jimenez M.D.        ondansetron (Zofran ODT) dispertab 4 mg  4 mg Oral Q4HRS PRN Maribeth Jimenez M.D.        LR (Bolus) infusion 500 mL  500 mL Intravenous Once PRN Maribeth Jimenez M.D.        memantine (Namenda) tablet 10 mg  10 mg Oral BID Maribeth Jimenez M.D.   10 mg at 06/25/24 1000    donepezil (Aricept) tablet 10 mg  10 mg Oral DAILY Maribeth Jimenez M.D.   10 mg at 06/24/24 2116    thyroid ([place] Thyroid) tablet 45 mg  45 mg Oral QDAY Maribeth Jimenez M.D.   45 mg at 06/25/24 0900       Allergies  Allergies   Allergen Reactions    Cephalexin Rash     rash    Nabumetone      rash    Synthroid [Levothroid]      Nausea      Tetracycline Swelling       Physical Exam  Temp:  [36 °C (96.8 °F)-36.7 °C (98.1 °F)] 36 °C (96.8 °F)  Pulse:  [53-60] 60  Resp:  [16-17] 16  BP: (114-141)/(48-69) 119/48  SpO2:  [94 %-96 %] 96 %    Physical Exam  Vitals and nursing note reviewed.   Constitutional:       Appearance: Normal appearance.   HENT:      Head: Normocephalic.      Nose: Nose normal.      Mouth/Throat:      Pharynx: Oropharynx is clear.   Eyes:      Conjunctiva/sclera: Conjunctivae normal.   Cardiovascular:      Rate and Rhythm: Normal rate.   Pulmonary:      Effort: Pulmonary effort is normal.   Abdominal:      Palpations: Abdomen is soft.   Musculoskeletal:         General: Normal range  of motion.      Cervical back: Normal range of motion.   Skin:     General: Skin is warm.   Neurological:      Mental Status: Mental status is at baseline.   Psychiatric:         Mood and Affect: Mood normal.         Fluids  Date 06/25/24 0700 - 06/26/24 0659   Shift 9349-1548 1672-1271 5020-2576 24 Hour Total   INTAKE   P.O. 120   120   Shift Total 120   120   OUTPUT   Shift Total       Weight (kg) 68.9 68.9 68.9 68.9       Laboratory  Recent Labs     06/23/24  0045 06/24/24  0112 06/25/24  0112   WBC 14.8* 11.2* 10.9*   RBC 4.41 4.00* 4.16*   HEMOGLOBIN 13.2 12.0 12.6   HEMATOCRIT 41.0 36.7* 38.7   MCV 93.0 91.8 93.0   MCH 29.9 30.0 30.3   MCHC 32.2 32.7 32.6   RDW 46.1 45.0 45.7   PLATELETCT 260 249 244   MPV 9.8 9.6 9.6     Recent Labs     06/23/24 0045 06/24/24  0112 06/25/24 0112   SODIUM 144 141 142   POTASSIUM 3.5* 3.8 3.8   CHLORIDE 106 106 107   CO2 24 23 24   GLUCOSE 84 87 86   BUN 9 11 13   CREATININE 0.71 0.73 0.84   CALCIUM 9.1 8.8 9.0                     Imaging  CT-RENAL COLIC EVALUATION(A/P W/O)   Final Result      1. Left-sided double-J ureteral stent in good position with mild dilatation of the left renal pelvis .      2. Small 3 mm nonobstructing calculus upper pole left kidney.      3. No evidence of right-sided hydronephrosis or renal calculi.      4. Moderate atherosclerotic changes of the abdominal aorta and iliac arteries..          Assessment/Plan  72 yo F w/ small L kidney stones s/p L stent planning for outpatient management, readmitted due to ?pain vs infection  - Will discuss with primary team as well as OR re: inpatient management of her stones  - Recommend ongoing culture-appropriate antibiotics to minimize risk of re-infection leading up to stone procedure  - Will ask that she is made NPO once we have an updated OR plan  - Risks, benefits, alternatives discussed with the patient's  who is her healthcare surrogate given her significant baseline dementia.  All questions  answered.  They understand and agree to proceed.    - Will discuss with Dr. Phoenix later today.

## 2024-06-25 NOTE — H&P (VIEW-ONLY)
Urology Consultation    Date of Service  6/25/2024    Referring Physician  Isidro Alaniz*    Consulting Physician  Rob Green M.D.    Reason for Consultation  Left kidney stones    History of Presenting Illness  73 y.o. female who presented 6/21/2024 with history of L kidney stones s/p stent  - Emergent stent placed on 6/10 when she presented septic  - Plan was for outpatient antibiotics and surgery on 7/5  - Readmitted with constitutional symptoms and abdominal/flank pain    - Patient with significant dementia at baseline, her  is her caregiver and provided the history  - According to her , he mental status is currently at/near her baseline    Review of Systems  Review of Systems   Reason unable to perform ROS: due to dementia, ROS obtained from her .       Past Medical History   has a past medical history of Arthritis, Asthma, Dementia ([facility]), DVT (deep venous thrombosis) ([facility]) (2003), GERD (gastroesophageal reflux disease), and Thyroid disease.    Surgical History   has a past surgical history that includes other abdominal surgery; appendectomy laparoscopic (8/1/2011); cholecystectomy; appendectomy; tonsillectomy; and pr cystoscopy,insert ureteral stent (Left, 6/10/2024).    Family History  family history includes Diabetes in her sister; Genetic Disorder in her mother; Heart Disease in her sister; Heart Disease (age of onset: 55) in her brother; Hypertension in her sister; No Known Problems in her maternal grandfather, maternal grandmother, paternal grandfather, and paternal grandmother; Other in her sister; Stroke in her father.    Social History   reports that she quit smoking about 18 years ago. Her smoking use included cigarettes. She started smoking about 58 years ago. She has a 60 pack-year smoking history. She has never used smokeless tobacco. She reports that she does not drink alcohol and does not use drugs.    Medications  Current Facility-Administered Medications    Medication Dose Route Frequency Provider Last Rate Last Admin    cefUROXime (Ceftin) tablet 250 mg  250 mg Oral Q12HRS Isidro Jackson M.D.   250 mg at 06/25/24 0555    enoxaparin (Lovenox) inj 40 mg  40 mg Subcutaneous DAILY AT 1800 Isidro Jackson M.D.   40 mg at 06/24/24 1724    folic acid (Folvite) tablet 1 mg  1 mg Oral DAILY Maribeth Jimenez M.D.   1 mg at 06/25/24 0555    acetaminophen (Tylenol) tablet 650 mg  650 mg Oral Q6HRS PRN Maribeth Jimenez M.D.        ondansetron (Zofran) syringe/vial injection 4 mg  4 mg Intravenous Q4HRS PRN Maribeth Jimenez M.D.        ondansetron (Zofran ODT) dispertab 4 mg  4 mg Oral Q4HRS PRN Maribeth Jimenez M.D.        LR (Bolus) infusion 500 mL  500 mL Intravenous Once PRN Maribeth Jimenez M.D.        memantine (Namenda) tablet 10 mg  10 mg Oral BID Maribeth Jimenez M.D.   10 mg at 06/25/24 1000    donepezil (Aricept) tablet 10 mg  10 mg Oral DAILY Maribeth Jimenez M.D.   10 mg at 06/24/24 2116    thyroid ([place] Thyroid) tablet 45 mg  45 mg Oral QDAY Maribeth Jimenez M.D.   45 mg at 06/25/24 0900       Allergies  Allergies   Allergen Reactions    Cephalexin Rash     rash    Nabumetone      rash    Synthroid [Levothroid]      Nausea      Tetracycline Swelling       Physical Exam  Temp:  [36 °C (96.8 °F)-36.7 °C (98.1 °F)] 36 °C (96.8 °F)  Pulse:  [53-60] 60  Resp:  [16-17] 16  BP: (114-141)/(48-69) 119/48  SpO2:  [94 %-96 %] 96 %    Physical Exam  Vitals and nursing note reviewed.   Constitutional:       Appearance: Normal appearance.   HENT:      Head: Normocephalic.      Nose: Nose normal.      Mouth/Throat:      Pharynx: Oropharynx is clear.   Eyes:      Conjunctiva/sclera: Conjunctivae normal.   Cardiovascular:      Rate and Rhythm: Normal rate.   Pulmonary:      Effort: Pulmonary effort is normal.   Abdominal:      Palpations: Abdomen is soft.   Musculoskeletal:         General: Normal range  of motion.      Cervical back: Normal range of motion.   Skin:     General: Skin is warm.   Neurological:      Mental Status: Mental status is at baseline.   Psychiatric:         Mood and Affect: Mood normal.         Fluids  Date 06/25/24 0700 - 06/26/24 0659   Shift 1314-3171 0180-6229 8899-9285 24 Hour Total   INTAKE   P.O. 120   120   Shift Total 120   120   OUTPUT   Shift Total       Weight (kg) 68.9 68.9 68.9 68.9       Laboratory  Recent Labs     06/23/24  0045 06/24/24  0112 06/25/24  0112   WBC 14.8* 11.2* 10.9*   RBC 4.41 4.00* 4.16*   HEMOGLOBIN 13.2 12.0 12.6   HEMATOCRIT 41.0 36.7* 38.7   MCV 93.0 91.8 93.0   MCH 29.9 30.0 30.3   MCHC 32.2 32.7 32.6   RDW 46.1 45.0 45.7   PLATELETCT 260 249 244   MPV 9.8 9.6 9.6     Recent Labs     06/23/24 0045 06/24/24  0112 06/25/24 0112   SODIUM 144 141 142   POTASSIUM 3.5* 3.8 3.8   CHLORIDE 106 106 107   CO2 24 23 24   GLUCOSE 84 87 86   BUN 9 11 13   CREATININE 0.71 0.73 0.84   CALCIUM 9.1 8.8 9.0                     Imaging  CT-RENAL COLIC EVALUATION(A/P W/O)   Final Result      1. Left-sided double-J ureteral stent in good position with mild dilatation of the left renal pelvis .      2. Small 3 mm nonobstructing calculus upper pole left kidney.      3. No evidence of right-sided hydronephrosis or renal calculi.      4. Moderate atherosclerotic changes of the abdominal aorta and iliac arteries..          Assessment/Plan  74 yo F w/ small L kidney stones s/p L stent planning for outpatient management, readmitted due to ?pain vs infection  - Will discuss with primary team as well as OR re: inpatient management of her stones  - Recommend ongoing culture-appropriate antibiotics to minimize risk of re-infection leading up to stone procedure  - Will ask that she is made NPO once we have an updated OR plan  - Risks, benefits, alternatives discussed with the patient's  who is her healthcare surrogate given her significant baseline dementia.  All questions  answered.  They understand and agree to proceed.    - Will discuss with Dr. Phoenix later today.

## 2024-06-25 NOTE — PROGRESS NOTES
Pt is awake in bed. VSS. Pt has no c/o pain or n/v at this time. Pt remains A&Ox1. RN positioned pt for comfort and administered night time medications. Urine in purwick remains brown in color. Pt denies any discomfort with voiding. Pt is requesting to rest at this time.  High fall precautions verified.

## 2024-06-26 ENCOUNTER — APPOINTMENT (OUTPATIENT)
Dept: MEDICAL GROUP | Facility: MEDICAL CENTER | Age: 74
End: 2024-06-26
Payer: MEDICARE

## 2024-06-26 LAB
BACTERIA BLD CULT: NORMAL
BACTERIA BLD CULT: NORMAL
SIGNIFICANT IND 70042: NORMAL
SIGNIFICANT IND 70042: NORMAL
SITE SITE: NORMAL
SITE SITE: NORMAL
SOURCE SOURCE: NORMAL
SOURCE SOURCE: NORMAL

## 2024-06-26 PROCEDURE — 700102 HCHG RX REV CODE 250 W/ 637 OVERRIDE(OP): Performed by: HOSPITALIST

## 2024-06-26 PROCEDURE — 99232 SBSQ HOSP IP/OBS MODERATE 35: CPT | Performed by: UROLOGY

## 2024-06-26 PROCEDURE — 700102 HCHG RX REV CODE 250 W/ 637 OVERRIDE(OP): Performed by: INTERNAL MEDICINE

## 2024-06-26 PROCEDURE — A9270 NON-COVERED ITEM OR SERVICE: HCPCS | Performed by: HOSPITALIST

## 2024-06-26 PROCEDURE — 770001 HCHG ROOM/CARE - MED/SURG/GYN PRIV*

## 2024-06-26 PROCEDURE — 700111 HCHG RX REV CODE 636 W/ 250 OVERRIDE (IP): Mod: JZ | Performed by: INTERNAL MEDICINE

## 2024-06-26 PROCEDURE — A9270 NON-COVERED ITEM OR SERVICE: HCPCS | Performed by: INTERNAL MEDICINE

## 2024-06-26 PROCEDURE — 94760 N-INVAS EAR/PLS OXIMETRY 1: CPT

## 2024-06-26 PROCEDURE — 99233 SBSQ HOSP IP/OBS HIGH 50: CPT | Performed by: INTERNAL MEDICINE

## 2024-06-26 RX ORDER — HALOPERIDOL 5 MG/ML
2 INJECTION INTRAMUSCULAR ONCE
Status: DISCONTINUED | OUTPATIENT
Start: 2024-06-26 | End: 2024-06-26

## 2024-06-26 RX ADMIN — DONEPEZIL HYDROCHLORIDE 10 MG: 5 TABLET, FILM COATED ORAL at 22:02

## 2024-06-26 RX ADMIN — MEMANTINE HYDROCHLORIDE 10 MG: 10 TABLET ORAL at 22:02

## 2024-06-26 RX ADMIN — MEMANTINE HYDROCHLORIDE 10 MG: 10 TABLET ORAL at 10:00

## 2024-06-26 RX ADMIN — ENOXAPARIN SODIUM 40 MG: 100 INJECTION SUBCUTANEOUS at 17:30

## 2024-06-26 RX ADMIN — FOLIC ACID 1 MG: 1 TABLET ORAL at 06:22

## 2024-06-26 RX ADMIN — THYROID 45 MG: 30 TABLET ORAL at 09:00

## 2024-06-26 RX ADMIN — CEFUROXIME AXETIL 250 MG: 250 TABLET ORAL at 06:22

## 2024-06-26 RX ADMIN — ACETAMINOPHEN 650 MG: 325 TABLET ORAL at 22:02

## 2024-06-26 RX ADMIN — CEFUROXIME AXETIL 250 MG: 250 TABLET ORAL at 17:30

## 2024-06-26 ASSESSMENT — PAIN SCALES - PAIN ASSESSMENT IN ADVANCED DEMENTIA (PAINAD)
CONSOLABILITY: NO NEED TO CONSOLE
FACIALEXPRESSION: SMILING OR INEXPRESSIVE
NEGVOCALIZATION: OCCASIONAL MOAN/GROAN, LOW SPEECH, NEGATIVE/DISAPPROVING QUALITY
BREATHING: NORMAL
BODYLANGUAGE: RELAXED
BODYLANGUAGE: TENSE, DISTRESSED PACING, FIDGETING
BREATHING: NORMAL
TOTALSCORE: 0

## 2024-06-26 ASSESSMENT — PAIN DESCRIPTION - PAIN TYPE
TYPE: ACUTE PAIN
TYPE: ACUTE PAIN

## 2024-06-26 NOTE — PROGRESS NOTES
Assumed care of pt. Received report from Rose Mary MORALES.  at bedside. No concerns at this time.

## 2024-06-26 NOTE — PROGRESS NOTES
"Hospital Medicine Daily Progress Note    Date of Service  6/26/2024    Chief Complaint  Thuy Albert is a 73 y.o. female admitted 6/21/2024 with pelvic pain    Hospital Course  As per chart review:  \"Patient has severe dementia and history was obtained with her , who was present by the bedside on admission.     Thuy Albert is a 73 y.o. female, with h/o Alzheimer's dementia, HLD, Hypothyroidism and prediabetes, who was recently admitted to the hospital with sepsis due to obstructing ureteral stone on 6/10/2024.  She had left ureteral stent placed the day of admission.  She had intermittent confusion also during hospitalization and recommendation per PT was discharged to SNF however  decided to take her home. She completed antibiotic therapy, was feeling better for a few days, however over the last week has progressive weakness and today, started complaining of abdominal pain/flank pain again reason why presented to the emergency department on 6/21/2024 for further evaluation. No fever\"    Interval Problem Update  6/22: Patient seen at bedside this morning.  She seems to be more calm and little bit more lucid than yesterday, however still with dementia and confused unable to fully have a full conversation with her.  No family member present at the time of my evaluation.  I did discuss case with urology, for now we will continue antibiotics.  We appreciate further recommendations.  Follow cultures.  We are pending repeat hemoglobin however there are no signs of overt bleeding at this time.    6/23: Patient pleasant but confused, her only statement to me was that she felt cold and wanted to be covered up.  WBC count did increase from 8.6-14.8.  Patient did have a large bowel movement and constipation could have been contributing to this in addition to the underlying infection.  If WBC count remains elevated tomorrow then antibiotics may need to be adjusted.    PATIENT SEEN BY PREVIOUS " HOSPITALIST ON 6/23 6/24: Patient still confused, however she seems to be more lucid than 2 days ago when I last saw her.  She is able to tell me that she is in the hospital, however not much more of having a conversation with her.  No family members present at the time of my evaluation.  Will continue with antibiotics, follow cultures.  Patient will most likely require placement upon discharge.    6/25: Patient seen at bedside this morning.  Still confused, however suspect this is her baseline.  No family member present at the time of my evaluation.  I did discuss case with urology for further recommendations regarding antibiotic treatment and stent management.  We appreciate further recommendations.  The patient will most likely require placement upon discharge.  Continue antibiotics for now.    6/26: Patient seen at bedside this morning.  Still confused, however not changed since yesterday.  No family was present at the time of my evaluation.  I did try to talk to the patient's  over the phone, however I was unsuccessful.  For now we will continue antibiotics.  I discussed case with urology the plan is to treat the stent and the stone most likely on Friday, for now we will continue antibiotics.  We appreciate further recommendations.  Patient is also pending placement.  We appreciate further recommendations by case management.    I have discussed this patient's plan of care and discharge plan at IDT rounds today with Case Management, Nursing, Nursing leadership, and other members of the IDT team.    Consultants/Specialty  urology    Code Status  Full Code    Disposition  The patient is not medically cleared for discharge to home or a post-acute facility.        Review of Systems  Review of Systems   Unable to perform ROS: Dementia        Physical Exam  Temp:  [36 °C (96.8 °F)-36.4 °C (97.6 °F)] 36 °C (96.8 °F)  Pulse:  [56-62] 62  Resp:  [16] 16  BP: (114-123)/(48-60) 123/60  SpO2:  [94 %-96 %] 95  %    Physical Exam  Vitals and nursing note reviewed.   Constitutional:       General: She is not in acute distress.     Appearance: She is ill-appearing.   HENT:      Head: Normocephalic and atraumatic.      Right Ear: External ear normal.      Left Ear: External ear normal.      Nose: Nose normal.      Mouth/Throat:      Pharynx: No oropharyngeal exudate or posterior oropharyngeal erythema.   Eyes:      General:         Right eye: No discharge.         Left eye: No discharge.   Cardiovascular:      Rate and Rhythm: Normal rate and regular rhythm.      Pulses: Normal pulses.      Heart sounds: Normal heart sounds. No murmur heard.     No gallop.   Pulmonary:      Effort: Pulmonary effort is normal. No respiratory distress.      Breath sounds: Normal breath sounds. No wheezing or rhonchi.   Abdominal:      General: Bowel sounds are normal. There is no distension.      Palpations: Abdomen is soft.      Tenderness: There is no abdominal tenderness. There is no guarding.   Musculoskeletal:         General: No swelling or tenderness. Normal range of motion.      Cervical back: Normal range of motion and neck supple. No tenderness.   Skin:     General: Skin is warm and dry.   Neurological:      General: No focal deficit present.      Mental Status: She is alert. Mental status is at baseline. She is disoriented.      Motor: No weakness.      Comments: Oriented to self only   Psychiatric:         Mood and Affect: Mood normal.         Behavior: Behavior normal.         Fluids    Intake/Output Summary (Last 24 hours) at 6/26/2024 1006  Last data filed at 6/25/2024 2005  Gross per 24 hour   Intake 120 ml   Output --   Net 120 ml       Laboratory  Recent Labs     06/24/24  0112 06/25/24 0112   WBC 11.2* 10.9*   RBC 4.00* 4.16*   HEMOGLOBIN 12.0 12.6   HEMATOCRIT 36.7* 38.7   MCV 91.8 93.0   MCH 30.0 30.3   MCHC 32.7 32.6   RDW 45.0 45.7   PLATELETCT 249 244   MPV 9.6 9.6     Recent Labs     06/24/24  0112 06/25/24  0112  "  SODIUM 141 142   POTASSIUM 3.8 3.8   CHLORIDE 106 107   CO2 23 24   GLUCOSE 87 86   BUN 11 13   CREATININE 0.73 0.84   CALCIUM 8.8 9.0                     Imaging  CT-RENAL COLIC EVALUATION(A/P W/O)   Final Result      1. Left-sided double-J ureteral stent in good position with mild dilatation of the left renal pelvis .      2. Small 3 mm nonobstructing calculus upper pole left kidney.      3. No evidence of right-sided hydronephrosis or renal calculi.      4. Moderate atherosclerotic changes of the abdominal aorta and iliac arteries..           Assessment/Plan  * Complicated UTI (urinary tract infection)- (present on admission)  Assessment & Plan  -Inpatient status to medical floor.  -Recently admitted to the hospital with sepsis due to obstructing ureteral stone on 6/10/2024.  She had left ureteral stent placed the day of admission  -Patient has positive UTI.  CT imaging showed ureteral stent in the left side in good position.  There is no obstructing uropathy.  -ERP discussed this case with urologist on-call, Dr. Green.  There is no recommendation for any acute intervention/procedure at this time.  -Patient was started on IV antibiotics IV Rocephin and I was called for admission  -Patient has leukocytosis of 15.9 but there is no other SIRS criteria.  There is also no endorgan dysfunction therefore she is not septic on admission.    6/25: Continue antibiotics, follow cultures.  We appreciate further recommendations by urology.    6/26: I discussed case with urology, the patient with stents since 6/10/2024.  Urology will plan to manage stent and stone while hospitalized.  Tentatively this could happen on Friday.  We appreciate further recommendations.  For now we will continue antibiotics.    ACP (advance care planning)  Assessment & Plan  As per previous hospitalist:  \"-I had detailed conversation with  regarding advance care planing and CODE STATUS. Patient has dementia and  is MPOA. He states that " "patient would like to be Full Code. He also said that has Advance Directives and was not sure about POLST form. He declined signing paperwork now. Full Code Status added. Total amount of time on CODE STATUS and advance care planning 16 minutes\"    Hypokalemia  Assessment & Plan  Replace as needed  monitor    Anemia- (present on admission)  Assessment & Plan  6/22: Hb this morning is 11.8, no signs of overt bleeding. We will repeat Hb. Monitor.    6/25: Hb seems to be stable. Monitor    Abnormal gait- (present on admission)  Assessment & Plan  -Added PT/OT.  states he is in the process to have Home Health established. He declines SNF last admission. Seems to have trouble caring for her and himself.     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  Family requested medications to be given on specific timing.  She takes Namenda 10 mg twice a day at 10 AM and 10 PM and donepezil at 10 PM.  I called pharmacy and medications times are adjusted per request.    6/22: PT/OT    6/26: Pending placement    Leukocytosis- (present on admission)  Assessment & Plan  -WBC is 15.9.  This is likely secondary to underlying UTI.  She is a starting on IV antibiotics.    6/25: continue antibiotics.    Prediabetes- (present on admission)  Assessment & Plan  -Glucose is 125 on admission. Hgb 5.2 11/2023.    6/22: could have been reactive as glucose this morning is 85    Mixed hyperlipidemia - (present on admission)  Assessment & Plan  -No longer taking rosuvastatin    Hypothyroidism- (present on admission)  Assessment & Plan  Per previous hospitalist  -She takes [place] Thyroid.  Family requested medication to be given at 9 AM which I accommodated with pharmacy over the phone.  -Her last TSH check was in February and 0.78.         VTE prophylaxis: Lovenox    I have performed a physical " exam and reviewed and updated ROS and Plan today (6/26/2024). In review of yesterday's note (6/25/2024), there are no changes except as documented above.      I spend at least 52 minutes providing care for this patient.  This includes face-to-face interview, physical examination.  Review lab work including CBC, and CMP. Discussing case with urology regarding plan of care. Discussing with multidisciplinary team including case management, nursing staff and pharmacy.  Creating plan of care, reviewing orders.

## 2024-06-26 NOTE — PROGRESS NOTES
4 Eyes Skin Assessment Completed by ANDREW Kerr and ANDREW Gómez.    Head WDL  Ears WDL  Nose WDL  Mouth WDL  Neck WDL  Breast/Chest WDL  Shoulder Blades WDL  Spine WDL  (R) Arm/Elbow/Hand Bruising  (L) Arm/Elbow/Hand Bruising  Abdomen WDL  Groin WDL  Scrotum/Coccyx/Buttocks Redness, Blanching, and Non-Blanching  (R) Leg WDL  (L) Leg WDL  (R) Heel/Foot/Toe WDL  (L) Heel/Foot/Toe WDL          Devices In Places NONE      Interventions In Place Waffle Overlay, TAP System, Pillows, Barrier Cream, and Heels Loaded W/Pillows    Possible Skin Injury No    Pictures Uploaded Into Epic N/A  Wound Consult Placed N/A  RN Wound Prevention Protocol Ordered Yes

## 2024-06-26 NOTE — CARE PLAN
The patient is Watcher - Medium risk of patient condition declining or worsening    Shift Goals  Clinical Goals: Remain free from falls/injury; maintain skin integrity; tolerate oral abx.  Patient Goals: Comfort, rest.  Family Goals: SPRING    Progress made toward(s) clinical / shift goals:  Patient had no falls.injury, maintained skin integrity, and tolerated oral antibiotic.     Patient is not progressing towards the following goals:    Patient was anxious/agitated, hallucinating and yelling out for multiple hours during the night.     Problem: Knowledge Deficit - Standard  Goal: Patient and family/care givers will demonstrate understanding of plan of care, disease process/condition, diagnostic tests and medications  Outcome: Not Progressing   Severe dementia at baseline, no interaction with family during this shift.  Problem: Urinary - Renal Perfusion  Goal: Ability to achieve and maintain adequate renal perfusion and functioning will improve  Outcome: Not Progressing   Patient continues to have small amounts of brown, cloudy, foul-smelling urine with sediment.  Problem: Physical Regulation  Goal: Signs and symptoms of infection will decrease  Outcome: Not Progressing

## 2024-06-26 NOTE — PROGRESS NOTES
Assumed care of patient at 1855, bedside report received from ANDREW Bazzi.  Patient resting comfortably in bed, oriented to self only, calm & cooperative, NAD, breaths even and unlabored, VS stable on room air. Bed in low & locked position, call light and personal belongings within reach, fall precautions in place. Patient updated on POC, no further needs at this time. Hourly rounding continues.

## 2024-06-27 LAB
ALBUMIN SERPL BCP-MCNC: 3.3 G/DL (ref 3.2–4.9)
ALBUMIN/GLOB SERPL: 1.2 G/DL
ALP SERPL-CCNC: 99 U/L (ref 30–99)
ALT SERPL-CCNC: 19 U/L (ref 2–50)
ANION GAP SERPL CALC-SCNC: 11 MMOL/L (ref 7–16)
AST SERPL-CCNC: 30 U/L (ref 12–45)
BILIRUB SERPL-MCNC: 0.5 MG/DL (ref 0.1–1.5)
BUN SERPL-MCNC: 20 MG/DL (ref 8–22)
CALCIUM ALBUM COR SERPL-MCNC: 9.5 MG/DL (ref 8.5–10.5)
CALCIUM SERPL-MCNC: 8.9 MG/DL (ref 8.4–10.2)
CHLORIDE SERPL-SCNC: 107 MMOL/L (ref 96–112)
CO2 SERPL-SCNC: 24 MMOL/L (ref 20–33)
CREAT SERPL-MCNC: 0.89 MG/DL (ref 0.5–1.4)
ERYTHROCYTE [DISTWIDTH] IN BLOOD BY AUTOMATED COUNT: 45.2 FL (ref 35.9–50)
GFR SERPLBLD CREATININE-BSD FMLA CKD-EPI: 68 ML/MIN/1.73 M 2
GLOBULIN SER CALC-MCNC: 2.7 G/DL (ref 1.9–3.5)
GLUCOSE SERPL-MCNC: 92 MG/DL (ref 65–99)
HCT VFR BLD AUTO: 40.1 % (ref 37–47)
HGB BLD-MCNC: 12.8 G/DL (ref 12–16)
MAGNESIUM SERPL-MCNC: 2.2 MG/DL (ref 1.5–2.5)
MCH RBC QN AUTO: 29.8 PG (ref 27–33)
MCHC RBC AUTO-ENTMCNC: 31.9 G/DL (ref 32.2–35.5)
MCV RBC AUTO: 93.5 FL (ref 81.4–97.8)
PHOSPHATE SERPL-MCNC: 3.3 MG/DL (ref 2.5–4.5)
PLATELET # BLD AUTO: 251 K/UL (ref 164–446)
PMV BLD AUTO: 9.3 FL (ref 9–12.9)
POTASSIUM SERPL-SCNC: 3.9 MMOL/L (ref 3.6–5.5)
PROT SERPL-MCNC: 6 G/DL (ref 6–8.2)
RBC # BLD AUTO: 4.29 M/UL (ref 4.2–5.4)
SODIUM SERPL-SCNC: 142 MMOL/L (ref 135–145)
WBC # BLD AUTO: 12.4 K/UL (ref 4.8–10.8)

## 2024-06-27 PROCEDURE — A9270 NON-COVERED ITEM OR SERVICE: HCPCS | Performed by: HOSPITALIST

## 2024-06-27 PROCEDURE — 99232 SBSQ HOSP IP/OBS MODERATE 35: CPT | Performed by: INTERNAL MEDICINE

## 2024-06-27 PROCEDURE — 94760 N-INVAS EAR/PLS OXIMETRY 1: CPT

## 2024-06-27 PROCEDURE — 97530 THERAPEUTIC ACTIVITIES: CPT

## 2024-06-27 PROCEDURE — 80053 COMPREHEN METABOLIC PANEL: CPT

## 2024-06-27 PROCEDURE — 84100 ASSAY OF PHOSPHORUS: CPT

## 2024-06-27 PROCEDURE — 83735 ASSAY OF MAGNESIUM: CPT

## 2024-06-27 PROCEDURE — 85027 COMPLETE CBC AUTOMATED: CPT

## 2024-06-27 PROCEDURE — 700102 HCHG RX REV CODE 250 W/ 637 OVERRIDE(OP): Performed by: HOSPITALIST

## 2024-06-27 PROCEDURE — 700111 HCHG RX REV CODE 636 W/ 250 OVERRIDE (IP): Mod: JZ | Performed by: INTERNAL MEDICINE

## 2024-06-27 PROCEDURE — A9270 NON-COVERED ITEM OR SERVICE: HCPCS | Performed by: INTERNAL MEDICINE

## 2024-06-27 PROCEDURE — 770001 HCHG ROOM/CARE - MED/SURG/GYN PRIV*

## 2024-06-27 PROCEDURE — 97116 GAIT TRAINING THERAPY: CPT

## 2024-06-27 PROCEDURE — 700102 HCHG RX REV CODE 250 W/ 637 OVERRIDE(OP): Performed by: INTERNAL MEDICINE

## 2024-06-27 PROCEDURE — 36415 COLL VENOUS BLD VENIPUNCTURE: CPT

## 2024-06-27 RX ADMIN — FOLIC ACID 1 MG: 1 TABLET ORAL at 05:53

## 2024-06-27 RX ADMIN — MEMANTINE HYDROCHLORIDE 10 MG: 10 TABLET ORAL at 09:15

## 2024-06-27 RX ADMIN — MEMANTINE HYDROCHLORIDE 10 MG: 10 TABLET ORAL at 22:12

## 2024-06-27 RX ADMIN — CEFUROXIME AXETIL 250 MG: 250 TABLET ORAL at 05:53

## 2024-06-27 RX ADMIN — ACETAMINOPHEN 650 MG: 325 TABLET ORAL at 22:11

## 2024-06-27 RX ADMIN — DONEPEZIL HYDROCHLORIDE 10 MG: 5 TABLET, FILM COATED ORAL at 22:11

## 2024-06-27 RX ADMIN — ENOXAPARIN SODIUM 40 MG: 100 INJECTION SUBCUTANEOUS at 17:27

## 2024-06-27 RX ADMIN — THYROID 45 MG: 30 TABLET ORAL at 09:15

## 2024-06-27 RX ADMIN — CEFUROXIME AXETIL 250 MG: 250 TABLET ORAL at 17:27

## 2024-06-27 RX ADMIN — ACETAMINOPHEN 650 MG: 325 TABLET ORAL at 05:53

## 2024-06-27 ASSESSMENT — COGNITIVE AND FUNCTIONAL STATUS - GENERAL
MOBILITY SCORE: 16
CLIMB 3 TO 5 STEPS WITH RAILING: TOTAL
MOVING FROM LYING ON BACK TO SITTING ON SIDE OF FLAT BED: A LITTLE
MOVING TO AND FROM BED TO CHAIR: A LITTLE
TURNING FROM BACK TO SIDE WHILE IN FLAT BAD: A LITTLE
SUGGESTED CMS G CODE MODIFIER MOBILITY: CK
STANDING UP FROM CHAIR USING ARMS: A LITTLE
WALKING IN HOSPITAL ROOM: A LITTLE

## 2024-06-27 ASSESSMENT — PAIN SCALES - PAIN ASSESSMENT IN ADVANCED DEMENTIA (PAINAD)
BREATHING: NORMAL
CONSOLABILITY: NO NEED TO CONSOLE
CONSOLABILITY: NO NEED TO CONSOLE
BODYLANGUAGE: RELAXED
FACIALEXPRESSION: SMILING OR INEXPRESSIVE
TOTALSCORE: 0
BREATHING: NORMAL
FACIALEXPRESSION: SMILING OR INEXPRESSIVE
NEGVOCALIZATION: OCCASIONAL MOAN/GROAN, LOW SPEECH, NEGATIVE/DISAPPROVING QUALITY
BODYLANGUAGE: RELAXED
TOTALSCORE: 1

## 2024-06-27 ASSESSMENT — PAIN DESCRIPTION - PAIN TYPE
TYPE: ACUTE PAIN

## 2024-06-27 ASSESSMENT — GAIT ASSESSMENTS
GAIT LEVEL OF ASSIST: MINIMAL ASSIST
DISTANCE (FEET): 50
ASSISTIVE DEVICE: HAND HELD ASSIST

## 2024-06-27 NOTE — THERAPY
Physical Therapy   Daily Treatment     Patient Name: Thuy Albert  Age:  73 y.o., Sex:  female  Medical Record #: [id number]  Today's Date: 6/27/2024     Precautions  Precautions: Fall Risk  Comments: severe dementia    Assessment    Pt is progressing slower than expected and demonstrates with slightly increased overall weakness, balance, and gait mechanics. Pt demonstrated with posterior LOB upon standing and required frequent facilitation to maintain upright standing. Pt does well with VC, sequencing, and compensatory strategies in order to maintain upright standing posture during functional activities and during static standing. Pt responds well to taking longer strides during ambulation in order to improve shuffling gait, however, poor carry over. Pt was able to transfer to toilet for voiding with Min LOGAN, however, requires assist to avoid falling off toilet during javi care as she tends to lean very far forward. Pt will continue to benefit from skilled PT while in house as she continues to be a fall risk. Recommend post-acute placement for continued physical therapy services prior to discharge home.       Plan    Treatment Plan Status: (P) Continue Current Treatment Plan  Type of Treatment: Bed Mobility, Gait Training, Neuro Re-Education / Balance, Self Care / Home Evaluation, Stair Training, Therapeutic Activities, Therapeutic Exercise  Treatment Frequency: 3 Times per Week  Treatment Duration: Until Therapy Goals Met    DC Equipment Recommendations: (P) Unable to determine at this time  Discharge Recommendations: (P) Recommend post-acute placement for additional physical therapy services prior to discharge home (but per pt significant other/caregiver, would prefer to take pt home with HH.)    Objective       06/27/24 8105   Precautions   Precautions Fall Risk   Comments severe dementia   Pain 0 - 10 Group   Therapist Pain Assessment Nurse Notified;0   Cognition    Cognition / Consciousness X    Orientation Level Other (Comment)  (severe dementia)   Level of Consciousness Alert   Ability To Follow Commands 1 Step   Safety Awareness Impaired   New Learning Impaired   Sequencing Impaired   Initiation Impaired   Strength Lower Body   Lower Body Strength  X   Gross Strength Generalized Weakness, Equal Bilaterally   Standing Lower Body Exercises   Standing Lower Body Exercises Yes   Marching 1 set of 10   Balance   Sitting Balance (Static) Fair +   Sitting Balance (Dynamic) Fair   Standing Balance (Static) Fair -   Standing Balance (Dynamic) Fair -   Weight Shift Sitting Fair   Weight Shift Standing Fair   Skilled Intervention Verbal Cuing;Postural Facilitation;Facilitation   Comments w/HHA, demonstrates with slight LOB and posterior lean during standing   Bed Mobility    Supine to Sit Contact Guard Assist   Sit to Supine Moderate Assist   Scooting Minimal Assist   Skilled Intervention Verbal Cuing;Sequencing;Facilitation   Comments requires frequent VC and sequencing   Gait Analysis   Gait Level Of Assist Minimal Assist   Assistive Device Hand Held Assist   Distance (Feet) 50   # of Times Distance was Traveled 2   Deviation Antalgic;Decreased Base Of Support;Step To;Bradykinetic;Shuffled Gait;Decreased Heel Strike;Decreased Toe Off   Weight Bearing Status FWB   Skilled Intervention Verbal Cuing;Facilitation;Compensatory Strategies   Functional Mobility   Sit to Stand Contact Guard Assist   Bed, Chair, Wheelchair Transfer Contact Guard Assist   Toilet Transfers Minimal Assist   Transfer Method Stand Step   Mobility EOB, sit<>stand, ambulation, to toilet, back to bed   Skilled Intervention Verbal Cuing;Sequencing;Facilitation   6 Clicks Assessment - How much HELP from from another person do you currently need... (If the patient hasn't done an activity recently, how much help from another person do you think he/she would need if he/she tried?)   Turning from your back to your side while in a flat bed without  using bedrails? 3   Moving from lying on your back to sitting on the side of a flat bed without using bedrails? 3   Moving to and from a bed to a chair (including a wheelchair)? 3   Standing up from a chair using your arms (e.g., wheelchair, or bedside chair)? 3   Walking in hospital room? 3   Climbing 3-5 steps with a railing? 1   6 clicks Mobility Score 16   Activity Tolerance   Sitting in Chair 5 mins toilet   Sitting Edge of Bed 5 mins   Standing 5 mins x 2   Comments limited by behavior   Patient / Family Goals    Patient / Family Goal #1 Home   Short Term Goals    Short Term Goal # 1 Pt will be able to perform all bed mobility Russell in order to perform bed mobility upon dc by end of 6 visits   Goal Outcome # 1 Progressing as expected   Short Term Goal # 2 Pt will be able to ambulate 150ft with HHA in order to ambulate household distances at home by end of 6 visits   Goal Outcome # 2 Progressing slower than expected   Short Term Goal # 3 Pt will be able to navigate 2 stairs with R handrail/HHA in order to begin navigating stairs at home by end of 6 visits   Goal Outcome # 3 Goal not met   Education Group   Role of Physical Therapist Patient Response Patient;Family;Acceptance;Explanation;Demonstration;Verbal Demonstration   Gait Training Patient Response Patient;Family;Acceptance;Explanation;Demonstration;Action Demonstration;Reinforcement Needed   Physical Therapy Treatment Plan   Physical Therapy Treatment Plan Continue Current Treatment Plan   Anticipated Discharge Equipment and Recommendations   DC Equipment Recommendations Unable to determine at this time   Discharge Recommendations Recommend post-acute placement for additional physical therapy services prior to discharge home  (but per pt significant other/caregiver, would prefer to take pt home with HH.)   Interdisciplinary Plan of Care Collaboration   IDT Collaboration with  Nursing;Family / Caregiver   Patient Position at End of Therapy In Bed;Bed Alarm  On;Call Light within Reach;Tray Table within Reach;Phone within Reach;Family / Friend in Room   Collaboration Comments aware of visit and recs   Session Information   Date / Session Number  6/27-2 (2/3, 6/27)

## 2024-06-27 NOTE — CARE PLAN
The patient is Stable - Low risk of patient condition declining or worsening    Shift Goals  Clinical Goals: Maintain skin interity, zero falls  Patient Goals: Rest  Family Goals: SPRING    Progress made toward(s) clinical / shift goals:  Barrier paste applied, taps system in use. Waffle in use, skin integrity maintained this shift. Safety maintained, zero falls overnight. Pt seen asleep with calm, unlabored respiration during rounds.    Patient is not progressing towards the following goals: Pt refused wedges for repositioning.      Problem: Knowledge Deficit - Standard  Goal: Patient and family/care givers will demonstrate understanding of plan of care, disease process/condition, diagnostic tests and medications  Outcome: Not Progressing

## 2024-06-27 NOTE — PROGRESS NOTES
Surgical Progress Note    Author: Rob Green M.D. Date & Time created: 2024   6:53 PM     Interval Events:  72 yo F s/p stent for infected stone readmitted for ?ongoing infection  - No significant changes from prior   - WBC downtrending  - Mentation appears better  -  at bedside    Review of Systems   Reason unable to perform ROS: patient with significant dementia.     Hemodynamics:  Temp (24hrs), Av.2 °C (97.1 °F), Min:36 °C (96.8 °F), Max:36.4 °C (97.6 °F)  Temperature: 36.1 °C (96.9 °F)  Pulse  Av.2  Min: 48  Max: 82   Blood Pressure : 130/50     Respiratory:    Respiration: 16, Pulse Oximetry: 93 %           Neuro:  GCS       Fluids:    Intake/Output Summary (Last 24 hours) at 2024  Last data filed at 2024  Gross per 24 hour   Intake 120 ml   Output --   Net 120 ml        Current Diet Order   Procedures    Diet Order Diet: Cardiac     Physical Exam  Vitals and nursing note reviewed.   HENT:      Head: Normocephalic.      Nose: Nose normal.      Mouth/Throat:      Pharynx: Oropharynx is clear.   Eyes:      Conjunctiva/sclera: Conjunctivae normal.   Cardiovascular:      Rate and Rhythm: Normal rate and regular rhythm.      Pulses: Normal pulses.   Pulmonary:      Effort: Pulmonary effort is normal.   Abdominal:      Palpations: Abdomen is soft.   Musculoskeletal:         General: Normal range of motion.      Cervical back: Normal range of motion.   Skin:     General: Skin is warm and dry.      Capillary Refill: Capillary refill takes less than 2 seconds.   Neurological:      Mental Status: She is alert. Mental status is at baseline.   Psychiatric:         Mood and Affect: Mood normal.       Labs:  No results found for this or any previous visit (from the past 24 hour(s)).  Medical Decision Making, by Problem:  Active Hospital Problems    Diagnosis     Complicated UTI (urinary tract infection) [N39.0]     Hypokalemia [E87.6]     ACP (advance care planning) [Z71.89]      Anemia [D64.9]     Abnormal gait [R26.9]      She reports that she does not walk much and she gets tired.  They live in an apartment.  Her  reports that they need to clean out the apartment and remove some stuff from home which they do not need      Severe early onset Alzheimer's dementia with other behavioral disturbance (HCC) [G30.0, F02.C18]      MRI 4/10/2023 showed  Moderate to severe diffuse cerebral volume loss. There is disproportionate severe temporal lobe volume loss indicating the possibility of Alzheimer's disease.    Seeing neurology.  Lives at home with her  who takes care of her.  On donepezil 10 mg daily and Namenda 10 mg 2 times daily      Leukocytosis [D72.829]     Prediabetes [R73.03]        Last A1c came back normal at 5.5.  Has history of prediabetes    Lab Results   Component Value Date/Time    HBA1C 5.5 08/23/2022 01:36 PM          Mixed hyperlipidemia  [E78.2]      The 10-year ASCVD risk score (Belgica JULIO, et al., 2019) is: 11.4%    Values used to calculate the score:      Age: 73 years      Sex: Female      Is Non- : No      Diabetic: No      Tobacco smoker: No      Systolic Blood Pressure: 120 mmHg      Is BP treated: No      HDL Cholesterol: 40 mg/dL      Total Cholesterol: 166 mg/dL       Lab Results   Component Value Date/Time    CHOLSTRLTOT 166 11/03/2023 1533    TRIGLYCERIDE 193 (H) 11/03/2023 1533    HDL 40 11/03/2023 1533    LDL 87 11/03/2023 1533      She is currently on Crestor 10 mg daily which she has not started medication      Hypothyroidism [E03.9]      Unable to tolerate Levothyroxine.  She is currently on [place] Thyroid which has been able to control her symptoms and her thyroid function has been in normal range.  T4 is mildly low, TSH is normal       Plan:  74 yo F s/p stent for infected stone presenting with ?ongoing infection   - Plan to perform ureteroscopy as an inpatient at [facility] 6/28   - Please keep NPO at 000 evening prior to  surgery   - Continue antibiotics per primary team     - Plan for cystoscopy, LEFT ureteroscopy, possible laser lithotripsy, stent exchange.    - The operation was discussed with the patient and her .  Risks, benefits, alternatives discussed with the patient.  All questions answered.  They understand and agree to proceed.   - Please obtain consent for procedure from patient's  given her significant baseline dementia    Quality Measures:  Quality-Core Measures   Reviewed items::  Labs reviewed, Medications reviewed and Radiology images reviewed  Rodriges catheter::  No Rodriges  Ulcer Prophylaxis::  Not indicated      Discussed patient condition with Hospitalist, Family, and Patient

## 2024-06-27 NOTE — PROGRESS NOTES
4 Eyes Skin Assessment Completed by ANDREW Hamlin and ANDREW Gómez.    Head WDL  Ears WDL  Nose WDL  Mouth WDL  Neck WDL  Breast/Chest WDL  Shoulder Blades WDL  Spine WDL  (R) Arm/Elbow/Hand Bruising  (L) Arm/Elbow/Hand Bruising  Abdomen WDL  Groin Redness, Blanching, and Rash  Scrotum/Coccyx/Buttocks Redness, Blanching, Excoriation, and Discoloration  (R) Leg WDL  (L) Leg WDL  (R) Heel/Foot/Toe WDL  (L) Heel/Foot/Toe WDL          Devices In Places Pulse Ox and SCD's, purewick      Interventions In Place Waffle Overlay, TAP System, Pillows, Barrier Cream, and Heels Loaded W/Pillows    Possible Skin Injury No    Pictures Uploaded Into Epic N/A  Wound Consult Placed N/A  RN Wound Prevention Protocol Ordered Yes

## 2024-06-27 NOTE — PROGRESS NOTES
"Attempted to insert PIV on pt, pt screaming, \"No! Get off me!\"  \"No, I'm cold\", warm blankets and snacks provided. Pt calmed down. Pt also reported lower left abdominal pain and pain in her vagina, PRN tylenol given.   "

## 2024-06-27 NOTE — PROGRESS NOTES
Received bedside report from day shift RN Giorgio at 19:07.   Assumed pt care. Pt seen asleep, on RA. Respirations unlabored.  No signs of distress noted. Safety and fall precautions in place. Bed locked and lowest position.  Bed and strip alarm on.  Call light kept within reach.  Whiteboard updated.  Care ongoing.

## 2024-06-27 NOTE — PROGRESS NOTES
"Hospital Medicine Daily Progress Note    Date of Service  6/27/2024    Chief Complaint  Thuy Albert is a 73 y.o. female admitted 6/21/2024 with pelvic pain    Hospital Course  As per chart review:  \"Patient has severe dementia and history was obtained with her , who was present by the bedside on admission.     Thuy Albert is a 73 y.o. female, with h/o Alzheimer's dementia, HLD, Hypothyroidism and prediabetes, who was recently admitted to the hospital with sepsis due to obstructing ureteral stone on 6/10/2024.  She had left ureteral stent placed the day of admission.  She had intermittent confusion also during hospitalization and recommendation per PT was discharged to SNF however  decided to take her home. She completed antibiotic therapy, was feeling better for a few days, however over the last week has progressive weakness and today, started complaining of abdominal pain/flank pain again reason why presented to the emergency department on 6/21/2024 for further evaluation. No fever\"    Interval Problem Update  6/22: Patient seen at bedside this morning.  She seems to be more calm and little bit more lucid than yesterday, however still with dementia and confused unable to fully have a full conversation with her.  No family member present at the time of my evaluation.  I did discuss case with urology, for now we will continue antibiotics.  We appreciate further recommendations.  Follow cultures.  We are pending repeat hemoglobin however there are no signs of overt bleeding at this time.    6/23: Patient pleasant but confused, her only statement to me was that she felt cold and wanted to be covered up.  WBC count did increase from 8.6-14.8.  Patient did have a large bowel movement and constipation could have been contributing to this in addition to the underlying infection.  If WBC count remains elevated tomorrow then antibiotics may need to be adjusted.    6/24: Patient still " confused, however she seems to be more lucid than 2 days ago when I last saw her.  She is able to tell me that she is in the hospital, however not much more of having a conversation with her.  No family members present at the time of my evaluation.  Will continue with antibiotics, follow cultures.  Patient will most likely require placement upon discharge.    6/25: Patient seen at bedside this morning.  Still confused, however suspect this is her baseline.  No family member present at the time of my evaluation.  I did discuss case with urology for further recommendations regarding antibiotic treatment and stent management.  We appreciate further recommendations.  The patient will most likely require placement upon discharge.  Continue antibiotics for now.    6/26: Patient seen at bedside this morning.  Still confused, however not changed since yesterday.  No family was present at the time of my evaluation.  I did try to talk to the patient's  over the phone, however I was unsuccessful.  For now we will continue antibiotics.  I discussed case with urology the plan is to treat the stent and the stone most likely on Friday, for now we will continue antibiotics.  We appreciate further recommendations.  Patient is also pending placement.  We appreciate further recommendations by case management.    6/27: Patient doing about the same as when I saw her on the 23rd.  Urology planning on doing cystoscopy evaluation tomorrow with stent exchange questionable lithotripsy and hopefully this should lessen the infectious burden.    I have discussed this patient's plan of care and discharge plan at IDT rounds today with Case Management, Nursing, Nursing leadership, and other members of the IDT team.    Consultants/Specialty  urology    Code Status  Full Code    Disposition  The patient is not medically cleared for discharge to home or a post-acute facility.  Anticipate discharge to: skilled nursing facility      Review of  Systems  Review of Systems   Unable to perform ROS: Dementia        Physical Exam  Temp:  [36.1 °C (96.9 °F)-36.7 °C (98 °F)] 36.6 °C (97.8 °F)  Pulse:  [58-64] 64  Resp:  [16-18] 17  BP: (112-130)/(50-77) 115/54  SpO2:  [92 %-97 %] 95 %    Physical Exam  Vitals and nursing note reviewed.   Constitutional:       General: She is not in acute distress.     Appearance: She is not ill-appearing.   HENT:      Head: Normocephalic and atraumatic.      Right Ear: External ear normal.      Left Ear: External ear normal.      Nose: Nose normal.      Mouth/Throat:      Pharynx: No oropharyngeal exudate or posterior oropharyngeal erythema.   Eyes:      General:         Right eye: No discharge.         Left eye: No discharge.   Cardiovascular:      Rate and Rhythm: Normal rate and regular rhythm.      Pulses: Normal pulses.      Heart sounds: Normal heart sounds. No murmur heard.     No gallop.   Pulmonary:      Effort: Pulmonary effort is normal. No respiratory distress.      Breath sounds: Normal breath sounds. No wheezing or rhonchi.   Abdominal:      General: Bowel sounds are normal. There is no distension.      Palpations: Abdomen is soft.      Tenderness: There is no abdominal tenderness. There is no guarding.   Musculoskeletal:         General: No swelling or tenderness. Normal range of motion.      Cervical back: Normal range of motion and neck supple. No tenderness.   Skin:     General: Skin is warm and dry.   Neurological:      General: No focal deficit present.      Mental Status: She is alert. Mental status is at baseline. She is disoriented.      Motor: No weakness.      Comments: Oriented to self only   Psychiatric:         Mood and Affect: Mood normal.         Behavior: Behavior normal.         Fluids    Intake/Output Summary (Last 24 hours) at 6/27/2024 1413  Last data filed at 6/27/2024 0933  Gross per 24 hour   Intake 280 ml   Output --   Net 280 ml       Laboratory  Recent Labs     06/25/24  0112  06/27/24  0152   WBC 10.9* 12.4*   RBC 4.16* 4.29   HEMOGLOBIN 12.6 12.8   HEMATOCRIT 38.7 40.1   MCV 93.0 93.5   MCH 30.3 29.8   MCHC 32.6 31.9*   RDW 45.7 45.2   PLATELETCT 244 251   MPV 9.6 9.3     Recent Labs     06/25/24  0112 06/27/24  0152   SODIUM 142 142   POTASSIUM 3.8 3.9   CHLORIDE 107 107   CO2 24 24   GLUCOSE 86 92   BUN 13 20   CREATININE 0.84 0.89   CALCIUM 9.0 8.9                     Imaging  CT-RENAL COLIC EVALUATION(A/P W/O)   Final Result      1. Left-sided double-J ureteral stent in good position with mild dilatation of the left renal pelvis .      2. Small 3 mm nonobstructing calculus upper pole left kidney.      3. No evidence of right-sided hydronephrosis or renal calculi.      4. Moderate atherosclerotic changes of the abdominal aorta and iliac arteries..           Assessment/Plan  * Complicated UTI (urinary tract infection)- (present on admission)  Assessment & Plan  -Inpatient status to medical floor.  -Recently admitted to the hospital with sepsis due to obstructing ureteral stone on 6/10/2024.  She had left ureteral stent placed the day of admission  -Patient has positive UTI.  CT imaging showed ureteral stent in the left side in good position.  There is no obstructing uropathy.  -ERP discussed this case with urologist on-call, Dr. Green.  There is no recommendation for any acute intervention/procedure at this time.  -Patient was started on IV antibiotics IV Rocephin and I was called for admission  -Patient has leukocytosis of 15.9 but there is no other SIRS criteria.  There is also no endorgan dysfunction therefore she is not septic on admission.    6/25: Continue antibiotics, follow cultures.  We appreciate further recommendations by urology.    6/26: I discussed case with urology, the patient with stents since 6/10/2024.  Urology will plan to manage stent and stone while hospitalized.  Tentatively this could happen on Friday.  We appreciate further recommendations.  For now we will  "continue antibiotics.    6/27:  OR tomorrow for stent exchange and ? lithotripsy tomorrow.    ACP (advance care planning)  Assessment & Plan  As per previous hospitalist:  \"-I had detailed conversation with  regarding advance care planing and CODE STATUS. Patient has dementia and  is YOHAN. He states that patient would like to be Full Code. He also said that has Advance Directives and was not sure about POLST form. He declined signing paperwork now. Full Code Status added. Total amount of time on CODE STATUS and advance care planning 16 minutes\"    Hypokalemia  Assessment & Plan  Replace as needed  monitor    Anemia- (present on admission)  Assessment & Plan  6/22: Hb this morning is 11.8, no signs of overt bleeding. We will repeat Hb. Monitor.    6/25: Hb seems to be stable. Monitor    Abnormal gait- (present on admission)  Assessment & Plan  -Added PT/OT.  states he is in the process to have Home Health established. He declines SNF last admission. Seems to have trouble caring for her and himself.     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  Family requested medications to be given on specific timing.  She takes Namenda 10 mg twice a day at 10 AM and 10 PM and donepezil at 10 PM.  I called pharmacy and medications times are adjusted per request.    6/27: Pending placement after surgery tomorrow    Leukocytosis- (present on admission)  Assessment & Plan  -WBC is 15.9.  This is likely secondary to underlying UTI.  She is a starting on IV antibiotics.    6/25: continue antibiotics.    Prediabetes- (present on admission)  Assessment & Plan  -Glucose is 125 on admission. Hgb 5.2 11/2023.    6/22: could have been reactive as glucose this morning is 85    Mixed hyperlipidemia - (present on admission)  Assessment & Plan  -No longer taking " rosuvastatin    Hypothyroidism- (present on admission)  Assessment & Plan  Per previous hospitalist  -She takes Burke Thyroid.  Family requested medication to be given at 9 AM which I accommodated with pharmacy over the phone.  -Her last TSH check was in February and 0.78.         VTE prophylaxis: Lovenox    I have performed a physical exam and reviewed and updated ROS and Plan today (6/27/2024). In review of yesterday's note (6/26/2024), there are no changes except as documented above.      I spend at least 52 minutes providing care for this patient.  This includes face-to-face interview, physical examination.  Review lab work including CBC, and CMP. Discussing case with urology regarding plan of care. Discussing with multidisciplinary team including case management, nursing staff and pharmacy.  Creating plan of care, reviewing orders.       enoxaparin ppx

## 2024-06-27 NOTE — PROGRESS NOTES
Assumed care of pt at 0715. Received bedside report from Yakelin MORALES. Pt resting in bed. Pt stated she has no pain at this time. Assisted pt to chair for breakfast. Discussed plan for the day including maintaining pt safety. No other needs at this time, call light in reach, bed in lowest position.

## 2024-06-28 ENCOUNTER — ANESTHESIA (OUTPATIENT)
Dept: SURGERY | Facility: MEDICAL CENTER | Age: 74
End: 2024-06-28
Payer: MEDICARE

## 2024-06-28 ENCOUNTER — APPOINTMENT (OUTPATIENT)
Dept: RADIOLOGY | Facility: MEDICAL CENTER | Age: 74
DRG: 660 | End: 2024-06-28
Attending: INTERNAL MEDICINE
Payer: MEDICARE

## 2024-06-28 ENCOUNTER — ANESTHESIA EVENT (OUTPATIENT)
Dept: SURGERY | Facility: MEDICAL CENTER | Age: 74
End: 2024-06-28
Payer: MEDICARE

## 2024-06-28 LAB
ANION GAP SERPL CALC-SCNC: 11 MMOL/L (ref 7–16)
BUN SERPL-MCNC: 23 MG/DL (ref 8–22)
CALCIUM SERPL-MCNC: 9.3 MG/DL (ref 8.4–10.2)
CHLORIDE SERPL-SCNC: 106 MMOL/L (ref 96–112)
CO2 SERPL-SCNC: 26 MMOL/L (ref 20–33)
CREAT SERPL-MCNC: 0.8 MG/DL (ref 0.5–1.4)
ERYTHROCYTE [DISTWIDTH] IN BLOOD BY AUTOMATED COUNT: 46 FL (ref 35.9–50)
GFR SERPLBLD CREATININE-BSD FMLA CKD-EPI: 77 ML/MIN/1.73 M 2
GLUCOSE SERPL-MCNC: 92 MG/DL (ref 65–99)
HCT VFR BLD AUTO: 39.7 % (ref 37–47)
HGB BLD-MCNC: 12.8 G/DL (ref 12–16)
MCH RBC QN AUTO: 29.7 PG (ref 27–33)
MCHC RBC AUTO-ENTMCNC: 32.2 G/DL (ref 32.2–35.5)
MCV RBC AUTO: 92.1 FL (ref 81.4–97.8)
PLATELET # BLD AUTO: 250 K/UL (ref 164–446)
PMV BLD AUTO: 9.8 FL (ref 9–12.9)
POTASSIUM SERPL-SCNC: 3.8 MMOL/L (ref 3.6–5.5)
RBC # BLD AUTO: 4.31 M/UL (ref 4.2–5.4)
SODIUM SERPL-SCNC: 143 MMOL/L (ref 135–145)
WBC # BLD AUTO: 11.8 K/UL (ref 4.8–10.8)

## 2024-06-28 PROCEDURE — A9270 NON-COVERED ITEM OR SERVICE: HCPCS | Performed by: INTERNAL MEDICINE

## 2024-06-28 PROCEDURE — 52352 CYSTOURETERO W/STONE REMOVE: CPT | Mod: LT | Performed by: UROLOGY

## 2024-06-28 PROCEDURE — 74018 RADEX ABDOMEN 1 VIEW: CPT

## 2024-06-28 PROCEDURE — 52332 CYSTOSCOPY AND TREATMENT: CPT | Mod: LT | Performed by: UROLOGY

## 2024-06-28 PROCEDURE — C2617 STENT, NON-COR, TEM W/O DEL: HCPCS | Performed by: UROLOGY

## 2024-06-28 PROCEDURE — 770001 HCHG ROOM/CARE - MED/SURG/GYN PRIV*

## 2024-06-28 PROCEDURE — 160035 HCHG PACU - 1ST 60 MINS PHASE I: Performed by: UROLOGY

## 2024-06-28 PROCEDURE — 85027 COMPLETE CBC AUTOMATED: CPT

## 2024-06-28 PROCEDURE — 94760 N-INVAS EAR/PLS OXIMETRY 1: CPT

## 2024-06-28 PROCEDURE — 160048 HCHG OR STATISTICAL LEVEL 1-5: Performed by: UROLOGY

## 2024-06-28 PROCEDURE — 700101 HCHG RX REV CODE 250: Performed by: ANESTHESIOLOGY

## 2024-06-28 PROCEDURE — 160009 HCHG ANES TIME/MIN: Performed by: UROLOGY

## 2024-06-28 PROCEDURE — 36415 COLL VENOUS BLD VENIPUNCTURE: CPT

## 2024-06-28 PROCEDURE — 700102 HCHG RX REV CODE 250 W/ 637 OVERRIDE(OP): Performed by: HOSPITALIST

## 2024-06-28 PROCEDURE — C1747 HCHG SHELL REV 278 C1747: HCPCS | Performed by: UROLOGY

## 2024-06-28 PROCEDURE — 0T778DZ DILATION OF LEFT URETER WITH INTRALUMINAL DEVICE, VIA NATURAL OR ARTIFICIAL OPENING ENDOSCOPIC: ICD-10-PCS | Performed by: UROLOGY

## 2024-06-28 PROCEDURE — 700111 HCHG RX REV CODE 636 W/ 250 OVERRIDE (IP): Performed by: ANESTHESIOLOGY

## 2024-06-28 PROCEDURE — 110371 HCHG SHELL REV 272: Performed by: UROLOGY

## 2024-06-28 PROCEDURE — 160028 HCHG SURGERY MINUTES - 1ST 30 MINS LEVEL 3: Performed by: UROLOGY

## 2024-06-28 PROCEDURE — C1769 GUIDE WIRE: HCPCS | Performed by: UROLOGY

## 2024-06-28 PROCEDURE — 0TC78ZZ EXTIRPATION OF MATTER FROM LEFT URETER, VIA NATURAL OR ARTIFICIAL OPENING ENDOSCOPIC: ICD-10-PCS | Performed by: UROLOGY

## 2024-06-28 PROCEDURE — 160039 HCHG SURGERY MINUTES - EA ADDL 1 MIN LEVEL 3: Performed by: UROLOGY

## 2024-06-28 PROCEDURE — 80048 BASIC METABOLIC PNL TOTAL CA: CPT

## 2024-06-28 PROCEDURE — 99232 SBSQ HOSP IP/OBS MODERATE 35: CPT | Performed by: INTERNAL MEDICINE

## 2024-06-28 PROCEDURE — 700102 HCHG RX REV CODE 250 W/ 637 OVERRIDE(OP): Performed by: INTERNAL MEDICINE

## 2024-06-28 PROCEDURE — A9270 NON-COVERED ITEM OR SERVICE: HCPCS | Performed by: HOSPITALIST

## 2024-06-28 PROCEDURE — 93005 ELECTROCARDIOGRAM TRACING: CPT | Performed by: ANESTHESIOLOGY

## 2024-06-28 PROCEDURE — 700105 HCHG RX REV CODE 258: Performed by: UROLOGY

## 2024-06-28 PROCEDURE — 160002 HCHG RECOVERY MINUTES (STAT): Performed by: UROLOGY

## 2024-06-28 PROCEDURE — 0TP98DZ REMOVAL OF INTRALUMINAL DEVICE FROM URETER, VIA NATURAL OR ARTIFICIAL OPENING ENDOSCOPIC: ICD-10-PCS | Performed by: UROLOGY

## 2024-06-28 DEVICE — STENT UROLOGICAL POLARIS 6X24  ULTRA: Type: IMPLANTABLE DEVICE | Site: URETER | Status: FUNCTIONAL

## 2024-06-28 RX ORDER — HYDROMORPHONE HYDROCHLORIDE 1 MG/ML
0.2 INJECTION, SOLUTION INTRAMUSCULAR; INTRAVENOUS; SUBCUTANEOUS
Status: DISCONTINUED | OUTPATIENT
Start: 2024-06-28 | End: 2024-06-28 | Stop reason: HOSPADM

## 2024-06-28 RX ORDER — DIPHENHYDRAMINE HYDROCHLORIDE 50 MG/ML
12.5 INJECTION INTRAMUSCULAR; INTRAVENOUS
Status: DISCONTINUED | OUTPATIENT
Start: 2024-06-28 | End: 2024-06-28 | Stop reason: HOSPADM

## 2024-06-28 RX ORDER — EPHEDRINE SULFATE 50 MG/ML
5 INJECTION, SOLUTION INTRAVENOUS
Status: DISCONTINUED | OUTPATIENT
Start: 2024-06-28 | End: 2024-06-28 | Stop reason: HOSPADM

## 2024-06-28 RX ORDER — GLYCOPYRROLATE 0.2 MG/ML
INJECTION INTRAMUSCULAR; INTRAVENOUS PRN
Status: DISCONTINUED | OUTPATIENT
Start: 2024-06-28 | End: 2024-06-28 | Stop reason: SURG

## 2024-06-28 RX ORDER — HYDROMORPHONE HYDROCHLORIDE 1 MG/ML
0.1 INJECTION, SOLUTION INTRAMUSCULAR; INTRAVENOUS; SUBCUTANEOUS
Status: DISCONTINUED | OUTPATIENT
Start: 2024-06-28 | End: 2024-06-28 | Stop reason: HOSPADM

## 2024-06-28 RX ORDER — ONDANSETRON 2 MG/ML
4 INJECTION INTRAMUSCULAR; INTRAVENOUS
Status: DISCONTINUED | OUTPATIENT
Start: 2024-06-28 | End: 2024-06-28 | Stop reason: HOSPADM

## 2024-06-28 RX ORDER — SODIUM CHLORIDE, SODIUM LACTATE, POTASSIUM CHLORIDE, CALCIUM CHLORIDE 600; 310; 30; 20 MG/100ML; MG/100ML; MG/100ML; MG/100ML
INJECTION, SOLUTION INTRAVENOUS CONTINUOUS
Status: DISCONTINUED | OUTPATIENT
Start: 2024-06-28 | End: 2024-06-28 | Stop reason: HOSPADM

## 2024-06-28 RX ORDER — OXYCODONE HCL 5 MG/5 ML
10 SOLUTION, ORAL ORAL
Status: DISCONTINUED | OUTPATIENT
Start: 2024-06-28 | End: 2024-06-28 | Stop reason: HOSPADM

## 2024-06-28 RX ORDER — OXYCODONE HCL 5 MG/5 ML
5 SOLUTION, ORAL ORAL
Status: DISCONTINUED | OUTPATIENT
Start: 2024-06-28 | End: 2024-06-28 | Stop reason: HOSPADM

## 2024-06-28 RX ORDER — MIDAZOLAM HYDROCHLORIDE 1 MG/ML
1 INJECTION INTRAMUSCULAR; INTRAVENOUS
Status: DISCONTINUED | OUTPATIENT
Start: 2024-06-28 | End: 2024-06-28 | Stop reason: HOSPADM

## 2024-06-28 RX ORDER — CEFAZOLIN SODIUM 1 G/3ML
INJECTION, POWDER, FOR SOLUTION INTRAMUSCULAR; INTRAVENOUS PRN
Status: DISCONTINUED | OUTPATIENT
Start: 2024-06-28 | End: 2024-06-28 | Stop reason: SURG

## 2024-06-28 RX ORDER — HALOPERIDOL 5 MG/ML
1 INJECTION INTRAMUSCULAR
Status: DISCONTINUED | OUTPATIENT
Start: 2024-06-28 | End: 2024-06-28 | Stop reason: HOSPADM

## 2024-06-28 RX ORDER — HYDRALAZINE HYDROCHLORIDE 20 MG/ML
5 INJECTION INTRAMUSCULAR; INTRAVENOUS
Status: DISCONTINUED | OUTPATIENT
Start: 2024-06-28 | End: 2024-06-28 | Stop reason: HOSPADM

## 2024-06-28 RX ORDER — LIDOCAINE HYDROCHLORIDE 20 MG/ML
INJECTION, SOLUTION EPIDURAL; INFILTRATION; INTRACAUDAL; PERINEURAL PRN
Status: DISCONTINUED | OUTPATIENT
Start: 2024-06-28 | End: 2024-06-28 | Stop reason: SURG

## 2024-06-28 RX ORDER — HYDROMORPHONE HYDROCHLORIDE 1 MG/ML
0.4 INJECTION, SOLUTION INTRAMUSCULAR; INTRAVENOUS; SUBCUTANEOUS
Status: DISCONTINUED | OUTPATIENT
Start: 2024-06-28 | End: 2024-06-28 | Stop reason: HOSPADM

## 2024-06-28 RX ORDER — MEPERIDINE HYDROCHLORIDE 25 MG/ML
12.5 INJECTION INTRAMUSCULAR; INTRAVENOUS; SUBCUTANEOUS
Status: DISCONTINUED | OUTPATIENT
Start: 2024-06-28 | End: 2024-06-28 | Stop reason: HOSPADM

## 2024-06-28 RX ORDER — SODIUM CHLORIDE, SODIUM LACTATE, POTASSIUM CHLORIDE, CALCIUM CHLORIDE 600; 310; 30; 20 MG/100ML; MG/100ML; MG/100ML; MG/100ML
INJECTION, SOLUTION INTRAVENOUS CONTINUOUS
Status: ACTIVE | OUTPATIENT
Start: 2024-06-28 | End: 2024-06-28

## 2024-06-28 RX ADMIN — GLYCOPYRROLATE 0.2 MG: 0.2 INJECTION INTRAMUSCULAR; INTRAVENOUS at 15:00

## 2024-06-28 RX ADMIN — LIDOCAINE HYDROCHLORIDE 100 MG: 20 INJECTION, SOLUTION EPIDURAL; INFILTRATION; INTRACAUDAL at 15:00

## 2024-06-28 RX ADMIN — PROPOFOL 70 MG: 10 INJECTION, EMULSION INTRAVENOUS at 15:00

## 2024-06-28 RX ADMIN — FENTANYL CITRATE 25 MCG: 50 INJECTION, SOLUTION INTRAMUSCULAR; INTRAVENOUS at 15:12

## 2024-06-28 RX ADMIN — DONEPEZIL HYDROCHLORIDE 10 MG: 5 TABLET, FILM COATED ORAL at 21:06

## 2024-06-28 RX ADMIN — CEFUROXIME AXETIL 250 MG: 250 TABLET ORAL at 17:12

## 2024-06-28 RX ADMIN — MEMANTINE HYDROCHLORIDE 10 MG: 10 TABLET ORAL at 21:06

## 2024-06-28 RX ADMIN — CEFAZOLIN 0.1 G: 1 INJECTION, POWDER, FOR SOLUTION INTRAMUSCULAR; INTRAVENOUS at 14:45

## 2024-06-28 RX ADMIN — FENTANYL CITRATE 25 MCG: 50 INJECTION, SOLUTION INTRAMUSCULAR; INTRAVENOUS at 15:15

## 2024-06-28 RX ADMIN — CEFAZOLIN 1.9 G: 1 INJECTION, POWDER, FOR SOLUTION INTRAMUSCULAR; INTRAVENOUS at 14:55

## 2024-06-28 RX ADMIN — SODIUM CHLORIDE, POTASSIUM CHLORIDE, SODIUM LACTATE AND CALCIUM CHLORIDE: 600; 310; 30; 20 INJECTION, SOLUTION INTRAVENOUS at 14:53

## 2024-06-28 ASSESSMENT — PAIN DESCRIPTION - PAIN TYPE
TYPE: SURGICAL PAIN
TYPE: SURGICAL PAIN
TYPE: ACUTE PAIN;CHRONIC PAIN
TYPE: ACUTE PAIN
TYPE: SURGICAL PAIN
TYPE: SURGICAL PAIN

## 2024-06-28 ASSESSMENT — PAIN SCALES - GENERAL: PAIN_LEVEL: 0

## 2024-06-28 NOTE — THERAPY
Speech Language Therapy Contact Note    Patient Name: Thuy Albert  Age:  73 y.o., Sex:  female  Medical Record #: [id number]  Today's Date: 6/28/2024 06/28/24 1503   Treatment Variance   Reason For Missed Therapy Medical - Patient  in Procedure   Initial Contact Note    Initial Contact Note  Order Received and Verified, Speech Therapy Evaluation in Progress with Full Report to Follow.   Interdisciplinary Plan of Care Collaboration   IDT Collaboration with  Nursing   Collaboration Comments This SLP received orders for CSE. Patient currently in surgery. SLP will hold and re-attempt as able/appropriate. Thank you.

## 2024-06-28 NOTE — ANESTHESIA PREPROCEDURE EVALUATION
Case: [id number] Date/Time: 06/28/24 1345    Procedures:       CYSTOSCOPY, LEFT URETEROSCOPY, LASER LITHOTRIPSY, URETERAL STENT PLACEMENT      URETEROSCOPY      LITHOTRIPSY, USING LASER    Pre-op diagnosis: KIDNEY STONE    Location: [facility] OR 03 / SURGERY [facility]    Surgeons: Rob Green M.D.            Relevant Problems   PULMONARY   (positive) Mild intermittent asthma without complication_controlled w/o meds       NEURO   (positive) Chronic migraine without aura without status migrainosus, not intractable      CARDIAC   (positive) Atherosclerosis of aorta (HCC)   (positive) Chronic migraine without aura without status migrainosus, not intractable   (positive) Sinus bradycardia   (positive) Stenosis of aorta      GI   (positive) GERD (gastroesophageal reflux disease)         (positive) NATALY (acute kidney injury) (HCC)      ENDO   (positive) Hypothyroidism   Dementia/Alzheimers      TTE 11/13/2023:  No prior study is available for comparison.   Normal left ventricular size and systolic function.  The ejection fraction is measured to be 67 % by Ji's biplane.  Normal diastolic function.  Normal right ventricular size and systolic function.  No significant valvular abnormalities.    Physical Exam    Airway   Mallampati: II  TM distance: >3 FB  Neck ROM: full       Cardiovascular - normal exam  Rhythm: regular  Rate: normal  (-) murmur     Dental - normal exam           Pulmonary - normal exam  Breath sounds clear to auscultation     Abdominal    Neurological - normal exam and abnormal exam         Other findings: Visually impaired  Very confused                Anesthesia Plan    ASA 2       Plan - general       Airway plan will be LMA          Induction: intravenous    Postoperative Plan: Postoperative administration of opioids is intended.    Pertinent diagnostic labs and testing reviewed    Informed Consent:    Anesthetic plan and risks discussed with patient.    Use of blood products discussed with: patient  whom consented to blood products.       Marcela to the mid 40's in PO.  Stat ECG shows sinus marcela, will proceed and pre-treat with glyco.    Vague history of allergy to cephalexin, will test dose ancef and admin if tolerated. Ancef administered 6/14 without issue.

## 2024-06-28 NOTE — PROGRESS NOTES
Assumed care of pt at 0715. Received bedside report from ETN RN. Pt asleep in bed. Equal rise and fall of chest noted. Bed in lowest position. Call light in reach.    1215 Report given to April 1245 Informed Leigha RN that pt HR started to dip to 40s. Dr. Victor made aware.    0100 Transport took pt to pre-op   saw therapist in past/anxiety disorder

## 2024-06-28 NOTE — ANESTHESIA POSTPROCEDURE EVALUATION
Patient: Thuy Albert    Procedure Summary       Date: 06/28/24 Room / Location:  OR  / SURGERY [facility]    Anesthesia Start: 1453 Anesthesia Stop: 1549    Procedures:       CYSTOSCOPY, LEFT URETEROSCOPY, LASER LITHOTRIPSY, URETERAL STENT PLACEMENT (Left: Ureter)      URETEROSCOPY (Left: Ureter) Diagnosis: (LEFT KIDNEY STONE)    Surgeons: Rob Green M.D. Responsible Provider: Corby Sloan M.D.    Anesthesia Type: general ASA Status: 2            Final Anesthesia Type: general  Last vitals  BP   Blood Pressure : 111/55    Temp   36.4 °C (97.5 °F)    Pulse   (!) 45   Resp   16    SpO2   96 %      Anesthesia Post Evaluation    Patient location during evaluation: PACU  Patient participation: complete - patient participated  Level of consciousness: awake and alert  Pain score: 0    Airway patency: patent  Anesthetic complications: no  Cardiovascular status: hemodynamically stable  Respiratory status: acceptable  Hydration status: euvolemic    PONV: none          No notable events documented.     Nurse Pain Score: 0 (NPRS)

## 2024-06-28 NOTE — PROGRESS NOTES
4 Eyes Skin Assessment Completed by ANDREW CAAL and ANDREW Hamlin.    Head WDL  Ears WDL  Nose WDL  Mouth WDL  Neck WDL  Breast/Chest WDL  Shoulder Blades WDL  Spine WDL  (R) Arm/Elbow/Hand Bruising  (L) Arm/Elbow/Hand Bruising  Abdomen WDL  Groin WDL  Scrotum/Coccyx/Buttocks Redness, Blanching, and Excoriation  (R) Leg WDL  (L) Leg WDL  (R) Heel/Foot/Toe WDL  (L) Heel/Foot/Toe WDL          Devices In Places n/a      Interventions In Place Waffle Overlay, TAP System, Pillows, Barrier Cream, and Heels Loaded W/Pillows, Q2 turns, attempted to placed female wick for incontinence but not really working because it get displaced as pt opens leg all the time.     Possible Skin Injury No    Pictures Uploaded Into Epic N/A  Wound Consult Placed N/A  RN Wound Prevention Protocol Ordered Yes

## 2024-06-28 NOTE — OR NURSING
Patient allergies and NPO status verified, home medication reconciliation completed and belongings secured. Surgical site verified with patient. Patient verbalizes understanding of pain scale, expected course of stay and plan of care; patient and family state verbal understanding at this time. IV access established.     Pt has severe dementia and legally blind. RN has explained plan of care carefully and keeping quiet environment to avoid agitation.

## 2024-06-28 NOTE — CARE PLAN
The patient is Stable - Low risk of patient condition declining or worsening    Shift Goals  Clinical Goals: Maintain on NPO post midnight as ordered. Pt will remain free from falls or injury throughout the shift.  Patient Goals: sleep  Family Goals: updated on plan of care    Progress made toward(s) clinical / shift goals:  Pt maintained on strict NPO post midnight. Pt seen sleeping comfortably in between rounds; even and unlabored breathing noted. Pt encouraged to reposition every 2 hours. Pt is free from falls or injury throughout the shift. Hourly rounding in progress.     Patient is not progressing towards the following goals: n/a

## 2024-06-28 NOTE — DIETARY
"Nutrition services: Day 7 of admit.  hTuy Albert is a 73 y.o. female with admitting DX of  Complicated UTI (urinary tract infection)     Poor PO noted during weekly screen      Assessment:  Height: 154.9 cm (5' 1\")  Weight: 68.9 kg (152 lb)  Body mass index is 28.72 kg/m²., BMI classification: overweight  Diet/Intake: NPO at midnight for possible cystoscopy w/ stent exchange and questionable lithotripsy.     Prior to this, pt was on a cardiac diet and PO intake was poor at < 25% of most meals. Best intake was 25-50%.     Evaluation:   DX includes severe Alzheimer's dementia.  Pt was in procedure when RD visited. Pt's  present and he was able to answer RD's questions. Pt's PO intake has dwindled recently.  is concerned this may be caused by pt's dementia. RD reassured  that pt's infection may also be contributing to poor PO. At home, pt's  prepares foods that pt likes. She likes chicken nuggets/tenders.  He also encourages intake of a serving of vegetables or fruit with meals for added nutrition. She is resistant to this at times. Pt's  is agreeable to pt receiving supplements while she is here. He is on a budget and the expense of supplements may hinder purchasing them when pt is home. RD provided a handout of tips to increase kcal and protein intake, and a handout of high kcal/high protein recipes.     Malnutrition Risk: unable to determine    Recommendations/Plan:  After diet is restarted, add supplement to meals TID   Encourage intake of >/= 50%  Document intake of all meals  as % taken in ADL's to provide interdisciplinary communication across all shifts.   Monitor weight.  Nutrition rep will continue to see patient for ongoing meal and snack preferences.     RD following.           "

## 2024-06-28 NOTE — ANESTHESIA TIME REPORT
Anesthesia Start and Stop Event Times       Date Time Event    6/28/2024 1453 Anesthesia Start     1549 Anesthesia Stop          Responsible Staff  06/28/24      Name Role Begin End    Corby Sloan M.D. Anesth 1453 1549          Overtime Reason:  overtime with call-back    Comments:

## 2024-06-28 NOTE — PROGRESS NOTES
Pt admitted back to 208 from PACU. Pt brought back in bed. No pain or nausea at this time. Purewick placed. Discussed plan with  to monitor vitals and pain overnight. No other needs at this time, call light in reach, bed in lowest position.

## 2024-06-28 NOTE — CARE PLAN
The patient is Stable - Low risk of patient condition declining or worsening    Shift Goals  Clinical Goals: Pt will have no falls  Patient Goals: Rest  Family Goals: SPRING    Progress made toward(s) clinical / shift goals:  No falls throughout shift. Pt worked with PT today.    Patient is not progressing towards the following goals:

## 2024-06-28 NOTE — PROGRESS NOTES
"Pt screaming \"help\". This RN to pts room. Pt confused, \"No one is here\", \"Where is RT?\". This RN reoriented pt that she is in the hospital and that SO has gone home for the day. This RN noted that pt also pulled her PIV. Site compressed, minimal output from PIV noted. Primary RN notified.   "

## 2024-06-28 NOTE — OR NURSING
1559- Received handoff report from Chela MORALES. OPA out for return of spontaneous eye opening/gag reflex - respirations continue spontaneous and non-labored. No pain or nausea reported. No drainage observed.    1615- No pain or nausea reported. The surgical site is free of drainage. The patient reports she is cold. Provided her with a warm blanket and a bear hugger warmer.    1620- Called the patients family and gave updates.    1630- No pain or nausea reported. The surgical site is free of drainage.    1635- Report called to Giorgio MORALES    1640- The patient was transported back to her room via [facility] by transport.

## 2024-06-28 NOTE — PROGRESS NOTES
Received bedside report from day shift nurseGiorgio RN. Assumed pt care at 1915.   Pt is awake, confused and resting comfortably in bed. Pt on room air; no signs of SOB/respiratory distress. Labs noted, VSS. Pt denies pain and no pain noted via PAINAD at this moment. Needs attended well. Fall precautions in place. Bed at lowest position. Call light and personal belongings within reach. Encouraged to call for assistance. Plan of care on going, no further concerns as of present.   Hourly rounding in progress.

## 2024-06-28 NOTE — PROGRESS NOTES
4 Eyes Skin Assessment Completed by Giorgio RN and ANDREW Jefferson.    Head WDL  Ears WDL  Nose WDL  Mouth WDL  Neck WDL  Breast/Chest WDL  Shoulder Blades WDL  Spine WDL  (R) Arm/Elbow/Hand Bruising  (L) Arm/Elbow/Hand Bruising  Abdomen WDL  Groin WDL  Scrotum/Coccyx/Buttocks Redness, Blanching, and Excoriation  (R) Leg WDL  (L) Leg WDL  (R) Heel/Foot/Toe WDL  (L) Heel/Foot/Toe WDL          Devices In Places Pulse Ox      Interventions In Place Waffle Overlay, Pillows, Q2 Turns, and Barrier Cream purewick    Possible Skin Injury No    Pictures Uploaded Into Epic N/A  Wound Consult Placed N/A  RN Wound Prevention Protocol Ordered Yes

## 2024-06-28 NOTE — PROGRESS NOTES
4 Eyes Skin Assessment Completed by Giorgio RN and Estela RN.    Head WDL  Ears WDL  Nose WDL  Mouth WDL  Neck WDL  Breast/Chest WDL  Shoulder Blades WDL  Spine WDL  (R) Arm/Elbow/Hand Bruising  (L) Arm/Elbow/Hand Bruising  Abdomen WDL  Groin WDL  Scrotum/Coccyx/Buttocks Redness, Blanching, and Excoriation  (R) Leg WDL  (L) Leg WDL  (R) Heel/Foot/Toe WDL  (L) Heel/Foot/Toe WDL          Devices In Places Pulse Ox      Interventions In Place Waffle Overlay, TAP System, Barrier Cream, and Dri-Billy Pads Pt refused to turn throughout shift. Pt stated she was uncomfortable when wedges were placed. Pt is incontinent and unable to have mepilex placed. Pt did sit in the chair once today and walked with PT.    Possible Skin Injury No    Pictures Uploaded Into Epic N/A  Wound Consult Placed N/A  RN Wound Prevention Protocol Ordered Yes

## 2024-06-28 NOTE — INTERVAL H&P NOTE
H&P reviewed. The patient was examined and there are no changes to the H&P.  Plan for cystoscopy, LEFT ureteroscopy, possible laser lithotripsy and stent exchange.

## 2024-06-28 NOTE — PROGRESS NOTES
"Hospital Medicine Daily Progress Note    Date of Service  6/28/2024    Chief Complaint  Thuy Albert is a 73 y.o. female admitted 6/21/2024 with pelvic pain    Hospital Course  As per chart review:  \"Patient has severe dementia and history was obtained with her , who was present by the bedside on admission.     Thuy Albert is a 73 y.o. female, with h/o Alzheimer's dementia, HLD, Hypothyroidism and prediabetes, who was recently admitted to the hospital with sepsis due to obstructing ureteral stone on 6/10/2024.  She had left ureteral stent placed the day of admission.  She had intermittent confusion also during hospitalization and recommendation per PT was discharged to SNF however  decided to take her home. She completed antibiotic therapy, was feeling better for a few days, however over the last week has progressive weakness and today, started complaining of abdominal pain/flank pain again reason why presented to the emergency department on 6/21/2024 for further evaluation. No fever\"    Interval Problem Update  6/22: Patient seen at bedside this morning.  She seems to be more calm and little bit more lucid than yesterday, however still with dementia and confused unable to fully have a full conversation with her.  No family member present at the time of my evaluation.  I did discuss case with urology, for now we will continue antibiotics.  We appreciate further recommendations.  Follow cultures.  We are pending repeat hemoglobin however there are no signs of overt bleeding at this time.    6/23: Patient pleasant but confused, her only statement to me was that she felt cold and wanted to be covered up.  WBC count did increase from 8.6-14.8.  Patient did have a large bowel movement and constipation could have been contributing to this in addition to the underlying infection.  If WBC count remains elevated tomorrow then antibiotics may need to be adjusted.    6/24: Patient still " confused, however she seems to be more lucid than 2 days ago when I last saw her.  She is able to tell me that she is in the hospital, however not much more of having a conversation with her.  No family members present at the time of my evaluation.  Will continue with antibiotics, follow cultures.  Patient will most likely require placement upon discharge.    6/25: Patient seen at bedside this morning.  Still confused, however suspect this is her baseline.  No family member present at the time of my evaluation.  I did discuss case with urology for further recommendations regarding antibiotic treatment and stent management.  We appreciate further recommendations.  The patient will most likely require placement upon discharge.  Continue antibiotics for now.    6/26: Patient seen at bedside this morning.  Still confused, however not changed since yesterday.  No family was present at the time of my evaluation.  I did try to talk to the patient's  over the phone, however I was unsuccessful.  For now we will continue antibiotics.  I discussed case with urology the plan is to treat the stent and the stone most likely on Friday, for now we will continue antibiotics.  We appreciate further recommendations.  Patient is also pending placement.  We appreciate further recommendations by case management.    6/27: Patient doing about the same as when I saw her on the 23rd.  Urology planning on doing cystoscopy evaluation tomorrow with stent exchange questionable lithotripsy and hopefully this should lessen the infectious burden.    6/28: Patient without significant change, n.p.o. at midnight for upcoming surgery at 1400 with urology.  Stent exchange with questionable lithotripsy and cystoscopy.    I have discussed this patient's plan of care and discharge plan at IDT rounds today with Case Management, Nursing, Nursing leadership, and other members of the IDT team.    Consultants/Specialty  urology    Code Status  Full  Code    Disposition  The patient is not medically cleared for discharge to home or a post-acute facility.  Anticipate discharge to: home with close outpatient follow-up      Review of Systems  Review of Systems   Unable to perform ROS: Dementia        Physical Exam  Temp:  [36.3 °C (97.4 °F)-36.6 °C (97.9 °F)] 36.3 °C (97.4 °F)  Pulse:  [41-62] 41  Resp:  [16-17] 16  BP: (120-137)/(49-60) 137/49  SpO2:  [94 %-96 %] 96 %    Physical Exam  Vitals and nursing note reviewed.   Constitutional:       General: She is not in acute distress.     Appearance: She is not ill-appearing.   HENT:      Head: Normocephalic and atraumatic.      Right Ear: External ear normal.      Left Ear: External ear normal.      Nose: Nose normal.      Mouth/Throat:      Pharynx: No oropharyngeal exudate or posterior oropharyngeal erythema.   Eyes:      General:         Right eye: No discharge.         Left eye: No discharge.   Cardiovascular:      Rate and Rhythm: Normal rate and regular rhythm.      Pulses: Normal pulses.      Heart sounds: Normal heart sounds. No murmur heard.     No gallop.   Pulmonary:      Effort: Pulmonary effort is normal. No respiratory distress.      Breath sounds: Normal breath sounds. No wheezing or rhonchi.   Abdominal:      General: Bowel sounds are normal. There is no distension.      Palpations: Abdomen is soft.      Tenderness: There is no abdominal tenderness. There is no guarding.   Musculoskeletal:         General: No swelling or tenderness. Normal range of motion.      Cervical back: Normal range of motion and neck supple. No tenderness.   Skin:     General: Skin is warm and dry.   Neurological:      General: No focal deficit present.      Mental Status: She is alert. Mental status is at baseline. She is disoriented.      Motor: No weakness.      Comments: Oriented to self only   Psychiatric:         Mood and Affect: Mood normal.         Behavior: Behavior normal.         Fluids    Intake/Output Summary (Last  24 hours) at 6/28/2024 1248  Last data filed at 6/28/2024 1201  Gross per 24 hour   Intake 120 ml   Output --   Net 120 ml       Laboratory  Recent Labs     06/27/24  0152 06/28/24  0054   WBC 12.4* 11.8*   RBC 4.29 4.31   HEMOGLOBIN 12.8 12.8   HEMATOCRIT 40.1 39.7   MCV 93.5 92.1   MCH 29.8 29.7   MCHC 31.9* 32.2   RDW 45.2 46.0   PLATELETCT 251 250   MPV 9.3 9.8     Recent Labs     06/27/24  0152 06/28/24  0054   SODIUM 142 143   POTASSIUM 3.9 3.8   CHLORIDE 107 106   CO2 24 26   GLUCOSE 92 92   BUN 20 23*   CREATININE 0.89 0.80   CALCIUM 8.9 9.3                     Imaging  CT-RENAL COLIC EVALUATION(A/P W/O)   Final Result      1. Left-sided double-J ureteral stent in good position with mild dilatation of the left renal pelvis .      2. Small 3 mm nonobstructing calculus upper pole left kidney.      3. No evidence of right-sided hydronephrosis or renal calculi.      4. Moderate atherosclerotic changes of the abdominal aorta and iliac arteries..      [id number] VIEW    (Results Pending)   DX-PORTABLE FLUORO > 1 HOUR    (Results Pending)        Assessment/Plan  * Complicated UTI (urinary tract infection)- (present on admission)  Assessment & Plan  -Inpatient status to medical floor.  -Recently admitted to the hospital with sepsis due to obstructing ureteral stone on 6/10/2024.  She had left ureteral stent placed the day of admission  -Patient has positive UTI.  CT imaging showed ureteral stent in the left side in good position.  There is no obstructing uropathy.  -ERP discussed this case with urologist on-call, Dr. Green.  There is no recommendation for any acute intervention/procedure at this time.  -Patient was started on IV antibiotics IV Rocephin and I was called for admission  -Patient has leukocytosis of 15.9 but there is no other SIRS criteria.  There is also no endorgan dysfunction therefore she is not septic on admission.    6/25: Continue antibiotics, follow cultures.  We appreciate further  "recommendations by urology.    6/26: I discussed case with urology, the patient with stents since 6/10/2024.  Urology will plan to manage stent and stone while hospitalized.  Tentatively this could happen on Friday.  We appreciate further recommendations.  For now we will continue antibiotics.    6/27:  OR tomorrow for stent exchange and ? lithotripsy tomorrow.    6/28:  OR today at 1400    ACP (advance care planning)  Assessment & Plan  As per previous hospitalist:  \"-I had detailed conversation with  regarding advance care planing and CODE STATUS. Patient has dementia and  is MPOA. He states that patient would like to be Full Code. He also said that has Advance Directives and was not sure about POLST form. He declined signing paperwork now. Full Code Status added. Total amount of time on CODE STATUS and advance care planning 16 minutes\"    Hypokalemia  Assessment & Plan  Replace as needed  monitor    Anemia- (present on admission)  Assessment & Plan  6/22: Hb this morning is 11.8, no signs of overt bleeding. We will repeat Hb. Monitor.    6/25: Hb seems to be stable. Monitor    Abnormal gait- (present on admission)  Assessment & Plan  -Added PT/OT.  states he is in the process to have Home Health established. He declines SNF last admission. Seems to have trouble caring for her and himself.     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  Family requested medications to be given on specific timing.  She takes Namenda 10 mg twice a day at 10 AM and 10 PM and donepezil at 10 PM.  I called pharmacy and medications times are adjusted per request.    6/27: Pending placement after surgery tomorrow    Leukocytosis- (present on admission)  Assessment & Plan  -WBC is 15.9.  This is likely secondary to underlying UTI.  She is a starting on IV " antibiotics.    6/25: continue antibiotics.    Prediabetes- (present on admission)  Assessment & Plan  -Glucose is 125 on admission. Hgb 5.2 11/2023.    6/22: could have been reactive as glucose this morning is 85    Mixed hyperlipidemia - (present on admission)  Assessment & Plan  -No longer taking rosuvastatin    Hypothyroidism- (present on admission)  Assessment & Plan  Per previous hospitalist  -She takes [place] Thyroid.  Family requested medication to be given at 9 AM which I accommodated with pharmacy over the phone.  -Her last TSH check was in February and 0.78.         VTE prophylaxis: Lovenox    I have performed a physical exam and reviewed and updated ROS and Plan today (6/28/2024). In review of yesterday's note (6/27/2024), there are no changes except as documented above.      I spend at least 52 minutes providing care for this patient.  This includes face-to-face interview, physical examination.  Review lab work including CBC, and CMP. Discussing case with urology regarding plan of care. Discussing with multidisciplinary team including case management, nursing staff and pharmacy.  Creating plan of care, reviewing orders.       enoxaparin ppx

## 2024-06-28 NOTE — PROGRESS NOTES
Received bedside report from day shift nurseGiorgio RN. Assumed pt care at 1915.  Pt is awake, confused, resting on bed. Pt on room air; no signs of SOB/respiratory distress. Labs noted, VSS. Pt states having headache; prn pain medication given as per MAR at this moment. Pt reoriented to situation and place. Needs attended well. Fall precautions in place. Bed at lowest position. Call light and personal belongings within reach. Bed alarm and strip alarm on. Plan of care on going, no further concerns as of present.   Hourly rounding in progress.     2326 Clarified with Dr. Scott regarding order of NPO sips with meds post midnight and that in the morning pt needs to mixed pills with applesauce for her meds. Dr. Scott modified order to strict NPO post midnight. Informed also Sonia Bunch that pt pulled IV at 2100 and pt has been pulling her IV before as well; MD states okay to not have IV overnight with order.

## 2024-06-28 NOTE — ANESTHESIA PROCEDURE NOTES
Airway    Date/Time: 6/28/2024 3:01 PM    Performed by: Corby Sloan M.D.  Authorized by: Corby Sloan M.D.    Location:  OR  Urgency:  Elective  Indications for Airway Management:  Anesthesia      Spontaneous Ventilation: absent    Sedation Level:  Deep  Preoxygenated: Yes    Final Airway Type:  Supraglottic airway  Final Supraglottic Airway:  Standard LMA    SGA Size:  3  Number of Attempts at Approach:  1

## 2024-06-28 NOTE — OR NURSING
1546 Pt arrived from OR, report received from anesthesiologist and RN. Pt sedated at this time. OPA in place, respirations spontaneous and unlabored.     1558 OPA removed.     1559 Report to Elizabet MORALES.

## 2024-06-28 NOTE — OP REPORT
SURGEON: Dr. Rob Green      ANESTHESIA: General with LMA    PRE-OPERATIVE DIAGNOSIS: left ureteral stone    POST-OPERATIVE DIAGNOSIS: Same      NAME OF PROCEDURE: Cystoscopy, left ureteroscopy with stone basket extraction, left ureteral stent exchange    FINDINGS OF PROCEDURE: Small stone fragments and mostly <1 mm debris in the kidney likely due to baseline functional status     EBL: Minimal      COMPLICATIONS: None      PATIENT CONDITION: stable      INDICATIONS: Thuy Albert is a 73 y.o. female who presented with obstructing stones s/p stent who was readmitted for pain and ?infection.  Her  who is her POA agreed to above procedure for further management of kidney stones after complete discussion of risks, benefits, and alternatives.      PROCEDURE:     After informed consent was obtained in the preoperative care unit, the patient was taken to the OR on a stretcher. The patient was properly identified and placed in supine position per OR protocol. The patient was given a prophylactic dose of ancef 2 grams. General anesthesia with LMA was administered. The patient was then placed in dorsal lithotomy, prepped and draped in a standard sterile fashion.  A timeout was performed with all parties in agreement.       A 22Fr rigid cystoscope was inserted per urethra. A full inspection of the bladder was completed. There were no lesions or masses, the UOs were in orthotopic position effluxing clear urine. The existing left ureteral stent was removed.  A sensor wire was passed into the left ureteral orifice up to the level of the kidney, confirmed under fluoroscopy.  A flexible ureteroscope was passed into the left ureter.  As noted above we found mostly <1 mm debris, with three distinct stones which were close to 2-3 mm in size.  All stones were grasped and removed from the ureter.  We repeated ureteroscopy and flushed the kidney of debris and examined all calyces with no residual stone >1 mm noted.  The  ureteroscope was removed.  A wire was replaced.  A [id number] ureteral stent was passed over the wire and into the kidney, with it’s position confirmed under fluoroscopic guidance. The string was left in place.  The wire was removed and a good proximal and distal curl were noted in the renal pelvis and bladder respectively with fluoroscopy. This concluded the procedure. The patient tolerated it well and was transferred to the PACU in stable condition.      DISPOSITION: The patient will be discharged home with stent on a string.  We have given instructions to her  to remove the stent by pulling gently on the string in 1 week.  We will follow-up in the office if there are any issues.

## 2024-06-29 LAB
ANION GAP SERPL CALC-SCNC: 12 MMOL/L (ref 7–16)
BUN SERPL-MCNC: 22 MG/DL (ref 8–22)
CALCIUM SERPL-MCNC: 9.3 MG/DL (ref 8.4–10.2)
CHLORIDE SERPL-SCNC: 104 MMOL/L (ref 96–112)
CO2 SERPL-SCNC: 25 MMOL/L (ref 20–33)
CREAT SERPL-MCNC: 0.78 MG/DL (ref 0.5–1.4)
EKG IMPRESSION: NORMAL
ERYTHROCYTE [DISTWIDTH] IN BLOOD BY AUTOMATED COUNT: 44.9 FL (ref 35.9–50)
GFR SERPLBLD CREATININE-BSD FMLA CKD-EPI: 80 ML/MIN/1.73 M 2
GLUCOSE SERPL-MCNC: 122 MG/DL (ref 65–99)
HCT VFR BLD AUTO: 39 % (ref 37–47)
HGB BLD-MCNC: 13 G/DL (ref 12–16)
MCH RBC QN AUTO: 30.2 PG (ref 27–33)
MCHC RBC AUTO-ENTMCNC: 33.3 G/DL (ref 32.2–35.5)
MCV RBC AUTO: 90.7 FL (ref 81.4–97.8)
PLATELET # BLD AUTO: 236 K/UL (ref 164–446)
PMV BLD AUTO: 9.7 FL (ref 9–12.9)
POTASSIUM SERPL-SCNC: 4.1 MMOL/L (ref 3.6–5.5)
RBC # BLD AUTO: 4.3 M/UL (ref 4.2–5.4)
SODIUM SERPL-SCNC: 141 MMOL/L (ref 135–145)
WBC # BLD AUTO: 11.1 K/UL (ref 4.8–10.8)

## 2024-06-29 PROCEDURE — A9270 NON-COVERED ITEM OR SERVICE: HCPCS | Performed by: HOSPITALIST

## 2024-06-29 PROCEDURE — 85027 COMPLETE CBC AUTOMATED: CPT

## 2024-06-29 PROCEDURE — A9270 NON-COVERED ITEM OR SERVICE: HCPCS | Performed by: INTERNAL MEDICINE

## 2024-06-29 PROCEDURE — 97535 SELF CARE MNGMENT TRAINING: CPT

## 2024-06-29 PROCEDURE — 770001 HCHG ROOM/CARE - MED/SURG/GYN PRIV*

## 2024-06-29 PROCEDURE — 92610 EVALUATE SWALLOWING FUNCTION: CPT

## 2024-06-29 PROCEDURE — 97112 NEUROMUSCULAR REEDUCATION: CPT

## 2024-06-29 PROCEDURE — 700102 HCHG RX REV CODE 250 W/ 637 OVERRIDE(OP): Performed by: HOSPITALIST

## 2024-06-29 PROCEDURE — 94760 N-INVAS EAR/PLS OXIMETRY 1: CPT

## 2024-06-29 PROCEDURE — 99232 SBSQ HOSP IP/OBS MODERATE 35: CPT | Performed by: INTERNAL MEDICINE

## 2024-06-29 PROCEDURE — 36415 COLL VENOUS BLD VENIPUNCTURE: CPT

## 2024-06-29 PROCEDURE — 93010 ELECTROCARDIOGRAM REPORT: CPT | Performed by: INTERNAL MEDICINE

## 2024-06-29 PROCEDURE — 700111 HCHG RX REV CODE 636 W/ 250 OVERRIDE (IP): Mod: JZ | Performed by: INTERNAL MEDICINE

## 2024-06-29 PROCEDURE — 80048 BASIC METABOLIC PNL TOTAL CA: CPT

## 2024-06-29 PROCEDURE — 700102 HCHG RX REV CODE 250 W/ 637 OVERRIDE(OP): Performed by: INTERNAL MEDICINE

## 2024-06-29 RX ADMIN — THYROID 45 MG: 30 TABLET ORAL at 09:23

## 2024-06-29 RX ADMIN — FOLIC ACID 1 MG: 1 TABLET ORAL at 05:34

## 2024-06-29 RX ADMIN — MEMANTINE HYDROCHLORIDE 10 MG: 10 TABLET ORAL at 22:39

## 2024-06-29 RX ADMIN — CEFUROXIME AXETIL 250 MG: 250 TABLET ORAL at 18:06

## 2024-06-29 RX ADMIN — DONEPEZIL HYDROCHLORIDE 10 MG: 5 TABLET, FILM COATED ORAL at 22:39

## 2024-06-29 RX ADMIN — ENOXAPARIN SODIUM 40 MG: 100 INJECTION SUBCUTANEOUS at 18:07

## 2024-06-29 RX ADMIN — CEFUROXIME AXETIL 250 MG: 250 TABLET ORAL at 05:34

## 2024-06-29 RX ADMIN — MEMANTINE HYDROCHLORIDE 10 MG: 10 TABLET ORAL at 09:24

## 2024-06-29 ASSESSMENT — COGNITIVE AND FUNCTIONAL STATUS - GENERAL
HELP NEEDED FOR BATHING: TOTAL
SUGGESTED CMS G CODE MODIFIER DAILY ACTIVITY: CL
CLIMB 3 TO 5 STEPS WITH RAILING: TOTAL
EATING MEALS: TOTAL
SUGGESTED CMS G CODE MODIFIER MOBILITY: CL
MOVING FROM LYING ON BACK TO SITTING ON SIDE OF FLAT BED: A LOT
MOVING TO AND FROM BED TO CHAIR: TOTAL
DRESSING REGULAR UPPER BODY CLOTHING: A LOT
EATING MEALS: A LITTLE
STANDING UP FROM CHAIR USING ARMS: A LITTLE
PERSONAL GROOMING: A LITTLE
MOBILITY SCORE: 12
DRESSING REGULAR LOWER BODY CLOTHING: A LOT
PERSONAL GROOMING: A LITTLE
HELP NEEDED FOR BATHING: TOTAL
WALKING IN HOSPITAL ROOM: A LOT
TURNING FROM BACK TO SIDE WHILE IN FLAT BAD: A LITTLE
SUGGESTED CMS G CODE MODIFIER DAILY ACTIVITY: CL
TOILETING: A LOT
DAILY ACTIVITIY SCORE: 13
DAILY ACTIVITIY SCORE: 10
DRESSING REGULAR UPPER BODY CLOTHING: A LOT
TOILETING: A LOT
DRESSING REGULAR LOWER BODY CLOTHING: TOTAL

## 2024-06-29 ASSESSMENT — SOCIAL DETERMINANTS OF HEALTH (SDOH)
WITHIN THE LAST YEAR, HAVE YOU BEEN AFRAID OF YOUR PARTNER OR EX-PARTNER?: PATIENT UNABLE TO ANSWER
WITHIN THE LAST YEAR, HAVE TO BEEN RAPED OR FORCED TO HAVE ANY KIND OF SEXUAL ACTIVITY BY YOUR PARTNER OR EX-PARTNER?: PATIENT UNABLE TO ANSWER
WITHIN THE LAST YEAR, HAVE YOU BEEN HUMILIATED OR EMOTIONALLY ABUSED IN OTHER WAYS BY YOUR PARTNER OR EX-PARTNER?: PATIENT UNABLE TO ANSWER
WITHIN THE LAST YEAR, HAVE YOU BEEN KICKED, HIT, SLAPPED, OR OTHERWISE PHYSICALLY HURT BY YOUR PARTNER OR EX-PARTNER?: PATIENT UNABLE TO ANSWER

## 2024-06-29 ASSESSMENT — PAIN DESCRIPTION - PAIN TYPE
TYPE: ACUTE PAIN
TYPE: ACUTE PAIN

## 2024-06-29 NOTE — PROGRESS NOTES
During the early morning hours patient pulled her peripheral IV out. Bleeding minimal and tegaderm dressing continues to cover site. Notified charge and did not replace.

## 2024-06-29 NOTE — PROGRESS NOTES
Assumed care, patient awake and alert sitting in high Ramos's position in bed. No complaints of pain or discomfort. Reviewed plan of care for the night and reminded her to use the call light to ask for assistance. Verified call light within reach.

## 2024-06-29 NOTE — PROGRESS NOTES
4 Eyes Skin Assessment Completed by ANDREW Mojica and ANDREW Li.    Head WDL  Ears WDL  Nose WDL  Mouth WDL  Neck WDL  Breast/Chest WDL  Shoulder Blades WDL  Spine WDL  (R) Arm/Elbow/Hand Bruising  (L) Arm/Elbow/Hand Bruising  Abdomen WDL  Groin WDL  Scrotum/Coccyx/Buttocks Redness, Blanching, and Excoriation  (R) Leg WDL  (L) Leg WDL  (R) Heel/Foot/Toe WDL  (L) Heel/Foot/Toe WDL          Devices In Places Blood Pressure Cuff and Pulse Ox      Interventions In Place Waffle Overlay, Pillows, Q2 Turns, and Barrier Cream    Possible Skin Injury No    Pictures Uploaded Into Epic N/A  Wound Consult Placed N/A  RN Wound Prevention Protocol Ordered No

## 2024-06-29 NOTE — THERAPY
Speech Language Pathology   Clinical Swallow Evaluation     Patient Name: Thuy Albert  AGE:  73 y.o., SEX:  female  Medical Record #: [id number]  Date of Service: 6/29/2024      History of Present Illness  73 y.o. female admitted 6/21 for pelvic pain. Found to have complicated UTI. Recently admitted for ureteral stone s/p left ureteral stent placement  6/10/2024. On 6/28 patient underwent left ureteroscopy with stone basket extraction and left ureteral stent exchange.    PMHx: Arthritis, Asthma, Dementia, DVT, GERD, and Thyroid disease.    Seen for swallow evaluation during recent admission with recommendations for soft/bite size diet and thin liquids.    General Information:  Vitals  O2 Delivery Device: None - Room Air  Level of Consciousness: Alert  Patient Behaviors: Forgetful, Confused  Orientation: Self  Follows Directives: Inconsistent      Prior Living Situation & Level of Function:  Prior Services: Continuous (24 Hour) Care Giving Family  Housing / Facility: 1 Story Apartment / Condo  Lives with - Patient's Self Care Capacity: Significant Other  Comments: Pt sig other has significant pain in hip and utilizes cane in order to ambulate.  Communication: Severe dementia, impaired  Swallowing: soft diet at home per family       Oral Mechanism Evaluation:  Dentition: Full dentures   Facial Symmetry: Equal  Facial Sensation: Equal     Labial Observations: WFL   Lingual Observations: Midline  Motor Speech: WFL         Laryngeal Function:  Secretion Management: Adequate  Voice Quality: WFL   Cough: Pt did not follow commands to assess       Subjective  Patient  present for evaluation      Assessment  Current Method of Nutrition: Oral diet (regular)  Positioning: Sitting UIC  Bolus Administration: SLP  O2 Delivery Device: None - Room Air  Factor(s) Affecting Performance: Impaired endurance, Impaired mental status, Impaired command following       Swallowing Trials:  Swallowing Trials  Thin Liquid (TN0):  WFL  Pureed (PU4): WFL  Minced & Moist (MM5): Impaired  Soft & Bite Sized (SB6): Impaired  Regular (RG7): Impaired      Comments: Patient seen seated in chair for swallow evaluation.   Oral phase: adequate for liquids from tsp and straw. Adequate for puree. Uncoordinated bolus prep and transit across solids. Yells now and spits out minced/moist trials. Adequate mastication with soft solids. Reports pain with mastication of dry cracker.  Pharyngeal phase: no signs of airway invasion.       Clinical Impressions  Patient presents with increased risk for aspiration due to primarily oral phase dysphagia. AMS further elevates aspiration risk. Recommend modified oral diet with adherence to aspiration precautions as detailed below. Patient familiar to service and appears to be at baseline function for swallowing. Will follow while in house to ensure diet tolerance, modify diet as needed, and train aspiration precautions/safe swallow strategies with family as appropriate.       Recommendations  Diet Consistency: soft and bite sized and thin liquids.  Instrumentation: None indicated at this time  Medication: Whole with puree, Cut large pills, One pill at a time (crush if chewing pills or spitting pills out, give verbal encouragement to take medications)  Supervision: 1:1 feeding with constant supervision, Encourage self-feeding  Positioning: Fully upright and midline during oral intake  Risk Management : Small bites/sips, Slow rate of intake, Alternate bites and sips  Oral Care: Q6h         SLP Treatment Plan  Treatment Plan: Dysphagia Treatment  SLP Frequency: 3x Per Week  Estimated Duration: Until Therapy Goals Met      Anticipated Discharge Needs  Discharge Recommendations: Anticipate that the patient will have no further speech therapy needs after discharge from the hospital   Therapy Recommendations Upon DC: Not Indicated        Patient / Family Goals  Patient / Family Goal #1: to discharge home  Short Term Goals  Short  Term Goal # 1: patient will tolerate soft and bite size diet and thin liquids without signs of airway invasion      Dena El, SLP

## 2024-06-29 NOTE — CARE PLAN
The patient is Stable - Low risk of patient condition declining or worsening    Shift Goals  Clinical Goals: Pt without falls and injury; Skin Integrity remains intact.  Patient Goals: SPRING  Family Goals: updated on plan of care    Progress made toward(s) clinical / shift goals:  Pt without falls and injury; Skin Integrity remains intact.    Patient is not progressing towards the following goals:

## 2024-06-29 NOTE — PROGRESS NOTES
Received report from night shift nurse. Assumed pt care at 0715. Pt is A&Ox4, resting comfortably in bed. Pt on RA, SpO2 96%. No signs of SOB/respiratory distress. Labs, VS, medications reviewed.  Fall precautions in place. Bed at lowest position. Call light and personal belongings within reach. Plan of care discussed, no further concerns at this time.

## 2024-06-29 NOTE — PROGRESS NOTES
4 Eyes Skin Assessment Completed by ANDREW Mace and ANDREW Felix.    Head WDL  Ears WDL  Nose WDL  Mouth WDL  Neck WDL  Breast/Chest WDL  Shoulder Blades WDL  Spine WDL  (R) Arm/Elbow/Hand Bruising  (L) Arm/Elbow/Hand Bruising  Abdomen WDL  Groin WDL  Scrotum/Coccyx/Buttocks Redness, Blanching, and Excoriation  (R) Leg WDL  (L) Leg WDL  (R) Heel/Foot/Toe WDL  (L) Heel/Foot/Toe WDL          Devices In Places Pulse Ox      Interventions In Place Pillows, Q2 Turns, and Barrier CreamPurewick    Possible Skin Injury No    Pictures Uploaded Into Epic N/A  Wound Consult Placed N/A  RN Wound Prevention Protocol Ordered Yes

## 2024-06-29 NOTE — CARE PLAN
The patient is Stable - Low risk of patient condition declining or worsening    Shift Goals  Clinical Goals: Free from falls, Skin protection, Comfort  Patient Goals: Comfort, Rest  Family Goals: updated on plan of care    Progress made toward(s) clinical / shift goals:  Patient is resistant to turning every 2 hours but will allow herself to be propped up with pillows occasionally. She removes the pulse ox constantly. She is compliant with medications crushed and floated in pudding. She does not seem to grasp using the call light and drops it to the floor regularly.    Patient is not progressing towards the following goals:

## 2024-06-29 NOTE — THERAPY
"Occupational Therapy  Daily Treatment     Patient Name: Thuy Albert  Age:  73 y.o., Sex:  female  Medical Record #: [id number]  Today's Date: 6/29/2024     Precautions: Legally blind  Precautions: Fall Risk, Swallow Precautions  Comments: severe dementia    Assessment  Pt is A&Ox1, legally blind. Agreeable to OOB activity. Max cues for each task. Tolerates EOB ADL's, STSs, transfer to chair. Requires Karson-MaxA with ADL's and functional mobility; see below for CLOF. Ot will continue to follow.        Plan  Treatment Plan Status: (P) Continue Current Treatment Plan  Type of Treatment: (P) Self Care / Activities of Daily Living, Neuro Re-Education / Balance, Therapeutic Activity  Treatment Frequency: (P) 3 Times per Week  Treatment Duration: (P) Until Therapy Goals Met    DC Equipment Recommendations: (P) Unable to determine at this time  Discharge Recommendations: (P) Recommend post-acute placement for additional occupational therapy services prior to discharge home (pt is not safe for return home, but spouse is declining snf.)    Subjective  \"Let's sing\"     Objective     06/29/24 1429   Precautions   Precautions Fall Risk   Comments Blindness   Vitals   O2 Delivery Device None - Room Air   Pain 0 - 10 Group   Therapist Pain Assessment 0   Cognition    Cognition / Consciousness X   Orientation Level   (Ox1)   Level of Consciousness Alert   Ability To Follow Commands 1 Step   Safety Awareness Impaired   New Learning Impaired   Sequencing Impaired   Initiation Impaired   Balance   Sitting Balance (Static) Fair +   Sitting Balance (Dynamic) Fair   Standing Balance (Static) Fair   Standing Balance (Dynamic) Fair -   Weight Shift Sitting Fair   Weight Shift Standing Fair   Skilled Intervention Verbal Cuing;Sequencing;Tactile Cuing   Bed Mobility    Supine to Sit Minimal Assist   Skilled Intervention Verbal Cuing;Sequencing;Tactile Cuing   Activities of Daily Living   Eating Minimal Assist   Grooming Minimal " Assist;Seated   Lower Body Dressing Maximal Assist   Skilled Intervention Verbal Cuing;Tactile Cuing   How much help from another person does the patient currently need...   Putting on and taking off regular lower body clothing? 2   Bathing (including washing, rinsing, and drying)? 1   Toileting, which includes using a toilet, bedpan, or urinal? 2   Putting on and taking off regular upper body clothing? 2   Taking care of personal grooming such as brushing teeth? 3   Eating meals? 3   6 Clicks Daily Activity Score 13   Functional Mobility   Sit to Stand Minimal Assist   Bed, Chair, Wheelchair Transfer Minimal Assist   Transfer Method Stand Step   Skilled Intervention Verbal Cuing;Sequencing;Tactile Cuing   Comments HHA, max cues   Visual Perception   Visual Perception  X   Comments legally blind   Activity Tolerance   Sitting in Chair 1 hr  (UIC)   Sitting Edge of Bed 13   Standing 2x2   Patient / Family Goals   Goal #1 Outcome Progressing as expected   Short Term Goals   Goal Outcome # 1 Progressing as expected   Goal Outcome # 2 Progressing as expected   Goal Outcome # 3 Progressing as expected   Goal Outcome # 4 Progressing as expected   Education Group   Education Provided Activities of Daily Living;Transfers   Role of Occupational Therapist Patient Response Patient;Family;Acceptance;Verbal Demonstration   Transfers Patient Response Patient;Family;Acceptance;Demonstration;Action Demonstration   ADL Patient Response Patient;Family;Acceptance;Demonstration;Action Demonstration   Occupational Therapy Treatment Plan    O.T. Treatment Plan Continue Current Treatment Plan   Treatment Interventions Self Care / Activities of Daily Living;Neuro Re-Education / Balance;Therapeutic Activity   Treatment Frequency 3 Times per Week   Duration Until Therapy Goals Met   Anticipated Discharge Equipment and Recommendations   DC Equipment Recommendations Unable to determine at this time   Discharge Recommendations Recommend  post-acute placement for additional occupational therapy services prior to discharge home  (pt is not safe for return home, but spouse is declining snf.)   Interdisciplinary Plan of Care Collaboration   IDT Collaboration with  Nursing;Certified Nursing Assistant;Family / Caregiver   Patient Position at End of Therapy Seated;Chair Alarm On;Call Light within Reach;Family / Friend in Room   Collaboration Comments aware of visit. pt is a fall risk; recommend post-acute upon dc.

## 2024-06-29 NOTE — CARE PLAN
The patient is Stable - Low risk of patient condition declining or worsening    Shift Goals  Clinical Goals: Pt will remain NPO for her procedure today.  Patient Goals: sleep  Family Goals: updated on plan of care    Progress made toward(s) clinical / shift goals:  Pt had her procedure today. Pt now back on cardiac diet.

## 2024-06-29 NOTE — PROGRESS NOTES
"Hospital Medicine Daily Progress Note    Date of Service  6/29/2024    Chief Complaint  Thuy Ablert is a 73 y.o. female admitted 6/21/2024 with pelvic pain    Hospital Course  As per chart review:  \"Patient has severe dementia and history was obtained with her , who was present by the bedside on admission.     Thuy Albert is a 73 y.o. female, with h/o Alzheimer's dementia, HLD, Hypothyroidism and prediabetes, who was recently admitted to the hospital with sepsis due to obstructing ureteral stone on 6/10/2024.  She had left ureteral stent placed the day of admission.  She had intermittent confusion also during hospitalization and recommendation per PT was discharged to SNF however  decided to take her home. She completed antibiotic therapy, was feeling better for a few days, however over the last week has progressive weakness and today, started complaining of abdominal pain/flank pain again reason why presented to the emergency department on 6/21/2024 for further evaluation. No fever\"    Interval Problem Update  6/22: Patient seen at bedside this morning.  She seems to be more calm and little bit more lucid than yesterday, however still with dementia and confused unable to fully have a full conversation with her.  No family member present at the time of my evaluation.  I did discuss case with urology, for now we will continue antibiotics.  We appreciate further recommendations.  Follow cultures.  We are pending repeat hemoglobin however there are no signs of overt bleeding at this time.    6/23: Patient pleasant but confused, her only statement to me was that she felt cold and wanted to be covered up.  WBC count did increase from 8.6-14.8.  Patient did have a large bowel movement and constipation could have been contributing to this in addition to the underlying infection.  If WBC count remains elevated tomorrow then antibiotics may need to be adjusted.    6/24: Patient still " confused, however she seems to be more lucid than 2 days ago when I last saw her.  She is able to tell me that she is in the hospital, however not much more of having a conversation with her.  No family members present at the time of my evaluation.  Will continue with antibiotics, follow cultures.  Patient will most likely require placement upon discharge.    6/25: Patient seen at bedside this morning.  Still confused, however suspect this is her baseline.  No family member present at the time of my evaluation.  I did discuss case with urology for further recommendations regarding antibiotic treatment and stent management.  We appreciate further recommendations.  The patient will most likely require placement upon discharge.  Continue antibiotics for now.    6/26: Patient seen at bedside this morning.  Still confused, however not changed since yesterday.  No family was present at the time of my evaluation.  I did try to talk to the patient's  over the phone, however I was unsuccessful.  For now we will continue antibiotics.  I discussed case with urology the plan is to treat the stent and the stone most likely on Friday, for now we will continue antibiotics.  We appreciate further recommendations.  Patient is also pending placement.  We appreciate further recommendations by case management.    6/27: Patient doing about the same as when I saw her on the 23rd.  Urology planning on doing cystoscopy evaluation tomorrow with stent exchange questionable lithotripsy and hopefully this should lessen the infectious burden.    6/28: Patient without significant change, n.p.o. at midnight for upcoming surgery at 1400 with urology.  Stent exchange with questionable lithotripsy and cystoscopy.    6/29: Patient doing about the same today.  She states that she feels cold.  Blankets repositioned.  WBC count down to 11.1.  She had a stent exchange and lithotripsy yesterday with evacuation of the stones as well as debris from  the kidney.  Discussed with urology yesterday and antibiotics discontinued today.  She will have 1 more day in the hospital and then as long as her WBC count does not increase and she remains afebrile should be able to discharge from the hospital tomorrow.    I have discussed this patient's plan of care and discharge plan at IDT rounds today with Case Management, Nursing, Nursing leadership, and other members of the IDT team.    Consultants/Specialty  urology    Code Status  Full Code    Disposition  The patient is not medically cleared for discharge to home or a post-acute facility.  Anticipate discharge to: home with close outpatient follow-up      Review of Systems  Review of Systems   Unable to perform ROS: Dementia        Physical Exam  Temp:  [36 °C (96.8 °F)-36.9 °C (98.4 °F)] 36.1 °C (96.9 °F)  Pulse:  [41-91] 46  Resp:  [12-17] 16  BP: ()/(46-91) 127/59  SpO2:  [91 %-100 %] 95 %    Physical Exam  Vitals and nursing note reviewed.   Constitutional:       General: She is not in acute distress.     Appearance: She is not ill-appearing.   HENT:      Head: Normocephalic and atraumatic.      Right Ear: External ear normal.      Left Ear: External ear normal.      Nose: Nose normal.      Mouth/Throat:      Pharynx: No oropharyngeal exudate or posterior oropharyngeal erythema.   Eyes:      General:         Right eye: No discharge.         Left eye: No discharge.   Cardiovascular:      Rate and Rhythm: Normal rate and regular rhythm.      Pulses: Normal pulses.      Heart sounds: Normal heart sounds. No murmur heard.     No gallop.   Pulmonary:      Effort: Pulmonary effort is normal. No respiratory distress.      Breath sounds: Normal breath sounds. No wheezing or rhonchi.   Abdominal:      General: Bowel sounds are normal. There is no distension.      Palpations: Abdomen is soft.      Tenderness: There is no abdominal tenderness. There is no guarding.   Musculoskeletal:         General: No swelling or  tenderness. Normal range of motion.      Cervical back: Normal range of motion and neck supple. No tenderness.   Skin:     General: Skin is warm and dry.   Neurological:      General: No focal deficit present.      Mental Status: She is alert. Mental status is at baseline. She is disoriented.      Motor: No weakness.      Comments: Oriented to self only   Psychiatric:         Mood and Affect: Mood normal.         Behavior: Behavior normal.         Fluids    Intake/Output Summary (Last 24 hours) at 6/29/2024 1150  Last data filed at 6/29/2024 0845  Gross per 24 hour   Intake 750 ml   Output 201 ml   Net 549 ml       Laboratory  Recent Labs     06/27/24  0152 06/28/24  0054 06/29/24  0041   WBC 12.4* 11.8* 11.1*   RBC 4.29 4.31 4.30   HEMOGLOBIN 12.8 12.8 13.0   HEMATOCRIT 40.1 39.7 39.0   MCV 93.5 92.1 90.7   MCH 29.8 29.7 30.2   MCHC 31.9* 32.2 33.3   RDW 45.2 46.0 44.9   PLATELETCT 251 250 236   MPV 9.3 9.8 9.7     Recent Labs     06/27/24  0152 06/28/24  0054 06/29/24  0041   SODIUM 142 143 141   POTASSIUM 3.9 3.8 4.1   CHLORIDE 107 106 104   CO2 24 26 25   GLUCOSE 92 92 122*   BUN 20 23* 22   CREATININE 0.89 0.80 0.78   CALCIUM 8.9 9.3 9.3                     Imaging  CT-RENAL COLIC EVALUATION(A/P W/O)   Final Result      1. Left-sided double-J ureteral stent in good position with mild dilatation of the left renal pelvis .      2. Small 3 mm nonobstructing calculus upper pole left kidney.      3. No evidence of right-sided hydronephrosis or renal calculi.      4. Moderate atherosclerotic changes of the abdominal aorta and iliac arteries..      [id number] VIEW    (Results Pending)   DX-PORTABLE FLUORO > 1 HOUR    (Results Pending)        Assessment/Plan  * Complicated UTI (urinary tract infection)- (present on admission)  Assessment & Plan  -Inpatient status to medical floor.  -Recently admitted to the hospital with sepsis due to obstructing ureteral stone on 6/10/2024.  She had left ureteral stent placed the  "day of admission  -Patient has positive UTI.  CT imaging showed ureteral stent in the left side in good position.  There is no obstructing uropathy.  -ERP discussed this case with urologist on-call, Dr. Green.  There is no recommendation for any acute intervention/procedure at this time.  -Patient was started on IV antibiotics IV Rocephin and I was called for admission  -Patient has leukocytosis of 15.9 but there is no other SIRS criteria.  There is also no endorgan dysfunction therefore she is not septic on admission.    6/25: Continue antibiotics, follow cultures.  We appreciate further recommendations by urology.    6/26: I discussed case with urology, the patient with stents since 6/10/2024.  Urology will plan to manage stent and stone while hospitalized.  Tentatively this could happen on Friday.  We appreciate further recommendations.  For now we will continue antibiotics.    6/27:  OR tomorrow for stent exchange and ? lithotripsy tomorrow.    6/28:  OR today at 1400    6/29:  POD 1 from stent exchange and lithotripsy, stent to be removed at home by  in 2 weeks, discussed with urology yesterday.  OK to dc antibiotics an watch 1 more day, if afebrile and no increase in WBC, plan to dc tomorrow.      ACP (advance care planning)  Assessment & Plan  As per previous hospitalist:  \"-I had detailed conversation with  regarding advance care planing and CODE STATUS. Patient has dementia and  is MPOA. He states that patient would like to be Full Code. He also said that has Advance Directives and was not sure about POLST form. He declined signing paperwork now. Full Code Status added. Total amount of time on CODE STATUS and advance care planning 16 minutes\"    Hypokalemia  Assessment & Plan  Replace as needed  monitor    Anemia- (present on admission)  Assessment & Plan  6/22: Hb this morning is 11.8, no signs of overt bleeding. We will repeat Hb. Monitor.    6/25: Hb seems to be stable. " Monitor    Abnormal gait- (present on admission)  Assessment & Plan  -Added PT/OT.  states he is in the process to have Home Health established. He declines SNF last admission. Seems to have trouble caring for her and himself.     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  Family requested medications to be given on specific timing.  She takes Namenda 10 mg twice a day at 10 AM and 10 PM and donepezil at 10 PM.  I called pharmacy and medications times are adjusted per request.    6/29:  Placement vs home with  tomorrow.    Leukocytosis- (present on admission)  Assessment & Plan  -WBC is 11.1  Source is controlled, okay to stop antibiotics, discussed with urology yesterday  Monitor for 1 more day in the hospital and if afebrile, plan to dc tomorrow.      Prediabetes- (present on admission)  Assessment & Plan  -Glucose is 125 on admission. Hgb 5.2 11/2023.    6/22: could have been reactive as glucose this morning is 85    Mixed hyperlipidemia - (present on admission)  Assessment & Plan  -No longer taking rosuvastatin    Hypothyroidism- (present on admission)  Assessment & Plan  Per previous hospitalist  -She takes [place] Thyroid.  Family requested medication to be given at 9 AM which I accommodated with pharmacy over the phone.  -Her last TSH check was in February and 0.78.         VTE prophylaxis: Lovenox    I have performed a physical exam and reviewed and updated ROS and Plan today (6/29/2024). In review of yesterday's note (6/28/2024), there are no changes except as documented above.      I spend at least 52 minutes providing care for this patient.  This includes face-to-face interview, physical examination.  Review lab work including CBC, and CMP. Discussing case with urology regarding plan of care. Discussing with multidisciplinary team including  case management, nursing staff and pharmacy.  Creating plan of care, reviewing orders.       enoxaparin ppx

## 2024-06-30 VITALS
HEIGHT: 61 IN | RESPIRATION RATE: 16 BRPM | OXYGEN SATURATION: 95 % | WEIGHT: 152 LBS | DIASTOLIC BLOOD PRESSURE: 75 MMHG | SYSTOLIC BLOOD PRESSURE: 129 MMHG | HEART RATE: 51 BPM | BODY MASS INDEX: 28.7 KG/M2 | TEMPERATURE: 97.1 F

## 2024-06-30 LAB
ANION GAP SERPL CALC-SCNC: 11 MMOL/L (ref 7–16)
BUN SERPL-MCNC: 22 MG/DL (ref 8–22)
CALCIUM SERPL-MCNC: 8.9 MG/DL (ref 8.4–10.2)
CHLORIDE SERPL-SCNC: 104 MMOL/L (ref 96–112)
CO2 SERPL-SCNC: 25 MMOL/L (ref 20–33)
CREAT SERPL-MCNC: 0.84 MG/DL (ref 0.5–1.4)
ERYTHROCYTE [DISTWIDTH] IN BLOOD BY AUTOMATED COUNT: 48 FL (ref 35.9–50)
GFR SERPLBLD CREATININE-BSD FMLA CKD-EPI: 73 ML/MIN/1.73 M 2
GLUCOSE SERPL-MCNC: 88 MG/DL (ref 65–99)
HCT VFR BLD AUTO: 40.7 % (ref 37–47)
HGB BLD-MCNC: 12.9 G/DL (ref 12–16)
MCH RBC QN AUTO: 30.1 PG (ref 27–33)
MCHC RBC AUTO-ENTMCNC: 31.7 G/DL (ref 32.2–35.5)
MCV RBC AUTO: 95.1 FL (ref 81.4–97.8)
PLATELET # BLD AUTO: 200 K/UL (ref 164–446)
PMV BLD AUTO: 9.5 FL (ref 9–12.9)
POTASSIUM SERPL-SCNC: 3.8 MMOL/L (ref 3.6–5.5)
RBC # BLD AUTO: 4.28 M/UL (ref 4.2–5.4)
SODIUM SERPL-SCNC: 140 MMOL/L (ref 135–145)
WBC # BLD AUTO: 11.2 K/UL (ref 4.8–10.8)

## 2024-06-30 PROCEDURE — 85027 COMPLETE CBC AUTOMATED: CPT

## 2024-06-30 PROCEDURE — 700102 HCHG RX REV CODE 250 W/ 637 OVERRIDE(OP): Performed by: HOSPITALIST

## 2024-06-30 PROCEDURE — 700102 HCHG RX REV CODE 250 W/ 637 OVERRIDE(OP): Performed by: INTERNAL MEDICINE

## 2024-06-30 PROCEDURE — 94760 N-INVAS EAR/PLS OXIMETRY 1: CPT

## 2024-06-30 PROCEDURE — 36415 COLL VENOUS BLD VENIPUNCTURE: CPT

## 2024-06-30 PROCEDURE — A9270 NON-COVERED ITEM OR SERVICE: HCPCS | Performed by: HOSPITALIST

## 2024-06-30 PROCEDURE — A9270 NON-COVERED ITEM OR SERVICE: HCPCS | Performed by: INTERNAL MEDICINE

## 2024-06-30 PROCEDURE — 80048 BASIC METABOLIC PNL TOTAL CA: CPT

## 2024-06-30 PROCEDURE — 99239 HOSP IP/OBS DSCHRG MGMT >30: CPT | Performed by: INTERNAL MEDICINE

## 2024-06-30 RX ADMIN — THYROID 45 MG: 30 TABLET ORAL at 11:54

## 2024-06-30 RX ADMIN — CEFUROXIME AXETIL 250 MG: 250 TABLET ORAL at 05:05

## 2024-06-30 RX ADMIN — FOLIC ACID 1 MG: 1 TABLET ORAL at 05:05

## 2024-06-30 RX ADMIN — MEMANTINE HYDROCHLORIDE 10 MG: 10 TABLET ORAL at 11:53

## 2024-06-30 ASSESSMENT — PAIN SCALES - PAIN ASSESSMENT IN ADVANCED DEMENTIA (PAINAD)
BREATHING: NORMAL
BODYLANGUAGE: RELAXED
CONSOLABILITY: NO NEED TO CONSOLE
TOTALSCORE: 0
FACIALEXPRESSION: SMILING OR INEXPRESSIVE

## 2024-06-30 ASSESSMENT — PAIN DESCRIPTION - PAIN TYPE: TYPE: ACUTE PAIN

## 2024-06-30 NOTE — PROGRESS NOTES
Patient discharged per MD order via wheelchair with personal belongings. Pt is in stable condition. . Discharge instructions have been reviewed, but patient unable to confirm understanding.; with copy provided to patient. Any questions have been answered and patient verbalizes understanding of education and instructions provided.

## 2024-06-30 NOTE — CARE PLAN
The patient is Stable - Low risk of patient condition declining or worsening    Shift Goals  Clinical Goals: Pt without falls and injury; Skin intergrity without degredation.  Patient Goals: SPRING  Family Goals: updated on plan of care    Progress made toward(s) clinical / shift goals:  Pt without falls and injury; Skin intergrity without degredation.    Patient is not progressing towards the following goals:

## 2024-06-30 NOTE — CARE PLAN
The patient is Stable - Low risk of patient condition declining or worsening    Shift Goals  Clinical Goals: no falls, monitor urinary output, pain controlled <4/10  Patient Goals: be left alone and sleep  Family Goals: updated on plan of care    Progress made toward(s) clinical / shift goals:  pt had no falls, UOP monitored with purewick, pt had no complaints of pain during the shift    Patient is not progressing towards the following goals:

## 2024-06-30 NOTE — DISCHARGE SUMMARY
Discharge Summary    CHIEF COMPLAINT ON ADMISSION  Chief Complaint   Patient presents with    Pelvic Pain     Pt was recently discharged for the same. She had a ureter stent placed, and is now having pelvic pain. Pt has been taking her antibiotics like she should per .        Reason for Admission  Abdominal Pain     Admission Date  6/21/2024    CODE STATUS  Full Code    HPI & HOSPITAL COURSE  This is a 73 y.o. female here with severe dementia comes in with worsening confusion, complicated urinary tract infection and weakness.  Patient was initially placed on antibiotics and urology was consulted.  Patient eventually went to the operating room with urology and had a stent exchange, lithotripsy and endoscopy on 6/28 with Dr. Green.  She tolerated the procedure well and was able to be weaned from antibiotics given the infectious nidus had been removed.  Patient's mental status continued to wax and wane.  She was seen by physical and Occupational Therapy both of which have recommended placement however patient's  is adamant that he wants to take her home.  I have placed a home health referral so she can continue with therapies once home.  The removable stent can come out in 1 week from surgery and Dr. Green had explained this to the  that gentle pressure on the string should be able to remove the stent without problem.  At this time patient is medically cleared to discharge from the hospital and per patient's family request will be discharged home with home health.    Therefore, she is discharged in fair and stable condition to home with organized home healthcare and close outpatient follow-up.    The patient met 2-midnight criteria for an inpatient stay at the time of discharge.    Discharge Date  6/30/24    FOLLOW UP ITEMS POST DISCHARGE  PCP-2 weeks  Urology if needed    DISCHARGE DIAGNOSES  Principal Problem:    Complicated UTI (urinary tract infection) (POA: Yes)  Active Problems:    Hypothyroidism  (Chronic) (POA: Yes)      Overview: Unable to tolerate Levothyroxine.  She is currently on [place] Thyroid       which has been able to control her symptoms and her thyroid function has       been in normal range.  T4 is mildly low, TSH is normal    Mixed hyperlipidemia  (POA: Yes)      Overview: The 10-year ASCVD risk score ([place] JULIO, et al., 2019) is: 11.4%        Values used to calculate the score:          Age: 73 years          Sex: Female          Is Non- : No          Diabetic: No          Tobacco smoker: No          Systolic Blood Pressure: 120 mmHg          Is BP treated: No          HDL Cholesterol: 40 mg/dL          Total Cholesterol: 166 mg/dL                   Lab Results       Component Value Date/Time        CHOLSTRLTOT 166 11/03/2023 1533        TRIGLYCERIDE 193 (H) 11/03/2023 1533        HDL 40 11/03/2023 1533        LDL 87 11/03/2023 1533              She is currently on Crestor 10 mg daily which she has not started       medication    Prediabetes (POA: Yes)      Overview:       Last A1c came back normal at 5.5.  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 There is disproportionate       severe temporal lobe volume loss indicating the possibility of Alzheimer's       disease.            Seeing neurology.  Lives at home with her  who takes care of her.        On donepezil 10 mg daily and Namenda 10 mg 2 times daily    Abnormal gait (POA: Yes)      Overview: She reports that she does not walk much and she gets tired.  They live in       an apartment.  Her  reports that they need to clean out the       apartment and remove some stuff from home which they do not need    Anemia  (POA: Yes)    Hypokalemia (POA: Unknown)    ACP (advance care planning) (POA: Unknown)  Resolved Problems:    * No resolved hospital problems. *      FOLLOW UP  No future appointments.  No follow-up provider specified.    MEDICATIONS ON DISCHARGE     Medication List        CHANGE how you take these medications        Instructions   donepezil 10 MG tablet  What changed: when to take this  Commonly known as: Aricept   Take 1 Tablet by mouth every day.  Dose: 10 mg            CONTINUE taking these medications        Instructions   B COMPLEX PO   Take 1 Tablet by mouth every day.  Dose: 1 Tablet     folic acid 1 MG Tabs  Commonly known as: Folvite   Take 1 Tablet by mouth every day.  Dose: 1 mg     memantine 10 MG Tabs  Commonly known as: Namenda   Doctor's comments: DX Code Needed  REFILL.  Take 1 Tablet by mouth 2 times a day.  Dose: 10 mg     PEPTO-BISMOL PO   Take 30 mL by mouth 2 times a day as needed (For upset stomach).  Dose: 30 mL     thyroid 15 MG Tabs  Commonly known as: [place] Thyroid   Take 3 Tablets by mouth every day.  Dose: 45 mg     VITAMIN B-12 PO   Take 1 Tablet by mouth every day.  Dose: 1 Tablet     VITAMIN C PO   Take 1 Tablet by mouth every day.  Dose: 1 Tablet     ZINC PO   Take 1 Tablet by mouth every day.  Dose: 1 Tablet            STOP taking these medications      cefUROXime 250 MG Tabs  Commonly known as: Ceftin     rosuvastatin 10 MG Tabs  Commonly known as: Crestor              Allergies  Allergies   Allergen Reactions    Nabumetone      rash    Synthroid [Levothroid]      Nausea      Tetracycline Swelling    Cephalexin Rash     Rash, tolerated ancef test dose followed by full dose       DIET  Orders Placed This Encounter   Procedures    Diet Order Diet: Level 6 - Soft and Bite Sized; Liquid level: Level 0 - Thin; Second Modifier: (optional): Cardiac; Tray Modifications (optional): SLP - Deliver to Nursing Station, SLP - 1:1 Supervision by Nursing     Standing Status:   Standing     Number  of Occurrences:   1     Order Specific Question:   Diet:     Answer:   Level 6 - Soft and Bite Sized [23]     Order Specific Question:   Liquid level     Answer:   Level 0 - Thin     Order Specific Question:   Second Modifier: (optional)     Answer:   Cardiac [6]     Order Specific Question:   Tray Modifications (optional)     Answer:   SLP - Deliver to Nursing Station     Order Specific Question:   Tray Modifications (optional)     Answer:   SLP - 1:1 Supervision by Nursing       ACTIVITY  As tolerated.  Weight bearing as tolerated    CONSULTATIONS  Urology-Dr. Green    PROCEDURES  6/28/24 - Norma -cystoscopy, left ureteroscopy with stone basket extraction, left ureteral stent exchange    LABORATORY  Lab Results   Component Value Date    SODIUM 140 06/30/2024    POTASSIUM 3.8 06/30/2024    CHLORIDE 104 06/30/2024    CO2 25 06/30/2024    GLUCOSE 88 06/30/2024    BUN 22 06/30/2024    CREATININE 0.84 06/30/2024        Lab Results   Component Value Date    WBC 11.2 (H) 06/30/2024    HEMOGLOBIN 12.9 06/30/2024    HEMATOCRIT 40.7 06/30/2024    PLATELETCT 200 06/30/2024        Total time of the discharge process exceeds 36 minutes.

## 2024-06-30 NOTE — PROGRESS NOTES
Assumed care of pt at 1915, bedside report with ANDREW Mojica. Pt is AAOx 1, resting comfortably in bed with NADN, pain currently 0/10.    Discussed plan of care for the night with patient, bed in lowest position, call light in reach, personal belongings in reach. No complaints at this time.

## 2024-07-01 ENCOUNTER — APPOINTMENT (OUTPATIENT)
Dept: RADIOLOGY | Facility: MEDICAL CENTER | Age: 74
End: 2024-07-01
Attending: EMERGENCY MEDICINE
Payer: MEDICARE

## 2024-07-01 ENCOUNTER — HOSPITAL ENCOUNTER (OUTPATIENT)
Facility: MEDICAL CENTER | Age: 74
End: 2024-07-04
Attending: EMERGENCY MEDICINE | Admitting: HOSPITALIST
Payer: MEDICARE

## 2024-07-01 DIAGNOSIS — N39.0 ACUTE UTI: ICD-10-CM

## 2024-07-01 DIAGNOSIS — E87.6 HYPOKALEMIA: ICD-10-CM

## 2024-07-01 DIAGNOSIS — R10.31 RLQ ABDOMINAL PAIN: ICD-10-CM

## 2024-07-01 PROBLEM — R10.9 INTRACTABLE ABDOMINAL PAIN: Status: ACTIVE | Noted: 2024-07-01

## 2024-07-01 LAB
ALBUMIN SERPL BCP-MCNC: 3.7 G/DL (ref 3.2–4.9)
ALBUMIN/GLOB SERPL: 1.4 G/DL
ALP SERPL-CCNC: 101 U/L (ref 30–99)
ALT SERPL-CCNC: 24 U/L (ref 2–50)
ANION GAP SERPL CALC-SCNC: 12 MMOL/L (ref 7–16)
APPEARANCE UR: ABNORMAL
AST SERPL-CCNC: 28 U/L (ref 12–45)
BACTERIA #/AREA URNS HPF: ABNORMAL /HPF
BASOPHILS # BLD AUTO: 0.4 % (ref 0–1.8)
BASOPHILS # BLD: 0.05 K/UL (ref 0–0.12)
BILIRUB SERPL-MCNC: 0.5 MG/DL (ref 0.1–1.5)
BILIRUB UR QL STRIP.AUTO: NEGATIVE
BUN SERPL-MCNC: 24 MG/DL (ref 8–22)
CALCIUM ALBUM COR SERPL-MCNC: 9.3 MG/DL (ref 8.5–10.5)
CALCIUM SERPL-MCNC: 9.1 MG/DL (ref 8.4–10.2)
CHLORIDE SERPL-SCNC: 104 MMOL/L (ref 96–112)
CO2 SERPL-SCNC: 24 MMOL/L (ref 20–33)
COLOR UR: ABNORMAL
CREAT SERPL-MCNC: 0.97 MG/DL (ref 0.5–1.4)
EOSINOPHIL # BLD AUTO: 0.1 K/UL (ref 0–0.51)
EOSINOPHIL NFR BLD: 0.8 % (ref 0–6.9)
EPI CELLS #/AREA URNS HPF: ABNORMAL /HPF
ERYTHROCYTE [DISTWIDTH] IN BLOOD BY AUTOMATED COUNT: 45 FL (ref 35.9–50)
GFR SERPLBLD CREATININE-BSD FMLA CKD-EPI: 61 ML/MIN/1.73 M 2
GLOBULIN SER CALC-MCNC: 2.6 G/DL (ref 1.9–3.5)
GLUCOSE SERPL-MCNC: 107 MG/DL (ref 65–99)
GLUCOSE UR STRIP.AUTO-MCNC: NEGATIVE MG/DL
HCT VFR BLD AUTO: 39.1 % (ref 37–47)
HGB BLD-MCNC: 12.8 G/DL (ref 12–16)
IMM GRANULOCYTES # BLD AUTO: 0.05 K/UL (ref 0–0.11)
IMM GRANULOCYTES NFR BLD AUTO: 0.4 % (ref 0–0.9)
INR PPP: 0.99 (ref 0.87–1.13)
KETONES UR STRIP.AUTO-MCNC: ABNORMAL MG/DL
LEUKOCYTE ESTERASE UR QL STRIP.AUTO: ABNORMAL
LIPASE SERPL-CCNC: 16 U/L (ref 11–82)
LYMPHOCYTES # BLD AUTO: 2.54 K/UL (ref 1–4.8)
LYMPHOCYTES NFR BLD: 19.1 % (ref 22–41)
MCH RBC QN AUTO: 30 PG (ref 27–33)
MCHC RBC AUTO-ENTMCNC: 32.7 G/DL (ref 32.2–35.5)
MCV RBC AUTO: 91.6 FL (ref 81.4–97.8)
MICRO URNS: ABNORMAL
MONOCYTES # BLD AUTO: 0.82 K/UL (ref 0–0.85)
MONOCYTES NFR BLD AUTO: 6.2 % (ref 0–13.4)
MUCOUS THREADS #/AREA URNS HPF: ABNORMAL /HPF
NEUTROPHILS # BLD AUTO: 9.71 K/UL (ref 1.82–7.42)
NEUTROPHILS NFR BLD: 73.1 % (ref 44–72)
NITRITE UR QL STRIP.AUTO: POSITIVE
NRBC # BLD AUTO: 0 K/UL
NRBC BLD-RTO: 0 /100 WBC (ref 0–0.2)
PH UR STRIP.AUTO: 6.5 [PH] (ref 5–8)
PLATELET # BLD AUTO: 218 K/UL (ref 164–446)
PMV BLD AUTO: 9.3 FL (ref 9–12.9)
POTASSIUM SERPL-SCNC: 3.3 MMOL/L (ref 3.6–5.5)
PROT SERPL-MCNC: 6.3 G/DL (ref 6–8.2)
PROT UR QL STRIP: >=300 MG/DL
PROTHROMBIN TIME: 13.5 SEC (ref 12–14.6)
RBC # BLD AUTO: 4.27 M/UL (ref 4.2–5.4)
RBC # URNS HPF: >150 /HPF
RBC UR QL AUTO: ABNORMAL
SCCMEC + MECA PNL NOSE NAA+PROBE: NEGATIVE
SCCMEC + MECA PNL NOSE NAA+PROBE: NEGATIVE
SODIUM SERPL-SCNC: 140 MMOL/L (ref 135–145)
SP GR UR STRIP.AUTO: 1.02
TROPONIN T SERPL-MCNC: 11 NG/L (ref 6–19)
WBC # BLD AUTO: 13.3 K/UL (ref 4.8–10.8)
WBC #/AREA URNS HPF: ABNORMAL /HPF

## 2024-07-01 PROCEDURE — 93005 ELECTROCARDIOGRAM TRACING: CPT | Performed by: EMERGENCY MEDICINE

## 2024-07-01 PROCEDURE — 99285 EMERGENCY DEPT VISIT HI MDM: CPT

## 2024-07-01 PROCEDURE — 97163 PT EVAL HIGH COMPLEX 45 MIN: CPT

## 2024-07-01 PROCEDURE — 74177 CT ABD & PELVIS W/CONTRAST: CPT

## 2024-07-01 PROCEDURE — 83690 ASSAY OF LIPASE: CPT

## 2024-07-01 PROCEDURE — 700102 HCHG RX REV CODE 250 W/ 637 OVERRIDE(OP): Performed by: HOSPITALIST

## 2024-07-01 PROCEDURE — 80053 COMPREHEN METABOLIC PANEL: CPT

## 2024-07-01 PROCEDURE — 700111 HCHG RX REV CODE 636 W/ 250 OVERRIDE (IP): Mod: JZ | Performed by: HOSPITALIST

## 2024-07-01 PROCEDURE — 700105 HCHG RX REV CODE 258: Performed by: INTERNAL MEDICINE

## 2024-07-01 PROCEDURE — 700117 HCHG RX CONTRAST REV CODE 255: Performed by: EMERGENCY MEDICINE

## 2024-07-01 PROCEDURE — 84484 ASSAY OF TROPONIN QUANT: CPT

## 2024-07-01 PROCEDURE — 94760 N-INVAS EAR/PLS OXIMETRY 1: CPT

## 2024-07-01 PROCEDURE — G0378 HOSPITAL OBSERVATION PER HR: HCPCS

## 2024-07-01 PROCEDURE — 81001 URINALYSIS AUTO W/SCOPE: CPT

## 2024-07-01 PROCEDURE — 36415 COLL VENOUS BLD VENIPUNCTURE: CPT

## 2024-07-01 PROCEDURE — 85025 COMPLETE CBC W/AUTO DIFF WBC: CPT

## 2024-07-01 PROCEDURE — 99223 1ST HOSP IP/OBS HIGH 75: CPT | Mod: AI | Performed by: HOSPITALIST

## 2024-07-01 PROCEDURE — 87641 MR-STAPH DNA AMP PROBE: CPT | Mod: 91

## 2024-07-01 PROCEDURE — 85610 PROTHROMBIN TIME: CPT

## 2024-07-01 PROCEDURE — 87040 BLOOD CULTURE FOR BACTERIA: CPT | Mod: 91

## 2024-07-01 PROCEDURE — 700111 HCHG RX REV CODE 636 W/ 250 OVERRIDE (IP): Mod: JZ | Performed by: EMERGENCY MEDICINE

## 2024-07-01 PROCEDURE — 700102 HCHG RX REV CODE 250 W/ 637 OVERRIDE(OP): Performed by: INTERNAL MEDICINE

## 2024-07-01 PROCEDURE — A9270 NON-COVERED ITEM OR SERVICE: HCPCS | Performed by: INTERNAL MEDICINE

## 2024-07-01 PROCEDURE — 87086 URINE CULTURE/COLONY COUNT: CPT

## 2024-07-01 PROCEDURE — 96365 THER/PROPH/DIAG IV INF INIT: CPT

## 2024-07-01 PROCEDURE — 96375 TX/PRO/DX INJ NEW DRUG ADDON: CPT

## 2024-07-01 PROCEDURE — A9270 NON-COVERED ITEM OR SERVICE: HCPCS | Performed by: HOSPITALIST

## 2024-07-01 RX ORDER — THYROID 30 MG/1
45 TABLET ORAL DAILY
Status: DISCONTINUED | OUTPATIENT
Start: 2024-07-01 | End: 2024-07-04 | Stop reason: HOSPADM

## 2024-07-01 RX ORDER — SODIUM CHLORIDE, SODIUM LACTATE, POTASSIUM CHLORIDE, AND CALCIUM CHLORIDE .6; .31; .03; .02 G/100ML; G/100ML; G/100ML; G/100ML
500 INJECTION, SOLUTION INTRAVENOUS
Status: DISCONTINUED | OUTPATIENT
Start: 2024-07-01 | End: 2024-07-04 | Stop reason: HOSPADM

## 2024-07-01 RX ORDER — DONEPEZIL HYDROCHLORIDE 5 MG/1
10 TABLET, FILM COATED ORAL NIGHTLY
Status: DISCONTINUED | OUTPATIENT
Start: 2024-07-01 | End: 2024-07-04 | Stop reason: HOSPADM

## 2024-07-01 RX ORDER — ACETAMINOPHEN 325 MG/1
650 TABLET ORAL EVERY 6 HOURS PRN
Status: DISCONTINUED | OUTPATIENT
Start: 2024-07-01 | End: 2024-07-04 | Stop reason: HOSPADM

## 2024-07-01 RX ORDER — MORPHINE SULFATE 4 MG/ML
1 INJECTION INTRAVENOUS
Status: DISCONTINUED | OUTPATIENT
Start: 2024-07-01 | End: 2024-07-04 | Stop reason: HOSPADM

## 2024-07-01 RX ORDER — ROSUVASTATIN CALCIUM 10 MG/1
10 TABLET, COATED ORAL EVERY EVENING
Status: DISCONTINUED | OUTPATIENT
Start: 2024-07-01 | End: 2024-07-04 | Stop reason: HOSPADM

## 2024-07-01 RX ORDER — CEFUROXIME AXETIL 250 MG/1
250 TABLET ORAL 2 TIMES DAILY
COMMUNITY
End: 2024-07-12

## 2024-07-01 RX ORDER — MEMANTINE HYDROCHLORIDE 10 MG/1
10 TABLET ORAL 2 TIMES DAILY
Status: DISCONTINUED | OUTPATIENT
Start: 2024-07-01 | End: 2024-07-04 | Stop reason: HOSPADM

## 2024-07-01 RX ORDER — POTASSIUM CHLORIDE 20 MEQ/1
20 TABLET, EXTENDED RELEASE ORAL ONCE
Status: COMPLETED | OUTPATIENT
Start: 2024-07-01 | End: 2024-07-01

## 2024-07-01 RX ORDER — LINEZOLID 600 MG/1
600 TABLET, FILM COATED ORAL EVERY 12 HOURS
Status: DISCONTINUED | OUTPATIENT
Start: 2024-07-01 | End: 2024-07-03

## 2024-07-01 RX ORDER — ONDANSETRON 4 MG/1
4 TABLET, ORALLY DISINTEGRATING ORAL EVERY 4 HOURS PRN
Status: DISCONTINUED | OUTPATIENT
Start: 2024-07-01 | End: 2024-07-04 | Stop reason: HOSPADM

## 2024-07-01 RX ORDER — MORPHINE SULFATE 4 MG/ML
4 INJECTION INTRAVENOUS ONCE
Status: COMPLETED | OUTPATIENT
Start: 2024-07-01 | End: 2024-07-01

## 2024-07-01 RX ORDER — CEFUROXIME AXETIL 250 MG/1
500 TABLET ORAL EVERY 12 HOURS
Status: DISCONTINUED | OUTPATIENT
Start: 2024-07-01 | End: 2024-07-03

## 2024-07-01 RX ORDER — SODIUM CHLORIDE 9 MG/ML
INJECTION, SOLUTION INTRAVENOUS CONTINUOUS
Status: ACTIVE | OUTPATIENT
Start: 2024-07-01 | End: 2024-07-01

## 2024-07-01 RX ORDER — MORPHINE SULFATE 4 MG/ML
2 INJECTION INTRAVENOUS
Status: DISCONTINUED | OUTPATIENT
Start: 2024-07-01 | End: 2024-07-04 | Stop reason: HOSPADM

## 2024-07-01 RX ORDER — ONDANSETRON 2 MG/ML
4 INJECTION INTRAMUSCULAR; INTRAVENOUS EVERY 4 HOURS PRN
Status: DISCONTINUED | OUTPATIENT
Start: 2024-07-01 | End: 2024-07-04 | Stop reason: HOSPADM

## 2024-07-01 RX ORDER — LINEZOLID 2 MG/ML
600 INJECTION, SOLUTION INTRAVENOUS EVERY 12 HOURS
Status: DISCONTINUED | OUTPATIENT
Start: 2024-07-01 | End: 2024-07-01

## 2024-07-01 RX ORDER — AMOXICILLIN 250 MG
2 CAPSULE ORAL 2 TIMES DAILY
Status: DISCONTINUED | OUTPATIENT
Start: 2024-07-01 | End: 2024-07-04 | Stop reason: HOSPADM

## 2024-07-01 RX ORDER — POLYETHYLENE GLYCOL 3350 17 G/17G
1 POWDER, FOR SOLUTION ORAL
Status: DISCONTINUED | OUTPATIENT
Start: 2024-07-01 | End: 2024-07-04 | Stop reason: HOSPADM

## 2024-07-01 RX ADMIN — ROSUVASTATIN CALCIUM 10 MG: 10 TABLET, FILM COATED ORAL at 20:36

## 2024-07-01 RX ADMIN — MEMANTINE HYDROCHLORIDE 10 MG: 10 TABLET ORAL at 20:36

## 2024-07-01 RX ADMIN — MORPHINE SULFATE 4 MG: 4 INJECTION, SOLUTION INTRAMUSCULAR; INTRAVENOUS at 00:57

## 2024-07-01 RX ADMIN — IOHEXOL 100 ML: 350 INJECTION, SOLUTION INTRAVENOUS at 01:21

## 2024-07-01 RX ADMIN — SENNOSIDES AND DOCUSATE SODIUM 2 TABLET: 50; 8.6 TABLET ORAL at 17:26

## 2024-07-01 RX ADMIN — MEMANTINE HYDROCHLORIDE 10 MG: 10 TABLET ORAL at 09:54

## 2024-07-01 RX ADMIN — DONEPEZIL HYDROCHLORIDE 10 MG: 5 TABLET, FILM COATED ORAL at 20:36

## 2024-07-01 RX ADMIN — CEFUROXIME AXETIL 500 MG: 250 TABLET ORAL at 17:26

## 2024-07-01 RX ADMIN — SODIUM CHLORIDE: 9 INJECTION, SOLUTION INTRAVENOUS at 11:39

## 2024-07-01 RX ADMIN — THYROID 45 MG: 30 TABLET ORAL at 09:54

## 2024-07-01 RX ADMIN — POTASSIUM CHLORIDE 20 MEQ: 1500 TABLET, EXTENDED RELEASE ORAL at 05:13

## 2024-07-01 RX ADMIN — LINEZOLID 600 MG: 600 INJECTION, SOLUTION INTRAVENOUS at 03:43

## 2024-07-01 RX ADMIN — LINEZOLID 600 MG: 600 TABLET, FILM COATED ORAL at 17:26

## 2024-07-01 ASSESSMENT — LIFESTYLE VARIABLES
HOW MANY TIMES IN THE PAST YEAR HAVE YOU HAD 5 OR MORE DRINKS IN A DAY: 0
CONSUMPTION TOTAL: NEGATIVE
AVERAGE NUMBER OF DAYS PER WEEK YOU HAVE A DRINK CONTAINING ALCOHOL: 0
ALCOHOL_USE: NO
ON A TYPICAL DAY WHEN YOU DRINK ALCOHOL HOW MANY DRINKS DO YOU HAVE: 0
EVER FELT BAD OR GUILTY ABOUT YOUR DRINKING: NO
TOTAL SCORE: 0
HAVE PEOPLE ANNOYED YOU BY CRITICIZING YOUR DRINKING: NO
TOTAL SCORE: 0
TOTAL SCORE: 0
HAVE YOU EVER FELT YOU SHOULD CUT DOWN ON YOUR DRINKING: NO
EVER HAD A DRINK FIRST THING IN THE MORNING TO STEADY YOUR NERVES TO GET RID OF A HANGOVER: NO

## 2024-07-01 ASSESSMENT — ENCOUNTER SYMPTOMS
NAUSEA: 0
INSOMNIA: 0
SHORTNESS OF BREATH: 0
BLURRED VISION: 0
VOMITING: 0
ABDOMINAL PAIN: 1
NECK PAIN: 0
WEAKNESS: 0
DIZZINESS: 0
COUGH: 0
BRUISES/BLEEDS EASILY: 0
DOUBLE VISION: 0
MYALGIAS: 0
PALPITATIONS: 0
SORE THROAT: 0
HEADACHES: 0
FEVER: 1
DEPRESSION: 0
CHILLS: 1

## 2024-07-01 ASSESSMENT — GAIT ASSESSMENTS
GAIT LEVEL OF ASSIST: MINIMAL ASSIST
ASSISTIVE DEVICE: HAND HELD ASSIST
DISTANCE (FEET): 40

## 2024-07-01 ASSESSMENT — COGNITIVE AND FUNCTIONAL STATUS - GENERAL
DRESSING REGULAR UPPER BODY CLOTHING: A LITTLE
MOVING FROM LYING ON BACK TO SITTING ON SIDE OF FLAT BED: A LITTLE
WALKING IN HOSPITAL ROOM: A LOT
TURNING FROM BACK TO SIDE WHILE IN FLAT BAD: A LOT
HELP NEEDED FOR BATHING: A LOT
SUGGESTED CMS G CODE MODIFIER DAILY ACTIVITY: CK
MOBILITY SCORE: 12
MOVING FROM LYING ON BACK TO SITTING ON SIDE OF FLAT BED: A LOT
DRESSING REGULAR LOWER BODY CLOTHING: A LITTLE
SUGGESTED CMS G CODE MODIFIER MOBILITY: CL
STANDING UP FROM CHAIR USING ARMS: A LITTLE
MOVING TO AND FROM BED TO CHAIR: A LITTLE
SUGGESTED CMS G CODE MODIFIER MOBILITY: CK
TURNING FROM BACK TO SIDE WHILE IN FLAT BAD: A LITTLE
TOILETING: A LOT
CLIMB 3 TO 5 STEPS WITH RAILING: A LOT
PERSONAL GROOMING: A LITTLE
DAILY ACTIVITIY SCORE: 16
MOVING TO AND FROM BED TO CHAIR: A LOT
CLIMB 3 TO 5 STEPS WITH RAILING: A LOT
EATING MEALS: A LITTLE
STANDING UP FROM CHAIR USING ARMS: A LOT
MOBILITY SCORE: 17
WALKING IN HOSPITAL ROOM: A LITTLE

## 2024-07-01 ASSESSMENT — PAIN DESCRIPTION - PAIN TYPE
TYPE: ACUTE PAIN

## 2024-07-01 ASSESSMENT — SOCIAL DETERMINANTS OF HEALTH (SDOH)
WITHIN THE LAST YEAR, HAVE YOU BEEN HUMILIATED OR EMOTIONALLY ABUSED IN OTHER WAYS BY YOUR PARTNER OR EX-PARTNER?: NO
WITHIN THE PAST 12 MONTHS, YOU WORRIED THAT YOUR FOOD WOULD RUN OUT BEFORE YOU GOT THE MONEY TO BUY MORE: NEVER TRUE
WITHIN THE LAST YEAR, HAVE TO BEEN RAPED OR FORCED TO HAVE ANY KIND OF SEXUAL ACTIVITY BY YOUR PARTNER OR EX-PARTNER?: NO
WITHIN THE LAST YEAR, HAVE YOU BEEN AFRAID OF YOUR PARTNER OR EX-PARTNER?: NO
IN THE PAST 12 MONTHS, HAS THE ELECTRIC, GAS, OIL, OR WATER COMPANY THREATENED TO SHUT OFF SERVICE IN YOUR HOME?: NO
WITHIN THE PAST 12 MONTHS, THE FOOD YOU BOUGHT JUST DIDN'T LAST AND YOU DIDN'T HAVE MONEY TO GET MORE: NEVER TRUE
WITHIN THE LAST YEAR, HAVE YOU BEEN KICKED, HIT, SLAPPED, OR OTHERWISE PHYSICALLY HURT BY YOUR PARTNER OR EX-PARTNER?: NO

## 2024-07-01 ASSESSMENT — PATIENT HEALTH QUESTIONNAIRE - PHQ9
1. LITTLE INTEREST OR PLEASURE IN DOING THINGS: NOT AT ALL
2. FEELING DOWN, DEPRESSED, IRRITABLE, OR HOPELESS: NOT AT ALL
SUM OF ALL RESPONSES TO PHQ9 QUESTIONS 1 AND 2: 0

## 2024-07-01 ASSESSMENT — FIBROSIS 4 INDEX
FIB4 SCORE: 1.91
FIB4 SCORE: 2.51

## 2024-07-02 LAB
ANION GAP SERPL CALC-SCNC: 8 MMOL/L (ref 7–16)
BUN SERPL-MCNC: 17 MG/DL (ref 8–22)
CALCIUM SERPL-MCNC: 8.8 MG/DL (ref 8.4–10.2)
CHLORIDE SERPL-SCNC: 104 MMOL/L (ref 96–112)
CO2 SERPL-SCNC: 26 MMOL/L (ref 20–33)
CREAT SERPL-MCNC: 0.73 MG/DL (ref 0.5–1.4)
ERYTHROCYTE [DISTWIDTH] IN BLOOD BY AUTOMATED COUNT: 46.5 FL (ref 35.9–50)
GFR SERPLBLD CREATININE-BSD FMLA CKD-EPI: 86 ML/MIN/1.73 M 2
GLUCOSE SERPL-MCNC: 95 MG/DL (ref 65–99)
HCT VFR BLD AUTO: 39.6 % (ref 37–47)
HGB BLD-MCNC: 12.7 G/DL (ref 12–16)
MCH RBC QN AUTO: 30.2 PG (ref 27–33)
MCHC RBC AUTO-ENTMCNC: 32.1 G/DL (ref 32.2–35.5)
MCV RBC AUTO: 94.1 FL (ref 81.4–97.8)
PLATELET # BLD AUTO: 182 K/UL (ref 164–446)
PMV BLD AUTO: 9.5 FL (ref 9–12.9)
POTASSIUM SERPL-SCNC: 3.7 MMOL/L (ref 3.6–5.5)
RBC # BLD AUTO: 4.21 M/UL (ref 4.2–5.4)
SODIUM SERPL-SCNC: 138 MMOL/L (ref 135–145)
WBC # BLD AUTO: 8.5 K/UL (ref 4.8–10.8)

## 2024-07-02 PROCEDURE — 94760 N-INVAS EAR/PLS OXIMETRY 1: CPT

## 2024-07-02 PROCEDURE — 85027 COMPLETE CBC AUTOMATED: CPT

## 2024-07-02 PROCEDURE — 700102 HCHG RX REV CODE 250 W/ 637 OVERRIDE(OP): Performed by: INTERNAL MEDICINE

## 2024-07-02 PROCEDURE — 86480 TB TEST CELL IMMUN MEASURE: CPT

## 2024-07-02 PROCEDURE — 36415 COLL VENOUS BLD VENIPUNCTURE: CPT

## 2024-07-02 PROCEDURE — 99232 SBSQ HOSP IP/OBS MODERATE 35: CPT | Performed by: INTERNAL MEDICINE

## 2024-07-02 PROCEDURE — 700102 HCHG RX REV CODE 250 W/ 637 OVERRIDE(OP): Performed by: HOSPITALIST

## 2024-07-02 PROCEDURE — A9270 NON-COVERED ITEM OR SERVICE: HCPCS | Performed by: HOSPITALIST

## 2024-07-02 PROCEDURE — 97166 OT EVAL MOD COMPLEX 45 MIN: CPT

## 2024-07-02 PROCEDURE — 97535 SELF CARE MNGMENT TRAINING: CPT

## 2024-07-02 PROCEDURE — A9270 NON-COVERED ITEM OR SERVICE: HCPCS | Performed by: INTERNAL MEDICINE

## 2024-07-02 PROCEDURE — 80048 BASIC METABOLIC PNL TOTAL CA: CPT

## 2024-07-02 PROCEDURE — G0378 HOSPITAL OBSERVATION PER HR: HCPCS

## 2024-07-02 RX ADMIN — CEFUROXIME AXETIL 500 MG: 250 TABLET ORAL at 05:38

## 2024-07-02 RX ADMIN — MEMANTINE HYDROCHLORIDE 10 MG: 10 TABLET ORAL at 21:19

## 2024-07-02 RX ADMIN — ROSUVASTATIN CALCIUM 10 MG: 10 TABLET, FILM COATED ORAL at 21:19

## 2024-07-02 RX ADMIN — SENNOSIDES AND DOCUSATE SODIUM 2 TABLET: 50; 8.6 TABLET ORAL at 16:39

## 2024-07-02 RX ADMIN — CEFUROXIME AXETIL 500 MG: 250 TABLET ORAL at 16:37

## 2024-07-02 RX ADMIN — DONEPEZIL HYDROCHLORIDE 10 MG: 5 TABLET, FILM COATED ORAL at 21:19

## 2024-07-02 RX ADMIN — LINEZOLID 600 MG: 600 TABLET, FILM COATED ORAL at 05:38

## 2024-07-02 RX ADMIN — ACETAMINOPHEN 650 MG: 325 TABLET ORAL at 18:41

## 2024-07-02 RX ADMIN — THYROID 45 MG: 30 TABLET ORAL at 09:35

## 2024-07-02 RX ADMIN — LINEZOLID 600 MG: 600 TABLET, FILM COATED ORAL at 16:38

## 2024-07-02 RX ADMIN — MEMANTINE HYDROCHLORIDE 10 MG: 10 TABLET ORAL at 09:35

## 2024-07-02 ASSESSMENT — COGNITIVE AND FUNCTIONAL STATUS - GENERAL
TOILETING: A LOT
DAILY ACTIVITIY SCORE: 15
EATING MEALS: A LITTLE
HELP NEEDED FOR BATHING: A LOT
PERSONAL GROOMING: A LITTLE
SUGGESTED CMS G CODE MODIFIER DAILY ACTIVITY: CK
DRESSING REGULAR LOWER BODY CLOTHING: A LOT
DRESSING REGULAR UPPER BODY CLOTHING: A LITTLE

## 2024-07-02 ASSESSMENT — FIBROSIS 4 INDEX: FIB4 SCORE: 1.91

## 2024-07-02 ASSESSMENT — ACTIVITIES OF DAILY LIVING (ADL): TOILETING: REQUIRES ASSIST

## 2024-07-02 ASSESSMENT — PAIN DESCRIPTION - PAIN TYPE
TYPE: ACUTE PAIN
TYPE: ACUTE PAIN

## 2024-07-03 ENCOUNTER — HOME HEALTH ADMISSION (OUTPATIENT)
Dept: HOME HEALTH SERVICES | Facility: HOME HEALTHCARE | Age: 74
End: 2024-07-03
Payer: MEDICARE

## 2024-07-03 LAB
ANION GAP SERPL CALC-SCNC: 11 MMOL/L (ref 7–16)
ANION GAP SERPL CALC-SCNC: 9 MMOL/L (ref 7–16)
BACTERIA UR CULT: NORMAL
BUN SERPL-MCNC: 14 MG/DL (ref 8–22)
BUN SERPL-MCNC: 52 MG/DL (ref 8–22)
CALCIUM SERPL-MCNC: 8.8 MG/DL (ref 8.4–10.2)
CALCIUM SERPL-MCNC: 8.9 MG/DL (ref 8.4–10.2)
CHLORIDE SERPL-SCNC: 103 MMOL/L (ref 96–112)
CHLORIDE SERPL-SCNC: 109 MMOL/L (ref 96–112)
CO2 SERPL-SCNC: 23 MMOL/L (ref 20–33)
CO2 SERPL-SCNC: 26 MMOL/L (ref 20–33)
CREAT SERPL-MCNC: 0.76 MG/DL (ref 0.5–1.4)
CREAT SERPL-MCNC: 0.82 MG/DL (ref 0.5–1.4)
ERYTHROCYTE [DISTWIDTH] IN BLOOD BY AUTOMATED COUNT: 44.9 FL (ref 35.9–50)
ERYTHROCYTE [DISTWIDTH] IN BLOOD BY AUTOMATED COUNT: 48.1 FL (ref 35.9–50)
GAMMA INTERFERON BACKGROUND BLD IA-ACNC: 0.03 IU/ML
GFR SERPLBLD CREATININE-BSD FMLA CKD-EPI: 75 ML/MIN/1.73 M 2
GFR SERPLBLD CREATININE-BSD FMLA CKD-EPI: 82 ML/MIN/1.73 M 2
GLUCOSE SERPL-MCNC: 255 MG/DL (ref 65–99)
GLUCOSE SERPL-MCNC: 96 MG/DL (ref 65–99)
HCT VFR BLD AUTO: 36.9 % (ref 37–47)
HCT VFR BLD AUTO: 40.7 % (ref 37–47)
HGB BLD-MCNC: 12.3 G/DL (ref 12–16)
HGB BLD-MCNC: 12.9 G/DL (ref 12–16)
M TB IFN-G BLD-IMP: NEGATIVE
M TB IFN-G CD4+ BCKGRND COR BLD-ACNC: 0 IU/ML
MCH RBC QN AUTO: 30.6 PG (ref 27–33)
MCH RBC QN AUTO: 31.2 PG (ref 27–33)
MCHC RBC AUTO-ENTMCNC: 31.7 G/DL (ref 32.2–35.5)
MCHC RBC AUTO-ENTMCNC: 33.3 G/DL (ref 32.2–35.5)
MCV RBC AUTO: 91.8 FL (ref 81.4–97.8)
MCV RBC AUTO: 98.5 FL (ref 81.4–97.8)
MITOGEN IGNF BCKGRD COR BLD-ACNC: >10 IU/ML
PLATELET # BLD AUTO: 161 K/UL (ref 164–446)
PLATELET # BLD AUTO: 217 K/UL (ref 164–446)
PMV BLD AUTO: 10.2 FL (ref 9–12.9)
PMV BLD AUTO: 9.7 FL (ref 9–12.9)
POTASSIUM SERPL-SCNC: 3.6 MMOL/L (ref 3.6–5.5)
POTASSIUM SERPL-SCNC: 5.1 MMOL/L (ref 3.6–5.5)
QFT TB2 - NIL TBQ2: -0.01 IU/ML
RBC # BLD AUTO: 4.02 M/UL (ref 4.2–5.4)
RBC # BLD AUTO: 4.13 M/UL (ref 4.2–5.4)
SIGNIFICANT IND 70042: NORMAL
SITE SITE: NORMAL
SODIUM SERPL-SCNC: 137 MMOL/L (ref 135–145)
SODIUM SERPL-SCNC: 144 MMOL/L (ref 135–145)
SOURCE SOURCE: NORMAL
WBC # BLD AUTO: 11.5 K/UL (ref 4.8–10.8)
WBC # BLD AUTO: 7.9 K/UL (ref 4.8–10.8)

## 2024-07-03 PROCEDURE — A9270 NON-COVERED ITEM OR SERVICE: HCPCS | Performed by: INTERNAL MEDICINE

## 2024-07-03 PROCEDURE — A9270 NON-COVERED ITEM OR SERVICE: HCPCS | Performed by: HOSPITALIST

## 2024-07-03 PROCEDURE — 36415 COLL VENOUS BLD VENIPUNCTURE: CPT

## 2024-07-03 PROCEDURE — 700102 HCHG RX REV CODE 250 W/ 637 OVERRIDE(OP): Performed by: HOSPITALIST

## 2024-07-03 PROCEDURE — 99232 SBSQ HOSP IP/OBS MODERATE 35: CPT | Performed by: INTERNAL MEDICINE

## 2024-07-03 PROCEDURE — 85027 COMPLETE CBC AUTOMATED: CPT

## 2024-07-03 PROCEDURE — 80048 BASIC METABOLIC PNL TOTAL CA: CPT

## 2024-07-03 PROCEDURE — G0378 HOSPITAL OBSERVATION PER HR: HCPCS

## 2024-07-03 PROCEDURE — 700102 HCHG RX REV CODE 250 W/ 637 OVERRIDE(OP): Performed by: INTERNAL MEDICINE

## 2024-07-03 PROCEDURE — 97602 WOUND(S) CARE NON-SELECTIVE: CPT

## 2024-07-03 PROCEDURE — 94760 N-INVAS EAR/PLS OXIMETRY 1: CPT

## 2024-07-03 RX ADMIN — THYROID 45 MG: 30 TABLET ORAL at 09:17

## 2024-07-03 RX ADMIN — MEMANTINE HYDROCHLORIDE 10 MG: 10 TABLET ORAL at 21:41

## 2024-07-03 RX ADMIN — CEFUROXIME AXETIL 500 MG: 250 TABLET ORAL at 05:36

## 2024-07-03 RX ADMIN — MEMANTINE HYDROCHLORIDE 10 MG: 10 TABLET ORAL at 09:19

## 2024-07-03 RX ADMIN — LINEZOLID 600 MG: 600 TABLET, FILM COATED ORAL at 05:36

## 2024-07-03 RX ADMIN — ROSUVASTATIN CALCIUM 10 MG: 10 TABLET, FILM COATED ORAL at 21:41

## 2024-07-03 RX ADMIN — DONEPEZIL HYDROCHLORIDE 10 MG: 5 TABLET, FILM COATED ORAL at 21:41

## 2024-07-03 RX ADMIN — SENNOSIDES AND DOCUSATE SODIUM 2 TABLET: 50; 8.6 TABLET ORAL at 05:36

## 2024-07-03 RX ADMIN — SENNOSIDES AND DOCUSATE SODIUM 2 TABLET: 50; 8.6 TABLET ORAL at 18:15

## 2024-07-03 ASSESSMENT — PAIN DESCRIPTION - PAIN TYPE
TYPE: ACUTE PAIN
TYPE: ACUTE PAIN

## 2024-07-04 VITALS
BODY MASS INDEX: 25.18 KG/M2 | DIASTOLIC BLOOD PRESSURE: 66 MMHG | SYSTOLIC BLOOD PRESSURE: 114 MMHG | OXYGEN SATURATION: 96 % | RESPIRATION RATE: 18 BRPM | HEART RATE: 74 BPM | TEMPERATURE: 97.4 F | HEIGHT: 61 IN | WEIGHT: 133.38 LBS

## 2024-07-04 LAB
ANION GAP SERPL CALC-SCNC: 13 MMOL/L (ref 7–16)
BUN SERPL-MCNC: 16 MG/DL (ref 8–22)
CALCIUM SERPL-MCNC: 9 MG/DL (ref 8.4–10.2)
CHLORIDE SERPL-SCNC: 106 MMOL/L (ref 96–112)
CO2 SERPL-SCNC: 23 MMOL/L (ref 20–33)
CREAT SERPL-MCNC: 0.8 MG/DL (ref 0.5–1.4)
EKG IMPRESSION: NORMAL
ERYTHROCYTE [DISTWIDTH] IN BLOOD BY AUTOMATED COUNT: 44.7 FL (ref 35.9–50)
GFR SERPLBLD CREATININE-BSD FMLA CKD-EPI: 77 ML/MIN/1.73 M 2
GLUCOSE SERPL-MCNC: 89 MG/DL (ref 65–99)
HCT VFR BLD AUTO: 36.9 % (ref 37–47)
HGB BLD-MCNC: 12.3 G/DL (ref 12–16)
MCH RBC QN AUTO: 30.4 PG (ref 27–33)
MCHC RBC AUTO-ENTMCNC: 33.3 G/DL (ref 32.2–35.5)
MCV RBC AUTO: 91.3 FL (ref 81.4–97.8)
PLATELET # BLD AUTO: 167 K/UL (ref 164–446)
PMV BLD AUTO: 9.9 FL (ref 9–12.9)
POTASSIUM SERPL-SCNC: 4 MMOL/L (ref 3.6–5.5)
RBC # BLD AUTO: 4.04 M/UL (ref 4.2–5.4)
SODIUM SERPL-SCNC: 142 MMOL/L (ref 135–145)
WBC # BLD AUTO: 11.1 K/UL (ref 4.8–10.8)

## 2024-07-04 PROCEDURE — 36415 COLL VENOUS BLD VENIPUNCTURE: CPT

## 2024-07-04 PROCEDURE — 85027 COMPLETE CBC AUTOMATED: CPT

## 2024-07-04 PROCEDURE — A9270 NON-COVERED ITEM OR SERVICE: HCPCS | Performed by: HOSPITALIST

## 2024-07-04 PROCEDURE — 99239 HOSP IP/OBS DSCHRG MGMT >30: CPT | Performed by: INTERNAL MEDICINE

## 2024-07-04 PROCEDURE — 700102 HCHG RX REV CODE 250 W/ 637 OVERRIDE(OP): Performed by: HOSPITALIST

## 2024-07-04 PROCEDURE — G0378 HOSPITAL OBSERVATION PER HR: HCPCS

## 2024-07-04 PROCEDURE — 80048 BASIC METABOLIC PNL TOTAL CA: CPT

## 2024-07-04 RX ADMIN — MEMANTINE HYDROCHLORIDE 10 MG: 10 TABLET ORAL at 09:12

## 2024-07-04 RX ADMIN — SENNOSIDES AND DOCUSATE SODIUM 2 TABLET: 50; 8.6 TABLET ORAL at 05:55

## 2024-07-04 RX ADMIN — THYROID 45 MG: 30 TABLET ORAL at 09:12

## 2024-07-04 ASSESSMENT — PAIN DESCRIPTION - PAIN TYPE: TYPE: ACUTE PAIN

## 2024-07-05 ENCOUNTER — PATIENT OUTREACH (OUTPATIENT)
Dept: MEDICAL GROUP | Facility: MEDICAL CENTER | Age: 74
End: 2024-07-05
Payer: MEDICARE

## 2024-07-09 ENCOUNTER — TELEPHONE (OUTPATIENT)
Dept: MEDICAL GROUP | Facility: MEDICAL CENTER | Age: 74
End: 2024-07-09
Payer: MEDICARE

## 2024-07-12 ENCOUNTER — OFFICE VISIT (OUTPATIENT)
Dept: MEDICAL GROUP | Facility: MEDICAL CENTER | Age: 74
End: 2024-07-12
Payer: MEDICARE

## 2024-07-12 VITALS
TEMPERATURE: 97.5 F | SYSTOLIC BLOOD PRESSURE: 94 MMHG | OXYGEN SATURATION: 99 % | DIASTOLIC BLOOD PRESSURE: 68 MMHG | HEART RATE: 85 BPM

## 2024-07-12 DIAGNOSIS — F02.C18 SEVERE EARLY ONSET ALZHEIMER'S DEMENTIA WITH OTHER BEHAVIORAL DISTURBANCE (HCC): ICD-10-CM

## 2024-07-12 DIAGNOSIS — L60.9 NAIL ABNORMALITY: ICD-10-CM

## 2024-07-12 DIAGNOSIS — Z09 HOSPITAL DISCHARGE FOLLOW-UP: Primary | ICD-10-CM

## 2024-07-12 DIAGNOSIS — R26.9 ABNORMAL GAIT: ICD-10-CM

## 2024-07-12 DIAGNOSIS — N39.0 RECURRENT UTI: ICD-10-CM

## 2024-07-12 DIAGNOSIS — G30.0 SEVERE EARLY ONSET ALZHEIMER'S DEMENTIA WITH OTHER BEHAVIORAL DISTURBANCE (HCC): ICD-10-CM

## 2024-07-12 LAB
APPEARANCE UR: CLEAR
BILIRUB UR STRIP-MCNC: NORMAL MG/DL
COLOR UR AUTO: YELLOW
GLUCOSE UR STRIP.AUTO-MCNC: NORMAL MG/DL
KETONES UR STRIP.AUTO-MCNC: NORMAL MG/DL
LEUKOCYTE ESTERASE UR QL STRIP.AUTO: NORMAL
NITRITE UR QL STRIP.AUTO: NORMAL
PH UR STRIP.AUTO: 5.5 [PH] (ref 5–8)
PROT UR QL STRIP: 30 MG/DL
RBC UR QL AUTO: NORMAL
SP GR UR STRIP.AUTO: 1.03
UROBILINOGEN UR STRIP-MCNC: 0.2 MG/DL

## 2024-07-12 ASSESSMENT — ENCOUNTER SYMPTOMS
FEVER: 0
CHILLS: 0
MEMORY LOSS: 1

## 2024-08-02 ENCOUNTER — OFFICE VISIT (OUTPATIENT)
Dept: MEDICAL GROUP | Facility: MEDICAL CENTER | Age: 74
End: 2024-08-02
Payer: MEDICARE

## 2024-08-02 VITALS
HEART RATE: 89 BPM | DIASTOLIC BLOOD PRESSURE: 80 MMHG | RESPIRATION RATE: 16 BRPM | WEIGHT: 122 LBS | BODY MASS INDEX: 23.03 KG/M2 | TEMPERATURE: 97.5 F | SYSTOLIC BLOOD PRESSURE: 128 MMHG | HEIGHT: 61 IN | OXYGEN SATURATION: 94 %

## 2024-08-02 DIAGNOSIS — E03.9 HYPOTHYROIDISM, UNSPECIFIED TYPE: Chronic | ICD-10-CM

## 2024-08-02 DIAGNOSIS — F02.C18 SEVERE EARLY ONSET ALZHEIMER'S DEMENTIA WITH OTHER BEHAVIORAL DISTURBANCE (HCC): ICD-10-CM

## 2024-08-02 DIAGNOSIS — G30.0 SEVERE EARLY ONSET ALZHEIMER'S DEMENTIA WITH OTHER BEHAVIORAL DISTURBANCE (HCC): ICD-10-CM

## 2024-08-02 DIAGNOSIS — R73.03 PREDIABETES: ICD-10-CM

## 2024-08-02 DIAGNOSIS — D64.9 ANEMIA, UNSPECIFIED TYPE: ICD-10-CM

## 2024-08-02 PROCEDURE — 99214 OFFICE O/P EST MOD 30 MIN: CPT | Performed by: FAMILY MEDICINE

## 2024-08-02 PROCEDURE — 3074F SYST BP LT 130 MM HG: CPT | Performed by: FAMILY MEDICINE

## 2024-08-02 PROCEDURE — 3079F DIAST BP 80-89 MM HG: CPT | Performed by: FAMILY MEDICINE

## 2024-08-02 ASSESSMENT — ENCOUNTER SYMPTOMS
CHILLS: 0
PALPITATIONS: 0
FEVER: 0

## 2024-08-02 ASSESSMENT — FIBROSIS 4 INDEX: FIB4 SCORE: 2.53

## 2024-08-02 NOTE — PROGRESS NOTES
FAMILY MEDICINE VISIT                                                               Assessment/Plan:         1. Severe early onset Alzheimer's dementia with other behavioral disturbance (HCC)  Chronic condition, stable, continue to follow-up with neurology.  Discussed with patient and  the importance of doing physical therapy.  Recommended to call home health and do physical therapy with home health as well as nutrition counseling.  Her weight today is 120 pound in office.  Monitor weight closely.    2. Hypothyroidism, unspecified type  Chronic condition, stable based on last labs, recheck thyroid function test.  Continue [place] Thyroid at same dose.    - TSH+FREE T4    3. Anemia, unspecified type  Chronic condition, stable, recheck labs with next blood test.  - CBC WITH DIFFERENTIAL; Future  - Comp Metabolic Panel; Future    4. Prediabetes  Chronic condition, stable based on last labs, recheck labs with next blood test  - Comp Metabolic Panel; Future  - HEMOGLOBIN A1C; Future       Follow up in 1 month for lab follow-up and weight follow-up.      Chief complaint::Diagnoses of Severe early onset Alzheimer's dementia with other behavioral disturbance (HCC), Hypothyroidism, unspecified type, Anemia, unspecified type, and Prediabetes were pertinent to this visit.    History of present illness: Thuy Albert is a 74 y.o. female who presented for follow-up.    She is here with her  today.   reports that she has not started physical therapy after cleaning their home to have doing physical therapy through home health.  Patient reports that she is feeling better and is walking at home now from room to bathroom.  Needs assistance when she is walking.  Has severe Alzheimer's disease for which she is following with neurology.    She is eating more and drinking protein shakes.      Review of systems:     Review of Systems   Constitutional:  Negative for chills and fever.   Cardiovascular:  Negative  "for chest pain, palpitations and leg swelling.        Medications and Allergies:     Current Outpatient Medications   Medication Sig Dispense Refill    thyroid (ARMOUR THYROID) 15 MG Tab Take 3 Tablets by mouth every day. 270 Tablet 3    B Complex Vitamins (B COMPLEX PO) Take 1 Tablet by mouth every day.      Multiple Vitamins-Minerals (ZINC PO) Take 1 Tablet by mouth every day.      Bismuth Subsalicylate (PEPTO-BISMOL PO) Take 30 mL by mouth 2 times a day as needed (For upset stomach).      Cyanocobalamin (VITAMIN B-12 PO) Take 1 Tablet by mouth every day.      Ascorbic Acid (VITAMIN C PO) Take 1 Tablet by mouth every day.      memantine (NAMENDA) 10 MG Tab Take 1 Tablet by mouth 2 times a day. 180 Tablet 3    donepezil (ARICEPT) 10 MG tablet Take 1 Tablet by mouth every day. (Patient taking differently: Take 10 mg by mouth every evening.) 90 Tablet 3    folic acid (FOLVITE) 1 MG Tab Take 1 Tablet by mouth every day. 90 Tablet 1     No current facility-administered medications for this visit.          Vitals:    /80 (BP Location: Left arm, Patient Position: Sitting, BP Cuff Size: Adult)   Pulse 89   Temp 36.4 °C (97.5 °F) (Temporal)   Resp 16   Ht 1.549 m (5' 1\")   Wt 55.3 kg (122 lb)   SpO2 94%  Body mass index is 23.05 kg/m².    Physical Exam:     Physical Exam  Constitutional:       Appearance: Normal appearance. She is well-developed and well-groomed.   HENT:      Head: Normocephalic and atraumatic.      Right Ear: External ear normal.      Left Ear: External ear normal.   Eyes:      General:         Right eye: No discharge.         Left eye: No discharge.      Conjunctiva/sclera: Conjunctivae normal.   Cardiovascular:      Rate and Rhythm: Normal rate.   Pulmonary:      Effort: Pulmonary effort is normal. No respiratory distress.   Musculoskeletal:      Cervical back: Neck supple.   Skin:     Findings: No rash.   Neurological:      Mental Status: She is alert.      Gait: Gait abnormal. "   Psychiatric:         Mood and Affect: Mood and affect normal.         Behavior: Behavior normal.                Please note that this dictation was created using voice recognition software. I have made every reasonable attempt to correct obvious errors, but I expect that there are errors of grammar and possibly content that I did not discover before finalizing the note.

## 2024-09-17 ENCOUNTER — HOSPITAL ENCOUNTER (OUTPATIENT)
Dept: LAB | Facility: MEDICAL CENTER | Age: 74
End: 2024-09-17
Attending: FAMILY MEDICINE
Payer: MEDICARE

## 2024-09-17 ENCOUNTER — OFFICE VISIT (OUTPATIENT)
Dept: MEDICAL GROUP | Facility: MEDICAL CENTER | Age: 74
End: 2024-09-17
Payer: MEDICARE

## 2024-09-17 VITALS
DIASTOLIC BLOOD PRESSURE: 76 MMHG | OXYGEN SATURATION: 95 % | BODY MASS INDEX: 21.8 KG/M2 | WEIGHT: 115.4 LBS | SYSTOLIC BLOOD PRESSURE: 104 MMHG | HEART RATE: 87 BPM | TEMPERATURE: 97.3 F

## 2024-09-17 DIAGNOSIS — R21 RASH: ICD-10-CM

## 2024-09-17 DIAGNOSIS — D64.9 ANEMIA, UNSPECIFIED TYPE: ICD-10-CM

## 2024-09-17 DIAGNOSIS — R73.03 PREDIABETES: ICD-10-CM

## 2024-09-17 DIAGNOSIS — R63.4 WEIGHT LOSS: ICD-10-CM

## 2024-09-17 DIAGNOSIS — F02.C18 SEVERE EARLY ONSET ALZHEIMER'S DEMENTIA WITH OTHER BEHAVIORAL DISTURBANCE (HCC): ICD-10-CM

## 2024-09-17 DIAGNOSIS — G30.0 SEVERE EARLY ONSET ALZHEIMER'S DEMENTIA WITH OTHER BEHAVIORAL DISTURBANCE (HCC): ICD-10-CM

## 2024-09-17 PROBLEM — A41.9 SEPSIS (HCC): Status: RESOLVED | Noted: 2024-06-10 | Resolved: 2024-09-17

## 2024-09-17 LAB
ALBUMIN SERPL BCP-MCNC: 4.4 G/DL (ref 3.2–4.9)
ALBUMIN/GLOB SERPL: 1.5 G/DL
ALP SERPL-CCNC: 93 U/L (ref 30–99)
ALT SERPL-CCNC: 35 U/L (ref 2–50)
ANION GAP SERPL CALC-SCNC: 15 MMOL/L (ref 7–16)
AST SERPL-CCNC: 30 U/L (ref 12–45)
BASOPHILS # BLD AUTO: 0.3 % (ref 0–1.8)
BASOPHILS # BLD: 0.05 K/UL (ref 0–0.12)
BILIRUB SERPL-MCNC: 0.5 MG/DL (ref 0.1–1.5)
BUN SERPL-MCNC: 28 MG/DL (ref 8–22)
CALCIUM ALBUM COR SERPL-MCNC: 9.7 MG/DL (ref 8.5–10.5)
CALCIUM SERPL-MCNC: 10 MG/DL (ref 8.5–10.5)
CHLORIDE SERPL-SCNC: 103 MMOL/L (ref 96–112)
CO2 SERPL-SCNC: 23 MMOL/L (ref 20–33)
CREAT SERPL-MCNC: 0.98 MG/DL (ref 0.5–1.4)
EOSINOPHIL # BLD AUTO: 0.05 K/UL (ref 0–0.51)
EOSINOPHIL NFR BLD: 0.3 % (ref 0–6.9)
ERYTHROCYTE [DISTWIDTH] IN BLOOD BY AUTOMATED COUNT: 45.5 FL (ref 35.9–50)
EST. AVERAGE GLUCOSE BLD GHB EST-MCNC: 114 MG/DL
GFR SERPLBLD CREATININE-BSD FMLA CKD-EPI: 61 ML/MIN/1.73 M 2
GLOBULIN SER CALC-MCNC: 3 G/DL (ref 1.9–3.5)
GLUCOSE SERPL-MCNC: 135 MG/DL (ref 65–99)
HBA1C MFR BLD: 5.6 % (ref 4–5.6)
HCT VFR BLD AUTO: 43.3 % (ref 37–47)
HGB BLD-MCNC: 14.3 G/DL (ref 12–16)
IMM GRANULOCYTES # BLD AUTO: 0.05 K/UL (ref 0–0.11)
IMM GRANULOCYTES NFR BLD AUTO: 0.3 % (ref 0–0.9)
LYMPHOCYTES # BLD AUTO: 2.93 K/UL (ref 1–4.8)
LYMPHOCYTES NFR BLD: 19.5 % (ref 22–41)
MCH RBC QN AUTO: 31.1 PG (ref 27–33)
MCHC RBC AUTO-ENTMCNC: 33 G/DL (ref 32.2–35.5)
MCV RBC AUTO: 94.1 FL (ref 81.4–97.8)
MONOCYTES # BLD AUTO: 0.85 K/UL (ref 0–0.85)
MONOCYTES NFR BLD AUTO: 5.7 % (ref 0–13.4)
NEUTROPHILS # BLD AUTO: 11.08 K/UL (ref 1.82–7.42)
NEUTROPHILS NFR BLD: 73.9 % (ref 44–72)
NRBC # BLD AUTO: 0 K/UL
NRBC BLD-RTO: 0 /100 WBC (ref 0–0.2)
PLATELET # BLD AUTO: 227 K/UL (ref 164–446)
PMV BLD AUTO: 10 FL (ref 9–12.9)
POTASSIUM SERPL-SCNC: 3.8 MMOL/L (ref 3.6–5.5)
PROT SERPL-MCNC: 7.4 G/DL (ref 6–8.2)
RBC # BLD AUTO: 4.6 M/UL (ref 4.2–5.4)
SODIUM SERPL-SCNC: 141 MMOL/L (ref 135–145)
WBC # BLD AUTO: 15 K/UL (ref 4.8–10.8)

## 2024-09-17 PROCEDURE — 80053 COMPREHEN METABOLIC PANEL: CPT

## 2024-09-17 PROCEDURE — 99214 OFFICE O/P EST MOD 30 MIN: CPT | Performed by: FAMILY MEDICINE

## 2024-09-17 PROCEDURE — 84439 ASSAY OF FREE THYROXINE: CPT

## 2024-09-17 PROCEDURE — 85025 COMPLETE CBC W/AUTO DIFF WBC: CPT

## 2024-09-17 PROCEDURE — 83036 HEMOGLOBIN GLYCOSYLATED A1C: CPT | Mod: GA

## 2024-09-17 PROCEDURE — 3074F SYST BP LT 130 MM HG: CPT | Performed by: FAMILY MEDICINE

## 2024-09-17 PROCEDURE — 36415 COLL VENOUS BLD VENIPUNCTURE: CPT | Mod: GA

## 2024-09-17 PROCEDURE — 3078F DIAST BP <80 MM HG: CPT | Performed by: FAMILY MEDICINE

## 2024-09-17 PROCEDURE — 84443 ASSAY THYROID STIM HORMONE: CPT

## 2024-09-17 RX ORDER — TRIAMCINOLONE ACETONIDE 1 MG/G
1 CREAM TOPICAL 2 TIMES DAILY
Qty: 45 G | Refills: 3 | Status: SHIPPED | OUTPATIENT
Start: 2024-09-17

## 2024-09-17 ASSESSMENT — ENCOUNTER SYMPTOMS
CHILLS: 0
FEVER: 0

## 2024-09-17 ASSESSMENT — FIBROSIS 4 INDEX: FIB4 SCORE: 2.53

## 2024-09-18 ENCOUNTER — HOME HEALTH ADMISSION (OUTPATIENT)
Dept: HOME HEALTH SERVICES | Facility: HOME HEALTHCARE | Age: 74
End: 2024-09-18
Payer: MEDICARE

## 2024-09-18 LAB
T4 FREE SERPL-MCNC: 1.03 NG/DL (ref 0.93–1.7)
TSH SERPL-ACNC: 2.61 UIU/ML (ref 0.35–5.5)

## 2024-09-18 NOTE — PROGRESS NOTES
FAMILY MEDICINE VISIT                                                               Assessment/Plan:         1. 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 Weight loss  Chronic condition, unstable, recommended physical therapy and Occupational Therapy through home health.  Discussed with patient's  that physical therapy is pertinent in patient's condition.    I gave 2 choices to do either physical therapy through home health or through outpatient physical therapy.  They chose currently home health to try.  I discussed my concern with them that since patient is not moving that much, she is losing muscle weight.  She needs physical therapy and Occupational Therapy and assessment of nutrition for this.    Discussed if weight loss is persistent then will order CT chest abdomen pelvis for further evaluation.  Briefly discussed about aging protective Services for resources for them.    - Referral to Home Health    3. Rash  New condition, unstable, start Kenalog cream.    Follow up in 6 weeks for weight follow-up.       Chief complaint::Diagnoses of Severe early onset Alzheimer's dementia with other behavioral disturbance (HCC), Weight loss, and Rash were pertinent to this visit.    History of present illness: Thuy Albert is a 74 y.o. female who presented for follow-up for her labs.  She had labs done today.  Results are pending.  She lost another 5 pounds.  She is here with her  today who reports that she is eating well.  They have not done physical therapy through home health.    He is worried that their house does not have much space to do physical therapy.  He himself has medical conditions.  Patient has Alzheimer's dementia and is following with neurology.    She is using diapers.  She has rash at her buttock area      Review of systems:     Review of  Systems   Constitutional:  Negative for chills and fever.        Weight loss   Skin:  Positive for rash.        Medications and Allergies:     Current Outpatient Medications   Medication Sig Dispense Refill    thyroid (ARMOUR THYROID) 15 MG Tab Take 3 Tablets by mouth every day. 270 Tablet 3    B Complex Vitamins (B COMPLEX PO) Take 1 Tablet by mouth every day.      Multiple Vitamins-Minerals (ZINC PO) Take 1 Tablet by mouth every day.      Bismuth Subsalicylate (PEPTO-BISMOL PO) Take 30 mL by mouth 2 times a day as needed (For upset stomach).      Cyanocobalamin (VITAMIN B-12 PO) Take 1 Tablet by mouth every day.      Ascorbic Acid (VITAMIN C PO) Take 1 Tablet by mouth every day.      memantine (NAMENDA) 10 MG Tab Take 1 Tablet by mouth 2 times a day. 180 Tablet 3    donepezil (ARICEPT) 10 MG tablet Take 1 Tablet by mouth every day. (Patient taking differently: Take 10 mg by mouth every evening.) 90 Tablet 3    folic acid (FOLVITE) 1 MG Tab Take 1 Tablet by mouth every day. 90 Tablet 1     No current facility-administered medications for this visit.          Vitals:    /76   Pulse 87   Temp 36.3 °C (97.3 °F) (Temporal)   Wt 52.3 kg (115 lb 6.4 oz)   SpO2 95%  Body mass index is 21.8 kg/m².    Physical Exam:     Physical Exam  Constitutional:       Appearance: Normal appearance. She is well-developed and well-groomed.   HENT:      Head: Normocephalic and atraumatic.      Right Ear: External ear normal.      Left Ear: External ear normal.   Eyes:      General:         Right eye: No discharge.         Left eye: No discharge.      Conjunctiva/sclera: Conjunctivae normal.   Cardiovascular:      Rate and Rhythm: Normal rate.   Pulmonary:      Effort: Pulmonary effort is normal. No respiratory distress.   Musculoskeletal:      Cervical back: Neck supple.   Skin:     Findings: No rash.   Neurological:      Mental Status: She is alert.      Gait: Gait abnormal.   Psychiatric:         Mood and Affect: Mood and  affect normal.         Behavior: Behavior normal.                Please note that this dictation was created using voice recognition software. I have made every reasonable attempt to correct obvious errors, but I expect that there are errors of grammar and possibly content that I did not discover before finalizing the note.

## 2024-10-17 ENCOUNTER — OFFICE VISIT (OUTPATIENT)
Dept: MEDICAL GROUP | Facility: MEDICAL CENTER | Age: 74
End: 2024-10-17
Payer: MEDICARE

## 2024-10-17 VITALS
HEART RATE: 87 BPM | OXYGEN SATURATION: 95 % | SYSTOLIC BLOOD PRESSURE: 106 MMHG | HEIGHT: 61 IN | DIASTOLIC BLOOD PRESSURE: 62 MMHG | BODY MASS INDEX: 21.8 KG/M2 | TEMPERATURE: 98.9 F

## 2024-10-17 DIAGNOSIS — J06.9 ACUTE UPPER RESPIRATORY INFECTION, UNSPECIFIED: ICD-10-CM

## 2024-10-17 DIAGNOSIS — J02.9 SORE THROAT: ICD-10-CM

## 2024-10-17 LAB
FLUAV RNA SPEC QL NAA+PROBE: NEGATIVE
FLUBV RNA SPEC QL NAA+PROBE: NEGATIVE
RSV RNA SPEC QL NAA+PROBE: NEGATIVE
S PYO DNA SPEC NAA+PROBE: NOT DETECTED
SARS-COV-2 RNA RESP QL NAA+PROBE: POSITIVE

## 2024-10-17 PROCEDURE — 3074F SYST BP LT 130 MM HG: CPT | Performed by: STUDENT IN AN ORGANIZED HEALTH CARE EDUCATION/TRAINING PROGRAM

## 2024-10-17 PROCEDURE — 0241U POCT CEPHEID COV-2, FLU A/B, RSV - PCR: CPT | Performed by: STUDENT IN AN ORGANIZED HEALTH CARE EDUCATION/TRAINING PROGRAM

## 2024-10-17 PROCEDURE — 99213 OFFICE O/P EST LOW 20 MIN: CPT | Performed by: STUDENT IN AN ORGANIZED HEALTH CARE EDUCATION/TRAINING PROGRAM

## 2024-10-17 PROCEDURE — 87651 STREP A DNA AMP PROBE: CPT | Performed by: STUDENT IN AN ORGANIZED HEALTH CARE EDUCATION/TRAINING PROGRAM

## 2024-10-17 PROCEDURE — 3078F DIAST BP <80 MM HG: CPT | Performed by: STUDENT IN AN ORGANIZED HEALTH CARE EDUCATION/TRAINING PROGRAM

## 2024-10-17 RX ORDER — CLOTRIMAZOLE 1 %
1 CREAM (GRAM) TOPICAL 2 TIMES DAILY
Qty: 12 G | Refills: 0 | Status: SHIPPED | OUTPATIENT
Start: 2024-10-17

## 2024-11-14 ENCOUNTER — OFFICE VISIT (OUTPATIENT)
Dept: MEDICAL GROUP | Facility: MEDICAL CENTER | Age: 74
End: 2024-11-14
Payer: MEDICARE

## 2024-11-14 VITALS
OXYGEN SATURATION: 97 % | TEMPERATURE: 98.4 F | DIASTOLIC BLOOD PRESSURE: 70 MMHG | SYSTOLIC BLOOD PRESSURE: 116 MMHG | WEIGHT: 105 LBS | BODY MASS INDEX: 19.83 KG/M2 | HEART RATE: 86 BPM | HEIGHT: 61 IN

## 2024-11-14 DIAGNOSIS — F02.C18 SEVERE EARLY ONSET ALZHEIMER'S DEMENTIA WITH OTHER BEHAVIORAL DISTURBANCE (HCC): ICD-10-CM

## 2024-11-14 DIAGNOSIS — R21 FACIAL RASH: ICD-10-CM

## 2024-11-14 DIAGNOSIS — R26.9 ABNORMAL GAIT: ICD-10-CM

## 2024-11-14 DIAGNOSIS — G30.0 SEVERE EARLY ONSET ALZHEIMER'S DEMENTIA WITH OTHER BEHAVIORAL DISTURBANCE (HCC): ICD-10-CM

## 2024-11-14 DIAGNOSIS — R63.4 WEIGHT LOSS: ICD-10-CM

## 2024-11-14 PROBLEM — E66.9 OBESITY (BMI 30-39.9): Status: RESOLVED | Noted: 2018-10-04 | Resolved: 2024-11-14

## 2024-11-14 PROCEDURE — 99214 OFFICE O/P EST MOD 30 MIN: CPT | Performed by: FAMILY MEDICINE

## 2024-11-14 PROCEDURE — 3078F DIAST BP <80 MM HG: CPT | Performed by: FAMILY MEDICINE

## 2024-11-14 PROCEDURE — 3074F SYST BP LT 130 MM HG: CPT | Performed by: FAMILY MEDICINE

## 2024-11-14 RX ORDER — MUPIROCIN 20 MG/G
1 OINTMENT TOPICAL 2 TIMES DAILY
Qty: 22 G | Refills: 0 | Status: SHIPPED | OUTPATIENT
Start: 2024-11-14

## 2024-11-14 ASSESSMENT — ENCOUNTER SYMPTOMS
FEVER: 0
WEIGHT LOSS: 1
CHILLS: 0

## 2024-11-14 ASSESSMENT — FIBROSIS 4 INDEX: FIB4 SCORE: 1.65

## 2024-11-15 ENCOUNTER — HOME CARE VISIT (OUTPATIENT)
Dept: HOME HEALTH SERVICES | Facility: HOME HEALTHCARE | Age: 74
End: 2024-11-15

## 2024-11-15 ENCOUNTER — HOME HEALTH ADMISSION (OUTPATIENT)
Dept: HOME HEALTH SERVICES | Facility: HOME HEALTHCARE | Age: 74
End: 2024-11-15
Payer: MEDICARE

## 2024-11-15 NOTE — PROGRESS NOTES
FAMILY MEDICINE VISIT                                                               Assessment/Plan:         1. Weight loss  Chronic condition, unstable, I had a detailed discussion with patient's  today in regard to this.  Her lab test including thyroid function test came back normal.  CT abdomen pelvis was checked in July which showed no major abnormalities cause for this much weight loss.  She has significantly lost her muscle weight and that is causing her weakness.  Per  she was having difficulty swallowing so I placed referral to gastroenterology.  Referral placed to home health for physical therapy, nutrition therapy, Occupational Therapy.    I had a detailed discussion with  today that I am concerned that last 3 times home health team action was not taken for patient's health and I discussed about calling APS.  He wants me to give another chance and have home health team come at home and work on physical therapy for this patient.  Our area practice manager Rosetta was with me today in office visit to discuss regarding this.  Patient's  does not want her to go to nursing home and he is adamant about that.    - Referral to Gastroenterology  - Referral to Home Health    2. Severe early onset Alzheimer's dementia with other behavioral disturbance (HCC)  Chronic condition, unstable, referral to home health.    - Referral to Home Health    3. Abnormal gait  Chronic condition, unstable, referral to home health.  - Referral to Home Health    5. Facial rash  New condition, unstable, start mupirocin  - mupirocin (BACTROBAN) 2 % Ointment; Apply 1 Application topically 2 times a day.  Dispense: 22 g; Refill: 0       Follow up in 1 month for follow-up for weight.      Chief complaint::Diagnoses of Weight loss, Severe early onset Alzheimer's dementia with other behavioral disturbance (HCC), Abnormal gait, Need for vaccination, and Facial rash were pertinent to this visit.    History of present  illness: Thuy Albert is a 74 y.o. female who presented for follow-up.    She is here with her  today.  Patient has severe Alzheimer's dementia.  She lost another 10 pounds since I last saw her.  Per  she has been at 105 pounds.  There is no documentation at previous visit with another provider that she was 105 pounds.    I placed referral to home health 3 times before and patient has been always declined home health team to come at home as he reports that they wake up late and timing does not work for them.    She has rash over her nose which seem to be mildly red.      Review of systems:     Review of Systems   Constitutional:  Positive for weight loss. Negative for chills and fever.   Skin:  Positive for rash.        Medications and Allergies:     Current Outpatient Medications   Medication Sig Dispense Refill    clotrimazole (LOTRIMIN) 1 % Cream Apply 1 Application topically 2 times a day. 12 g 0    Nirmatrelvir&Ritonavir 300/100 20 x 150 MG & 10 x 100MG Tablet Therapy Pack Take 300 mg nirmatrelvir (two 150 mg tablets) with 100 mg ritonavir (one 100 mg tablet) by mouth, with all three tablets taken together twice daily for 5 days. 30 Each 0    triamcinolone acetonide (KENALOG) 0.1 % Cream Apply 1 Application topically 2 times a day. 45 g 3    thyroid (ARMOUR THYROID) 15 MG Tab Take 3 Tablets by mouth every day. 270 Tablet 3    B Complex Vitamins (B COMPLEX PO) Take 1 Tablet by mouth every day.      Multiple Vitamins-Minerals (ZINC PO) Take 1 Tablet by mouth every day.      Bismuth Subsalicylate (PEPTO-BISMOL PO) Take 30 mL by mouth 2 times a day as needed (For upset stomach).      Cyanocobalamin (VITAMIN B-12 PO) Take 1 Tablet by mouth every day.      Ascorbic Acid (VITAMIN C PO) Take 1 Tablet by mouth every day.      memantine (NAMENDA) 10 MG Tab Take 1 Tablet by mouth 2 times a day. 180 Tablet 3    donepezil (ARICEPT) 10 MG tablet Take 1 Tablet by mouth every day. (Patient taking  "differently: Take 10 mg by mouth every evening.) 90 Tablet 3    folic acid (FOLVITE) 1 MG Tab Take 1 Tablet by mouth every day. 90 Tablet 1     No current facility-administered medications for this visit.          Vitals:    /70 (BP Location: Right arm, Patient Position: Sitting, BP Cuff Size: Child)   Pulse 86   Temp 36.9 °C (98.4 °F) (Temporal)   Ht 1.549 m (5' 1\")   Wt 47.6 kg (105 lb)   SpO2 97%  Body mass index is 19.84 kg/m².    Physical Exam:     Physical Exam  Constitutional:       Appearance: She is cachectic.   HENT:      Head: Normocephalic and atraumatic.      Right Ear: External ear normal.      Left Ear: External ear normal.   Eyes:      General:         Right eye: No discharge.         Left eye: No discharge.      Conjunctiva/sclera: Conjunctivae normal.   Cardiovascular:      Rate and Rhythm: Normal rate.   Pulmonary:      Effort: Pulmonary effort is normal. No respiratory distress.   Musculoskeletal:      Cervical back: Neck supple.      Comments: Significant upper and lower extremity weakness that patient is not able to walk or stand on her own   Skin:     Findings: Rash present.      Comments: Mildly erythematous patches present at nose   Neurological:      Mental Status: She is alert.      Gait: Gait abnormal.   Psychiatric:         Mood and Affect: Mood and affect normal.         Behavior: Behavior normal.                Please note that this dictation was created using voice recognition software. I have made every reasonable attempt to correct obvious errors, but I expect that there are errors of grammar and possibly content that I did not discover before finalizing the note.      "

## 2024-11-26 ENCOUNTER — HOME CARE VISIT (OUTPATIENT)
Dept: HOME HEALTH SERVICES | Facility: HOME HEALTHCARE | Age: 74
End: 2024-11-26
Payer: MEDICARE

## 2024-11-26 ENCOUNTER — DOCUMENTATION (OUTPATIENT)
Dept: CARDIOLOGY | Facility: MEDICAL CENTER | Age: 74
End: 2024-11-26
Payer: MEDICARE

## 2024-11-26 VITALS
HEART RATE: 77 BPM | BODY MASS INDEX: 20.01 KG/M2 | RESPIRATION RATE: 16 BRPM | OXYGEN SATURATION: 96 % | WEIGHT: 106 LBS | SYSTOLIC BLOOD PRESSURE: 110 MMHG | DIASTOLIC BLOOD PRESSURE: 70 MMHG | TEMPERATURE: 97.9 F | HEIGHT: 61 IN

## 2024-11-26 PROCEDURE — G0495 RN CARE TRAIN/EDU IN HH: HCPCS

## 2024-11-26 PROCEDURE — 665005 NO-PAY RAP - HOME HEALTH

## 2024-11-26 ASSESSMENT — ENCOUNTER SYMPTOMS
VOMITING: DENIES
DESCRIPTION OF MEMORY LOSS: IMMEDIATE
TROUBLE SWALLOWING: 1
FORGETFULNESS: 1
POOR JUDGMENT: 1
NAUSEA: DENIES
DESCRIPTION OF MEMORY LOSS: SHORT TERM
DIFFICULTY THINKING: 1
DENIES PAIN: 1
LAST BOWEL MOVEMENT: 67169
BOWEL PATTERN NORMAL: 1

## 2024-11-26 ASSESSMENT — ACTIVITIES OF DAILY LIVING (ADL): OASIS_M1830: 06

## 2024-11-26 ASSESSMENT — FIBROSIS 4 INDEX: FIB4 SCORE: 1.65

## 2024-11-26 NOTE — PROGRESS NOTES
Medication chart review for [facility] services    Received referral from [facility].   Medications reviewed  compared with discharge summary if available.  Discharge summary date, if applicable:   7/4/24    Current medication list per [facility]     Medication list one, patient is currently taking    Current Outpatient Medications:     Vitamin D3, 1 Tablet, Oral, DAILY    mupirocin, 1 Application, Topical, BID    clotrimazole, 1 Application, Topical, BID (Patient not taking: Reported on 11/26/2024)    Nirmatrelvir&Ritonavir 300/100, Take 300 mg nirmatrelvir (two 150 mg tablets) with 100 mg ritonavir (one 100 mg tablet) by mouth, with all three tablets taken together twice daily for 5 days.    triamcinolone acetonide, 1 Application, Topical, BID    thyroid, 45 mg, Oral, QDAY    B Complex Vitamins (B COMPLEX PO), 1 Tablet, Oral, DAILY    Multiple Vitamins-Minerals (ZINC PO), 1 Tablet, Oral, DAILY    Bismuth Subsalicylate (PEPTO-BISMOL PO), 30 mL, Oral, BID PRN    Cyanocobalamin (VITAMIN B-12 PO), 1 Tablet, Oral, DAILY    Ascorbic Acid (VITAMIN C PO), 1 Tablet, Oral, DAILY    memantine, 10 mg, Oral, BID    donepezil, 10 mg, Oral, QDAY (Patient taking differently: 10 mg, Oral, NIGHTLY)    folic acid, 1 mg, Oral, DAILY      Medication list two, drugs that the patient has been prescribed or recommended to take by their healthcare provider on discharge summary       MEDICATIONS ON DISCHARGE      Medication List          CHANGE how you take these medications         Instructions   donepezil 10 MG tablet  What changed: when to take this  Commonly known as: Aricept    Take 1 Tablet by mouth every day.  Dose: 10 mg                CONTINUE taking these medications         Instructions   B COMPLEX PO    Take 1 Tablet by mouth every day.  Dose: 1 Tablet      cefUROXime 250 MG Tabs  Commonly known as: Ceftin    Take 250 mg by mouth 2 times a day. Per  reports that pt started this medication on 6/16/2024 for 14  day course, pt had surgery on 6/21/2024 per  is not sure if she continued this medication  Dose: 250 mg      folic acid 1 MG Tabs  Commonly known as: Folvite    Take 1 Tablet by mouth every day.  Dose: 1 mg      memantine 10 MG Tabs  Commonly known as: Namenda    Doctor's comments: DX Code Needed  REFILL.  Take 1 Tablet by mouth 2 times a day.  Dose: 10 mg      PEPTO-BISMOL PO    Take 30 mL by mouth 2 times a day as needed (For upset stomach).  Dose: 30 mL      thyroid 15 MG Tabs  Commonly known as: [place] Thyroid    Take 3 Tablets by mouth every day.  Dose: 45 mg      VITAMIN B-12 PO    Take 1 Tablet by mouth every day.  Dose: 1 Tablet      VITAMIN C PO    Take 1 Tablet by mouth every day.  Dose: 1 Tablet      ZINC PO    Take 1 Tablet by mouth every day.  Dose: 1 Tablet           Allergies   Allergen Reactions    Nabumetone      rash    Synthroid [Levothroid]      Nausea      Tetracycline Swelling    Cephalexin Rash     Rash, tolerated ancef test dose followed by full dose       Labs     Lab Results   Component Value Date/Time    SODIUM 141 09/17/2024 03:25 PM    POTASSIUM 3.8 09/17/2024 03:25 PM    CHLORIDE 103 09/17/2024 03:25 PM    CO2 23 09/17/2024 03:25 PM    GLUCOSE 135 (H) 09/17/2024 03:25 PM    BUN 28 (H) 09/17/2024 03:25 PM    CREATININE 0.98 09/17/2024 03:25 PM     Lab Results   Component Value Date/Time    ALKPHOSPHAT 93 09/17/2024 03:25 PM    ASTSGOT 30 09/17/2024 03:25 PM    ALTSGPT 35 09/17/2024 03:25 PM    TBILIRUBIN 0.5 09/17/2024 03:25 PM    INR 0.99 07/01/2024 12:29 AM    ALBUMIN 4.4 09/17/2024 03:25 PM    ALBUMIN 4.68 11/10/2016 11:10 AM        Assessment for clinically significant drug interactions, drug omissions/additions, duplicative therapies.            CC   Pramod Lu M.D.  [address]  [facility] [address]  Fax: [phone]    [facility]  Phone [phone] fax [phone]    This note was created using voice recognition  software (Dragon). The accuracy of the dictation is limited by the abilities of the software. I have reviewed the note prior to signing, however some errors in grammar and context are still possible. If you have any questions related to this note please do not hesitate to contact our office.

## 2024-11-26 NOTE — Clinical Note
Opened patient to [facility] medication reconciliation process done. Medication list left in home care folder. See MAR for drug allergy interactions. There are major drug to drug interactions.    Drug-Drug: Ascorbic Acid (VITAMIN C PO) and Nirmatrelvir&Ritonavir 300/100   Plasma concentrations and pharmacologic activity of protease inhibitors (eg, Nirmatrelvir&Ritonavir                         The best contact phone number is [phone] and SO Artl manages the medications.

## 2024-11-26 NOTE — Clinical Note
PRIMARY DIAGNOSIS: Weight loss, Severe early onset Alzheimer's dementia, Abnormal gait             SKILLED NEED: Health assessment, Medication education, education and monitoring of disease processes            NURSING FREQUENCY: 1 every other week for 5 weeks            ZIP CODE: [id number]             DISCIPLINES ORDERED:  SN, PT, HHA, patient/SO refusing OT, SLP MSW RD             INSURANCE: MEDICARE PART A & B            CERTIFICATION PERIOD: 11/26/24 - 1/24/25            SPECIAL CONSIDERATION: wants afternoon appts only

## 2024-11-27 NOTE — CASE COMMUNICATION
noted  ----- Message -----  From: Tara Lugo R.N.  Sent: 11/26/2024   2:13 PM PST  To: Pearl Lopez R.N.; RENITA Quiroga, MSW; *      PRIMARY DIAGNOSIS: Weight loss, Severe early onset Alzheimer's dementia, Abnormal gait             SKILLED NEED: Health assessment, Medication education, education and monitoring of disease processes            NURSING FREQUENCY: 1 every other week for 5 weeks            ZIP CODE: [id number]              DISCIPLINES ORDERED:  SN, PT, HHA, patient/SO refusing OT, SLP MSW RD             INSURANCE: MEDICARE PART A & B            CERTIFICATION PERIOD: 11/26/24 - 1/24/25            SPECIAL CONSIDERATION: wants afternoon appts only

## 2024-12-04 ENCOUNTER — HOME CARE VISIT (OUTPATIENT)
Dept: HOME HEALTH SERVICES | Facility: HOME HEALTHCARE | Age: 74
End: 2024-12-04
Payer: MEDICARE

## 2024-12-05 ENCOUNTER — HOME CARE VISIT (OUTPATIENT)
Dept: HOME HEALTH SERVICES | Facility: HOME HEALTHCARE | Age: 74
End: 2024-12-05
Payer: MEDICARE

## 2024-12-05 VITALS
RESPIRATION RATE: 16 BRPM | TEMPERATURE: 97.6 F | HEART RATE: 72 BPM | DIASTOLIC BLOOD PRESSURE: 60 MMHG | SYSTOLIC BLOOD PRESSURE: 110 MMHG | OXYGEN SATURATION: 96 %

## 2024-12-05 PROCEDURE — G0151 HHCP-SERV OF PT,EA 15 MIN: HCPCS

## 2024-12-05 ASSESSMENT — PAIN SCALES - PAIN ASSESSMENT IN ADVANCED DEMENTIA (PAINAD)
BODYLANGUAGE: 0 - RELAXED.
FACIALEXPRESSION: 0 - SMILING OR INEXPRESSIVE.
CONSOLABILITY: 0 - NO NEED TO CONSOLE.
NEGVOCALIZATION: 0 - NONE.
TOTALSCORE: 0

## 2024-12-05 ASSESSMENT — ENCOUNTER SYMPTOMS
MUSCLE WEAKNESS: 1
ARTHRALGIAS: 1
POOR JUDGMENT: 1
DIFFICULTY THINKING: 1

## 2024-12-05 ASSESSMENT — PATIENT HEALTH QUESTIONNAIRE - PHQ9: CLINICAL INTERPRETATION OF PHQ2 SCORE: 0

## 2024-12-05 ASSESSMENT — ACTIVITIES OF DAILY LIVING (ADL)
CURRENT_FUNCTION: ONE PERSON
AMBULATION ASSISTANCE ON FLAT SURFACES: 1
AMBULATION ASSISTANCE: ONE PERSON
AMBULATION_DISTANCE/DURATION_TOLERATED: <50 FT ONE WAY

## 2024-12-10 ENCOUNTER — HOME CARE VISIT (OUTPATIENT)
Dept: HOME HEALTH SERVICES | Facility: HOME HEALTHCARE | Age: 74
End: 2024-12-10
Payer: MEDICARE

## 2024-12-10 PROCEDURE — G0151 HHCP-SERV OF PT,EA 15 MIN: HCPCS

## 2024-12-10 NOTE — CASE COMMUNICATION
I agree with these changes.  ----- Message -----  From: Pearl Cooper PT  Sent: 12/10/2024   9:34 AM PST  To: Tara Lugo R.N.      Quality Review for SOC OASIS by STEFANIA Cooper PT  on  December 9, 2024     Edits completed by STEFANIA Cooper PT:  1.  and  diagnosis coding updated per chart review.  2.  - unchecked NA and entered 11/26/2024 as per LSOC order.  3.  - changed to Medicare traditional fee-for-service.  4.   - checked multiple hospitalizations and multiple emergency department visits as per EPIC.  5. Flu question - Changed from yes to no per response of no, patient offered and declined.  6. [id number] - changed to 88 as pt requires Min to Mod assist with gait and transfers and would be unsafe.  7. Functional Limitations - checked ambulation; Nutritional Requirements - checked low cholesterol diet as per dx of HLD.  8.  - changed to  3 as per ordered therapy visit sets.

## 2024-12-10 NOTE — CASE COMMUNICATION
Quality Review for SOC OASIS by STEFANIA Cooper, PT  on  December 9, 2024     Edits completed by STEFANIA Cooper, PT:  1.  and  diagnosis coding updated per chart review.  2.  - unchecked NA and entered 11/26/2024 as per LSOC order.  3.  - changed to Medicare traditional fee-for-service.  4.  - checked multiple hospitalizations and multiple emergency department visits as per EPIC.  5. Flu question - Changed from yes to no  per response of no, patient offered and declined.  6. [id number] - changed to 88 as pt requires Min to Mod assist with gait and transfers and would be unsafe.  7. Functional Limitations - checked ambulation; Nutritional Requirements - checked low cholesterol diet as per dx of HLD.  8.  - changed to 3 as per ordered therapy visit sets.

## 2024-12-11 ENCOUNTER — HOME CARE VISIT (OUTPATIENT)
Dept: HOME HEALTH SERVICES | Facility: HOME HEALTHCARE | Age: 74
End: 2024-12-11
Payer: MEDICARE

## 2024-12-11 VITALS
HEART RATE: 66 BPM | RESPIRATION RATE: 18 BRPM | OXYGEN SATURATION: 95 % | SYSTOLIC BLOOD PRESSURE: 110 MMHG | DIASTOLIC BLOOD PRESSURE: 70 MMHG | TEMPERATURE: 98 F

## 2024-12-11 PROCEDURE — G0493 RN CARE EA 15 MIN HH/HOSPICE: HCPCS

## 2024-12-11 PROCEDURE — G0156 HHCP-SVS OF AIDE,EA 15 MIN: HCPCS

## 2024-12-11 ASSESSMENT — ENCOUNTER SYMPTOMS
FORGETFULNESS: 1
LAST BOWEL MOVEMENT: 67185
DESCRIPTION OF MEMORY LOSS: SHORT TERM
DESCRIPTION OF MEMORY LOSS: LONG TERM
FATIGUES EASILY: 1
FATIGUE: 1

## 2024-12-12 VITALS
DIASTOLIC BLOOD PRESSURE: 70 MMHG | RESPIRATION RATE: 18 BRPM | OXYGEN SATURATION: 95 % | HEART RATE: 66 BPM | SYSTOLIC BLOOD PRESSURE: 110 MMHG | TEMPERATURE: 98.1 F

## 2024-12-12 ASSESSMENT — PAIN SCALES - PAIN ASSESSMENT IN ADVANCED DEMENTIA (PAINAD)
BODYLANGUAGE: 0 - RELAXED.
TOTALSCORE: 0
CONSOLABILITY: 0 - NO NEED TO CONSOLE.
FACIALEXPRESSION: 0 - SMILING OR INEXPRESSIVE.
NEGVOCALIZATION: 0 - NONE.

## 2024-12-12 ASSESSMENT — ENCOUNTER SYMPTOMS
BOWEL INCONTINENCE: 1
STOOL FREQUENCY: DAILY
POOR JUDGMENT: 1
BLURRED VISION: 1

## 2024-12-13 VITALS
RESPIRATION RATE: 16 BRPM | SYSTOLIC BLOOD PRESSURE: 110 MMHG | HEART RATE: 74 BPM | DIASTOLIC BLOOD PRESSURE: 70 MMHG | OXYGEN SATURATION: 99 % | TEMPERATURE: 97.5 F

## 2024-12-13 ASSESSMENT — ENCOUNTER SYMPTOMS
DENIES PAIN: 1
DIFFICULTY THINKING: 1

## 2024-12-13 ASSESSMENT — ACTIVITIES OF DAILY LIVING (ADL): AMBULATION ASSISTANCE ON FLAT SURFACES: 1

## 2024-12-13 ASSESSMENT — PATIENT HEALTH QUESTIONNAIRE - PHQ9: CLINICAL INTERPRETATION OF PHQ2 SCORE: 0

## 2024-12-19 ENCOUNTER — OFFICE VISIT (OUTPATIENT)
Dept: MEDICAL GROUP | Facility: MEDICAL CENTER | Age: 74
End: 2024-12-19
Payer: MEDICARE

## 2024-12-19 ENCOUNTER — HOME CARE VISIT (OUTPATIENT)
Dept: HOME HEALTH SERVICES | Facility: HOME HEALTHCARE | Age: 74
End: 2024-12-19
Payer: MEDICARE

## 2024-12-19 ENCOUNTER — TELEPHONE (OUTPATIENT)
Dept: MEDICAL GROUP | Facility: MEDICAL CENTER | Age: 74
End: 2024-12-19

## 2024-12-19 VITALS
TEMPERATURE: 97.6 F | OXYGEN SATURATION: 96 % | HEART RATE: 79 BPM | HEIGHT: 61 IN | WEIGHT: 110 LBS | DIASTOLIC BLOOD PRESSURE: 70 MMHG | BODY MASS INDEX: 20.77 KG/M2 | SYSTOLIC BLOOD PRESSURE: 110 MMHG

## 2024-12-19 DIAGNOSIS — G30.0 SEVERE EARLY ONSET ALZHEIMER'S DEMENTIA WITH OTHER BEHAVIORAL DISTURBANCE (HCC): ICD-10-CM

## 2024-12-19 DIAGNOSIS — F02.C18 SEVERE EARLY ONSET ALZHEIMER'S DEMENTIA WITH OTHER BEHAVIORAL DISTURBANCE (HCC): ICD-10-CM

## 2024-12-19 DIAGNOSIS — Z23 NEED FOR VACCINATION: ICD-10-CM

## 2024-12-19 DIAGNOSIS — E03.9 HYPOTHYROIDISM, UNSPECIFIED TYPE: Chronic | ICD-10-CM

## 2024-12-19 PROCEDURE — G0008 ADMIN INFLUENZA VIRUS VAC: HCPCS

## 2024-12-19 PROCEDURE — G0151 HHCP-SERV OF PT,EA 15 MIN: HCPCS

## 2024-12-19 PROCEDURE — 99214 OFFICE O/P EST MOD 30 MIN: CPT | Mod: 25 | Performed by: FAMILY MEDICINE

## 2024-12-19 PROCEDURE — 3074F SYST BP LT 130 MM HG: CPT | Performed by: FAMILY MEDICINE

## 2024-12-19 PROCEDURE — 3078F DIAST BP <80 MM HG: CPT | Performed by: FAMILY MEDICINE

## 2024-12-19 PROCEDURE — 90662 IIV NO PRSV INCREASED AG IM: CPT

## 2024-12-19 ASSESSMENT — FIBROSIS 4 INDEX
FIB4 SCORE: 1.65
FIB4 SCORE: 1.65

## 2024-12-19 ASSESSMENT — ENCOUNTER SYMPTOMS
PALPITATIONS: 0
CHILLS: 0
FEVER: 0

## 2024-12-20 NOTE — PROGRESS NOTES
FAMILY MEDICINE VISIT                                                               Assessment/Plan:         1. Hypothyroidism, unspecified type  Chronic condition, stable, continue [place] Thyroid 45 mg daily.  Will recheck labs next year.  She is gaining weight.  Continue with physical activity and eat healthy diet    2. 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  Continue to follow-up with neurology    3. Need for vaccination  - INFLUENZA VACCINE, HIGH DOSE (65+ ONLY) chronic condition, stable, continue [place] Thyroid      Follow up in April or May follow-up for medications and weight.  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    History of present illness: Thuy Albert is a 74 y.o. female who presented for follow-up for weight.    She gained 5 pounds weight since her last saw her.  Patient's  reports that she is eating better.  Home health is following with her and they learned exercises from them to do.  Previously she was having difficulty swallowing but now she is doing better and not having difficulty swallowing.    She has advanced dementia and is currently on Namenda and Aricept.        Review of systems:     Review of Systems   Constitutional:  Negative for chills and fever.   Cardiovascular:  Negative for chest pain, palpitations and leg swelling.        Medications and Allergies:     Current Outpatient Medications   Medication Sig Dispense Refill    Cholecalciferol (VITAMIN D3) 1.25 MG (08587 UT) Tab Take 1 Tablet by mouth every day. Indications: supplement      triamcinolone acetonide (KENALOG) 0.1 % Cream Apply 1 Application topically 2 times a day. 45 g 3    thyroid (ARMOUR THYROID) 15 MG Tab Take 3 Tablets by mouth every  "day. 270 Tablet 3    B Complex Vitamins (B COMPLEX PO) Take 1 Tablet by mouth every day. Indications: supplement      Multiple Vitamins-Minerals (ZINC PO) Take 1 Tablet by mouth every day. 50 mg  Indications: supplement      Bismuth Subsalicylate (PEPTO-BISMOL PO) Take 30 mL by mouth 2 times a day as needed (For upset stomach). Indications: upset stomach      Cyanocobalamin (VITAMIN B-12 PO) Take 1 Tablet by mouth every day. 1,000mcg  Indications: supplement      Ascorbic Acid (VITAMIN C PO) Take 1 Tablet by mouth every day. 1,000 mg  Indications: suppelemt      memantine (NAMENDA) 10 MG Tab Take 1 Tablet by mouth 2 times a day. 180 Tablet 3    donepezil (ARICEPT) 10 MG tablet Take 1 Tablet by mouth every day. (Patient taking differently: Take 10 mg by mouth every evening.) 90 Tablet 3    folic acid (FOLVITE) 1 MG Tab Take 1 Tablet by mouth every day. 90 Tablet 1     No current facility-administered medications for this visit.          Vitals:    /70 (BP Location: Left arm, Patient Position: Sitting, BP Cuff Size: Adult)   Pulse 79   Temp 36.4 °C (97.6 °F) (Temporal)   Ht 1.549 m (5' 1\")   Wt 49.9 kg (110 lb)   SpO2 96%  Body mass index is 20.78 kg/m².    Physical Exam:     Physical Exam  Constitutional:       Appearance: Normal appearance. She is well-developed and well-groomed.   HENT:      Head: Normocephalic and atraumatic.      Right Ear: External ear normal.      Left Ear: External ear normal.   Eyes:      General:         Right eye: No discharge.         Left eye: No discharge.      Conjunctiva/sclera: Conjunctivae normal.   Cardiovascular:      Rate and Rhythm: Normal rate.   Pulmonary:      Effort: Pulmonary effort is normal. No respiratory distress.   Musculoskeletal:      Cervical back: Neck supple.   Skin:     Findings: No rash.   Neurological:      Mental Status: She is alert.      Gait: Gait abnormal.   Psychiatric:         Mood and Affect: Mood and affect normal.         Behavior: Behavior " normal.                Please note that this dictation was created using voice recognition software. I have made every reasonable attempt to correct obvious errors, but I expect that there are errors of grammar and possibly content that I did not discover before finalizing the note.

## 2024-12-20 NOTE — TELEPHONE ENCOUNTER
Caller Name:Giles from [facility]    Call Back Number: [phone]      Message:Giles called to let us know that he was discharging pt from care. He said that pt is receiving sufficient care from  despite his issues with his hip, them living in a second story apartment, and a cluttered environment.

## 2024-12-23 VITALS
TEMPERATURE: 97.5 F | DIASTOLIC BLOOD PRESSURE: 68 MMHG | HEART RATE: 72 BPM | RESPIRATION RATE: 16 BRPM | BODY MASS INDEX: 20.77 KG/M2 | SYSTOLIC BLOOD PRESSURE: 110 MMHG | OXYGEN SATURATION: 96 % | WEIGHT: 110 LBS | HEIGHT: 61 IN

## 2024-12-23 ASSESSMENT — PAIN SCALES - PAIN ASSESSMENT IN ADVANCED DEMENTIA (PAINAD)
TOTALSCORE: 0
NEGVOCALIZATION: 0 - NONE.
CONSOLABILITY: 0 - NO NEED TO CONSOLE.
BODYLANGUAGE: 0 - RELAXED.
FACIALEXPRESSION: 0 - SMILING OR INEXPRESSIVE.

## 2024-12-23 ASSESSMENT — ENCOUNTER SYMPTOMS
POOR JUDGMENT: 1
DIFFICULTY THINKING: 1

## 2024-12-23 ASSESSMENT — ACTIVITIES OF DAILY LIVING (ADL)
HOME_HEALTH_OASIS: 01
OASIS_M1830: 03

## 2024-12-23 ASSESSMENT — PATIENT HEALTH QUESTIONNAIRE - PHQ9: CLINICAL INTERPRETATION OF PHQ2 SCORE: 0

## 2024-12-24 ENCOUNTER — HOME CARE VISIT (OUTPATIENT)
Dept: HOME HEALTH SERVICES | Facility: HOME HEALTHCARE | Age: 74
End: 2024-12-24
Payer: MEDICARE

## 2024-12-24 NOTE — CASE COMMUNICATION
Quality Review for DC OASIS by STEFANIA Cooper, PT  on  December 24, 2024     Edits completed by STEFANIA Cooper, PT:  1.  - changed to yes and , checked yes, from other health care provider per EPIC.

## 2024-12-30 NOTE — CASE COMMUNICATION
I agree with the change, thanks  ----- Message -----  From: Pearl Cooper, PT  Sent: 12/24/2024   1:17 PM PST  To: Giles Good, PT      Quality Review for DC OASIS by STEFANIA Cooper, PT  on  December 24, 2024     Edits completed by STEFANIA Cooper, PT:  1.  - changed to yes and , checked yes, from other health care provider per EPIC.

## 2025-01-02 DIAGNOSIS — E03.9 ACQUIRED HYPOTHYROIDISM: Chronic | ICD-10-CM

## 2025-01-02 RX ORDER — THYROID,PORK 15 MG
45 TABLET ORAL
Qty: 270 TABLET | Refills: 0 | Status: SHIPPED | OUTPATIENT
Start: 2025-01-02

## 2025-02-09 ENCOUNTER — APPOINTMENT (OUTPATIENT)
Dept: RADIOLOGY | Facility: MEDICAL CENTER | Age: 75
DRG: 071 | End: 2025-02-09
Attending: STUDENT IN AN ORGANIZED HEALTH CARE EDUCATION/TRAINING PROGRAM
Payer: MEDICARE

## 2025-02-09 ENCOUNTER — HOSPITAL ENCOUNTER (INPATIENT)
Facility: MEDICAL CENTER | Age: 75
LOS: 7 days | DRG: 071 | End: 2025-02-17
Attending: STUDENT IN AN ORGANIZED HEALTH CARE EDUCATION/TRAINING PROGRAM | Admitting: HOSPITALIST
Payer: MEDICARE

## 2025-02-09 DIAGNOSIS — D64.9 ANEMIA, UNSPECIFIED TYPE: ICD-10-CM

## 2025-02-09 DIAGNOSIS — N13.9 OBSTRUCTIVE UROPATHY: ICD-10-CM

## 2025-02-09 DIAGNOSIS — A41.9 SEVERE SEPSIS (HCC): ICD-10-CM

## 2025-02-09 DIAGNOSIS — E87.3 METABOLIC ALKALOSIS: ICD-10-CM

## 2025-02-09 DIAGNOSIS — E03.9 HYPOTHYROIDISM, UNSPECIFIED TYPE: Chronic | ICD-10-CM

## 2025-02-09 DIAGNOSIS — G93.41 ACUTE METABOLIC ENCEPHALOPATHY: ICD-10-CM

## 2025-02-09 DIAGNOSIS — F02.C18 SEVERE EARLY ONSET ALZHEIMER'S DEMENTIA WITH OTHER BEHAVIORAL DISTURBANCE (HCC): ICD-10-CM

## 2025-02-09 DIAGNOSIS — H25.9 AGE-RELATED CATARACT OF BOTH EYES, UNSPECIFIED AGE-RELATED CATARACT TYPE: ICD-10-CM

## 2025-02-09 DIAGNOSIS — M72.2 PLANTAR FASCIITIS, BILATERAL: ICD-10-CM

## 2025-02-09 DIAGNOSIS — D72.829 LEUKOCYTOSIS, UNSPECIFIED TYPE: ICD-10-CM

## 2025-02-09 DIAGNOSIS — E78.2 MIXED HYPERLIPIDEMIA: ICD-10-CM

## 2025-02-09 DIAGNOSIS — R26.9 ABNORMAL GAIT: ICD-10-CM

## 2025-02-09 DIAGNOSIS — Q25.1 STENOSIS OF AORTA: ICD-10-CM

## 2025-02-09 DIAGNOSIS — G43.709 CHRONIC MIGRAINE WITHOUT AURA WITHOUT STATUS MIGRAINOSUS, NOT INTRACTABLE: ICD-10-CM

## 2025-02-09 DIAGNOSIS — R00.1 BRADYCARDIA: ICD-10-CM

## 2025-02-09 DIAGNOSIS — J45.20 MILD INTERMITTENT ASTHMA WITHOUT COMPLICATION: ICD-10-CM

## 2025-02-09 DIAGNOSIS — F22 DELUSIONAL DISORDERS (HCC): ICD-10-CM

## 2025-02-09 DIAGNOSIS — N17.9 AKI (ACUTE KIDNEY INJURY) (HCC): ICD-10-CM

## 2025-02-09 DIAGNOSIS — Z71.0 ENCOUNTER FOR FAMILY CONFERENCE WITHOUT PATIENT PRESENT: ICD-10-CM

## 2025-02-09 DIAGNOSIS — N20.1 LEFT URETERAL CALCULUS: ICD-10-CM

## 2025-02-09 DIAGNOSIS — E87.6 HYPOKALEMIA: ICD-10-CM

## 2025-02-09 DIAGNOSIS — Z87.891 FORMER SMOKER: ICD-10-CM

## 2025-02-09 DIAGNOSIS — K56.41 FECAL IMPACTION (HCC): ICD-10-CM

## 2025-02-09 DIAGNOSIS — I70.0 ATHEROSCLEROSIS OF AORTA (HCC): Chronic | ICD-10-CM

## 2025-02-09 DIAGNOSIS — N39.0 COMPLICATED UTI (URINARY TRACT INFECTION): ICD-10-CM

## 2025-02-09 DIAGNOSIS — R73.03 PREDIABETES: ICD-10-CM

## 2025-02-09 DIAGNOSIS — R65.20 SEVERE SEPSIS (HCC): ICD-10-CM

## 2025-02-09 DIAGNOSIS — N39.0 RECURRENT UTI: ICD-10-CM

## 2025-02-09 DIAGNOSIS — Z71.89 ACP (ADVANCE CARE PLANNING): ICD-10-CM

## 2025-02-09 DIAGNOSIS — E87.20 LACTIC ACIDEMIA: ICD-10-CM

## 2025-02-09 DIAGNOSIS — A41.9 SEPTIC SHOCK (HCC): ICD-10-CM

## 2025-02-09 DIAGNOSIS — K21.9 GASTROESOPHAGEAL REFLUX DISEASE WITHOUT ESOPHAGITIS: ICD-10-CM

## 2025-02-09 DIAGNOSIS — R65.21 SEPTIC SHOCK (HCC): ICD-10-CM

## 2025-02-09 DIAGNOSIS — R41.0 DELIRIUM: ICD-10-CM

## 2025-02-09 DIAGNOSIS — R00.1 SINUS BRADYCARDIA: ICD-10-CM

## 2025-02-09 DIAGNOSIS — G03.9 MENINGITIS: ICD-10-CM

## 2025-02-09 DIAGNOSIS — M85.89 OSTEOPENIA OF MULTIPLE SITES: ICD-10-CM

## 2025-02-09 DIAGNOSIS — R07.89 OTHER CHEST PAIN: ICD-10-CM

## 2025-02-09 DIAGNOSIS — R10.9 INTRACTABLE ABDOMINAL PAIN: ICD-10-CM

## 2025-02-09 DIAGNOSIS — G30.0 SEVERE EARLY ONSET ALZHEIMER'S DEMENTIA WITH OTHER BEHAVIORAL DISTURBANCE (HCC): ICD-10-CM

## 2025-02-09 LAB
ALBUMIN SERPL BCP-MCNC: 3.8 G/DL (ref 3.2–4.9)
ALBUMIN/GLOB SERPL: 1.4 G/DL
ALP SERPL-CCNC: 88 U/L (ref 30–99)
ALT SERPL-CCNC: 49 U/L (ref 2–50)
ANION GAP SERPL CALC-SCNC: 16 MMOL/L (ref 7–16)
APPEARANCE UR: CLEAR
AST SERPL-CCNC: 37 U/L (ref 12–45)
BASOPHILS # BLD AUTO: 0.1 % (ref 0–1.8)
BASOPHILS # BLD: 0.01 K/UL (ref 0–0.12)
BILIRUB SERPL-MCNC: 0.5 MG/DL (ref 0.1–1.5)
BILIRUB UR QL STRIP.AUTO: NEGATIVE
BUN SERPL-MCNC: 33 MG/DL (ref 8–22)
CALCIUM ALBUM COR SERPL-MCNC: 9.4 MG/DL (ref 8.5–10.5)
CALCIUM SERPL-MCNC: 9.2 MG/DL (ref 8.4–10.2)
CHLORIDE SERPL-SCNC: 97 MMOL/L (ref 96–112)
CO2 SERPL-SCNC: 22 MMOL/L (ref 20–33)
COLOR UR: YELLOW
CREAT SERPL-MCNC: 1.1 MG/DL (ref 0.5–1.4)
EOSINOPHIL # BLD AUTO: 0 K/UL (ref 0–0.51)
EOSINOPHIL NFR BLD: 0 % (ref 0–6.9)
ERYTHROCYTE [DISTWIDTH] IN BLOOD BY AUTOMATED COUNT: 43.8 FL (ref 35.9–50)
FLUAV RNA SPEC QL NAA+PROBE: NEGATIVE
FLUBV RNA SPEC QL NAA+PROBE: NEGATIVE
GFR SERPLBLD CREATININE-BSD FMLA CKD-EPI: 53 ML/MIN/1.73 M 2
GLOBULIN SER CALC-MCNC: 2.8 G/DL (ref 1.9–3.5)
GLUCOSE SERPL-MCNC: 167 MG/DL (ref 65–99)
GLUCOSE UR STRIP.AUTO-MCNC: NEGATIVE MG/DL
HCT VFR BLD AUTO: 41 % (ref 37–47)
HGB BLD-MCNC: 13.8 G/DL (ref 12–16)
IMM GRANULOCYTES # BLD AUTO: 0.04 K/UL (ref 0–0.11)
IMM GRANULOCYTES NFR BLD AUTO: 0.3 % (ref 0–0.9)
KETONES UR STRIP.AUTO-MCNC: NEGATIVE MG/DL
LACTATE SERPL-SCNC: 4.6 MMOL/L (ref 0.5–2)
LEUKOCYTE ESTERASE UR QL STRIP.AUTO: NEGATIVE
LIPASE SERPL-CCNC: 13 U/L (ref 11–82)
LYMPHOCYTES # BLD AUTO: 0.33 K/UL (ref 1–4.8)
LYMPHOCYTES NFR BLD: 2.4 % (ref 22–41)
MCH RBC QN AUTO: 31.3 PG (ref 27–33)
MCHC RBC AUTO-ENTMCNC: 33.7 G/DL (ref 32.2–35.5)
MCV RBC AUTO: 93 FL (ref 81.4–97.8)
MICRO URNS: NORMAL
MONOCYTES # BLD AUTO: 0.45 K/UL (ref 0–0.85)
MONOCYTES NFR BLD AUTO: 3.2 % (ref 0–13.4)
NEUTROPHILS # BLD AUTO: 13.09 K/UL (ref 1.82–7.42)
NEUTROPHILS NFR BLD: 94 % (ref 44–72)
NITRITE UR QL STRIP.AUTO: NEGATIVE
NRBC # BLD AUTO: 0 K/UL
NRBC BLD-RTO: 0 /100 WBC (ref 0–0.2)
PH UR STRIP.AUTO: 5.5 [PH] (ref 5–8)
PLATELET # BLD AUTO: 187 K/UL (ref 164–446)
PMV BLD AUTO: 9.6 FL (ref 9–12.9)
POTASSIUM SERPL-SCNC: 3.8 MMOL/L (ref 3.6–5.5)
PROT SERPL-MCNC: 6.6 G/DL (ref 6–8.2)
PROT UR QL STRIP: NEGATIVE MG/DL
RBC # BLD AUTO: 4.41 M/UL (ref 4.2–5.4)
RBC UR QL AUTO: NEGATIVE
RSV RNA SPEC QL NAA+PROBE: NEGATIVE
SARS-COV-2 RNA RESP QL NAA+PROBE: NOTDETECTED
SODIUM SERPL-SCNC: 135 MMOL/L (ref 135–145)
SP GR UR STRIP.AUTO: 1.01
SPECIMEN SOURCE: NORMAL
UROBILINOGEN UR STRIP.AUTO-MCNC: 0.2 EU/DL
WBC # BLD AUTO: 13.9 K/UL (ref 4.8–10.8)

## 2025-02-09 PROCEDURE — 81003 URINALYSIS AUTO W/O SCOPE: CPT

## 2025-02-09 PROCEDURE — 80053 COMPREHEN METABOLIC PANEL: CPT

## 2025-02-09 PROCEDURE — 83605 ASSAY OF LACTIC ACID: CPT

## 2025-02-09 PROCEDURE — 74177 CT ABD & PELVIS W/CONTRAST: CPT

## 2025-02-09 PROCEDURE — 99285 EMERGENCY DEPT VISIT HI MDM: CPT

## 2025-02-09 PROCEDURE — 70450 CT HEAD/BRAIN W/O DYE: CPT

## 2025-02-09 PROCEDURE — 85025 COMPLETE CBC W/AUTO DIFF WBC: CPT

## 2025-02-09 PROCEDURE — 87086 URINE CULTURE/COLONY COUNT: CPT

## 2025-02-09 PROCEDURE — 700105 HCHG RX REV CODE 258: Performed by: STUDENT IN AN ORGANIZED HEALTH CARE EDUCATION/TRAINING PROGRAM

## 2025-02-09 PROCEDURE — 62272 THER SPI PNXR DRG CSF: CPT

## 2025-02-09 PROCEDURE — 72125 CT NECK SPINE W/O DYE: CPT

## 2025-02-09 PROCEDURE — 94760 N-INVAS EAR/PLS OXIMETRY 1: CPT

## 2025-02-09 PROCEDURE — 87040 BLOOD CULTURE FOR BACTERIA: CPT

## 2025-02-09 PROCEDURE — 009U3ZX DRAINAGE OF SPINAL CANAL, PERCUTANEOUS APPROACH, DIAGNOSTIC: ICD-10-PCS | Performed by: STUDENT IN AN ORGANIZED HEALTH CARE EDUCATION/TRAINING PROGRAM

## 2025-02-09 PROCEDURE — 700117 HCHG RX CONTRAST REV CODE 255: Performed by: STUDENT IN AN ORGANIZED HEALTH CARE EDUCATION/TRAINING PROGRAM

## 2025-02-09 PROCEDURE — 0202U NFCT DS 22 TRGT SARS-COV-2: CPT

## 2025-02-09 PROCEDURE — 0241U HCHG SARS-COV-2 COVID-19 NFCT DS RESP RNA 4 TRGT MIC: CPT

## 2025-02-09 PROCEDURE — 83690 ASSAY OF LIPASE: CPT

## 2025-02-09 PROCEDURE — 36415 COLL VENOUS BLD VENIPUNCTURE: CPT

## 2025-02-09 PROCEDURE — 84443 ASSAY THYROID STIM HORMONE: CPT

## 2025-02-09 PROCEDURE — 71045 X-RAY EXAM CHEST 1 VIEW: CPT

## 2025-02-09 RX ORDER — SODIUM CHLORIDE, SODIUM LACTATE, POTASSIUM CHLORIDE, AND CALCIUM CHLORIDE .6; .31; .03; .02 G/100ML; G/100ML; G/100ML; G/100ML
30 INJECTION, SOLUTION INTRAVENOUS ONCE
Status: COMPLETED | OUTPATIENT
Start: 2025-02-09 | End: 2025-02-10

## 2025-02-09 RX ADMIN — SODIUM CHLORIDE, POTASSIUM CHLORIDE, SODIUM LACTATE AND CALCIUM CHLORIDE 1497 ML: 600; 310; 30; 20 INJECTION, SOLUTION INTRAVENOUS at 22:48

## 2025-02-09 RX ADMIN — IOHEXOL 100 ML: 350 INJECTION, SOLUTION INTRAVENOUS at 22:28

## 2025-02-09 ASSESSMENT — FIBROSIS 4 INDEX: FIB4 SCORE: 1.65

## 2025-02-10 ENCOUNTER — APPOINTMENT (OUTPATIENT)
Dept: RADIOLOGY | Facility: MEDICAL CENTER | Age: 75
DRG: 071 | End: 2025-02-10
Attending: HOSPITALIST
Payer: MEDICARE

## 2025-02-10 PROBLEM — G93.41 ACUTE METABOLIC ENCEPHALOPATHY: Status: ACTIVE | Noted: 2025-02-10

## 2025-02-10 PROBLEM — R65.21 SEPTIC SHOCK (HCC): Status: ACTIVE | Noted: 2025-02-10

## 2025-02-10 PROBLEM — A41.9 SEPTIC SHOCK (HCC): Status: ACTIVE | Noted: 2025-02-10

## 2025-02-10 LAB
ALBUMIN SERPL BCP-MCNC: 3.4 G/DL (ref 3.2–4.9)
ALBUMIN/GLOB SERPL: 1.4 G/DL
ALP SERPL-CCNC: 76 U/L (ref 30–99)
ALT SERPL-CCNC: 55 U/L (ref 2–50)
AMMONIA PLAS-SCNC: 12 UMOL/L (ref 11–45)
ANION GAP SERPL CALC-SCNC: 11 MMOL/L (ref 7–16)
AST SERPL-CCNC: 49 U/L (ref 12–45)
B PARAP IS1001 DNA NPH QL NAA+NON-PROBE: NOT DETECTED
B PERT.PT PRMT NPH QL NAA+NON-PROBE: NOT DETECTED
BILIRUB SERPL-MCNC: 0.3 MG/DL (ref 0.1–1.5)
BUN SERPL-MCNC: 25 MG/DL (ref 8–22)
BURR CELLS/RBC NFR CSF MANUAL: 0 %
C GATTII+NEOFOR DNA CSF QL NAA+NON-PROBE: NOT DETECTED
C PNEUM DNA NPH QL NAA+NON-PROBE: NOT DETECTED
CALCIUM ALBUM COR SERPL-MCNC: 9.4 MG/DL (ref 8.5–10.5)
CALCIUM SERPL-MCNC: 8.9 MG/DL (ref 8.4–10.2)
CHLORIDE SERPL-SCNC: 102 MMOL/L (ref 96–112)
CLARITY CSF: CLEAR
CMV DNA CSF QL NAA+NON-PROBE: NOT DETECTED
CO2 SERPL-SCNC: 24 MMOL/L (ref 20–33)
COLOR CSF: COLORLESS
COLOR SPUN CSF: COLORLESS
CREAT SERPL-MCNC: 0.79 MG/DL (ref 0.5–1.4)
CSF COMMENTS 1658: NORMAL
E COLI K1 DNA CSF QL NAA+NON-PROBE: NOT DETECTED
ERYTHROCYTE [DISTWIDTH] IN BLOOD BY AUTOMATED COUNT: 44.4 FL (ref 35.9–50)
EV RNA CSF QL NAA+NON-PROBE: NOT DETECTED
FLUAV RNA NPH QL NAA+NON-PROBE: NOT DETECTED
FLUBV RNA NPH QL NAA+NON-PROBE: NOT DETECTED
GFR SERPLBLD CREATININE-BSD FMLA CKD-EPI: 78 ML/MIN/1.73 M 2
GLOBULIN SER CALC-MCNC: 2.4 G/DL (ref 1.9–3.5)
GLUCOSE CSF-MCNC: 86 MG/DL (ref 40–80)
GLUCOSE SERPL-MCNC: 122 MG/DL (ref 65–99)
GP B STREP DNA CSF QL NAA+NON-PROBE: NOT DETECTED
GRAM STN SPEC: NORMAL
HADV DNA NPH QL NAA+NON-PROBE: NOT DETECTED
HAEM INFLU DNA CSF QL NAA+NON-PROBE: NOT DETECTED
HCOV 229E RNA NPH QL NAA+NON-PROBE: NOT DETECTED
HCOV HKU1 RNA NPH QL NAA+NON-PROBE: NOT DETECTED
HCOV NL63 RNA NPH QL NAA+NON-PROBE: NOT DETECTED
HCOV OC43 RNA NPH QL NAA+NON-PROBE: NOT DETECTED
HCT VFR BLD AUTO: 35.1 % (ref 37–47)
HGB BLD-MCNC: 11.8 G/DL (ref 12–16)
HHV6 DNA CSF QL NAA+NON-PROBE: NOT DETECTED
HMPV RNA NPH QL NAA+NON-PROBE: NOT DETECTED
HPIV1 RNA NPH QL NAA+NON-PROBE: NOT DETECTED
HPIV2 RNA NPH QL NAA+NON-PROBE: NOT DETECTED
HPIV3 RNA NPH QL NAA+NON-PROBE: NOT DETECTED
HPIV4 RNA NPH QL NAA+NON-PROBE: NOT DETECTED
HSV1 DNA CSF QL NAA+NON-PROBE: NOT DETECTED
HSV2 DNA CSF QL NAA+NON-PROBE: NOT DETECTED
INR PPP: 1.04 (ref 0.87–1.13)
L MONOCYTOG DNA CSF QL NAA+NON-PROBE: NOT DETECTED
LACTATE SERPL-SCNC: 1.4 MMOL/L (ref 0.5–2)
LACTATE SERPL-SCNC: 1.8 MMOL/L (ref 0.5–2)
LACTATE SERPL-SCNC: 3.2 MMOL/L (ref 0.5–2)
LACTATE SERPL-SCNC: 3.4 MMOL/L (ref 0.5–2)
M PNEUMO DNA NPH QL NAA+NON-PROBE: NOT DETECTED
MAGNESIUM SERPL-MCNC: 1.9 MG/DL (ref 1.5–2.5)
MCH RBC QN AUTO: 31.2 PG (ref 27–33)
MCHC RBC AUTO-ENTMCNC: 33.6 G/DL (ref 32.2–35.5)
MCV RBC AUTO: 92.9 FL (ref 81.4–97.8)
N MEN DNA CSF QL NAA+NON-PROBE: NOT DETECTED
NUC CELL # CSF: 1 CELLS/UL (ref 0–10)
PARECHOVIRUS A RNA CSF QL NAA+NON-PROBE: NOT DETECTED
PHOSPHATE SERPL-MCNC: 2.8 MG/DL (ref 2.5–4.5)
PLATELET # BLD AUTO: 149 K/UL (ref 164–446)
PMV BLD AUTO: 9.7 FL (ref 9–12.9)
POTASSIUM SERPL-SCNC: 3.8 MMOL/L (ref 3.6–5.5)
PROT CSF-MCNC: 53 MG/DL (ref 15–45)
PROT SERPL-MCNC: 5.8 G/DL (ref 6–8.2)
PROTHROMBIN TIME: 13.9 SEC (ref 12–14.6)
RBC # BLD AUTO: 3.78 M/UL (ref 4.2–5.4)
RBC # CSF: 1528 CELLS/UL
RSV RNA NPH QL NAA+NON-PROBE: NOT DETECTED
RV+EV RNA NPH QL NAA+NON-PROBE: NOT DETECTED
S PNEUM DNA CSF QL NAA+NON-PROBE: NOT DETECTED
SARS-COV-2 RNA NPH QL NAA+NON-PROBE: NOTDETECTED
SIGNIFICANT IND 70042: NORMAL
SITE SITE: NORMAL
SODIUM SERPL-SCNC: 137 MMOL/L (ref 135–145)
SOURCE SOURCE: NORMAL
SPECIMEN VOL CSF: 2 ML
TSH SERPL DL<=0.005 MIU/L-ACNC: 1.32 UIU/ML (ref 0.38–5.33)
TUBE # CSF: 4
TUBE # CSF: NORMAL
VIT B12 SERPL-MCNC: 1000 PG/ML (ref 211–911)
VZV DNA CSF QL NAA+NON-PROBE: NOT DETECTED
WBC # BLD AUTO: 9.3 K/UL (ref 4.8–10.8)

## 2025-02-10 PROCEDURE — 84157 ASSAY OF PROTEIN OTHER: CPT

## 2025-02-10 PROCEDURE — 87040 BLOOD CULTURE FOR BACTERIA: CPT

## 2025-02-10 PROCEDURE — 700111 HCHG RX REV CODE 636 W/ 250 OVERRIDE (IP): Mod: JZ | Performed by: STUDENT IN AN ORGANIZED HEALTH CARE EDUCATION/TRAINING PROGRAM

## 2025-02-10 PROCEDURE — 84100 ASSAY OF PHOSPHORUS: CPT

## 2025-02-10 PROCEDURE — 700111 HCHG RX REV CODE 636 W/ 250 OVERRIDE (IP)

## 2025-02-10 PROCEDURE — 700111 HCHG RX REV CODE 636 W/ 250 OVERRIDE (IP): Mod: JZ | Performed by: HOSPITALIST

## 2025-02-10 PROCEDURE — 89051 BODY FLUID CELL COUNT: CPT

## 2025-02-10 PROCEDURE — 85027 COMPLETE CBC AUTOMATED: CPT

## 2025-02-10 PROCEDURE — 700105 HCHG RX REV CODE 258

## 2025-02-10 PROCEDURE — 83735 ASSAY OF MAGNESIUM: CPT

## 2025-02-10 PROCEDURE — 85610 PROTHROMBIN TIME: CPT

## 2025-02-10 PROCEDURE — A9270 NON-COVERED ITEM OR SERVICE: HCPCS | Performed by: HOSPITALIST

## 2025-02-10 PROCEDURE — 700102 HCHG RX REV CODE 250 W/ 637 OVERRIDE(OP): Performed by: HOSPITALIST

## 2025-02-10 PROCEDURE — 87205 SMEAR GRAM STAIN: CPT

## 2025-02-10 PROCEDURE — 82607 VITAMIN B-12: CPT

## 2025-02-10 PROCEDURE — 87070 CULTURE OTHR SPECIMN AEROBIC: CPT

## 2025-02-10 PROCEDURE — 83605 ASSAY OF LACTIC ACID: CPT

## 2025-02-10 PROCEDURE — 700105 HCHG RX REV CODE 258: Performed by: INTERNAL MEDICINE

## 2025-02-10 PROCEDURE — 96365 THER/PROPH/DIAG IV INF INIT: CPT

## 2025-02-10 PROCEDURE — 99291 CRITICAL CARE FIRST HOUR: CPT | Performed by: HOSPITALIST

## 2025-02-10 PROCEDURE — 700102 HCHG RX REV CODE 250 W/ 637 OVERRIDE(OP): Performed by: INTERNAL MEDICINE

## 2025-02-10 PROCEDURE — 700105 HCHG RX REV CODE 258: Performed by: STUDENT IN AN ORGANIZED HEALTH CARE EDUCATION/TRAINING PROGRAM

## 2025-02-10 PROCEDURE — 770020 HCHG ROOM/CARE - TELE (206)

## 2025-02-10 PROCEDURE — 82140 ASSAY OF AMMONIA: CPT

## 2025-02-10 PROCEDURE — 94760 N-INVAS EAR/PLS OXIMETRY 1: CPT

## 2025-02-10 PROCEDURE — 99223 1ST HOSP IP/OBS HIGH 75: CPT | Performed by: INTERNAL MEDICINE

## 2025-02-10 PROCEDURE — 80053 COMPREHEN METABOLIC PANEL: CPT

## 2025-02-10 PROCEDURE — 87483 CNS DNA AMP PROBE TYPE 12-25: CPT

## 2025-02-10 PROCEDURE — 36415 COLL VENOUS BLD VENIPUNCTURE: CPT

## 2025-02-10 PROCEDURE — A9270 NON-COVERED ITEM OR SERVICE: HCPCS | Performed by: INTERNAL MEDICINE

## 2025-02-10 PROCEDURE — 96375 TX/PRO/DX INJ NEW DRUG ADDON: CPT

## 2025-02-10 PROCEDURE — 700101 HCHG RX REV CODE 250

## 2025-02-10 PROCEDURE — 82945 GLUCOSE OTHER FLUID: CPT

## 2025-02-10 RX ORDER — BISACODYL 10 MG
10 SUPPOSITORY, RECTAL RECTAL ONCE
Status: COMPLETED | OUTPATIENT
Start: 2025-02-10 | End: 2025-02-10

## 2025-02-10 RX ORDER — CEFTRIAXONE 2 G/1
2000 INJECTION, POWDER, FOR SOLUTION INTRAMUSCULAR; INTRAVENOUS ONCE
Status: DISCONTINUED | OUTPATIENT
Start: 2025-02-10 | End: 2025-02-10

## 2025-02-10 RX ORDER — MEMANTINE HYDROCHLORIDE 10 MG/1
10 TABLET ORAL ONCE
Status: COMPLETED | OUTPATIENT
Start: 2025-02-10 | End: 2025-02-10

## 2025-02-10 RX ORDER — FOLIC ACID 1 MG/1
1 TABLET ORAL DAILY
Status: DISCONTINUED | OUTPATIENT
Start: 2025-02-10 | End: 2025-02-17 | Stop reason: HOSPADM

## 2025-02-10 RX ORDER — AMOXICILLIN 250 MG
2 CAPSULE ORAL EVERY EVENING
Status: DISCONTINUED | OUTPATIENT
Start: 2025-02-10 | End: 2025-02-17 | Stop reason: HOSPADM

## 2025-02-10 RX ORDER — THYROID 30 MG/1
45 TABLET ORAL
Status: DISCONTINUED | OUTPATIENT
Start: 2025-02-10 | End: 2025-02-17 | Stop reason: HOSPADM

## 2025-02-10 RX ORDER — SODIUM CHLORIDE, SODIUM LACTATE, POTASSIUM CHLORIDE, AND CALCIUM CHLORIDE .6; .31; .03; .02 G/100ML; G/100ML; G/100ML; G/100ML
500 INJECTION, SOLUTION INTRAVENOUS
Status: DISCONTINUED | OUTPATIENT
Start: 2025-02-10 | End: 2025-02-17 | Stop reason: HOSPADM

## 2025-02-10 RX ORDER — SODIUM CHLORIDE, SODIUM LACTATE, POTASSIUM CHLORIDE, CALCIUM CHLORIDE 600; 310; 30; 20 MG/100ML; MG/100ML; MG/100ML; MG/100ML
INJECTION, SOLUTION INTRAVENOUS CONTINUOUS
Status: DISCONTINUED | OUTPATIENT
Start: 2025-02-10 | End: 2025-02-13

## 2025-02-10 RX ORDER — SODIUM CHLORIDE, SODIUM LACTATE, POTASSIUM CHLORIDE, CALCIUM CHLORIDE 600; 310; 30; 20 MG/100ML; MG/100ML; MG/100ML; MG/100ML
1000 INJECTION, SOLUTION INTRAVENOUS ONCE
Status: COMPLETED | OUTPATIENT
Start: 2025-02-10 | End: 2025-02-10

## 2025-02-10 RX ORDER — ACETAMINOPHEN 325 MG/1
650 TABLET ORAL EVERY 6 HOURS PRN
Status: DISCONTINUED | OUTPATIENT
Start: 2025-02-10 | End: 2025-02-17 | Stop reason: HOSPADM

## 2025-02-10 RX ORDER — POLYETHYLENE GLYCOL 3350 17 G/17G
1 POWDER, FOR SOLUTION ORAL
Status: DISCONTINUED | OUTPATIENT
Start: 2025-02-10 | End: 2025-02-17 | Stop reason: HOSPADM

## 2025-02-10 RX ORDER — MIDAZOLAM HYDROCHLORIDE 1 MG/ML
2 INJECTION INTRAMUSCULAR; INTRAVENOUS ONCE
Status: COMPLETED | OUTPATIENT
Start: 2025-02-10 | End: 2025-02-10

## 2025-02-10 RX ORDER — HALOPERIDOL 5 MG/ML
5 INJECTION INTRAMUSCULAR ONCE
Status: COMPLETED | OUTPATIENT
Start: 2025-02-10 | End: 2025-02-10

## 2025-02-10 RX ORDER — CEFTRIAXONE 1 G/1
1000 INJECTION, POWDER, FOR SOLUTION INTRAMUSCULAR; INTRAVENOUS ONCE
Status: DISCONTINUED | OUTPATIENT
Start: 2025-02-10 | End: 2025-02-10

## 2025-02-10 RX ORDER — DONEPEZIL HYDROCHLORIDE 5 MG/1
10 TABLET, FILM COATED ORAL NIGHTLY
Status: DISCONTINUED | OUTPATIENT
Start: 2025-02-10 | End: 2025-02-17 | Stop reason: HOSPADM

## 2025-02-10 RX ORDER — DEXAMETHASONE SODIUM PHOSPHATE 4 MG/ML
4 INJECTION, SOLUTION INTRA-ARTICULAR; INTRALESIONAL; INTRAMUSCULAR; INTRAVENOUS; SOFT TISSUE EVERY 6 HOURS
Status: DISCONTINUED | OUTPATIENT
Start: 2025-02-10 | End: 2025-02-10

## 2025-02-10 RX ORDER — ACYCLOVIR SODIUM 500 MG/10ML
INJECTION, SOLUTION INTRAVENOUS
Status: COMPLETED
Start: 2025-02-10 | End: 2025-02-10

## 2025-02-10 RX ORDER — HALOPERIDOL 5 MG/1
5 TABLET ORAL ONCE
Status: DISCONTINUED | OUTPATIENT
Start: 2025-02-10 | End: 2025-02-10

## 2025-02-10 RX ORDER — ONDANSETRON 2 MG/ML
4 INJECTION INTRAMUSCULAR; INTRAVENOUS EVERY 4 HOURS PRN
Status: DISCONTINUED | OUTPATIENT
Start: 2025-02-10 | End: 2025-02-17 | Stop reason: HOSPADM

## 2025-02-10 RX ORDER — CEFTRIAXONE 1 G/1
1000 INJECTION, POWDER, FOR SOLUTION INTRAMUSCULAR; INTRAVENOUS ONCE
Status: COMPLETED | OUTPATIENT
Start: 2025-02-10 | End: 2025-02-10

## 2025-02-10 RX ORDER — MEMANTINE HYDROCHLORIDE 10 MG/1
10 TABLET ORAL 2 TIMES DAILY
Status: DISCONTINUED | OUTPATIENT
Start: 2025-02-10 | End: 2025-02-17 | Stop reason: HOSPADM

## 2025-02-10 RX ORDER — DONEPEZIL HYDROCHLORIDE 5 MG/1
10 TABLET, FILM COATED ORAL ONCE
Status: COMPLETED | OUTPATIENT
Start: 2025-02-10 | End: 2025-02-10

## 2025-02-10 RX ORDER — ONDANSETRON 4 MG/1
4 TABLET, ORALLY DISINTEGRATING ORAL EVERY 4 HOURS PRN
Status: DISCONTINUED | OUTPATIENT
Start: 2025-02-10 | End: 2025-02-17 | Stop reason: HOSPADM

## 2025-02-10 RX ADMIN — ACYCLOVIR SODIUM 50 MG: 500 INJECTION, SOLUTION INTRAVENOUS at 04:10

## 2025-02-10 RX ADMIN — CEFTRIAXONE SODIUM 1000 MG: 1 INJECTION, POWDER, FOR SOLUTION INTRAMUSCULAR; INTRAVENOUS at 02:35

## 2025-02-10 RX ADMIN — DONEPEZIL HYDROCHLORIDE 10 MG: 5 TABLET, FILM COATED ORAL at 23:56

## 2025-02-10 RX ADMIN — MEMANTINE HYDROCHLORIDE 10 MG: 10 TABLET ORAL at 01:00

## 2025-02-10 RX ADMIN — SODIUM CHLORIDE, POTASSIUM CHLORIDE, SODIUM LACTATE AND CALCIUM CHLORIDE: 600; 310; 30; 20 INJECTION, SOLUTION INTRAVENOUS at 22:03

## 2025-02-10 RX ADMIN — DONEPEZIL HYDROCHLORIDE 10 MG: 5 TABLET, FILM COATED ORAL at 01:00

## 2025-02-10 RX ADMIN — BISACODYL 10 MG: 10 SUPPOSITORY RECTAL at 10:47

## 2025-02-10 RX ADMIN — SODIUM CHLORIDE, POTASSIUM CHLORIDE, SODIUM LACTATE AND CALCIUM CHLORIDE 1000 ML: 600; 310; 30; 20 INJECTION, SOLUTION INTRAVENOUS at 02:15

## 2025-02-10 RX ADMIN — VANCOMYCIN HYDROCHLORIDE 1250 MG: 5 INJECTION, POWDER, LYOPHILIZED, FOR SOLUTION INTRAVENOUS at 05:51

## 2025-02-10 RX ADMIN — CEFTRIAXONE SODIUM 1000 MG: 1 INJECTION, POWDER, FOR SOLUTION INTRAMUSCULAR; INTRAVENOUS at 03:01

## 2025-02-10 RX ADMIN — HALOPERIDOL LACTATE 5 MG: 5 INJECTION, SOLUTION INTRAMUSCULAR at 00:52

## 2025-02-10 RX ADMIN — AMPICILLIN SODIUM 2 G: 2 INJECTION, POWDER, FOR SOLUTION INTRAVENOUS at 03:08

## 2025-02-10 RX ADMIN — DEXAMETHASONE SODIUM PHOSPHATE 4 MG: 4 INJECTION INTRA-ARTICULAR; INTRALESIONAL; INTRAMUSCULAR; INTRAVENOUS; SOFT TISSUE at 03:00

## 2025-02-10 RX ADMIN — MIDAZOLAM HYDROCHLORIDE 2 MG: 1 INJECTION, SOLUTION INTRAMUSCULAR; INTRAVENOUS at 01:30

## 2025-02-10 RX ADMIN — SODIUM CHLORIDE, POTASSIUM CHLORIDE, SODIUM LACTATE AND CALCIUM CHLORIDE: 600; 310; 30; 20 INJECTION, SOLUTION INTRAVENOUS at 10:13

## 2025-02-10 ASSESSMENT — PAIN SCALES - PAIN ASSESSMENT IN ADVANCED DEMENTIA (PAINAD)
CONSOLABILITY: NO NEED TO CONSOLE
BREATHING: NORMAL
FACIALEXPRESSION: SMILING OR INEXPRESSIVE
TOTALSCORE: 0
BODYLANGUAGE: RELAXED

## 2025-02-10 ASSESSMENT — FIBROSIS 4 INDEX: FIB4 SCORE: 2.09

## 2025-02-10 ASSESSMENT — PAIN DESCRIPTION - PAIN TYPE
TYPE: ACUTE PAIN
TYPE: ACUTE PAIN

## 2025-02-10 NOTE — PROGRESS NOTES
Pt transferred to room 319. Bedside report given to ANDREW Ocasio. All questions answered. Care released.

## 2025-02-10 NOTE — ED NOTES
Junaid from Lab called with critical result of Lactic 4.6 at 2312. Critical lab result read back to Junaid.   Dr. Cronin notified of critical lab result at 2312.  Critical lab result read back by Dr. Cronin.

## 2025-02-10 NOTE — ASSESSMENT & PLAN NOTE
Ruled out sepsis  This is Sepsis Not present on admission    Patient had a lumbar puncture in the ER.  Meningitis/encephalitis PCR returned negative.  Expanded viral respiratory panel PCR negative.  She was initially started on IV acyclovir, ampicillin, ceftriaxone and vancomycin which was discontinued after CSF labs return negative for meningitis/encephalitis  I reviewed labs, WBC 7.1, procalcitonin 0.16.  Blood cultures negative.  Ucx negative.

## 2025-02-10 NOTE — PROGRESS NOTES
4 Eyes Skin Assessment Completed by ANDREW Duke and ANDREW Olson.    Head Blanching and Redness  Ears WDL  Nose WDL  Mouth WDL  Neck Redness and Blanching on back of neck  Breast/Chest WDL  Shoulder Blades WDL  Spine Right lower back lumbar puncture access  site covered with Band-aid.  (R) Arm/Elbow/Hand Redness and Blanching  (L) Arm/Elbow/Hand Redness and Blanching  Abdomen WDL  Groin WDL  Scrotum/Coccyx/Buttocks Redness, Blanching, Non-Blanching, and Excoriation    (R) Leg Redness and Blanching on knee  (L) Leg Redness and Blanching on knee  (R) Heel/Foot/Toe Boggy  (L) Heel/Foot/Toe Boggy          Devices In Places Tele Box, Blood Pressure Cuff, and Pulse Ox      Interventions In Place Heel Mepilex, TAP System, Pillows, Q2 Turns, and Barrier Cream  (Patient incontinent, sacral mepilex contraindicated)    Possible Skin Injury Yes    Pictures Uploaded Into Epic Yes  Wound Consult Placed Yes  RN Wound Prevention Protocol Ordered Yes

## 2025-02-10 NOTE — ED TRIAGE NOTES
Chief Complaint   Patient presents with    GLF     Pt reports two GLFs, pt cannot state time of falls. Pt reports hitting her head during fall. Denies blood thinners.     Mental Status Change     BIBA from home, pts  reports pt had increased confusion this AM. Pt is A+O 1, GCS 10 with EMS. Pt brought directly to CT from EMS [facility] after seen by ERP.      BP (!) 141/60   Pulse 96   Temp 36.6 °C (97.8 °F) (Temporal)   Resp 16   Wt 49.9 kg (110 lb)   SpO2 97%   BMI 20.78 kg/m²

## 2025-02-10 NOTE — ED NOTES
[facility]     Dr. Hunter Barrett     PROCEDURE: Atrial Fibrillation  Ablation    Date of Procedure: 18  Time: 7:45   Arrival Time: 5:45    Patient Name: Kenton Esposito  : 1953   MRN# [id number]    HOSPITAL: [facility])    Call to [facility] at [phone]  2 days before your procedure    x Please have blood work and chest-x-ray done 18 at [facility], [address] [place] [address] or [facility].    x Please do not have anything by mouth after midnight prior to or 8 hours before the procedure.    x You may take your medications with a sip of water in the morning before your procedure or take them with you unless listed below. X   Hold Metformin 24 hours before the procedure / you may restart metformin  2 days after the procedure on 18. X  Only take half (1/2) dose of insulin night prior to procedure & NO Insulin morning of procedure. X  Hold all Blood Pressure Medications morning of procedure    X Hold Actos and Glimepiride 24 hours prior to procedure. Patient Signature:  _______________________  Staff: Shalom Jarvis     Staff Given Instructions:_______________________________                      [place] [facility]     Dr. Hunter Barrett     PROCEDURE: Atrial Fibrillation  Ablation with transesophageal echocardiogram    Date of Procedure: 18 Time: 7:45 Arrival Time: 5:45     Patient Name: Kenton Esposito   : 1953  MRN# [id number]    Day of Procedure Cath Lab Holding area Pre op Orders:    ? YOU HAVE MY PERMISSION TO TALK TO THE PATIENT-PLEASE J  ? NPO  ? IF ORDERED FOR AFIB PATIENTS ONLY CARDIAC CT SCAN ANGIO (PULMONARY MAPPING)   ? Anesthesia  ? CHRISTA with Anesthesia  ? IV peripheral saline lock x 2 sites  (at least one in preferred left arm). ? Type & Screen STAT on arrival.  ? ANTICOUGLATION:  NONE  ? If taking Coumadin, PT INR  STAT on arrival day of procedure. Called lab regarding add on lab orders   ? All anesthesia and sedation carry risks. My practitioner has discussed my anticipated anesthesia and/or sedation and the risks of using, risk of not using, benefits, side effects, and alternatives. ? Use of pathology  ? I authorize [facility] to dispose of tissues, specimens or organs when pathology is complete. ? Use of radiology  ? A contrast agent may be required for radiology procedures. My practitioner has advised me of the risks of using, risks of not using, benefits, side effects, and alternatives. ? Observers or use of photography, video/audio recording, or televising of the procedure(s). This is for medical, scientific, or educational purposes. This includes appropriate portions of my body. My identity will not be revealed. ? I consent to release of my social security number and other identifying information to [employer] (FDA), and the supplier/, if I receive tissue, a device, or implant. This is to track the tissue, device, or implant for defect, recall, infection, etc.     ? Use of blood and/or blood products, if needed, through my hospital stay. My practitioner has advised me of the risks of using, risks of not using, benefits, side effects, and alternatives. ___ I do NOT want Blood or Blood products given. (Complete separate  refusal form)    Code Status (angely one):  ___ I do NOT HAVE a DNR order. I am a Full-code.   I will receive CPR, intubation,  chest compressions, medications, and/or other life saving measures if I have a  cardiac or respiratory arrest.    ___ I have a Do Not Resuscitate (DNR)order.   (angely one below)  ___  I rescind my DNR for surgery and immediate post-operative period through Phase 2 recovery.    This means, for that time period, I will be a Full-code and receive CPR, intubation, chest compressions, medications, and/or other life saving measures, if I have a cardiac or respiratory

## 2025-02-10 NOTE — CARE PLAN
The patient is Stable - Low risk of patient condition declining or worsening    Shift Goals  Clinical Goals: ABX / Improve mental status  Patient Goals: SPRING  Family Goals: SPRING    Progress made toward(s) clinical / shift goals:  Pt on abx. Pt GCS 10-13. Pt turned every 2 hours. Pending MRI.    Patient is not progressing towards the following goals:

## 2025-02-10 NOTE — ASSESSMENT & PLAN NOTE
Due to dehydration and fecal impaction worsening chronic Alzheimer's dementia  Fall precautions  Patient did receive IV Versed 2 mg and IV Haldol 5 mg back on 2/10.  This could be affecting her baseline cognitive function, may take time for her to completely metabolize medications  I ordered an EEG, patient is not returning to her baseline as per Art.    Addendum: pt less able to follow commands in the evening. MRI was showing more brain loss to temporal lobes. This could promote seizures. EEG tech could not make it to [facility]. I am empirically starting Keppra. At this time, there is no other explanation for patient's encephalopathy.    2/14:  I am holding CNS acting medications including memantine, donepezil.  These fortunately have been held since 2/13.  Patient is speaking more today after starting Keppra.  We will rehold Keppra and see if patient reverts back.  I am awaiting for an EEG but no techs have arrived from [facility].    2/15:  Patient is still encephalopathic.  I reviewed EEG results, findings are nonspecific.  EEG is not showing nonconvulsive status epilepticus.  We will restart Keppra, patient has tremors that could be representing a seizure.     2/16:  Patient answered questions more better today.   She had more bowel movements overnight. XR still shows rectal fecal impaction. Will give another fleet enema.

## 2025-02-10 NOTE — ED PROVIDER NOTES
ED Provider Note    CHIEF COMPLAINT  Chief Complaint   Patient presents with    GLF     Pt reports two GLFs, pt cannot state time of falls. Pt reports hitting her head during fall. Denies blood thinners.     Mental Status Change     BIBA from home, pts  reports pt had increased confusion this AM. Pt is A+O 1, GCS 10 with EMS. Pt brought directly to CT from EMS [facility] after seen by ERP.        EXTERNAL RECORDS REVIEWED  Outpatient Notes 12/19/2024 PCP note    HPI/ROS  LIMITATION TO HISTORY   Select: Altered mental status / Confusion  OUTSIDE HISTORIAN(S):  Significant other     Thuy Albert is a 74 y.o. female who presents from home for altered mental status.   reports the patient was acting normally yesterday.  He woke up this morning to take her medications and she was incredibly somnolent.  She had balance issues at home today having multiple falls.  He denies fevers, vomiting, diarrhea.    PAST MEDICAL HISTORY   has a past medical history of Arthritis, Asthma, Dementia ([facility]), DVT (deep venous thrombosis) ([facility]) (2003), GERD (gastroesophageal reflux disease), and Thyroid disease.    SURGICAL HISTORY   has a past surgical history that includes other abdominal surgery; appendectomy laparoscopic (8/1/2011); cholecystectomy; appendectomy; tonsillectomy; cystoscopy,insert ureteral stent (Left, 6/10/2024); cysto/uretero/pyeloscopy, dx (Left, 6/28/2024); and cystoscopy with ureteral stent insertion or removal (Left, 6/28/2024).    FAMILY HISTORY  Family History   Problem Relation Age of Onset    Hypertension Sister     Diabetes Sister     Heart Disease Sister     Other Sister         loss vision complete    Heart Disease Brother 55        MI    Genetic Disorder Mother     Stroke Father     No Known Problems Maternal Grandmother     No Known Problems Maternal Grandfather     No Known Problems Paternal Grandmother     No Known Problems Paternal Grandfather        SOCIAL HISTORY  Social History      Tobacco Use    Smoking status: Former     Current packs/day: 0.00     Average packs/day: 1.5 packs/day for 40.0 years (60.0 ttl pk-yrs)     Types: Cigarettes     Start date: 1966     Quit date: 2006     Years since quittin.1    Smokeless tobacco: Never    Tobacco comments:     QUIT 2006,  USED TO TO SMOKE 2-3 PPD FOR 40 YRS.   Vaping Use    Vaping status: Never Used   Substance and Sexual Activity    Alcohol use: No     Alcohol/week: 0.0 oz    Drug use: No    Sexual activity: Never       CURRENT MEDICATIONS  Home Medications    **Home medications have not yet been reviewed for this encounter**       Audit from Redirected Encounters    **Home medications have not yet been reviewed for this encounter**         ALLERGIES  Allergies   Allergen Reactions    Nabumetone      rash    Synthroid [Levothroid]      Nausea      Tetracycline Swelling    Cephalexin Rash     Rash, tolerated ancef test dose followed by full dose       PHYSICAL EXAM  VITAL SIGNS: /58   Pulse 80   Temp 36.6 °C (97.8 °F) (Temporal)   Resp 14   Wt 49.9 kg (110 lb)   SpO2 96%   BMI 20.78 kg/m²    Physical Exam  Vitals and nursing note reviewed.   Constitutional:       Appearance: She is well-developed. She is not toxic-appearing.      Comments: Somnolent but arousable, confused   HENT:      Head: Normocephalic.      Mouth/Throat:      Mouth: Mucous membranes are moist.   Eyes:      Extraocular Movements: Extraocular movements intact.      Pupils: Pupils are equal, round, and reactive to light.   Cardiovascular:      Rate and Rhythm: Normal rate and regular rhythm.      Heart sounds: No murmur heard.  Pulmonary:      Effort: Pulmonary effort is normal.      Breath sounds: Normal breath sounds.   Abdominal:      General: There is no distension.      Palpations: Abdomen is soft.      Tenderness: There is abdominal tenderness. There is no guarding or rebound.   Musculoskeletal:         General: Normal range of motion.       Cervical back: Normal range of motion.   Skin:     General: Skin is warm and dry.      Findings: No lesion or rash.   Neurological:      Comments: Disoriented, somnolent but arousable, follows commands           EKG/LABS  Labs Reviewed   LACTIC ACID - Abnormal; Notable for the following components:       Result Value    Lactic Acid 4.6 (*)     All other components within normal limits   LACTIC ACID - Abnormal; Notable for the following components:    Lactic Acid 3.2 (*)     All other components within normal limits   CBC WITH DIFFERENTIAL - Abnormal; Notable for the following components:    WBC 13.9 (*)     Neutrophils-Polys 94.00 (*)     Lymphocytes 2.40 (*)     Neutrophils (Absolute) 13.09 (*)     Lymphs (Absolute) 0.33 (*)     All other components within normal limits   COMP METABOLIC PANEL - Abnormal; Notable for the following components:    Glucose 167 (*)     Bun 33 (*)     All other components within normal limits   ESTIMATED GFR - Abnormal; Notable for the following components:    GFR (CKD-EPI) 53 (*)     All other components within normal limits   CSF PROTEIN - Abnormal; Notable for the following components:    Total Protein, CSF 53 (*)     All other components within normal limits   CSF GLUCOSE - Abnormal; Notable for the following components:    Glucose CSF 86 (*)     All other components within normal limits   URINALYSIS   BLOOD CULTURE   BLOOD CULTURE   LIPASE   COV-2, FLU A/B, AND RSV BY PCR (CEPHEID)   TSH WITH REFLEX TO FT4   CSF CELL COUNT   LACTIC ACID   URINE CULTURE(NEW)   MENINGITIS/ENCEPHALITIS CSF PANEL BY PCR   CSF CULTURE   LACTIC ACID   PROTHROMBIN TIME   BLOOD CULTURE   BLOOD CULTURE         RADIOLOGY/PROCEDURES   I have independently interpreted the diagnostic imaging associated with this visit and am waiting the final reading from the radiologist.   My preliminary interpretation is as follows: CT head, C-spine, abdomen pelvis no acute process chest x-ray no acute process    Radiologist  interpretation:  CT-HEAD W/O   Final Result         1.  No acute intracranial abnormality is identified, there are nonspecific white matter changes, commonly associated with small vessel ischemic disease.  Associated mild cerebral atrophy is noted.   2.  Atherosclerosis.               CT-CSPINE WITHOUT PLUS RECONS   Final Result         1.  Multilevel degenerative changes of the cervical spine limit diagnostic sensitivity of this examination, otherwise no acute traumatic bony injury of the cervical spine is apparent.   2.  Atherosclerosis      DX-CHEST-PORTABLE (1 VIEW)   Final Result         1.  No acute cardiopulmonary disease.   2.  Atherosclerosis      CT-ABDOMEN-PELVIS WITH   Final Result         1.  Large quantity of stool in the rectal vault, appearance suggesting constipation with probable rectal fecal impaction   2.  Atherosclerosis and atherosclerotic coronary artery disease        Lumbar Puncture Procedure Note    Indication: to obtain spinal fluid for diagnostic testing    Consent: The patient was counseled regarding the procedure, it's indications, risks, potential complications and alternatives and any questions were answered. Consent was obtained.    Procedure: The patient was placed in the left lateral decubitus position and the appropriate landmarks were identified. The area was prepped and draped in the usual sterile fashion. Anesthesia was obtained using 4 cc of 1% Lidocaine without epinephrine. A 22-gauge atraumatic spinal needle was inserted at the L4- L5 level with the stylet in place until spinal fluid was returned. Opening pressure was not measured. At this point 4.0 cc of clear fluid was obtained and sent for appropriate testing. The stylet was then replaced and the needle was withdrawn. A sterile dressing was placed over the site and the patient was placed in the supine position.    The patient tolerated the procedure well.    Complications: None    CRITICAL CARE  The very real possibilty  of a deterioration of this patient's condition required the highest level of my preparedness for sudden, emergent intervention.  I provided critical care services, which included medication orders, frequent reevaluations of the patient's condition and response to treatment, ordering and reviewing test results, and discussing the case with various consultants.  The critical care time associated with the care of the patient was 36 minutes. Review chart for interventions. This time is exclusive of any other billable procedures.       COURSE & MEDICAL DECISION MAKING    ASSESSMENT, COURSE AND PLAN  Care Narrative: 74-year-old female presenting for altered mental status from home.  Multiple falls noted by .  No signs of trauma on head to toe exam.  Patient confused and somnolent.  Extensive workup obtained and head and C-spine scans negative for trauma or intracranial injury.  CT abdomen negative for infectious etiology.  Laboratory evaluation also without infectious source as urinalysis is normal.  Chest x-ray also negative for pneumonia or other infectious etiology.  Head to toe skin exam without signs of infection.  Major joints without warmth or swelling to indicate septic arthritis.  Viral swab negative.  Patient with leukocytosis of 15 indicating infection additionally initial lactic is 4.6 indicating severe sepsis.  Patient given fluid bolus and LP performed to rule out meningitis.  Initial results show an elevated protein and glucose indicative of viral meningitis.  Would not expect this degree of lactic elevation with simply viral meningitis so we will continue to cover empirically for bacterial at this time as well.    Sepsis: Infection was suspected 2321 (Time). Sepsis pathway was initiated. 30cc/kg bolus given. Antibiotics were given per protocol.    0156 AM LP successfully completed at this time therefore will initiate antibiotic therapy empirically.  Will cover for meningitis with Rocephin,  ampicillin and acyclovir.      2:34 AM discussed case with hospitalist who has kindly agreed to admit    ADDITIONAL PROBLEMS MANAGED  Past Medical History:   Diagnosis Date    Arthritis     Asthma     Dementia (HCC)     DVT (deep venous thrombosis) ([facility]) 2003    DVT LEFT LEG-- STATES SHE HAD TAKEN COUMADIN FOR 8 WEEKS IN 2003.    GERD (gastroesophageal reflux disease)     Thyroid disease     hypothyroidism         DISPOSITION AND DISCUSSIONS  I have discussed management of the patient with the following physicians and ANTIA's: Dr. Scott    Discussion of management with other QHP or appropriate source(s): None     Escalation of care considered, and ultimately not performed:    Barriers to care at this time, including but not limited to: .     Decision tools and prescription drugs considered including, but not limited to: Antibiotics empiric .    FINAL DIAGNOSIS  1. Delirium    2. Severe sepsis (HCC)    3. Meningitis    4. Lactic acidemia         Electronically signed by: Ravindra Cronin M.D., 2/9/2025 11:21 PM

## 2025-02-10 NOTE — CONSULTS
Critical Care Consultation    Date of consult: 2/10/2025    Referring Physician  Maribeth Jimenez M.D.     Reason for Consultation  Encephalopathy/sepsis concerned for meningitis/encephalitis    History of Presenting Illness  74 y.o. female who presented 2/9/2025 with history of Alzheimer's dementia brought in by family for abnormal behavior, falls at home, and fatigue since the morning of admission. Last known well 1 day prior to admission. + Head trauma with falls at home.  History obtained by .    Afebrile  GCS 10, somnolent, lethargic only responding to painful stimuli  SBP 90s-110s, no pressors  HR 50s  WBC 13.9, normalized to 9.3  Cr 1.1 -> 0.79  LA 4.6 -> 1.8  Lactic acid 4.6, normalized.  Ammonia WNL.  UA bland   TSH wnl    CT head no acute intracranial abnormality, nonspecific white matter changes consistent with small vessel ischemic disease.  Mild cerebral atrophy.  C-spine demonstrated multilevel degenerative changes, no acute traumatic bony injury.  CXR reassuring.  CT abdomen/pelvis with large quantity of stool in rectum    LP by ERP: no opening pressure measured.  Colorless, acellular, total protein mildly elevated.   Started empirically on vanco/rocephin/ampicillin/acyclovir. PCR panel returned negative and mentation improved and abx dc'ed    PMH dementia, DVT, GERD, thyroid disease    Code Status  Full Code    Review of Systems  Review of Systems   Unable to perform ROS: Mental acuity   Denies pain    Past Medical History   has a past medical history of Arthritis, Asthma, Dementia ([facility]), DVT (deep venous thrombosis) ([facility]) (2003), GERD (gastroesophageal reflux disease), and Thyroid disease.    Surgical History   has a past surgical history that includes other abdominal surgery; appendectomy laparoscopic (8/1/2011); cholecystectomy; appendectomy; tonsillectomy; pr cystoscopy,insert ureteral stent (Left, 6/10/2024); pr cysto/uretero/pyeloscopy, dx (Left, 6/28/2024); and cystoscopy with  ureteral stent insertion or removal (Left, 6/28/2024).    Family History  family history includes Diabetes in her sister; Genetic Disorder in her mother; Heart Disease in her sister; Heart Disease (age of onset: 55) in her brother; Hypertension in her sister; No Known Problems in her maternal grandfather, maternal grandmother, paternal grandfather, and paternal grandmother; Other in her sister; Stroke in her father.    Social History   reports that she quit smoking about 19 years ago. Her smoking use included cigarettes. She started smoking about 59 years ago. She has a 60 pack-year smoking history. She has never used smokeless tobacco. She reports that she does not drink alcohol and does not use drugs.    Medications  Home Medications       Reviewed by Carrol Howe (Pharmacy Tech) on 02/10/25 at 1139  Med List Status: Complete     Medication Last Dose Status   Ascorbic Acid (VITAMIN C PO) 2/8/2025 Active   B Complex Vitamins (B COMPLEX PO) 2/8/2025 Active   Bismuth Subsalicylate (PEPTO-BISMOL PO) Unknown Active   Cholecalciferol (VITAMIN D3 PO) 2/8/2025 Active   Cyanocobalamin (VITAMIN B-12 PO) 2/8/2025 Active   donepezil (ARICEPT) 10 MG tablet 2/8/2025 Active   FOLIC ACID PO 2/8/2025 Active   memantine (NAMENDA) 10 MG Tab 2/9/2025 Active   Multiple Vitamins-Minerals (ZINC PO) 2/8/2025 Active   thyroid (ARMOUR THYROID) 15 MG Tab 2/8/2025 Active   triamcinolone acetonide (KENALOG) 0.1 % Cream  Active                  Audit from Redirected Encounters    **Home medications have not yet been reviewed for this encounter**       Current Facility-Administered Medications   Medication Dose Route Frequency Provider Last Rate Last Admin    donepezil (Aricept) tablet 10 mg  10 mg Oral Nightly Maribeth Jimenez M.D.        folic acid (Folvite) tablet 1 mg  1 mg Oral DAILY Maribeth Jimenez M.D.        memantine (Namenda) tablet 10 mg  10 mg Oral BID Maribeth Jimenez M.D.        thyroid ([place]  Thyroid) tablet 45 mg  45 mg Oral QDAY Maribeth Jimenez M.D.        acetaminophen (Tylenol) tablet 650 mg  650 mg Oral Q6HRS PRN Maribeth Jimenez M.D.        ondansetron (Zofran) syringe/vial injection 4 mg  4 mg Intravenous Q4HRS PRN Maribeth Jimenez M.D.        Or    ondansetron (Zofran ODT) dispertab 4 mg  4 mg Oral Q4HRS PRN Maribeth Jimenez M.D.        LR (Bolus) infusion 500 mL  500 mL Intravenous Once PRN Maribeth Jimenez M.D.        lactated ringers infusion   Intravenous Continuous Jamar Edwards M.D. 100 mL/hr at 02/10/25 1013 New Bag at 02/10/25 1013    senna-docusate (Pericolace Or Senokot S) 8.6-50 MG per tablet 2 Tablet  2 Tablet Oral Q EVENING Jamar Edwards M.D.        And    polyethylene glycol/lytes (Miralax) Packet 1 Packet  1 Packet Oral QDAY PRN Jamar Edwards M.D.         Allergies  Allergies   Allergen Reactions    Nabumetone      rash    Synthroid [Levothroid]      Nausea      Tetracycline Swelling    Cephalexin Rash     Rash, tolerated ancef test dose followed by full dose     Vital Signs last 24 hours  Temp:  [36.6 °C (97.8 °F)-37.3 °C (99.1 °F)] 36.7 °C (98.1 °F)  Pulse:  [48-96] 58  Resp:  [12-26] 17  BP: ()/(49-73) 140/61  SpO2:  [81 %-100 %] 97 %    Physical Exam  Physical Exam  Vitals and nursing note reviewed.   Constitutional:       General: She is not in acute distress.     Appearance: Normal appearance. She is well-developed. She is ill-appearing. She is not diaphoretic.      Comments: Very pleasant   HENT:      Head: Atraumatic.      Mouth/Throat:      Mouth: Mucous membranes are dry.      Comments: edentulous  Eyes:      General: No scleral icterus.        Right eye: No discharge.         Left eye: No discharge.      Conjunctiva/sclera: Conjunctivae normal.      Pupils: Pupils are equal, round, and reactive to light.   Neck:      Thyroid: No thyromegaly.      Vascular: No JVD.   Cardiovascular:      Rate and Rhythm: Regular  rhythm. Bradycardia present.      Heart sounds: Normal heart sounds. No murmur heard.     No gallop.   Pulmonary:      Effort: Pulmonary effort is normal. No respiratory distress.      Breath sounds: Normal breath sounds. No wheezing or rales.   Abdominal:      General: There is no distension.      Palpations: Abdomen is soft.      Tenderness: There is no abdominal tenderness. There is no guarding.   Musculoskeletal:         General: No tenderness.      Cervical back: Normal range of motion and neck supple. No rigidity or tenderness.      Right lower leg: No edema.      Left lower leg: No edema.      Comments: Generalized tremors, bilateral uppers   Lymphadenopathy:      Cervical: No cervical adenopathy.   Skin:     General: Skin is warm.      Capillary Refill: Capillary refill takes less than 2 seconds.      Coloration: Skin is pale. Skin is not jaundiced.      Findings: No erythema or rash.   Neurological:      Mental Status: She is alert. She is disoriented.      GCS: GCS eye subscore is 3. GCS verbal subscore is 4. GCS motor subscore is 6.      Cranial Nerves: No cranial nerve deficit.      Sensory: Sensation is intact. No sensory deficit.      Motor: Tremor present. No abnormal muscle tone.   Psychiatric:         Attention and Perception: She is inattentive.         Mood and Affect: Mood normal.         Speech: Speech is slurred.         Behavior: Behavior is slowed. Behavior is cooperative.         Thought Content: Thought content normal.         Cognition and Memory: Memory is impaired. She exhibits impaired recent memory and impaired remote memory.         Judgment: Judgment normal.       Fluids    Intake/Output Summary (Last 24 hours) at 2/10/2025 1855  Last data filed at 2/10/2025 1400  Gross per 24 hour   Intake 875.36 ml   Output --   Net 875.36 ml     Laboratory  Recent Results (from the past 48 hours)   Lactic Acid    Collection Time: 02/09/25 10:45 PM   Result Value Ref Range    Lactic Acid 4.6 (HH)  0.5 - 2.0 mmol/L   CBC with Differential    Collection Time: 02/09/25 10:45 PM   Result Value Ref Range    WBC 13.9 (H) 4.8 - 10.8 K/uL    RBC 4.41 4.20 - 5.40 M/uL    Hemoglobin 13.8 12.0 - 16.0 g/dL    Hematocrit 41.0 37.0 - 47.0 %    MCV 93.0 81.4 - 97.8 fL    MCH 31.3 27.0 - 33.0 pg    MCHC 33.7 32.2 - 35.5 g/dL    RDW 43.8 35.9 - 50.0 fL    Platelet Count 187 164 - 446 K/uL    MPV 9.6 9.0 - 12.9 fL    Neutrophils-Polys 94.00 (H) 44.00 - 72.00 %    Lymphocytes 2.40 (L) 22.00 - 41.00 %    Monocytes 3.20 0.00 - 13.40 %    Eosinophils 0.00 0.00 - 6.90 %    Basophils 0.10 0.00 - 1.80 %    Immature Granulocytes 0.30 0.00 - 0.90 %    Nucleated RBC 0.00 0.00 - 0.20 /100 WBC    Neutrophils (Absolute) 13.09 (H) 1.82 - 7.42 K/uL    Lymphs (Absolute) 0.33 (L) 1.00 - 4.80 K/uL    Monos (Absolute) 0.45 0.00 - 0.85 K/uL    Eos (Absolute) 0.00 0.00 - 0.51 K/uL    Baso (Absolute) 0.01 0.00 - 0.12 K/uL    Immature Granulocytes (abs) 0.04 0.00 - 0.11 K/uL    NRBC (Absolute) 0.00 K/uL   Complete Metabolic Panel    Collection Time: 02/09/25 10:45 PM   Result Value Ref Range    Sodium 135 135 - 145 mmol/L    Potassium 3.8 3.6 - 5.5 mmol/L    Chloride 97 96 - 112 mmol/L    Co2 22 20 - 33 mmol/L    Anion Gap 16.0 7.0 - 16.0    Glucose 167 (H) 65 - 99 mg/dL    Bun 33 (H) 8 - 22 mg/dL    Creatinine 1.10 0.50 - 1.40 mg/dL    Calcium 9.2 8.4 - 10.2 mg/dL    Correct Calcium 9.4 8.5 - 10.5 mg/dL    AST(SGOT) 37 12 - 45 U/L    ALT(SGPT) 49 2 - 50 U/L    Alkaline Phosphatase 88 30 - 99 U/L    Total Bilirubin 0.5 0.1 - 1.5 mg/dL    Albumin 3.8 3.2 - 4.9 g/dL    Total Protein 6.6 6.0 - 8.2 g/dL    Globulin 2.8 1.9 - 3.5 g/dL    A-G Ratio 1.4 g/dL   Blood Culture - Draw one from central line and one from peripheral site    Collection Time: 02/09/25 10:45 PM    Specimen: Peripheral; Blood   Result Value Ref Range    Significant Indicator NEG     Source BLD     Site PERIPHERAL     Culture Result       No Growth  Note: Blood cultures are incubated  for 5 days and  are monitored continuously.Positive blood cultures  are called to the RN and reported as soon as  they are identified.     Lipase    Collection Time: 02/09/25 10:45 PM   Result Value Ref Range    Lipase 13 11 - 82 U/L   CoV-2, Flu A/B, And RSV by PCR (Cepheid)    Collection Time: 02/09/25 10:45 PM    Specimen: Respirate   Result Value Ref Range    Influenza virus A RNA Negative Negative    Influenza virus B, PCR Negative Negative    RSV, PCR Negative Negative    SARS-CoV-2 by PCR NotDetected     SARS-CoV-2 Source NP Swab    ESTIMATED GFR    Collection Time: 02/09/25 10:45 PM   Result Value Ref Range    GFR (CKD-EPI) 53 (A) >60 mL/min/1.73 m 2   TSH WITH REFLEX TO FT4    Collection Time: 02/09/25 10:45 PM   Result Value Ref Range    TSH 1.320 0.380 - 5.330 uIU/mL   Respiratory Panel by PCR (Inpatient ONLY)    Collection Time: 02/09/25 10:45 PM    Specimen: Nasopharyngeal   Result Value Ref Range    Adenovirus, PCR Not Detected     SARS-CoV-2 (COVID-19) RNA by BRENNAN NotDetected     Coronavirus 229E, PCR Not Detected     Coronavirus HKU1, PCR Not Detected     Coronavirus NL63, PCR Not Detected     Coronavirus OC43, PCR Not Detected     Human Metapneumovirus, PCR Not Detected     Rhino/Enterovirus, PCR Not Detected     Influenza A, PCR Not Detected     Influenza B, PCR Not Detected     Parainfluenza 1, PCR Not Detected     Parainfluenza 2, PCR Not Detected     Parainfluenza 3, PCR Not Detected     Parainfluenza 4, PCR Not Detected     RSV (Respiratory Syncytial Virus), PCR Not Detected     Bordetella parapertussis (UL0143), PCR Not Detected     Bordetella pertussis (ptxP), PCR Not Detected     Mycoplasma pneumoniae, PCR Not Detected     Chlamydia pneumoniae, PCR Not Detected    Urinalysis    Collection Time: 02/09/25 11:28 PM    Specimen: Urine   Result Value Ref Range    Color Yellow     Character Clear     Specific Gravity 1.015 <1.035    Ph 5.5 5.0 - 8.0    Glucose Negative Negative mg/dL    Ketones  Negative Negative mg/dL    Protein Negative Negative mg/dL    Bilirubin Negative Negative    Urobilinogen, Urine 0.2 <=1.0 EU/dL    Nitrite Negative Negative    Leukocyte Esterase Negative Negative    Occult Blood Negative Negative    Micro Urine Req see below    Lactic Acid    Collection Time: 02/10/25 12:49 AM   Result Value Ref Range    Lactic Acid 3.2 (H) 0.5 - 2.0 mmol/L   Blood Culture - Draw one from central line and one from peripheral site    Collection Time: 02/10/25 12:50 AM    Specimen: Line; Blood   Result Value Ref Range    Significant Indicator NEG     Source BLD     Site LINE     Culture Result       No Growth  Note: Blood cultures are incubated for 5 days and  are monitored continuously.Positive blood cultures  are called to the RN and reported as soon as  they are identified.     CSF PROTEIN    Collection Time: 02/10/25  1:50 AM   Result Value Ref Range    Total Protein, CSF 53 (H) 15 - 45 mg/dL   MENINGITIS/ENCEPHALITIS CSF PANEL BY PCR    Collection Time: 02/10/25  1:50 AM   Result Value Ref Range    Cryptococcus neoformans/gattii by PCR Not Detected     Cytomegalovirus by PCR Not Detected     Enterovirus by PCR Not Detected     Escherichia coli K1 by PCR Not Detected     HAEM influenzae by PCR Not Detected     HSV 1 by PCR Not Detected     HSV 2 by PCR Not Detected     Human Herpesvirus 6 by PCR Not Detected     Human parechovirus by PCR Not Detected     Listeria Monocytogenes by PCR Not Detected     Neisseria meningitidis by PCR Not Detected     Strep Agalactiae by PCR Not Detected     Strep pneumoniae by PCR Not Detected     Varicella Zoster Virus by PCR Not Detected    CSF Cell Count    Collection Time: 02/10/25  1:50 AM   Result Value Ref Range    Number Of Tubes 4     Volume 2.0 mL    Color-Body Fluid Colorless     Character-Body Fluid Clear     Supernatant Appearance Colorless     Total RBC Count 1528 cells/uL    Crenated RBC 0 %    CSF Total Nucleated Cells 1 0 - 10 cells/uL    Comments  See Comment     CSF Tube Number Tube 4    CSF GLUCOSE    Collection Time: 02/10/25  1:50 AM   Result Value Ref Range    Glucose CSF 86 (H) 40 - 80 mg/dL   GRAM STAIN    Collection Time: 02/10/25  1:50 AM    Specimen: CSF   Result Value Ref Range    Significant Indicator .     Source CSF     Site TAP     Gram Stain Result No organisms seen.  Rare WBCs.      Lactic Acid    Collection Time: 02/10/25  3:58 AM   Result Value Ref Range    Lactic Acid 3.4 (H) 0.5 - 2.0 mmol/L   Blood Culture    Collection Time: 02/10/25  4:16 AM    Specimen: Line; Blood   Result Value Ref Range    Significant Indicator NEG     Source BLD     Site Peripheral     Culture Result       No Growth  Note: Blood cultures are incubated for 5 days and  are monitored continuously.Positive blood cultures  are called to the RN and reported as soon as  they are identified.     LACTIC ACID    Collection Time: 02/10/25  7:10 AM   Result Value Ref Range    Lactic Acid 1.8 0.5 - 2.0 mmol/L   CBC WITHOUT DIFFERENTIAL    Collection Time: 02/10/25  7:51 AM   Result Value Ref Range    WBC 9.3 4.8 - 10.8 K/uL    RBC 3.78 (L) 4.20 - 5.40 M/uL    Hemoglobin 11.8 (L) 12.0 - 16.0 g/dL    Hematocrit 35.1 (L) 37.0 - 47.0 %    MCV 92.9 81.4 - 97.8 fL    MCH 31.2 27.0 - 33.0 pg    MCHC 33.6 32.2 - 35.5 g/dL    RDW 44.4 35.9 - 50.0 fL    Platelet Count 149 (L) 164 - 446 K/uL    MPV 9.7 9.0 - 12.9 fL   Comp Metabolic Panel    Collection Time: 02/10/25  7:51 AM   Result Value Ref Range    Sodium 137 135 - 145 mmol/L    Potassium 3.8 3.6 - 5.5 mmol/L    Chloride 102 96 - 112 mmol/L    Co2 24 20 - 33 mmol/L    Anion Gap 11.0 7.0 - 16.0    Glucose 122 (H) 65 - 99 mg/dL    Bun 25 (H) 8 - 22 mg/dL    Creatinine 0.79 0.50 - 1.40 mg/dL    Calcium 8.9 8.4 - 10.2 mg/dL    Correct Calcium 9.4 8.5 - 10.5 mg/dL    AST(SGOT) 49 (H) 12 - 45 U/L    ALT(SGPT) 55 (H) 2 - 50 U/L    Alkaline Phosphatase 76 30 - 99 U/L    Total Bilirubin 0.3 0.1 - 1.5 mg/dL    Albumin 3.4 3.2 - 4.9 g/dL     Total Protein 5.8 (L) 6.0 - 8.2 g/dL    Globulin 2.4 1.9 - 3.5 g/dL    A-G Ratio 1.4 g/dL   MAGNESIUM    Collection Time: 02/10/25  7:51 AM   Result Value Ref Range    Magnesium 1.9 1.5 - 2.5 mg/dL   PHOSPHORUS    Collection Time: 02/10/25  7:51 AM   Result Value Ref Range    Phosphorus 2.8 2.5 - 4.5 mg/dL   VITAMIN B12    Collection Time: 02/10/25  7:51 AM   Result Value Ref Range    Vitamin B12 -True Cobalamin 1000 (H) 211 - 911 pg/mL   AMMONIA    Collection Time: 02/10/25  7:51 AM   Result Value Ref Range    Ammonia 12 11 - 45 umol/L   ESTIMATED GFR    Collection Time: 02/10/25  7:51 AM   Result Value Ref Range    GFR (CKD-EPI) 78 >60 mL/min/1.73 m 2   LACTIC ACID    Collection Time: 02/10/25 11:38 AM   Result Value Ref Range    Lactic Acid 1.4 0.5 - 2.0 mmol/L   Prothrombin Time    Collection Time: 02/10/25  5:32 PM   Result Value Ref Range    PT 13.9 12.0 - 14.6 sec    INR 1.04 0.87 - 1.13     Imaging  CT-HEAD W/O   Final Result         1.  No acute intracranial abnormality is identified, there are nonspecific white matter changes, commonly associated with small vessel ischemic disease.  Associated mild cerebral atrophy is noted.   2.  Atherosclerosis.               CT-CSPINE WITHOUT PLUS RECONS   Final Result         1.  Multilevel degenerative changes of the cervical spine limit diagnostic sensitivity of this examination, otherwise no acute traumatic bony injury of the cervical spine is apparent.   2.  Atherosclerosis      DX-CHEST-PORTABLE (1 VIEW)   Final Result         1.  No acute cardiopulmonary disease.   2.  Atherosclerosis      CT-ABDOMEN-PELVIS WITH   Final Result         1.  Large quantity of stool in the rectal vault, appearance suggesting constipation with probable rectal fecal impaction   2.  Atherosclerosis and atherosclerotic coronary artery disease      MR-BRAIN-WITH & W/O    (Results Pending)     Assessment/Plan    * Acute metabolic encephalopathy  Assessment & Plan  Baseline Alzheimer's  dementia, brought in for abnormal behavior with falls at home, fatigue.  Last known well 1 day prior to admission.  CT head without acute findings.  UA bland.  TSH WNL.  Lactic acid elevated with mild NATALY both of which improved with IVF.  Other imaging notable only for large amount of stool in the rectum.  Underwent LP by ERP, which overall is reassuring, essentially acellular with a mild elevated total protein.  Meningitis/encephalitis PCR panel was negative.  Extended respiratory pathogen panel is negative.      At this point the suspicion for infectious/toxic encephalopathy is low, and all antibiotics have been DC'd.    She is slowly improving back to baseline.    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  Continue.    Hypothyroidism- (present on admission)  Assessment & Plan  Resume home Mora Thyroid  TSH within normal limits.      Discussed patient condition and risk of morbidity and/or mortality with RN, Pharmacy, Charge nurse / hot rounds, and Patient.      The patient remains critically ill.  Critical care time = 45 minutes in directly providing and coordinating critical care and extensive data review.  No time overlap and excludes procedures.    This note was generated using voice recognition software which has a chance of producing errors of grammar and content.  I have made every reasonable attempt to find and correct any errors, but it should be expected that some may not be found prior to finalization of this note.  __________  Jamar Edwards MD  Pulmonary and Critical Care Medicine  [facility]

## 2025-02-10 NOTE — ASSESSMENT & PLAN NOTE
We obtained a brain MRI, I reviewed results and compared to previous brain MRIs up to 2016.  There does appear to be more cerebral atrophy.  I explained these results to Art.  There is no acute stroke.  Restart amantadine and donepezil

## 2025-02-10 NOTE — H&P
Hospital Medicine History & Physical Note    Date of Service  2/10/2025    Primary Care Physician  Pramod Lu M.D.    Consultants  None    Code Status  Full Code    Chief Complaint  Chief Complaint   Patient presents with    GLF     Pt reports two GLFs, pt cannot state time of falls. Pt reports hitting her head during fall. Denies blood thinners.     Mental Status Change     BIBA from home, pts  reports pt had increased confusion this AM. Pt is A+O 1, GCS 10 with EMS. Pt brought directly to CT from EMS [facility] after seen by ERP.        History of Presenting Illness  Patient is lethargic, altered and unable to provide history.  ERP discussed with her , who was here prior but at the time of my evaluation no family members present.    Thuy Albert is a 74 y.o. female, with history of Alzheimer's disease, was brought to the ED on 2/9/2025 with increased fatigue and not acting normal yesterday.  Patient has balance issues and had falls at home.She is not on blood thinners.     I discussed the plan of care with patient and ERP Dr. Cronin .    Review of Systems  Patient is altered, lethargic, patient is altered, lethargic and unable to provide review of systems.    Past Medical History   has a past medical history of Arthritis, Asthma, Dementia ([facility]), DVT (deep venous thrombosis) ([facility]) (2003), GERD (gastroesophageal reflux disease), and Thyroid disease.    Surgical History   has a past surgical history that includes other abdominal surgery; appendectomy laparoscopic (8/1/2011); cholecystectomy; appendectomy; tonsillectomy; pr cystoscopy,insert ureteral stent (Left, 6/10/2024); pr cysto/uretero/pyeloscopy, dx (Left, 6/28/2024); and cystoscopy with ureteral stent insertion or removal (Left, 6/28/2024).     Family History  family history includes Diabetes in her sister; Genetic Disorder in her mother; Heart Disease in her sister; Heart Disease (age of onset: 55) in her brother; Hypertension in her  sister; No Known Problems in her maternal grandfather, maternal grandmother, paternal grandfather, and paternal grandmother; Other in her sister; Stroke in her father.   Family history reviewed with patient. There is no family history that is pertinent to the chief complaint.     Social History   reports that she quit smoking about 19 years ago. Her smoking use included cigarettes. She started smoking about 59 years ago. She has a 60 pack-year smoking history. She has never used smokeless tobacco. She reports that she does not drink alcohol and does not use drugs.    Allergies  Allergies   Allergen Reactions    Nabumetone      rash    Synthroid [Levothroid]      Nausea      Tetracycline Swelling    Cephalexin Rash     Rash, tolerated ancef test dose followed by full dose       Medications  Prior to Admission Medications   Prescriptions Last Dose Informant Patient Reported? Taking?   Ascorbic Acid (VITAMIN C PO)  Significant Other Yes No   Sig: Take 1 Tablet by mouth every day. 1,000 mg  Indications: suppelemt   B Complex Vitamins (B COMPLEX PO)  Significant Other Yes No   Sig: Take 1 Tablet by mouth every day. Indications: supplement   Bismuth Subsalicylate (PEPTO-BISMOL PO)  Significant Other Yes No   Sig: Take 30 mL by mouth 2 times a day as needed (For upset stomach). Indications: upset stomach   Cholecalciferol (VITAMIN D3) 1.25 MG (10067 UT) Tab   Yes No   Sig: Take 1 Tablet by mouth every day. Indications: supplement   Cyanocobalamin (VITAMIN B-12 PO)  Significant Other Yes No   Sig: Take 1 Tablet by mouth every day. 1,000mcg  Indications: supplement   Multiple Vitamins-Minerals (ZINC PO)  Significant Other Yes No   Sig: Take 1 Tablet by mouth every day. 50 mg  Indications: supplement   donepezil (ARICEPT) 10 MG tablet 2/8/2025 at 10:45 PM Significant Other No Yes   Sig: Take 1 Tablet by mouth every day.   Patient taking differently: Take 10 mg by mouth every evening.   folic acid (FOLVITE) 1 MG Tab   Significant Other No No   Sig: Take 1 Tablet by mouth every day.   memantine (NAMENDA) 10 MG Tab 2/9/2025 at 10:45 AM Significant Other No Yes   Sig: Take 1 Tablet by mouth 2 times a day.   thyroid (ARMOUR THYROID) 15 MG Tab   No No   Sig: TAKE 3 TABLETS BY MOUTH EVERY DAY   triamcinolone acetonide (KENALOG) 0.1 % Cream   No No   Sig: Apply 1 Application topically 2 times a day.      Facility-Administered Medications: None       Physical Exam  Temp:  [36.6 °C (97.8 °F)] 36.6 °C (97.8 °F)  Pulse:  [61-96] 80  Resp:  [14-17] 14  BP: ()/(50-73) 107/58  SpO2:  [81 %-100 %] 96 %  Blood Pressure : 107/58   Temperature: 36.6 °C (97.8 °F)   Pulse: 80   Respiration: 14   Pulse Oximetry: 96 %       Physical Exam  Constitutional:       Appearance: Normal appearance.      Comments: Somnolent, lethargic, only responding to painful stimuli   HENT:      Head: Normocephalic and atraumatic.      Mouth/Throat:      Mouth: Mucous membranes are moist.      Pharynx: Oropharynx is clear.   Eyes:      Extraocular Movements: Extraocular movements intact.      Pupils: Pupils are equal, round, and reactive to light.   Cardiovascular:      Rate and Rhythm: Normal rate and regular rhythm.      Heart sounds: Normal heart sounds.   Pulmonary:      Effort: Pulmonary effort is normal.      Breath sounds: Normal breath sounds.   Abdominal:      General: Abdomen is flat. Bowel sounds are normal.      Palpations: Abdomen is soft.   Musculoskeletal:      Cervical back: Normal range of motion and neck supple.   Skin:     General: Skin is warm and dry.   Neurological:      Comments: Responding only to painful stimuli.  [place] =10   Psychiatric:      Comments: Unable to provide psychiatric exam.         Laboratory:  Recent Labs     02/09/25  2245   WBC 13.9*   RBC 4.41   HEMOGLOBIN 13.8   HEMATOCRIT 41.0   MCV 93.0   MCH 31.3   MCHC 33.7   RDW 43.8   PLATELETCT 187   MPV 9.6     Recent Labs     02/09/25  2245   SODIUM 135   POTASSIUM 3.8    CHLORIDE 97   CO2 22   GLUCOSE 167*   BUN 33*   CREATININE 1.10   CALCIUM 9.2     Recent Labs     02/09/25  2245   ALTSGPT 49   ASTSGOT 37   ALKPHOSPHAT 88   TBILIRUBIN 0.5   LIPASE 13   GLUCOSE 167*           Imaging:  CT-HEAD W/O   Final Result         1.  No acute intracranial abnormality is identified, there are nonspecific white matter changes, commonly associated with small vessel ischemic disease.  Associated mild cerebral atrophy is noted.   2.  Atherosclerosis.               CT-CSPINE WITHOUT PLUS RECONS   Final Result         1.  Multilevel degenerative changes of the cervical spine limit diagnostic sensitivity of this examination, otherwise no acute traumatic bony injury of the cervical spine is apparent.   2.  Atherosclerosis      DX-CHEST-PORTABLE (1 VIEW)   Final Result         1.  No acute cardiopulmonary disease.   2.  Atherosclerosis      CT-ABDOMEN-PELVIS WITH   Final Result         1.  Large quantity of stool in the rectal vault, appearance suggesting constipation with probable rectal fecal impaction   2.  Atherosclerosis and atherosclerotic coronary artery disease          X-Ray:  I have personally reviewed the images and compared with prior images. and My impression is: CT Head and neck negative for acute abnormalities. CT abdomen showed fecal impaction but otherwise no acute findings    Assessment/Plan:  Justification for Admission Status  I anticipate this patient will require at least two midnights for appropriate medical management, necessitating inpatient admission because ICU admission: 74-year-old female coming with septic shock, acute metabolic encephalopathy, possible meningitis under the context of underlying Alzheimer's disease      * Septic shock (HCC)- (present on admission)  Assessment & Plan  Inpatient Status to ICU  This is Sepsis Present on admission  SIRS criteria identified on my evaluation include: Tachycardia, with heart rate greater than 90 BPM and Leukocytosis, with WBC  greater than 12,000  Clinical indicators of end organ dysfunction include Lactic Acid greater than 2, Toxic Metabolic Encephalopathy, and GCS < 15  Source is Presumed Meningitis/ Encephalitis -possibly viral however given presentation of severe lactic acid which is still high in spite of for IV fluid hydration, and her ongoing acute metabolic encephalopathy, will treat her empirically for bacterial meningitis, de-escalate antibiotics when etiology better clarified.  Sepsis protocol initiated  Crystalloid Fluid Administration: Fluid resuscitation ordered per standard protocol - 30 mL/kg per current or ideal body weight  IV antibiotics as appropriate for source of sepsis  Reassessment: I have reassessed the patient's hemodynamic status    Acute metabolic encephalopathy  Assessment & Plan  - Patient will be treated for infectious cause of acute metabolic encephalopathy as above however other etiologies are not fully excluded.  She has history of Alzheimer's dementia baseline.  -Ordered MRI of the brain.  CT of the head was negative for acute intracranial abnormalities.  CT spine no acute traumatic bony injuries.    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  She is currently NPO.  Additional baseline information in her behavior determine better baseline.    Hypothyroidism- (present on admission)  Assessment & Plan  -TSH within normal limits.  She is currently NPO.          The patient remains critically ill.  Critical care time =52  minutes in directly providing and coordinating critical care and extensive data review.  This includes time spent before the visit reviewing the chart, time spent evaluating the patient face-to-face  in the room, time discussing and updating Nursing Staff about plan and time spent after the visit reviewing results and on documentation. No time overlap and excludes  procedures.  VTE prophylaxis: SCDs/TEDs

## 2025-02-10 NOTE — PROGRESS NOTES
Pharmacy Vancomycin Kinetics Note for 2/10/2025     74 y.o. female on Vancomycin day # 1     Vancomycin Indication (Trough based Dosing): CNS infection (goal of 18-22)    Provider specified end date: 02/20/25    Active Antibiotics (From admission, onward)      Ordered     Ordering Provider       Additional Orders    02/10/25 0308  ACYCLOVIR SODIUM 50 MG/ML IV SOLN        Note to Pharmacy: TISHA MARSHALL: cabinet override           Mon Feb 10, 2025  3:04 AM    02/10/25 0304  vancomycin 1250 mg/250mL NS IVPB premix  (vancomycin (VANCOCIN) IV (LD + Maintenance))  ONCE         Maribeth Jimenez M.D.       Mon Feb 10, 2025  2:58 AM    02/10/25 0258  cefTRIAXone (Rocephin) injection 1,000 mg  ONCE         Maribeth Jimenez M.D.       Mon Feb 10, 2025  2:36 AM    02/10/25 0236  acyclovir (Zovirax) 500 mg in  mL IVPB  (Meningitis / Encephalitis Infection)  EVERY 12 HOURS         Maribeth Jimenez M.D.    02/10/25 0236  cefTRIAXone (Rocephin) 2,000 mg in  mL IVPB  (Meningitis / Encephalitis Infection)  EVERY 12 HOURS         Maribeth Jimenez M.D.    02/10/25 0236  MD Alert...Vancomycin per Pharmacy  (Meningitis / Encephalitis Infection)  PHARMACY TO DOSE        Question:  Indication(s) for vancomycin?  Answer:  Central nervous system infection    Maribeth Jimenez M.D.       Mon Feb 10, 2025  2:31 AM    02/10/25 0231  acyclovir (Zovirax) 500 mg in  mL IVPB  ONCE         Ravindra Cronin M.D.    02/10/25 0231  ampicillin (Omnipen) 2 g in  mL IVPB  ONCE         Ravindra Cronin M.D.            Dosing Weight: 49.9 kg (110 lb 0.2 oz)      Admission History: Admitted on 2/9/2025 for Septic shock (HCC) [A41.9, R65.21]  Pertinent history: Pt presented with acute mental status change following multiple GLFs. LP performed. Vancomycin initiated emprical for meningitis coverage    Allergies:     Nabumetone, Synthroid [levothroid], Tetracycline, and Cephalexin     Pertinent cultures  to date:     Results       Procedure Component Value Units Date/Time    Blood Culture [[id number]]     Order Status: Sent Specimen: Blood from Peripheral     Blood Culture [[id number]]     Order Status: Sent Specimen: Blood from Line     Blood Culture - Draw one from central line and one from peripheral site [[id number]] Collected: 02/10/25 0050    Order Status: Completed Specimen: Blood from Line Updated: 02/10/25 0208     Significant Indicator NEG     Source BLD     Site LINE     Culture Result No Growth  Note: Blood cultures are incubated for 5 days and  are monitored continuously.Positive blood cultures  are called to the RN and reported as soon as  they are identified.      CSF CULTURE [[id number]] Collected: 02/10/25 0150    Order Status: Sent Specimen: CSF from Tap Updated: 02/10/25 0203    Blood Culture - Draw one from central line and one from peripheral site [[id number]] Collected: 02/09/25 2245    Order Status: Completed Specimen: Blood from Peripheral Updated: 02/10/25 0008     Significant Indicator NEG     Source BLD     Site PERIPHERAL     Culture Result No Growth  Note: Blood cultures are incubated for 5 days and  are monitored continuously.Positive blood cultures  are called to the RN and reported as soon as  they are identified.      Urinalysis [[id number]] Collected: 02/09/25 2328    Order Status: Completed Specimen: Urine Updated: 02/09/25 2339     Color Yellow     Character Clear     Specific Gravity 1.015     Ph 5.5     Glucose Negative mg/dL      Ketones Negative mg/dL      Protein Negative mg/dL      Bilirubin Negative     Urobilinogen, Urine 0.2 EU/dL      Nitrite Negative     Leukocyte Esterase Negative     Occult Blood Negative     Micro Urine Req see below     Comment: Microscopic examination not performed when specimen is clear  and chemically negative for protein, blood, leukocyte esterase  and nitrite.         Urine Culture (New) [[id number]] Collected: 02/09/25 2328    Order Status: Sent  "Specimen: Urine Updated: 25    CoV-2, Flu A/B, And RSV by PCR ([facility]) [[id number]] Collected: 25    Order Status: Completed Specimen: Respirate Updated: 25 233     Influenza virus A RNA Negative     Influenza virus B, PCR Negative     RSV, PCR Negative     SARS-CoV-2 by PCR NotDetected     Comment: [facility] providers: PLEASE REFER TO DE-ESCALATION AND RETESTING PROTOCOL  on [email or web address]    **The [employer] GeneXpert Xpress SARS-CoV-2 RT-PCR Test has been made  available for use under the Emergency Use Authorization (EUA) only.          SARS-CoV-2 Source NP Swab            Labs:     Estimated Creatinine Clearance: 33.9 mL/min (by C-G formula based on SCr of 1.1 mg/dL).  Recent Labs     25   WBC 13.9*   NEUTSPOLYS 94.00*     Recent Labs     25   BUN 33*   CREATININE 1.10   ALBUMIN 3.8       Intake/Output Summary (Last 24 hours) at 2/10/2025 0357  Last data filed at 2025 2207  Gross per 24 hour   Intake 250 ml   Output --   Net 250 ml      /65   Pulse 87   Temp 36.6 °C (97.8 °F) (Temporal)   Resp 19   Ht 1.549 m (5' 0.98\")   Wt 49.9 kg (110 lb)   SpO2 95%  Temp (24hrs), Av.6 °C (97.8 °F), Min:36.6 °C (97.8 °F), Max:36.6 °C (97.8 °F)      List concerns for Vancomycin clearance:     Age;BUN/Scr ratio greater than 20:1;Receipt of contrast dye    A/P:     -  Vancomycin dose: 1,000 mg Q24H    -  Next vancomycin level(s):    - 3-5th dose or sooner if renal function changes    -  Comments: moderate concern for drug accumulation. High trough goal to obtain adequate CNS penetration. CSF culture and encephalitis PCR pending. Pharmacy will monitor and adjust dosing as appropriate.      Mike Wnag, PharmD    "

## 2025-02-11 ENCOUNTER — APPOINTMENT (OUTPATIENT)
Dept: RADIOLOGY | Facility: MEDICAL CENTER | Age: 75
DRG: 071 | End: 2025-02-11
Attending: INTERNAL MEDICINE
Payer: MEDICARE

## 2025-02-11 PROBLEM — R07.89 OTHER CHEST PAIN: Status: ACTIVE | Noted: 2025-02-11

## 2025-02-11 PROBLEM — Z71.0 ENCOUNTER FOR FAMILY CONFERENCE WITHOUT PATIENT PRESENT: Status: ACTIVE | Noted: 2025-02-11

## 2025-02-11 PROBLEM — K56.41 FECAL IMPACTION (HCC): Status: ACTIVE | Noted: 2025-02-11

## 2025-02-11 LAB
ANION GAP SERPL CALC-SCNC: 11 MMOL/L (ref 7–16)
BUN SERPL-MCNC: 20 MG/DL (ref 8–22)
CALCIUM SERPL-MCNC: 9.3 MG/DL (ref 8.4–10.2)
CHLORIDE SERPL-SCNC: 106 MMOL/L (ref 96–112)
CO2 SERPL-SCNC: 24 MMOL/L (ref 20–33)
CREAT SERPL-MCNC: 0.75 MG/DL (ref 0.5–1.4)
D DIMER PPP IA.FEU-MCNC: 1.37 UG/ML (FEU) (ref 0–0.5)
EKG IMPRESSION: NORMAL
ERYTHROCYTE [DISTWIDTH] IN BLOOD BY AUTOMATED COUNT: 46 FL (ref 35.9–50)
GFR SERPLBLD CREATININE-BSD FMLA CKD-EPI: 83 ML/MIN/1.73 M 2
GLUCOSE SERPL-MCNC: 79 MG/DL (ref 65–99)
HCT VFR BLD AUTO: 36.5 % (ref 37–47)
HGB BLD-MCNC: 12.1 G/DL (ref 12–16)
LACTATE SERPL-SCNC: 1.8 MMOL/L (ref 0.5–2)
MCH RBC QN AUTO: 31.4 PG (ref 27–33)
MCHC RBC AUTO-ENTMCNC: 33.2 G/DL (ref 32.2–35.5)
MCV RBC AUTO: 94.8 FL (ref 81.4–97.8)
NT-PROBNP SERPL IA-MCNC: 2630 PG/ML (ref 0–125)
PLATELET # BLD AUTO: 153 K/UL (ref 164–446)
PMV BLD AUTO: 9.8 FL (ref 9–12.9)
POTASSIUM SERPL-SCNC: 3.9 MMOL/L (ref 3.6–5.5)
PROCALCITONIN SERPL-MCNC: 0.16 NG/ML
RBC # BLD AUTO: 3.85 M/UL (ref 4.2–5.4)
SODIUM SERPL-SCNC: 141 MMOL/L (ref 135–145)
TROPONIN T SERPL-MCNC: 18 NG/L (ref 6–19)
WBC # BLD AUTO: 7.1 K/UL (ref 4.8–10.8)

## 2025-02-11 PROCEDURE — 74018 RADEX ABDOMEN 1 VIEW: CPT

## 2025-02-11 PROCEDURE — 84484 ASSAY OF TROPONIN QUANT: CPT

## 2025-02-11 PROCEDURE — 700102 HCHG RX REV CODE 250 W/ 637 OVERRIDE(OP): Performed by: INTERNAL MEDICINE

## 2025-02-11 PROCEDURE — 83605 ASSAY OF LACTIC ACID: CPT

## 2025-02-11 PROCEDURE — 99418 PROLNG IP/OBS E/M EA 15 MIN: CPT | Performed by: INTERNAL MEDICINE

## 2025-02-11 PROCEDURE — 700102 HCHG RX REV CODE 250 W/ 637 OVERRIDE(OP): Performed by: HOSPITALIST

## 2025-02-11 PROCEDURE — 36415 COLL VENOUS BLD VENIPUNCTURE: CPT

## 2025-02-11 PROCEDURE — 84145 PROCALCITONIN (PCT): CPT

## 2025-02-11 PROCEDURE — 94760 N-INVAS EAR/PLS OXIMETRY 1: CPT

## 2025-02-11 PROCEDURE — 51798 US URINE CAPACITY MEASURE: CPT

## 2025-02-11 PROCEDURE — 700117 HCHG RX CONTRAST REV CODE 255: Performed by: INTERNAL MEDICINE

## 2025-02-11 PROCEDURE — 93005 ELECTROCARDIOGRAM TRACING: CPT | Mod: TC | Performed by: INTERNAL MEDICINE

## 2025-02-11 PROCEDURE — 99233 SBSQ HOSP IP/OBS HIGH 50: CPT | Performed by: INTERNAL MEDICINE

## 2025-02-11 PROCEDURE — 93010 ELECTROCARDIOGRAM REPORT: CPT | Performed by: INTERNAL MEDICINE

## 2025-02-11 PROCEDURE — A9270 NON-COVERED ITEM OR SERVICE: HCPCS | Performed by: INTERNAL MEDICINE

## 2025-02-11 PROCEDURE — 85379 FIBRIN DEGRADATION QUANT: CPT

## 2025-02-11 PROCEDURE — 92610 EVALUATE SWALLOWING FUNCTION: CPT

## 2025-02-11 PROCEDURE — 97602 WOUND(S) CARE NON-SELECTIVE: CPT

## 2025-02-11 PROCEDURE — 83880 ASSAY OF NATRIURETIC PEPTIDE: CPT

## 2025-02-11 PROCEDURE — 85027 COMPLETE CBC AUTOMATED: CPT

## 2025-02-11 PROCEDURE — 71275 CT ANGIOGRAPHY CHEST: CPT

## 2025-02-11 PROCEDURE — 770020 HCHG ROOM/CARE - TELE (206)

## 2025-02-11 PROCEDURE — 80048 BASIC METABOLIC PNL TOTAL CA: CPT

## 2025-02-11 PROCEDURE — A9270 NON-COVERED ITEM OR SERVICE: HCPCS | Performed by: HOSPITALIST

## 2025-02-11 RX ORDER — LACTULOSE 10 G/15ML
15 SOLUTION ORAL ONCE
Status: COMPLETED | OUTPATIENT
Start: 2025-02-11 | End: 2025-02-11

## 2025-02-11 RX ORDER — LORAZEPAM 2 MG/ML
0.5 INJECTION INTRAMUSCULAR ONCE
Status: ACTIVE | OUTPATIENT
Start: 2025-02-11 | End: 2025-02-12

## 2025-02-11 RX ADMIN — IOHEXOL 60 ML: 350 INJECTION, SOLUTION INTRAVENOUS at 23:45

## 2025-02-11 RX ADMIN — DONEPEZIL HYDROCHLORIDE 10 MG: 5 TABLET, FILM COATED ORAL at 20:56

## 2025-02-11 RX ADMIN — MEMANTINE 10 MG: 10 TABLET ORAL at 18:39

## 2025-02-11 RX ADMIN — MEMANTINE 10 MG: 10 TABLET ORAL at 06:29

## 2025-02-11 RX ADMIN — THYROID 45 MG: 30 TABLET ORAL at 05:33

## 2025-02-11 RX ADMIN — LACTULOSE 15 ML: 20 SOLUTION ORAL at 23:01

## 2025-02-11 RX ADMIN — FOLIC ACID 1 MG: 1 TABLET ORAL at 06:29

## 2025-02-11 ASSESSMENT — PAIN SCALES - PAIN ASSESSMENT IN ADVANCED DEMENTIA (PAINAD)
BODYLANGUAGE: RELAXED
FACIALEXPRESSION: SMILING OR INEXPRESSIVE
CONSOLABILITY: NO NEED TO CONSOLE
TOTALSCORE: 0
BREATHING: NORMAL

## 2025-02-11 ASSESSMENT — COGNITIVE AND FUNCTIONAL STATUS - GENERAL
MOVING FROM LYING ON BACK TO SITTING ON SIDE OF FLAT BED: A LOT
TURNING FROM BACK TO SIDE WHILE IN FLAT BAD: A LOT
MOBILITY SCORE: 10
CLIMB 3 TO 5 STEPS WITH RAILING: TOTAL
STANDING UP FROM CHAIR USING ARMS: A LOT
PERSONAL GROOMING: A LOT
TOILETING: A LOT
SUGGESTED CMS G CODE MODIFIER MOBILITY: CL
EATING MEALS: A LOT
MOVING TO AND FROM BED TO CHAIR: A LOT
DRESSING REGULAR LOWER BODY CLOTHING: A LOT
DRESSING REGULAR UPPER BODY CLOTHING: A LOT
WALKING IN HOSPITAL ROOM: TOTAL
HELP NEEDED FOR BATHING: A LOT
SUGGESTED CMS G CODE MODIFIER DAILY ACTIVITY: CL
DAILY ACTIVITIY SCORE: 12

## 2025-02-11 ASSESSMENT — PAIN DESCRIPTION - PAIN TYPE: TYPE: ACUTE PAIN

## 2025-02-11 ASSESSMENT — FIBROSIS 4 INDEX: FIB4 SCORE: 3.2

## 2025-02-11 ASSESSMENT — ENCOUNTER SYMPTOMS: WEAKNESS: 1

## 2025-02-11 NOTE — THERAPY
Speech Language Pathology   Clinical Swallow Evaluation     Patient Name: Thuy Albert  AGE:  74 y.o., SEX:  female  Medical Record #: [id number]  Date of Service: 2/11/2025      History of Present Illness  The pt is a 75 y/o F who was admitted 2/9/25. The pt presented with abnormal behavior, falls and fatigue.       PMHx: Alzheimer's dementia, asthma, DVT, GERD    CXR 2/9 - 1.  No acute cardiopulmonary disease.  2.  Atherosclerosis    CTH 2/9 - 1.  No acute intracranial abnormality is identified, there are nonspecific white matter changes, commonly associated with small vessel ischemic disease.  Associated mild cerebral atrophy is noted.  2.  Atherosclerosis.    Speech Pathology  CSE 6/29/24 - SB6/TN0  CSE 6/11/24 - PU4/TN0      General Information:  Vitals  O2 Delivery Device: None - Room Air  Level of Consciousness: Alert, Awake  Patient Behaviors: Confused  Orientation: Self  Follows Directives: Yes - simple commands only      Prior Living Situation & Level of Function:  Prior Services: Continuous (24 Hour) Care Giving Family  Lives with - Patient's Self Care Capacity: Significant Other  Communication: Unknown  Swallowing: Unknown       Oral Mechanism Evaluation:  Dentition: Edentulous   Facial Symmetry: Equal  Labial Observations: WFL   Lingual Observations: Midline            Laryngeal Function:  Secretion Management: Adequate  Voice Quality: WFL       Subjective  RN cleared the pt for speech tx, no acute changes reported. Pt was received asleep but was arousable to voice. She was pleasant and cooperative. No visitors present.      Assessment  Current Method of Nutrition: NPO until cleared by speech pathology  Positioning: Ramos's (60-90 degrees)  Bolus Administration: SLP  O2 Delivery Device: None - Room Air  Factor(s) Affecting Performance: Impaired mental status         Swallowing Trials:  Swallowing Trials  Thin Liquid (TN0): WFL - tsp, cup, straw  Pureed (PU4): WFL  Minced & Moist (MM5):  "Impaired  Soft & Bite Sized (SB6): Impaired      Comments: Pt required verbal cues to open her mouth and accept boluses. Pt with sufficient containment. Mastication time of minced/moist was ~20 sec with expectoration of unchewed boluses and 37-39 sec with diced peaches. Presumed mildly slowed but grossly functional oral manipulation and lingual motion. Complete oral clearance. No overt signs of aspiration noted. No change in vocal quality. No signs of esophageal dysfunction.      Clinical Impressions  The pt presents with oral findings, likely acute (encephalopathy) on chronic (dementia, lack of dentition). No pharyngeal dysphagia is suspected. Aspiration risk from oral intake appears low. A short term modified diet is indicated. Anticipate that swallow function will progress with ongoing medical management.      Recommendations  Diet Consistency: Pureed solids (PU4), Thin liquids (TN0)  Instrumentation: None indicated at this time  Medication: Whole with liquid, Crush with applesauce, as appropriate, Crush with pudding/puree, as appropriate, As tolerated  Supervision: 1:1 feeding with constant supervision  Positioning: Fully upright and midline during oral intake  Risk Management : Small bites/sips, Alternate bites and sips, Slow rate of intake  Oral Care: BID      Results and recs d/w pt and RN. Thank you.       SLP Treatment Plan  Treatment Plan: Dysphagia Treatment  SLP Frequency: 3x Per Week  Estimated Duration: Until Therapy Goals Met      Anticipated Discharge Needs  Discharge Recommendations: Anticipate that the patient will have no further speech therapy needs after discharge from the hospital   Therapy Recommendations Upon DC: Patient / Family / Caregiver Education        Patient / Family Goals  Patient / Family Goal #1: \"That was good\"  Short Term Goals  Short Term Goal # 1: Pt will consume PU4/TN0 without signs/sequelae of aspiration given JAMES Judge   "

## 2025-02-11 NOTE — DISCHARGE PLANNING
"Care Transition Team Assessment    LMSW met with Pt and Pt's Significant other/partner of 29 years and POA Robbie Man who was at bedside. Pt is A&Ox1 and unable to verify information on face sheet. LMSW Introduced self and department roles. Pt lives with her partner Robbie in a two story apartment on the second floor. Pt needs total assistance with her ADLS  and IADLS Pt does not use any DME at baseline as partner reports \"our home is too cluttered for a walker or WC.\" Partner Art takes care of Pt full time. Art reports pt has had HH in the past \" but they really didn't help out a whole lot and I do not want her to go to a nursing home, I will continue to take care of her myself.\"   Pt has a sister in [place] who is also in poor health and no children. Pt is retired and receives monthly SSI of around $980.00 a month. Partner Art is requesting a list of senior low income housing as  they have to move in one year. Pt will need assistance with transportation home.    Information Source  Orientation Level: Oriented to person, Disoriented to situation, Disoriented to time, Disoriented to place  Information Given By: Significant Other  Informant's Name: Robbie Man  Who is responsible for making decisions for patient? : POA  Name(s) of Primary Decision Maker: Robbie Man    Readmission Evaluation  Is this a readmission?: No    Elopement Risk  Legal Hold: No  Ambulatory or Self Mobile in Wheelchair: No-Not an Elopement Risk  Elopement Risk: Not at Risk for Elopement    Discharge Preparedness  What is your plan after discharge?: Uncertain - pending medical team collaboration  What are your discharge supports?: Partner (Pt is not  to Art he is a partner of 29 years has POA)  Prior Functional Level: Total Assist  Difficulity with ADLs: Bathing, Brushing teeth, Dressing, Eating, Toileting, Walking  Difficulity with IADLs: Using the telephone or computer, Managing medication, Laundry, Shopping, Keeping track of " finances, Driving, Cooking    Functional Assesment  Prior Functional Level: Total Assist    Finances  Financial Barriers to Discharge: No  Prescription Coverage: Yes    Vision / Hearing Impairment  Right Eye Vision: Blind  Left Eye Vision: Blind    Advance Directive  Advance Directive?: DPOA for Health Care     Psychological Assessment  History of Substance Abuse: None  History of Psychiatric Problems: No    Discharge Risks or Barriers  Discharge risks or barriers?: Complex medical needs  Patient risk factors: Cognitive / sensory / physical deficit, Complex medical needs, Lack of outside supports, Vulnerable adult    Anticipated Discharge Information  Discharge Disposition: Discharged to home/self care (01)

## 2025-02-11 NOTE — PROGRESS NOTES
4 Eyes Skin Assessment Completed by ANDREW Sofia and ANDREW Matos.    Head WDL  Ears WDL  Nose Redness and Blanching  Mouth WDL  Neck WDL  Breast/Chest Redness and Abrasion  Shoulder Blades Redness and Blanching  Spine Redness and Blanching, bandaid to R back/spine area  (R) Arm/Elbow/Hand Redness and Blanching  (L) Arm/Elbow/Hand Redness and Blanching  Abdomen Redness and Blanching  Groin Redness and Blanching  Scrotum/Coccyx/Buttocks Redness and Blanching  (R) Leg WDL  (L) Leg WDL  (R) Heel/Foot/Toe Redness and Blanching  (L) Heel/Foot/Toe Redness and Blanching          Devices In Places Tele Box, Blood Pressure Cuff, and Pulse Ox      Interventions In Place Heel Mepilex, Pillows, Q2 Turns, and Low Air Loss Mattress    Possible Skin Injury Yes    Pictures Uploaded Into Epic Yes  Wound Consult Placed N/A  RN Wound Prevention Protocol Ordered Yes

## 2025-02-11 NOTE — CARE PLAN
The patient is Stable - Low risk of patient condition declining or worsening    Shift Goals  Clinical Goals: Monitor Mentation/ IV abx, safety  Patient Goals: SPRING  Family Goals: SPRING    Progress made toward(s) clinical / shift goals:        Problem: Skin Integrity  Goal: Skin integrity is maintained or improved  Outcome: Progressing  Note: Turned patient every 2 hours with wedges and pillows, mepilex foam applied to sacral and bony prominences ( elbow and heels), changed diaper and linens when soaked, placed patient on female wick.      Problem: Fall Risk  Goal: Patient will remain free from falls  Outcome: Progressing  Note: Fall and safety precautions in placed, bed/ strip alarm are on, bed in lowest position and side rails up. Hourly rounding on patient.        Patient is not progressing towards the following goals:      Problem: Knowledge Deficit - Standard  Goal: Patient and family/care givers will demonstrate understanding of plan of care, disease process/condition, diagnostic tests and medications  Outcome: Not Progressing  Note: Reoriented patient to place, time and situation, provided patient with clear explanations of procedure and treatment, collaborate with the family with patient care

## 2025-02-11 NOTE — HOSPITAL COURSE
74-year-old female with a past medical history of Alzheimer's dementia, GERD, DVTs, hypothyroidism, asthma, COVID-19 pneumonia on 10/17/2024.  Patient was admitted 12/9/25 with altered mental state, ground-level fall at home, as per .    Upon presentation to the ER, HR 96, hypoxic to 81%, BP lowest 97/53 from 146/61.  Labs showed leukocytosis 13.9, hemoglobin 13.8, platelets 187, sodium 135, potassium 3.8, creatinine 1.1, severely elevated lactic acid of 4.6.  UA was negative.  COVID/flu/RSV PCR negative.    CT head and cervical spine showed no intracranial hemorrhage and no fractures.     CT abdomen pelvis showing fecal impaction in rectal vault.    Patient had a lumbar puncture in the ER.  Meningitis/encephalitis PCR returned negative.  Expanded viral respiratory panel PCR negative.  She was initially started on IV acyclovir, ampicillin, ceftriaxone and vancomycin which was discontinued after CSF labs return negative for meningitis/encephalitis.

## 2025-02-11 NOTE — ASSESSMENT & PLAN NOTE
Baseline Alzheimer's dementia, brought in for abnormal behavior with falls at home, fatigue.  Last known well 1 day prior to admission.  CT head without acute findings.  UA bland.  TSH WNL.  Lactic acid elevated with mild NATALY both of which improved with IVF.  Other imaging notable only for large amount of stool in the rectum.  Underwent LP by ERP, which overall is reassuring, essentially acellular with a mild elevated total protein.  Meningitis/encephalitis PCR panel was negative.  Extended respiratory pathogen panel is negative.      At this point the suspicion for infectious/toxic encephalopathy is low, and all antibiotics have been DC'd.    She is slowly improving back to baseline.    Brain MRI has been ordered

## 2025-02-11 NOTE — PROGRESS NOTES
Telemetry Shift Summary     Per monitor tech:  Rhythm SB/SR  HR Range 42-84 lowest of 39 not sustaining  Ectopy rPac  Measurements 0.18/0.08/0.40      Normal Values  Rhythm SR  HR Range:   Measurements: 0.12-0.20/0.06-0.10/0.30-0.52

## 2025-02-11 NOTE — ASSESSMENT & PLAN NOTE
As per significant other Art, patient is still not at her baseline mental state.  Typically at home she is able to make jokes and talk to him easily.  This could be from her fecal impaction.  I discussed with Art, she presented with significant dehydration.  Patient is not drinking enough water home.  She is dependent to Art to be able to get water.  I explained 1.5 L would be a good average at home.    She had more bowel movements overnight.  XR still shows rectal fecal impaction.   Will give another fleet enema.

## 2025-02-11 NOTE — PROGRESS NOTES
[facility] Medicine Daily Progress Note    Date of Service  2/11/2025    Chief Complaint  Thuy Albert is a 74 y.o. female admitted 2/9/2025 with   Chief Complaint   Patient presents with    GLF     Pt reports two GLFs, pt cannot state time of falls. Pt reports hitting her head during fall. Denies blood thinners.     Mental Status Change     BIBA from home, pts  reports pt had increased confusion this AM. Pt is A+O 1, GCS 10 with EMS. Pt brought directly to CT from EMS [facility] after seen by ERP.      Hospital Course  74-year-old female with a past medical history of Alzheimer's dementia, GERD, DVTs, hypothyroidism, asthma, COVID-19 pneumonia on 10/17/2024.  Patient was admitted 12/9/25 with altered mental state, ground-level fall at home, as per .    Upon presentation to the ER, HR 96, hypoxic to 81%, BP lowest 97/53 from 146/61.  Labs showed leukocytosis 13.9, hemoglobin 13.8, platelets 187, sodium 135, potassium 3.8, creatinine 1.1, severely elevated lactic acid of 4.6.  UA was negative.  COVID/flu/RSV PCR negative.    CT head and cervical spine showed no intracranial hemorrhage and no fractures.     CT abdomen pelvis showing fecal impaction in rectal vault.    Patient had a lumbar puncture in the ER.  Meningitis/encephalitis PCR returned negative.  Expanded viral respiratory panel PCR negative.  She was initially started on IV acyclovir, ampicillin, ceftriaxone and vancomycin which was discontinued after CSF labs return negative for meningitis/encephalitis.     Interval Problem Update  Patient has poor historian but she is complaining of chest pain.  I checked a ECG.  There is no ST elevations.  I checked troponins which was 18.    Lactic acid 1.8. Procalcitonin 0.16.  proBNP 2630 but patient is not showing signs of significant peripheral edema.    D-dimer 1.37, I am ordering a CTA chest.  Continue telemetry monitoring.  Speech therapy worked with patient, recommending purée solid and thin  liquids.    I spent an extra 15 minutes on patient's case.  I called significant other Art Donovan. I gave updates.  Patient has Alzheimers, sits in the TV. Has gone blind but listens to the old TV movies and shows.   She woke up Sunday morning, but she was not able to take her meds with water.  Everyday, she has stool incontinence in her underwear.  I explained current plans with imaging and possible non-infectious explanation for her symptoms.    I have discussed this patient's plan of care and discharge plan at IDT rounds today with Case Management, Nursing, Nursing leadership, and other members of the IDT team.    Consultants/Specialty  pulmonary    Code Status  Full Code    Disposition  The patient is not medically cleared for discharge to home or a post-acute facility.    I have placed the appropriate orders for post-discharge needs.    Review of Systems  Review of Systems   Cardiovascular:  Positive for chest pain. Negative for leg swelling.   Neurological:  Positive for weakness.   All other systems reviewed and are negative.       Physical Exam  Temp:  [36.6 °C (97.9 °F)-37.1 °C (98.8 °F)] 36.7 °C (98 °F)  Pulse:  [50-60] 55  Resp:  [16-18] 18  BP: (110-142)/(54-75) 142/75  SpO2:  [95 %-98 %] 98 %    Physical Exam  Vitals and nursing note reviewed.   Constitutional:       General: She is not in acute distress.     Appearance: She is not ill-appearing.      Comments: Frail appearing elderly female   HENT:      Head: Normocephalic and atraumatic.      Comments: Temporal muscle wasting positive     Mouth/Throat:      Mouth: Mucous membranes are dry.      Pharynx: No oropharyngeal exudate.   Eyes:      General: No scleral icterus.     Extraocular Movements: Extraocular movements intact.   Cardiovascular:      Rate and Rhythm: Normal rate and regular rhythm.      Pulses: Normal pulses.      Heart sounds: Normal heart sounds. No murmur heard.  Pulmonary:      Effort: Pulmonary effort is normal. No respiratory  distress.      Breath sounds: Normal breath sounds. No wheezing.   Abdominal:      General: Abdomen is flat. Bowel sounds are normal. There is no distension.      Palpations: Abdomen is soft.      Tenderness: There is no abdominal tenderness.   Musculoskeletal:         General: No swelling or tenderness.      Cervical back: Normal range of motion. No rigidity.      Comments: Sarcopenic. Bilateral hand muscle atrophy noted.   Skin:     General: Skin is warm.      Capillary Refill: Capillary refill takes less than 2 seconds.      Coloration: Skin is not jaundiced.      Findings: No erythema.   Neurological:      Mental Status: She is alert and oriented to person, place, and time. Mental status is at baseline.      Motor: Weakness present.   Psychiatric:         Mood and Affect: Mood normal.         Behavior: Behavior normal.         Thought Content: Thought content normal.         Judgment: Judgment normal.         Fluids  No intake or output data in the 24 hours ending 02/11/25 1922       Laboratory  Recent Labs     02/09/25  2245 02/10/25  0751 02/11/25  0157   WBC 13.9* 9.3 7.1   RBC 4.41 3.78* 3.85*   HEMOGLOBIN 13.8 11.8* 12.1   HEMATOCRIT 41.0 35.1* 36.5*   MCV 93.0 92.9 94.8   MCH 31.3 31.2 31.4   MCHC 33.7 33.6 33.2   RDW 43.8 44.4 46.0   PLATELETCT 187 149* 153*   MPV 9.6 9.7 9.8     Recent Labs     02/09/25 2245 02/10/25  0751 02/11/25  0157   SODIUM 135 137 141   POTASSIUM 3.8 3.8 3.9   CHLORIDE 97 102 106   CO2 22 24 24   GLUCOSE 167* 122* 79   BUN 33* 25* 20   CREATININE 1.10 0.79 0.75   CALCIUM 9.2 8.9 9.3     Recent Labs     02/10/25  1732   INR 1.04               Imaging  CT-HEAD W/O   Final Result         1.  No acute intracranial abnormality is identified, there are nonspecific white matter changes, commonly associated with small vessel ischemic disease.  Associated mild cerebral atrophy is noted.   2.  Atherosclerosis.               CT-CSPINE WITHOUT PLUS RECONS   Final Result         1.   Multilevel degenerative changes of the cervical spine limit diagnostic sensitivity of this examination, otherwise no acute traumatic bony injury of the cervical spine is apparent.   2.  Atherosclerosis      DX-CHEST-PORTABLE (1 VIEW)   Final Result         1.  No acute cardiopulmonary disease.   2.  Atherosclerosis      CT-ABDOMEN-PELVIS WITH   Final Result         1.  Large quantity of stool in the rectal vault, appearance suggesting constipation with probable rectal fecal impaction   2.  Atherosclerosis and atherosclerotic coronary artery disease      MR-BRAIN-WITH & W/O    (Results Pending)   CT-CTA CHEST PULMONARY ARTERY W/ RECONS    (Results Pending)   CH-RJCOSXN-6 VIEW    (Results Pending)        Assessment/Plan  * Acute metabolic encephalopathy- (present on admission)  Assessment & Plan  Differential diagnosis: Dehydration, fecal impaction, Alzheimer's dementia with acute delirium, medications, infection  Pending brain MRI  Continue IV fluids with LR. proBNP 2630 but patient is not showing signs of significant peripheral edema.     Encounter for family conference without patient present  Assessment & Plan  I spent an extra 15 minutes on patient's case.  I called significant other Art [place]. I gave updates.  Patient has Alzheimers, sits in the TV. Has gone blind but listens to the old TV movies and shows.   She woke up Sunday morning, but she was not able to take her meds with water.  Everyday, she has stool incontinence in her underwear.  I explained current plans with imaging and possible non-infectious explanation for her symptoms.    Other chest pain- (present on admission)  Assessment & Plan  The 10-year ASCVD risk score (Belgica JULIO, et al., 2019) is: 17.1%    Values used to calculate the score:      Age: 74 years      Sex: Female      Is Non- : No      Diabetic: No      Tobacco smoker: No      Systolic Blood Pressure: 142 mmHg      Is BP treated: No      HDL Cholesterol: 40  mg/dL      Total Cholesterol: 166 mg/dL    Patient has poor historian but she is complaining of chest pain.  I checked a ECG, there is no ST elevations.  I checked troponins which was 18.    D-dimer 1.37, I am ordering a CTA chest.  Continue telemetry monitoring.    Fecal impaction (HCC)- (present on admission)  Assessment & Plan      I reviewed CT abdomen pelvis showing fecal impaction in rectal vault.  Continue bowel regimen, I added suppository.  Ammonia was 12    Septic shock (HCC)- (present on admission)  Assessment & Plan  Inpatient Status to ICU  This is Sepsis Present on admission  SIRS criteria identified on my evaluation include: Tachycardia, with heart rate greater than 90 BPM and Leukocytosis, with WBC greater than 12,000  Clinical indicators of end organ dysfunction include Lactic Acid greater than 2, Toxic Metabolic Encephalopathy, and GCS < 15  Source is indeterminate  Sepsis protocol initiated  Crystalloid Fluid Administration: Fluid resuscitation ordered per standard protocol - 30 mL/kg per current or ideal body weight  IV antibiotics as appropriate for source of sepsis  Reassessment: I have reassessed the patient's hemodynamic status    Patient had a lumbar puncture in the ER.  Meningitis/encephalitis PCR returned negative.  Expanded viral respiratory panel PCR negative.  She was initially started on IV acyclovir, ampicillin, ceftriaxone and vancomycin which was discontinued after CSF labs return negative for meningitis/encephalitis  I reviewed labs, WBC 7.1, procalcitonin 0.16.  Blood cultures negative.  Ucx negative.  Unclear if infection is present.      Severe early onset Alzheimer's dementia with other behavioral disturbance (HCC)- (present on admission)  Assessment & Plan  Continue amantadine and donepezil    Hypothyroidism- (present on admission)  Assessment & Plan  Continue home [place] Thyroid         VTE prophylaxis:   SCDs/TEDs      I have performed a physical exam and reviewed and  updated ROS and Plan today (2/11/2025). In review of yesterday's note (2/10/2025), there are no changes except as documented above.      Total time spent 51 minutes. I spent greater than 50% of the time for patient care, including unit/floor time, multiple face-to-face encounters with the patient, counseling on treatment plan and discussion with bedside RN.  Further, I spent time on my own review of patient's overnight events, RN notes, imaging and lab analysis, and developing my assessment and plan above.  In addition, working with , social workers, and Charge RN on case coordination on this date.    This note was generated using voice recognition software which has a chance of producing errors of grammar and content.  I have made every reasonable attempt to find and correct any errors, but it should be expected that some may not be found prior to finalization of this note.

## 2025-02-11 NOTE — PROGRESS NOTES
Received a call from patient's , inquiring about patient's medication administration, updated and explained to the  that patient is currently on strict NPO because due to earlier episodes of lethargy.  Reached out to DM to evaluate patient's NPO status. On assessment, patient is alert but oriented to self, conducted a swallowing assessment with patient offering small sips of water to evaluate ability to swallow safely, patient successfully swallowed sips of water without coughing or signs of distress, medication given with sips of water as ordered.

## 2025-02-12 ENCOUNTER — APPOINTMENT (OUTPATIENT)
Dept: RADIOLOGY | Facility: MEDICAL CENTER | Age: 75
DRG: 071 | End: 2025-02-12
Attending: HOSPITALIST
Payer: MEDICARE

## 2025-02-12 LAB
ALBUMIN SERPL BCP-MCNC: 3.4 G/DL (ref 3.2–4.9)
BACTERIA UR CULT: NORMAL
BUN SERPL-MCNC: 15 MG/DL (ref 8–22)
CALCIUM ALBUM COR SERPL-MCNC: 9.7 MG/DL (ref 8.5–10.5)
CALCIUM SERPL-MCNC: 9.2 MG/DL (ref 8.4–10.2)
CHLORIDE SERPL-SCNC: 103 MMOL/L (ref 96–112)
CO2 SERPL-SCNC: 23 MMOL/L (ref 20–33)
CREAT SERPL-MCNC: 0.64 MG/DL (ref 0.5–1.4)
ERYTHROCYTE [DISTWIDTH] IN BLOOD BY AUTOMATED COUNT: 45.7 FL (ref 35.9–50)
GFR SERPLBLD CREATININE-BSD FMLA CKD-EPI: 92 ML/MIN/1.73 M 2
GLUCOSE SERPL-MCNC: 90 MG/DL (ref 65–99)
HCT VFR BLD AUTO: 40.8 % (ref 37–47)
HGB BLD-MCNC: 13.3 G/DL (ref 12–16)
MAGNESIUM SERPL-MCNC: 2.1 MG/DL (ref 1.5–2.5)
MCH RBC QN AUTO: 31.1 PG (ref 27–33)
MCHC RBC AUTO-ENTMCNC: 32.6 G/DL (ref 32.2–35.5)
MCV RBC AUTO: 95.3 FL (ref 81.4–97.8)
PHOSPHATE SERPL-MCNC: 2.9 MG/DL (ref 2.5–4.5)
PLATELET # BLD AUTO: 152 K/UL (ref 164–446)
PMV BLD AUTO: 9.8 FL (ref 9–12.9)
POTASSIUM SERPL-SCNC: 3.8 MMOL/L (ref 3.6–5.5)
RBC # BLD AUTO: 4.28 M/UL (ref 4.2–5.4)
SIGNIFICANT IND 70042: NORMAL
SITE SITE: NORMAL
SODIUM SERPL-SCNC: 138 MMOL/L (ref 135–145)
SOURCE SOURCE: NORMAL
WBC # BLD AUTO: 8.7 K/UL (ref 4.8–10.8)

## 2025-02-12 PROCEDURE — 70553 MRI BRAIN STEM W/O & W/DYE: CPT

## 2025-02-12 PROCEDURE — 700105 HCHG RX REV CODE 258: Performed by: INTERNAL MEDICINE

## 2025-02-12 PROCEDURE — 97162 PT EVAL MOD COMPLEX 30 MIN: CPT

## 2025-02-12 PROCEDURE — 770020 HCHG ROOM/CARE - TELE (206)

## 2025-02-12 PROCEDURE — 99233 SBSQ HOSP IP/OBS HIGH 50: CPT | Performed by: INTERNAL MEDICINE

## 2025-02-12 PROCEDURE — 92526 ORAL FUNCTION THERAPY: CPT

## 2025-02-12 PROCEDURE — 36415 COLL VENOUS BLD VENIPUNCTURE: CPT

## 2025-02-12 PROCEDURE — 80069 RENAL FUNCTION PANEL: CPT

## 2025-02-12 PROCEDURE — 85027 COMPLETE CBC AUTOMATED: CPT

## 2025-02-12 PROCEDURE — 700102 HCHG RX REV CODE 250 W/ 637 OVERRIDE(OP): Performed by: INTERNAL MEDICINE

## 2025-02-12 PROCEDURE — 700102 HCHG RX REV CODE 250 W/ 637 OVERRIDE(OP): Performed by: HOSPITALIST

## 2025-02-12 PROCEDURE — A9270 NON-COVERED ITEM OR SERVICE: HCPCS | Performed by: INTERNAL MEDICINE

## 2025-02-12 PROCEDURE — 700111 HCHG RX REV CODE 636 W/ 250 OVERRIDE (IP): Performed by: INTERNAL MEDICINE

## 2025-02-12 PROCEDURE — 94760 N-INVAS EAR/PLS OXIMETRY 1: CPT

## 2025-02-12 PROCEDURE — 97166 OT EVAL MOD COMPLEX 45 MIN: CPT

## 2025-02-12 PROCEDURE — 700117 HCHG RX CONTRAST REV CODE 255: Mod: JZ | Performed by: HOSPITALIST

## 2025-02-12 PROCEDURE — A9579 GAD-BASE MR CONTRAST NOS,1ML: HCPCS | Mod: JZ | Performed by: HOSPITALIST

## 2025-02-12 PROCEDURE — A9270 NON-COVERED ITEM OR SERVICE: HCPCS | Performed by: HOSPITALIST

## 2025-02-12 PROCEDURE — 83735 ASSAY OF MAGNESIUM: CPT

## 2025-02-12 RX ORDER — HEPARIN SODIUM 5000 [USP'U]/ML
5000 INJECTION, SOLUTION INTRAVENOUS; SUBCUTANEOUS EVERY 8 HOURS
Status: DISCONTINUED | OUTPATIENT
Start: 2025-02-12 | End: 2025-02-17 | Stop reason: HOSPADM

## 2025-02-12 RX ORDER — BISACODYL 10 MG
10 SUPPOSITORY, RECTAL RECTAL DAILY
Status: DISCONTINUED | OUTPATIENT
Start: 2025-02-12 | End: 2025-02-13

## 2025-02-12 RX ORDER — LACTULOSE 10 G/15ML
15 SOLUTION ORAL ONCE
Status: COMPLETED | OUTPATIENT
Start: 2025-02-12 | End: 2025-02-12

## 2025-02-12 RX ORDER — OMEPRAZOLE 20 MG/1
20 CAPSULE, DELAYED RELEASE ORAL DAILY
Status: DISCONTINUED | OUTPATIENT
Start: 2025-02-13 | End: 2025-02-17 | Stop reason: HOSPADM

## 2025-02-12 RX ADMIN — FOLIC ACID 1 MG: 1 TABLET ORAL at 05:10

## 2025-02-12 RX ADMIN — THYROID 45 MG: 30 TABLET ORAL at 04:41

## 2025-02-12 RX ADMIN — LACTULOSE 15 ML: 20 SOLUTION ORAL at 17:55

## 2025-02-12 RX ADMIN — GADOTERIDOL 10 ML: 279.3 INJECTION, SOLUTION INTRAVENOUS at 13:54

## 2025-02-12 RX ADMIN — SODIUM CHLORIDE, POTASSIUM CHLORIDE, SODIUM LACTATE AND CALCIUM CHLORIDE: 600; 310; 30; 20 INJECTION, SOLUTION INTRAVENOUS at 10:57

## 2025-02-12 RX ADMIN — DONEPEZIL HYDROCHLORIDE 10 MG: 5 TABLET, FILM COATED ORAL at 21:32

## 2025-02-12 RX ADMIN — SODIUM CHLORIDE, POTASSIUM CHLORIDE, SODIUM LACTATE AND CALCIUM CHLORIDE: 600; 310; 30; 20 INJECTION, SOLUTION INTRAVENOUS at 01:00

## 2025-02-12 RX ADMIN — SODIUM CHLORIDE, POTASSIUM CHLORIDE, SODIUM LACTATE AND CALCIUM CHLORIDE: 600; 310; 30; 20 INJECTION, SOLUTION INTRAVENOUS at 21:39

## 2025-02-12 RX ADMIN — MEMANTINE 10 MG: 10 TABLET ORAL at 17:53

## 2025-02-12 RX ADMIN — HEPARIN SODIUM 5000 UNITS: 5000 INJECTION, SOLUTION INTRAVENOUS; SUBCUTANEOUS at 21:32

## 2025-02-12 RX ADMIN — MEMANTINE 10 MG: 10 TABLET ORAL at 05:10

## 2025-02-12 ASSESSMENT — COGNITIVE AND FUNCTIONAL STATUS - GENERAL
TURNING FROM BACK TO SIDE WHILE IN FLAT BAD: A LITTLE
MOVING TO AND FROM BED TO CHAIR: A LITTLE
CLIMB 3 TO 5 STEPS WITH RAILING: A LOT
PERSONAL GROOMING: A LOT
DRESSING REGULAR LOWER BODY CLOTHING: A LOT
DAILY ACTIVITIY SCORE: 11
WALKING IN HOSPITAL ROOM: A LOT
EATING MEALS: A LOT
MOVING FROM LYING ON BACK TO SITTING ON SIDE OF FLAT BED: A LITTLE
SUGGESTED CMS G CODE MODIFIER DAILY ACTIVITY: CL
HELP NEEDED FOR BATHING: TOTAL
SUGGESTED CMS G CODE MODIFIER MOBILITY: CK
DRESSING REGULAR UPPER BODY CLOTHING: A LOT
TOILETING: A LOT
STANDING UP FROM CHAIR USING ARMS: A LITTLE
MOBILITY SCORE: 16

## 2025-02-12 ASSESSMENT — ACTIVITIES OF DAILY LIVING (ADL): TOILETING: UNABLE TO DETERMINE AT THIS TIME

## 2025-02-12 ASSESSMENT — PAIN DESCRIPTION - PAIN TYPE
TYPE: ACUTE PAIN
TYPE: ACUTE PAIN

## 2025-02-12 ASSESSMENT — FIBROSIS 4 INDEX: FIB4 SCORE: 3.22

## 2025-02-12 ASSESSMENT — GAIT ASSESSMENTS
ASSISTIVE DEVICE: HAND HELD ASSIST
DISTANCE (FEET): 5
GAIT LEVEL OF ASSIST: MINIMAL ASSIST

## 2025-02-12 ASSESSMENT — ENCOUNTER SYMPTOMS: WEAKNESS: 1

## 2025-02-12 NOTE — CARE PLAN
The patient is Watcher - Medium risk of patient condition declining or worsening    Shift Goals  Clinical Goals: monitor mentation, safety, monitor stool, MRI, speech consult  Patient Goals: rest  Family Goals: SPRING    Progress made toward(s) clinical / shift goals:  patient remains A&Ox1, [id number] continued to prevent further skin breakdown. Wound care consulted. Patient had loose stools during shift due to previous bowel regimen given to help with constipation. SLP consulted and cleared patient for pureed diet 1:1 supervision.       Problem: Skin Integrity  Goal: Skin integrity is maintained or improved  Outcome: Progressing     Problem: Fall Risk  Goal: Patient will remain free from falls  Outcome: Progressing     Problem: Hemodynamics  Goal: Patient's hemodynamics, fluid balance and neurologic status will be stable or improve  Outcome: Progressing       Patient is not progressing towards the following goals:

## 2025-02-12 NOTE — PROGRESS NOTES
Telemetry Shift Summary    Per monitor tech:  Rhythm SR/SB  HR Range 44-78  Ectopy rPAC  Measurements 0.18/0.08/0.44      Normal Values  Rhythm SR  HR Range:   Measurements: 0.12-0.20/0.06-0.10/0.30-0.52

## 2025-02-12 NOTE — PROGRESS NOTES
Patient to MRI.    1415 Patient back to room sitting up in chair. Chair alarm in place. Tele monitor and continuous pulse ox in place.

## 2025-02-12 NOTE — ASSESSMENT & PLAN NOTE
Patient has poor historian but she is complaining of chest pain.  I checked a ECG, there is no ST elevations.  I checked troponins which was 18.    I reviewed patient's CTA chest.  Radiologist was not able to comment on any distal findings to confirm PE.  Unclear if the yellow arrows are truly pulmonary embolisms.  Of note troponins did not elevate  Patient was not complaining of chest pain today at this time we will defer any anticoagulation as there is no concrete evidence of any pulmonary embolism.  These are likely artifact on CT scan.  Chest x-ray today showing more interstitial prominence

## 2025-02-12 NOTE — THERAPY
Occupational Therapy   Initial Evaluation     Patient Name: Thuy Albert  Age:  74 y.o., Sex:  female  Medical Record #: [id number]  Today's Date: 2/12/2025     Precautions  Precautions: Fall Risk, Swallow Precautions    Assessment  Patient is 74 y.o. female admitted due to falls at home, diagnosis of septic shock. Additional factors influencing patient status / progress: hx of Alzheimer's, GERD, DVT's. Ox1, follows 1-step commands. Impaired vision, balance,safety,activity tolerance. Tolerates UIC, ModA with feeding and grooming. ADL transfer with [facility]. Lives with spouse who is caregiver. Unable to obtain full PLOF at this time. OT to follow for caregiver training.       Plan  Occupational Therapy Initial Treatment Plan   Treatment Interventions: (P) Self Care / Activities of Daily Living, Neuro Re-Education / Balance, Therapeutic Activity, Family / Caregiver Training  Treatment Frequency: (P) 2 Times per Week  Duration: (P) Until Therapy Goals Met    DC Equipment Recommendations: (P) Unable to determine at this time  Discharge Recommendations: (P) Recommend post-acute placement for additional occupational therapy services prior to discharge home     Subjective  Agreeable     Objective   02/12/25 1150   Prior Living Situation   Prior Services Continuous (24 Hour) Care Giving Family   Housing / Facility 2 Story Apartment / Condo   Equipment Owned Unable to Determine At This Time   Lives with - Patient's Self Care Capacity Spouse   Comments Pt with advanced Alzheimer's; spouse is not present to obtain PLOF.   Prior Level of ADL Function   Self Feeding Unable To Determine At This Time   Grooming / Hygiene Unable To Determine At This Time   Bathing Unable To Determine At This Time   Dressing Unable To Determine At This Time   Toileting Unable To Determine At This Time   Comments per RN, spouse is caregiver   Prior Level of IADL Function   Medication Management Requires Assist   Laundry Requires Assist   Kitchen  Mobility Requires Assist   Finances Requires Assist   Home Management Requires Assist   Shopping Requires Assist   Prior Level Of Mobility Unable to Determine At This Time   Driving / Transportation Relatives / Others Provide Transportation   Occupation (Pre-Hospital Vocational) Not Employed   Leisure Interests Unable To Determine At This Time   Precautions   Precautions Fall Risk;Swallow Precautions   Vitals   O2 Delivery Device None - Room Air   Pain 0 - 10 Group   Therapist Pain Assessment 0;Nurse Notified   Cognition    Cognition / Consciousness X   Orientation Level Not Oriented to Place;Not Oriented to Month;Not Oriented to Year   Level of Consciousness Alert   Ability To Follow Commands 1 Step   Safety Awareness Impaired   New Learning Impaired   Attention Impaired   Sequencing Impaired   Initiation Impaired   Comments Ox1   Passive ROM Upper Body   Passive ROM Upper Body WDL   Active ROM Upper Body   Active ROM Upper Body  WDL   Strength Upper Body   Upper Body Strength  WDL   Sensation Upper Body   Upper Extremity Sensation  Not Tested   Upper Body Muscle Tone   Upper Body Muscle Tone  WDL   Coordination Upper Body   Coordination WDL   Balance Assessment   Sitting Balance (Static) Fair +   Sitting Balance (Dynamic) Fair   Standing Balance (Static) Fair -   Standing Balance (Dynamic) Poor   Weight Shift Sitting Fair   Weight Shift Standing Poor   Bed Mobility    Supine to Sit Minimal Assist   ADL Assessment   Eating Moderate Assist   Grooming Moderate Assist;Seated   Upper Body Dressing Moderate Assist   Lower Body Dressing Maximal Assist   Toileting Total Assist   How much help from another person does the patient currently need...   Putting on and taking off regular lower body clothing? 2   Bathing (including washing, rinsing, and drying)? 1   Toileting, which includes using a toilet, bedpan, or urinal? 2   Putting on and taking off regular upper body clothing? 2   Taking care of personal grooming such as  brushing teeth? 2   Eating meals? 2   6 Clicks Daily Activity Score 11   Functional Mobility   Sit to Stand Minimal Assist   Bed, Chair, Wheelchair Transfer Minimal Assist   Transfer Method Stand Step   Comments HHA   Visual Perception   Visual Perception  X   Comments glaucoma   Activity Tolerance   Sitting in Chair 1 hr   Sitting Edge of Bed 8   Standing 1   Short Term Goals   Short Term Goal # 1 Feeding with CGA within 5 days   Short Term Goal # 2 Grooming with [facility], cues within 5 days   Short Term Goal # 3 ADL transfer with CGA within 5 days   Education Group   Role of Occupational Therapist Patient Response Patient;Acceptance;Explanation;No Learning Evidence;Verbal Demonstration   Occupational Therapy Initial Treatment Plan    Treatment Interventions Self Care / Activities of Daily Living;Neuro Re-Education / Balance;Therapeutic Activity;Family / Caregiver Training   Treatment Frequency 2 Times per Week   Duration Until Therapy Goals Met   Problem List   Problem List Decreased Active Daily Living Skills;Decreased Functional Mobility;Safety Awareness Deficits / Cognition;Impaired Cognitive Function;Impaired Postural Control / Balance;Impaired Vision   Anticipated Discharge Equipment and Recommendations   DC Equipment Recommendations Unable to determine at this time   Discharge Recommendations Recommend post-acute placement for additional occupational therapy services prior to discharge home   Interdisciplinary Plan of Care Collaboration   IDT Collaboration with  Nursing   Patient Position at End of Therapy Seated;Chair Alarm On;Call Light within Reach;Tray Table within Reach;Phone within Reach   Collaboration Comments GRICELDA Swan

## 2025-02-12 NOTE — CARE PLAN
The patient is Watcher - Medium risk of patient condition declining or worsening    Shift Goals  Clinical Goals: Safety, Monitor mentation, CTA chest  Patient Goals: Rest  Family Goals: SPRING    Progress made toward(s) clinical / shift goals:        Problem: Skin Integrity  Goal: Skin integrity is maintained or improved  Outcome: Progressing  Note: Turned patient to sides every 2 hours with wedges, offloading foam placed. Provided patient with perineal care, cleaned after bowel movement to maintain patient skin integrity.      Problem: Fall Risk  Goal: Patient will remain free from falls  Outcome: Progressing  Note: Fall and safety precaution in placed, bed alarm on and bed in lowest position, hourly rounding and position patient every 2 hours.        Patient is not progressing towards the following goals:      Problem: Knowledge Deficit - Standard  Goal: Patient and family/care givers will demonstrate understanding of plan of care, disease process/condition, diagnostic tests and medications  Outcome: Not Progressing  Note: Patient oriented to self, reoriented patient to place, time and situation, used simple language when communicating to patient. Explained and educated every procedure and treatment plan

## 2025-02-12 NOTE — THERAPY
Physical Therapy   Initial Evaluation     Patient Name: Thuy Albert  Age:  74 y.o., Sex:  female  Medical Record #: [id number]  Today's Date: 2/12/2025     Precautions  Precautions: (P) Fall Risk;Swallow Precautions    Assessment  Patient is 74 y.o. female who resides with her SO. Pt with Pm HX:  Alzheimer's dementia, GERD, DVTs, hypothyroidism, asthma, COVID-19 pneumonia on 10/17/2024.  Patient was admitted 2/9/25 with altered mental state, ground-level fall at home.   Dx acute metabolic encephalopathy.  Pt A & O x 1, pt able to follow simple single step commands with manual and verbal cues to initiate and sustain task.   Pt ambulated with HHA from bed to chair. PT services to follow to aide in advancing mobility to household distance ambulation.   Plan    Physical Therapy Initial Treatment Plan   Treatment Plan : (P) Stair Training, Self Care / Home Evaluation, Neuro Re-Education / Balance, Gait Training, Family / Caregiver Training, Therapeutic Activities  Treatment Frequency: (P) 3 Times per Week  Duration: (P) Until Therapy Goals Met    DC Equipment Recommendations: (P) Unable to determine at this time  Discharge Recommendations: (P)  (pending families ability to provide care.)         Initial Contact Note    Initial Contact Note Order Received and Verified, Physical Therapy Evaluation in Progress with Full Report to Follow.   Precautions   Precautions Fall Risk;Swallow Precautions   Pain 0 - 10 Group   Location Chest   Location Orientation Mid;Left   Pain Rating Scale (NPRS) 4   Description Aching   Comfort Goal Perform Activity;Comfort with Movement;Sleep Comfortably   Prior Living Situation   Prior Services Continuous (24 Hour) Care Giving Family   Housing / Facility 2 Story Apartment / Condo   Steps Into Home   (FOS)   Equipment Owned Unable to Determine At This Time   Lives with - Patient's Self Care Capacity Significant Other   Comments Per EMR, pt was seen by [facility] in dec 2014   Prior Level of  Functional Mobility   Bed Mobility Required Assist   Transfer Status Required Assist   Ambulation Required Assist   Assistive Devices Used Unable to Determine At This Time   Stairs Required Assist   History of Falls   Date of Last Fall   (per EMR 7/1/24)   Cognition    Orientation Level   (A&O x 1)   Level of Consciousness Alert   Ability To Follow Commands 1 Step   Safety Awareness Impaired   New Learning Impaired   Attention Impaired   Sequencing Impaired   Initiation Impaired   Strength Upper Body   Upper Body Strength  WDL   Sensation Lower Body   Lower Extremity Sensation   Not Tested   Lower Body Muscle Tone   Lower Body Muscle Tone  WDL   Neurological Concerns   Neurological Concerns No   Vision   Vision Comments glaucoma.   Balance Assessment   Sitting Balance (Static) Fair +   Sitting Balance (Dynamic) Fair   Standing Balance (Static) Fair -   Standing Balance (Dynamic) Poor   Weight Shift Sitting Fair   Weight Shift Standing Poor   Comments HHA, cues to initiate.   Bed Mobility    Supine to Sit Minimal Assist   Sit to Supine Minimal Assist   Gait Analysis   Gait Level Of Assist Minimal Assist   Assistive Device Hand Held Assist   Distance (Feet) 5   # of Times Distance was Traveled 1   Deviation Shuffled Gait;Decreased Heel Strike;Decreased Toe Off;Step To;Decreased Base Of Support;Bradykinetic   # of Stairs Climbed 0   Weight Bearing Status no restrictions.   Functional Mobility   Sit to Stand Minimal Assist   Bed, Chair, Wheelchair Transfer Minimal Assist   6 Clicks Assessment - How much HELP from another person do you currently need... (If the patient hasn't done an activity recently, how much help from another person do you think he/she would need if he/she tried?)   Turning from your back to your side while in a flat bed without using bedrails? 3   Moving from lying on your back to sitting on the side of a flat bed without using bedrails? 3   Moving to and from a bed to a chair (including a  wheelchair)? 3   Standing up from a chair using your arms (e.g., wheelchair, or bedside chair)? 3   Walking in hospital room? 2   Climbing 3-5 steps with a railing? 2   6 clicks Mobility Score 16   Activity Tolerance   Sitting in Chair up to chair post session.   Sitting Edge of Bed 8 min   Standing 1 min   Short Term Goals    Short Term Goal # 1 Pt will ambulate with LRAD for 50 ft w/ CGA by tx 6   Short Term Goal # 2 Pt will transfer with LRAD at CGA level by tx 6   Short Term Goal # 3 Pt will ascend and descend steps x 2 with Min A and bilateral railing by tx 6   Education Group   Education Provided Role of Physical Therapist   Role of Physical Therapist Patient Response Patient;Nonacceptance;Explanation;Reinforcement Needed;No Learning Evidence   Physical Therapy Initial Treatment Plan    Treatment Plan  Stair Training;Self Care / Home Evaluation;Neuro Re-Education / Balance;Gait Training;Family / Caregiver Training;Therapeutic Activities   Treatment Frequency 3 Times per Week   Duration Until Therapy Goals Met   Problem List    Problems Impaired Ambulation;Functional Strength Deficit;Decreased Activity Tolerance;Impaired Transfers;Impaired Bed Mobility   Anticipated Discharge Equipment and Recommendations   DC Equipment Recommendations Unable to determine at this time   Discharge Recommendations   (pending families ability to provide care.)   Interdisciplinary Plan of Care Collaboration   IDT Collaboration with  Nursing   Patient Position at End of Therapy Seated;Chair Alarm On;Call Light within Reach;Tray Table within Reach;Phone within Reach   Collaboration Comments Staff updated   Session Information   Date / Session Number  2/12-1 ( 1/3,2/18)

## 2025-02-12 NOTE — CARE PLAN
The patient is Watcher - Medium risk of patient condition declining or worsening    Shift Goals  Clinical Goals: monitor mentation, MRI  Patient Goals: eat  Family Goals: SPRING    Progress made toward(s) clinical / shift goals:      MRI was completed.     Problem: Hemodynamics  Goal: Patient's hemodynamics, fluid balance and neurologic status will be stable or improve  Outcome: Progressing  Note: IV maintenance fluids administered per MAR.     Problem: Urinary - Renal Perfusion  Goal: Ability to achieve and maintain adequate renal perfusion and functioning will improve  Outcome: Progressing  Note: Patient had incontinent urine. Unable to measure output.      Problem: Respiratory  Goal: Patient will achieve/maintain optimum respiratory ventilation and gas exchange  Outcome: Progressing  Note: Patient denied difficulty breathing. SpO2 mid 90s on room air.      Problem: Skin Integrity  Goal: Skin integrity is maintained or improved  Outcome: Progressing  Note: Q2 turns in place. Patient was able to stand and tolerated sitting in the chair.      Problem: Fall Risk  Goal: Patient will remain free from falls  Outcome: Progressing  Note: Chair alarm in place.        Patient is not progressing towards the following goals:

## 2025-02-12 NOTE — THERAPY
"Speech Language Pathology   Daily Treatment     Patient Name: Thuy Albert  AGE:  74 y.o., SEX:  female  Medical Record #: [id number]  Date of Service: 2/12/2025      Precautions:  Precautions: Fall Risk, Swallow Precautions         Subjective  RN cleared pt for swallowing tx. Reports pt is doing well on current diet.  Pt was awake and agreeable.     Assessment  Patient was repositioned upright and given thin water via straw/cup, pureed solids and therapeutic trials of minced and moist solids (MM5). With first serial sips of thins via straw, pt demo'd immediate reflexive prolonged cough response, concerning for airway invasion. Given time to recover, pt was given pureed solids, which she took tiny 1/4 tsp bites of, eventually allowing SLP to give her 1/2 tsp sizes. She rolled the bolus around her tongue for a prolonged period of time, eventually forming a cohesive bolus and swallowing after ~10-20s. Given tx trials of MM5, pt immediately expectorated and looked disgusted. She was given additional sip of water via cup and straw with no further s/sx of airway invasion.     Discussed pt's performance during tx and her baseline status with her spouse, Robbie, via telephone after the session. He reports he typically feeds her a regular/thin diet and pt does spend a long time chewing; he has to cue her to swallow the food after awhile. He did switch her to \"baby foods\" awhile ago for about a week but then tried solid food with her again and she did well.     Clinical Impressions  Patient presents with clinical indicators of an oropharyngeal dysphagia, likely acute on chronic, characterized by prolonged bolus manipulation, aversion to chewable solids, and reflexive coughing w/ serial sips of thins via straw. Ongoing diet modification is indicated. Pt's capacity for behavioral swallow rehabilitation is poor-guarded due to advanced dementia. Her spouse would benefit from caregiver training with regards to safe swallow " "intake, depending on how pt progresses.     Recommendations  Treatment Completed: Dysphagia Treatment  Consult Referral(s): Other (see comments) (Palliative Care)    Dysphagia Treatment  Diet Consistency: Pureed solids (PU4), Thin liquids (TN0)  Instrumentation: None indicated at this time  Medication: Whole with liquid, Crush with applesauce, as appropriate, Crush with pudding/puree, as appropriate, As tolerated  Supervision: 1:1 feeding with constant supervision  Positioning: Fully upright and midline during oral intake, Meals sitting upright in a chair, as tolerated  Risk Management : Small bites/sips, Slow rate of intake, Alternate bites and sips, Reduce environmental distractions, Physical mobility, as tolerated  Oral Care:  After meals, before bed    SLP Treatment Plan  Treatment Plan: Dysphagia Treatment, Patient/Family/Caregiver Training  SLP Frequency: 3x Per Week  Estimated Duration: Until Therapy Goals Met    Anticipated Discharge Needs  Discharge Recommendations: Recommend post-acute placement for additional speech therapy services prior to discharge home (vs home with HH, depending on spouse's capacity to care for pt)  Therapy Recommendations Upon DC: Dysphagia Training, Patient / Family / Caregiver Education    Patient / Family Goals  Patient / Family Goal #1: \"That was good\"  Goal #1 Outcome: Progressing as expected  Short Term Goals  Short Term Goal # 1: Pt will consume PU4/TN0 without signs/sequelae of aspiration given maxA.  Goal Outcome # 1: Progressing as expected    Beronica Vieyra MS,CCC-SLP  "

## 2025-02-12 NOTE — WOUND TEAM
[facility]  Inpatient Services  Wound and Skin Care Brief Evaluation    Admission Date: 2/9/2025     Last order of IP CONSULT TO WOUND CARE was found on 2/10/2025 from Hospital Encounter on 2/9/2025     HPI, PMH, SH: Reviewed    Chief Complaint   Patient presents with    GLF     Pt reports two GLFs, pt cannot state time of falls. Pt reports hitting her head during fall. Denies blood thinners.     Mental Status Change     BIBA from home, pts  reports pt had increased confusion this AM. Pt is A+O 1, GCS 10 with EMS. Pt brought directly to CT from EMS [facility] after seen by ERP.      Diagnosis: Septic shock (HCC) [A41.9, R65.21]    Unit where seen by Wound Team: 1109/01     Wound consult placed regarding sacrococcygeal area. Chart and images reviewed. This discussed with bedside RN Elif. This clinician in to assess patient. Patient pleasant and agreeable. She has had another episode of urinary and fecal incontinence. Non-selectively debrided with No rinse foam soap and Moist warm washcloth. Purwick had been in use, was removed r/t fecal incontinence    No pressure injuries or advanced wound care needs identified. Wound consult completed. No further follow up unless indicated and consulted.        Normal color in center from blanching skin      L heel    R heel      Per pt this is normal  PREVENTATIVE INTERVENTIONS:    Q shift Mike - performed per nursing policy  Q shift pressure point assessments - performed per nursing policy    Surface/Positioning  Low Airloss - Ordered  Standard/trauma mattress - Currently in Place  Reposition q 2 hours - Currently in Place  TAPs Turning system - Currently in Place    Offloading/Redistribution  Heel offloading dressing (Silicone dressing) - Currently in Place      Respiratory  N/A    Containment/Moisture Prevention    Dri-radha pad - Currently in Place  Barrier wipes - Ordered  Barrier paste - Currently in Place

## 2025-02-12 NOTE — ASSESSMENT & PLAN NOTE
2/11:  I called significant other Robbie Man. I gave updates.  Patient has Alzheimers, sits in the TV. Has gone blind but listens to the old TV movies and shows.   She woke up Sunday morning, but she was not able to take her meds with water.  Everyday, she has stool incontinence in her underwear.  I explained current plans with imaging and possible non-infectious explanation for her symptoms.    2/12:  I had extensive discussion with significant other Art.  I explained the current findings and negative findings.  I explained we currently do not have any infection that we are suspecting.  I explained fecal impaction and dehydration at home  Art is adamant to take patient home and would prefer not to go to any skilled nursing facility.  He himself has new medical conditions including significant swelling to his lower extremity, ongoing abdominal pain.  I explained he should go to the ER.  It may be beneficial to try postacute placement for Ms. Albert temporarily while he seeks medical treatment for his needs.    2/13:  Robbie Duggan wanted Chantell to go home, he declined SNF    2/15:  I discussed with significant other at bedside and gave updates.    2/16:  I discussed with Art, he seems to be concerned patient is not getting up easy. PT evaluated and recommended post acute placement. Art is still against going to rehab, as he fears they will neglect her. He is complaining of lower extremity swelling for him still. He states he is still strong enough to pick her of the floor if that happens.  We are pending Home Health order  We are needing transport home to get up 15 stairs to their apartment.

## 2025-02-12 NOTE — PROGRESS NOTES
Telemetry Shift Summary     Per monitor tech:  Rhythm SR/SR- ST  HR Range 46-79- 103  Ectopy rPvc/rPac  Measurements 0.16/0.08/0.48      Normal Values  Rhythm SR  HR Range:   Measurements: 0.12-0.20/0.06-0.10/0.30-0.52

## 2025-02-13 ENCOUNTER — APPOINTMENT (OUTPATIENT)
Dept: RADIOLOGY | Facility: MEDICAL CENTER | Age: 75
DRG: 071 | End: 2025-02-13
Attending: INTERNAL MEDICINE
Payer: MEDICARE

## 2025-02-13 LAB
BACTERIA CSF CULT: NORMAL
BASE EXCESS BLDA CALC-SCNC: 6 MMOL/L (ref -4–3)
BODY TEMPERATURE: ABNORMAL DEGREES
CO2 BLDA-SCNC: 30 MMOL/L (ref 32–48)
DELSYS IDSYS: ABNORMAL
GLUCOSE BLD STRIP.AUTO-MCNC: 97 MG/DL (ref 65–99)
GRAM STN SPEC: NORMAL
HCO3 BLDA-SCNC: 28.9 MMOL/L (ref 21–28)
LACTATE BLD-SCNC: 0.4 MMOL/L (ref 0.5–2)
LPM ILPM: 0 LPM
PCO2 BLDA: 36.6 MMHG (ref 32–48)
PH BLDA: 7.5 [PH] (ref 7.35–7.45)
PO2 BLDA: 62 MMHG (ref 83–108)
SAO2 % BLDA: 93 % (ref 93–99)
SIGNIFICANT IND 70042: NORMAL
SITE SITE: NORMAL
SOURCE SOURCE: NORMAL
SPECIMEN DRAWN FROM PATIENT: ABNORMAL

## 2025-02-13 PROCEDURE — A9270 NON-COVERED ITEM OR SERVICE: HCPCS | Performed by: HOSPITALIST

## 2025-02-13 PROCEDURE — 700105 HCHG RX REV CODE 258: Performed by: INTERNAL MEDICINE

## 2025-02-13 PROCEDURE — 770001 HCHG ROOM/CARE - MED/SURG/GYN PRIV*

## 2025-02-13 PROCEDURE — 700102 HCHG RX REV CODE 250 W/ 637 OVERRIDE(OP): Performed by: INTERNAL MEDICINE

## 2025-02-13 PROCEDURE — 700111 HCHG RX REV CODE 636 W/ 250 OVERRIDE (IP): Performed by: INTERNAL MEDICINE

## 2025-02-13 PROCEDURE — A9270 NON-COVERED ITEM OR SERVICE: HCPCS | Performed by: INTERNAL MEDICINE

## 2025-02-13 PROCEDURE — 99232 SBSQ HOSP IP/OBS MODERATE 35: CPT | Performed by: INTERNAL MEDICINE

## 2025-02-13 PROCEDURE — 36600 WITHDRAWAL OF ARTERIAL BLOOD: CPT

## 2025-02-13 PROCEDURE — 82962 GLUCOSE BLOOD TEST: CPT

## 2025-02-13 PROCEDURE — 83605 ASSAY OF LACTIC ACID: CPT

## 2025-02-13 PROCEDURE — 700102 HCHG RX REV CODE 250 W/ 637 OVERRIDE(OP): Performed by: HOSPITALIST

## 2025-02-13 PROCEDURE — 74018 RADEX ABDOMEN 1 VIEW: CPT

## 2025-02-13 PROCEDURE — 82803 BLOOD GASES ANY COMBINATION: CPT

## 2025-02-13 PROCEDURE — 92526 ORAL FUNCTION THERAPY: CPT

## 2025-02-13 PROCEDURE — 94760 N-INVAS EAR/PLS OXIMETRY 1: CPT

## 2025-02-13 RX ORDER — LEVETIRACETAM 500 MG/5ML
500 INJECTION, SOLUTION, CONCENTRATE INTRAVENOUS EVERY 12 HOURS
Status: DISCONTINUED | OUTPATIENT
Start: 2025-02-13 | End: 2025-02-15

## 2025-02-13 RX ORDER — LEVETIRACETAM 500 MG/5ML
1000 INJECTION, SOLUTION, CONCENTRATE INTRAVENOUS ONCE
Status: COMPLETED | OUTPATIENT
Start: 2025-02-13 | End: 2025-02-13

## 2025-02-13 RX ADMIN — CEFAZOLIN 2 G: 2 INJECTION, POWDER, FOR SOLUTION INTRAMUSCULAR; INTRAVENOUS at 18:08

## 2025-02-13 RX ADMIN — SODIUM CHLORIDE, POTASSIUM CHLORIDE, SODIUM LACTATE AND CALCIUM CHLORIDE: 600; 310; 30; 20 INJECTION, SOLUTION INTRAVENOUS at 08:53

## 2025-02-13 RX ADMIN — OMEPRAZOLE 20 MG: 20 CAPSULE, DELAYED RELEASE ORAL at 05:28

## 2025-02-13 RX ADMIN — BISACODYL 10 MG: 10 SUPPOSITORY RECTAL at 05:28

## 2025-02-13 RX ADMIN — FOLIC ACID 1 MG: 1 TABLET ORAL at 05:28

## 2025-02-13 RX ADMIN — LEVETIRACETAM 500 MG: 100 INJECTION, SOLUTION INTRAVENOUS at 18:08

## 2025-02-13 RX ADMIN — THYROID 45 MG: 30 TABLET ORAL at 05:28

## 2025-02-13 RX ADMIN — MEMANTINE 10 MG: 10 TABLET ORAL at 05:28

## 2025-02-13 RX ADMIN — LEVETIRACETAM 1000 MG: 100 INJECTION, SOLUTION INTRAVENOUS at 17:57

## 2025-02-13 RX ADMIN — HEPARIN SODIUM 5000 UNITS: 5000 INJECTION, SOLUTION INTRAVENOUS; SUBCUTANEOUS at 14:11

## 2025-02-13 RX ADMIN — HEPARIN SODIUM 5000 UNITS: 5000 INJECTION, SOLUTION INTRAVENOUS; SUBCUTANEOUS at 05:28

## 2025-02-13 ASSESSMENT — ENCOUNTER SYMPTOMS: WEAKNESS: 1

## 2025-02-13 ASSESSMENT — PAIN DESCRIPTION - PAIN TYPE
TYPE: ACUTE PAIN
TYPE: ACUTE PAIN

## 2025-02-13 ASSESSMENT — FIBROSIS 4 INDEX: FIB4 SCORE: 3.22

## 2025-02-13 NOTE — THERAPY
Speech Language Pathology   Daily Treatment     Patient Name: Thuy Albert  AGE:  74 y.o., SEX:  female  Medical Record #: [id number]  Date of Service: 2/13/2025      Precautions:  Precautions: Fall Risk, Swallow Precautions     Subjective  RN cleared patient for dysphagia tx session. Patient received asleep in chair, but roused easily to verbal and tactile cues and was confused, but pleasant and cooperative during session. Per RN, patient appears to be tolerating current diet without difficulty, though PO intake has been poor.     Assessment  Patient consumed ~2oz pudding and several sips of thins via straw with feeding from this clinician. Patient presented with reduced bolus acceptance with pudding, but this appeared related to sleepiness, as it improved as evaluation progressed. Bolus containment was sufficient. Patient presented with suspected delayed initiation of pharyngeal swallow to palpation, consistent with dementia. PO trials of all textures resulted in no overt s/sx of aspiration.     Clinical Impressions  Patient presents with oral>pharyngeal dysphagia in the setting of dementia. Patient appears appropriate to continue PU4/TN0 diet with 1:1 feeding. Crush or float meds in puree as tolerated. Small straw sips okay. Please encourage PO intake; supplements ordered as well. SLP is following.     Recommendations  Treatment Completed: Dysphagia Treatment     Dysphagia Treatment  Diet Consistency: PU4/TN0 (pureed solids w/ thin liquids)  Instrumentation: None indicated at this time  Medication: Whole with puree, Crush with applesauce, as appropriate, Crush with pudding/puree, as appropriate, Cut large pills, One pill at a time  Supervision: 1:1 feeding with constant supervision  Positioning: Fully upright and midline during oral intake, Meals sitting upright in a chair, as tolerated  Risk Management : Small bites/sips, Alternate bites and sips, Slow rate of intake, Reduce environmental distractions,  "Physical mobility, as tolerated  Oral Care: BID    SLP Treatment Plan  Treatment Plan: Dysphagia Treatment, Patient/Family/Caregiver Training  SLP Frequency: 3x Per Week  Estimated Duration: Until Therapy Goals Met    Anticipated Discharge Needs  Discharge Recommendations: Recommend home health for continued speech therapy services  Therapy Recommendations Upon DC: Dysphagia Training, Community Re-Integration, Patient / Family / Caregiver Education    Patient / Family Goals  Patient / Family Goal #1: \"That was good\"  Goal #1 Outcome: Progressing slower than expected  Short Term Goals  Short Term Goal # 1: Pt will consume PU4/TN0 without signs/sequelae of aspiration given maxA.  Goal Outcome # 1: Progressing as expected    Minnie Chandler, SLP  "

## 2025-02-13 NOTE — PROGRESS NOTES
Telemetry Shift Summary    Rhythm SB, SR  HR Range 46-94  Ectopy R PVC  Measurements  0.16/0.08/0.46    Per monitor tech     Normal Values  Rhythm SR  HR Range   Measurements 0.12-0.20 / 0.06-0.10 / 0.30-0.52

## 2025-02-13 NOTE — DISCHARGE PLANNING
Case Management Discharge Planning    Admission Date: 2/9/2025  GMLOS: 4.9  ALOS: 3    6-Clicks ADL Score: 11  6-Clicks Mobility Score: 16  PT and/or OT Eval ordered: Yes  Post-acute Referrals Ordered: Yes  Post-acute Choice Obtained: No  Has referral(s) been sent to post-acute provider:  Yes      Anticipated Discharge Dispo: Discharge Disposition: Discharged to home/self care (01)    DME Needed: No    Action(s) Taken:     SNF order placed and referrals sent to local SNFs per protocol base on OT/SLP recommendation and low 6-clicks scores.     Called pt's DPOA/S.O. Art to discuss DC planning to home vs SNF. No answer, left VM.    Escalations Completed: None    Medically Clear: Yes    Next Steps: Follow-up with Art regarding DC plan.    Barriers to Discharge: Pending Placement/DPOA decision

## 2025-02-13 NOTE — CARE PLAN
The patient is Stable - Low risk of patient condition declining or worsening    Shift Goals  Clinical Goals: monitor mentation, skin integrity  Patient Goals: eat  Family Goals: SPRING    Progress made toward(s) clinical / shift goals:    Problem: Hemodynamics  Goal: Patient's hemodynamics, fluid balance and neurologic status will be stable or improve  Outcome: Progressing  Note: IV fluids administered per MAR. Patient has low appetite, but tolerated oral intake.      Problem: Urinary - Renal Perfusion  Goal: Ability to achieve and maintain adequate renal perfusion and functioning will improve  Outcome: Progressing  Note: Patient had urinary incontinence. Female wick failed and accurate measurements were unable to be obtained.      Problem: Skin Integrity  Goal: Skin integrity is maintained or improved  Outcome: Progressing  Note: Q2 turns, low airloss, and pillows in place. Patient was up to the chair for meals.       Patient is not progressing towards the following goals:      Problem: Knowledge Deficit - Standard  Goal: Patient and family/care givers will demonstrate understanding of plan of care, disease process/condition, diagnostic tests and medications  Outcome: Not Progressing  Note: Patient does not show sign of learning evidence and reorienting was required.

## 2025-02-13 NOTE — PROGRESS NOTES
[facility] Medicine Daily Progress Note    Date of Service  2/12/2025    Chief Complaint  Thuy Albert is a 74 y.o. female admitted 2/9/2025 with   Chief Complaint   Patient presents with    GLF     Pt reports two GLFs, pt cannot state time of falls. Pt reports hitting her head during fall. Denies blood thinners.     Mental Status Change     BIBA from home, pts  reports pt had increased confusion this AM. Pt is A+O 1, GCS 10 with EMS. Pt brought directly to CT from EMS [facility] after seen by ERP.      Hospital Course  74-year-old female with a past medical history of Alzheimer's dementia, GERD, DVTs, hypothyroidism, asthma, COVID-19 pneumonia on 10/17/2024.  Patient was admitted 12/9/25 with altered mental state, ground-level fall at home, as per .    Upon presentation to the ER, HR 96, hypoxic to 81%, BP lowest 97/53 from 146/61.  Labs showed leukocytosis 13.9, hemoglobin 13.8, platelets 187, sodium 135, potassium 3.8, creatinine 1.1, severely elevated lactic acid of 4.6.  UA was negative.  COVID/flu/RSV PCR negative.    CT head and cervical spine showed no intracranial hemorrhage and no fractures.     CT abdomen pelvis showing fecal impaction in rectal vault.    Patient had a lumbar puncture in the ER.  Meningitis/encephalitis PCR returned negative.  Expanded viral respiratory panel PCR negative.  She was initially started on IV acyclovir, ampicillin, ceftriaxone and vancomycin which was discontinued after CSF labs return negative for meningitis/encephalitis.     Interval Problem Update  2/12:  Patient had no acute complaints today  I reviewed brain MRI  I reviewed abdominal x-ray  I reviewed patient's labs  I discussed with significant other Art Donovan at bedside, gave updates.    2/11:  Patient has poor historian but she is complaining of chest pain.  I checked a ECG.  There is no ST elevations.  I checked troponins which was 18.    Lactic acid 1.8. Procalcitonin 0.16.  proBNP 2630 but  patient is not showing signs of significant peripheral edema.    D-dimer 1.37, I am ordering a CTA chest.  Continue telemetry monitoring.  Speech therapy worked with patient, recommending purée solid and thin liquids.    I spent an extra 15 minutes on patient's case.  I called significant other Robbie Man. I gave updates.  Patient has Alzheimers, sits in the TV. Has gone blind but listens to the old TV movies and shows.   She woke up Sunday morning, but she was not able to take her meds with water.  Everyday, she has stool incontinence in her underwear.  I explained current plans with imaging and possible non-infectious explanation for her symptoms.    I have discussed this patient's plan of care and discharge plan at IDT rounds today with Case Management, Nursing, Nursing leadership, and other members of the IDT team.    Consultants/Specialty  pulmonary    Code Status  Full Code    Disposition  The patient is not medically cleared for discharge to home or a post-acute facility.    I have placed the appropriate orders for post-discharge needs.  Significant crystal Man declined for post acute placement and home health. No referrals placed.    Review of Systems  Review of Systems   Cardiovascular:  Positive for chest pain. Negative for leg swelling.   Neurological:  Positive for weakness.   All other systems reviewed and are negative.       Physical Exam  Temp:  [36.4 °C (97.5 °F)-36.7 °C (98.1 °F)] 36.7 °C (98.1 °F)  Pulse:  [66-85] 78  Resp:  [16-19] 16  BP: (114-158)/(46-77) 114/74  SpO2:  [93 %-97 %] 96 %    Physical Exam  Vitals and nursing note reviewed.   Constitutional:       General: She is not in acute distress.     Appearance: She is not ill-appearing.      Comments: Frail appearing elderly female   HENT:      Head: Normocephalic and atraumatic.      Comments: Temporal muscle wasting positive     Mouth/Throat:      Mouth: Mucous membranes are dry.      Pharynx: No oropharyngeal exudate.   Eyes:       General: No scleral icterus.     Extraocular Movements: Extraocular movements intact.   Cardiovascular:      Rate and Rhythm: Normal rate and regular rhythm.      Pulses: Normal pulses.      Heart sounds: Normal heart sounds. No murmur heard.  Pulmonary:      Effort: Pulmonary effort is normal. No respiratory distress.      Breath sounds: Normal breath sounds. No wheezing.   Abdominal:      General: Abdomen is flat. Bowel sounds are normal. There is no distension.      Palpations: Abdomen is soft.      Tenderness: There is no abdominal tenderness.   Musculoskeletal:         General: No swelling or tenderness.      Cervical back: Normal range of motion. No rigidity.      Comments: Sarcopenic. Bilateral hand muscle atrophy noted.   Skin:     General: Skin is warm.      Capillary Refill: Capillary refill takes less than 2 seconds.      Coloration: Skin is not jaundiced.      Findings: No erythema.   Neurological:      Mental Status: She is alert and oriented to person, place, and time. Mental status is at baseline.      Motor: Weakness present.   Psychiatric:         Mood and Affect: Mood normal.         Behavior: Behavior normal.         Thought Content: Thought content normal.         Judgment: Judgment normal.         Fluids    Intake/Output Summary (Last 24 hours) at 2/12/2025 1735  Last data filed at 2/11/2025 2030  Gross per 24 hour   Intake 150 ml   Output --   Net 150 ml          Laboratory  Recent Labs     02/10/25  0751 02/11/25  0157 02/12/25  0308   WBC 9.3 7.1 8.7   RBC 3.78* 3.85* 4.28   HEMOGLOBIN 11.8* 12.1 13.3   HEMATOCRIT 35.1* 36.5* 40.8   MCV 92.9 94.8 95.3   MCH 31.2 31.4 31.1   MCHC 33.6 33.2 32.6   RDW 44.4 46.0 45.7   PLATELETCT 149* 153* 152*   MPV 9.7 9.8 9.8     Recent Labs     02/10/25  0751 02/11/25  0157 02/12/25  0308   SODIUM 137 141 138   POTASSIUM 3.8 3.9 3.8   CHLORIDE 102 106 103   CO2 24 24 23   GLUCOSE 122* 79 90   BUN 25* 20 15   CREATININE 0.79 0.75 0.64   CALCIUM 8.9 9.3 9.2      Recent Labs     02/10/25  1732   INR 1.04               Imaging  MR-BRAIN-WITH & W/O   Final Result      1.  Moderate to severe cerebral volume loss. The volume loss is more prominent in the temporal lobes. This is concerning for degenerative pattern volume loss. There has been mild interval increase in the extent of the volume loss since the previous study    dated 4/10/2023.   2.  Mild chronic microvascular ischemic disease.         CT-CTA CHEST PULMONARY ARTERY W/ RECONS   Final Result         1.  No large central pulmonary embolus is appreciated, evaluation of the subsegmental branches is essentially nondiagnostic due to motion artifacts. Additional imaging would be required for definitive exclusion of small distal pulmonary emboli.   2.  Atherosclerosis and atherosclerotic coronary artery disease.      [id number] VIEW   Final Result      1.  Marked left-sided colonic constipation.      CT-HEAD W/O   Final Result         1.  No acute intracranial abnormality is identified, there are nonspecific white matter changes, commonly associated with small vessel ischemic disease.  Associated mild cerebral atrophy is noted.   2.  Atherosclerosis.               CT-CSPINE WITHOUT PLUS RECONS   Final Result         1.  Multilevel degenerative changes of the cervical spine limit diagnostic sensitivity of this examination, otherwise no acute traumatic bony injury of the cervical spine is apparent.   2.  Atherosclerosis      DX-CHEST-PORTABLE (1 VIEW)   Final Result         1.  No acute cardiopulmonary disease.   2.  Atherosclerosis      CT-ABDOMEN-PELVIS WITH   Final Result         1.  Large quantity of stool in the rectal vault, appearance suggesting constipation with probable rectal fecal impaction   2.  Atherosclerosis and atherosclerotic coronary artery disease           Assessment/Plan  * Acute metabolic encephalopathy- (present on admission)  Assessment & Plan  Due to dehydration and fecal impaction worsening  chronic Alzheimer's dementia  Fall precautions  Patient did receive IV Versed 2 mg and IV Haldol 5 mg back on 2/10.  This could be affecting her baseline cognitive function, may take time for her to completely metabolize medications    Fecal impaction (HCC)- (present on admission)  Assessment & Plan        I reviewed CT abdomen pelvis showing fecal impaction in rectal vault.  I reviewed abdominal x-ray second image, showing ongoing rectal Voalte fecal impaction, extending to the left colon.  I am continuing lactulose.  I am adding bisacodyl suppository and as needed enema.  We will need to consider GoLytely.  As per significant other Art, patient is still not at her baseline mental state.  Typically at home she is able to make jokes and talk to him easily.  This could be from her fecal impaction.  I discussed with Art, she presented with significant dehydration.  Patient is not drinking enough water home.  She is dependent to Art to be able to get water.  I explained 1.5 L would be a good average at home.    Encounter for family conference without patient present  Assessment & Plan  2/11:  I called significant other Art Donovan. I gave updates.  Patient has Alzheimers, sits in the TV. Has gone blind but listens to the old TV movies and shows.   She woke up Sunday morning, but she was not able to take her meds with water.  Everyday, she has stool incontinence in her underwear.  I explained current plans with imaging and possible non-infectious explanation for her symptoms.    2/12:  I had extensive discussion with significant other Art.  I explained the current findings and negative findings.  I explained we currently do not have any infection that we are suspecting.  I explained fecal impaction and dehydration at home  Art is adamant to take patient home and would prefer not to go to any skilled nursing facility.  He himself has new medical conditions including significant swelling to his lower extremity, ongoing  abdominal pain.  I explained he should go to the ER.  It may be beneficial to try postacute placement for Ms. Albert temporarily while he seeks medical treatment for his needs.    Other chest pain- (present on admission)  Assessment & Plan  Patient has poor historian but she is complaining of chest pain.  I checked a ECG, there is no ST elevations.  I checked troponins which was 18.          I reviewed patient's CTA chest.  Radiologist was not able to comment on any distal findings to confirm PE.  Unclear if the yellow arrows are truly pulmonary embolisms.  Of note troponins did not elevate  Patient was not complaining of chest pain today at this time we will defer any anticoagulation as there is no concrete evidence of any pulmonary embolism.  These are likely artifact on CT scan.    Septic shock (HCC)  Assessment & Plan  Ruling out sepsis  This is Sepsis Not present on admission    Patient had a lumbar puncture in the ER.  Meningitis/encephalitis PCR returned negative.  Expanded viral respiratory panel PCR negative.  She was initially started on IV acyclovir, ampicillin, ceftriaxone and vancomycin which was discontinued after CSF labs return negative for meningitis/encephalitis  I reviewed labs, WBC 7.1, procalcitonin 0.16.  Blood cultures negative.  Ucx negative.    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  There does appear to be more cerebral atrophy.  I explained these results to Art.  There is no acute stroke.    Hypothyroidism- (present on admission)  Assessment & Plan  Continue home Keyser Thyroid    GERD (gastroesophageal reflux disease)- (present on admission)  Assessment & Plan  Starting omeprazole         VTE prophylaxis:    heparin ppx      I have performed a physical exam and reviewed and updated  ROS and Plan today (2/12/2025). In review of yesterday's note (2/11/2025), there are no changes except as documented above.      Total time spent 52 minutes  This included my review of patient's overnight RN notes, face to face interview, physical examination, lab analysis.  Also includes repeat visits with the patient, and my documented assessments and interventions above.   In addition, I spoke with entire care team on patient's treatment plan, and DC planning on morning rounds and IDT rounds.    This note was generated using voice recognition software which has a chance of producing errors of grammar and content.  I have made every reasonable attempt to find and correct any errors, but it should be expected that some may not be found prior to finalization of this note.

## 2025-02-13 NOTE — CARE PLAN
The patient is Stable - Low risk of patient condition declining or worsening    Shift Goals  Clinical Goals: Monitor mentation, Q2 turns,  Patient Goals: UTD  Family Goals: SPRING    Progress made toward(s) clinical / shift goals:    Problem: Fluid Volume  Goal: Fluid volume balance will be maintained  Outcome: Progressing  Note: Patient getting IVF, to help keep her rehydrated     Problem: Skin Integrity  Goal: Skin integrity is maintained or improved  Outcome: Progressing  Note: Patient on TAPs system to help maintain skin integrity.     Problem: Fall Risk  Goal: Patient will remain free from falls  Outcome: Progressing  Note: Patient bed locked and in low position, bed alarm on to help keep patient free of falls.        Patient is not progressing towards the following goals:

## 2025-02-13 NOTE — PROGRESS NOTES
Telemetry Shift Summary     Rhythm: SR/SB  HR Range:48-87  Ectopy: rPVC  Measurements: 0.18/0.08/0.36    Normal Values  Measurements: 0.12- 0.20 / 0.08-0.10 / 0.30-0.52   home

## 2025-02-13 NOTE — PROGRESS NOTES
[facility] Daily Progress Note    Date of Service  2/13/2025    Chief Complaint  Thuy Albert is a 74 y.o. female admitted 2/9/2025 with   Chief Complaint   Patient presents with    GLF     Pt reports two GLFs, pt cannot state time of falls. Pt reports hitting her head during fall. Denies blood thinners.     Mental Status Change     BIBA from home, pts  reports pt had increased confusion this AM. Pt is A+O 1, GCS 10 with EMS. Pt brought directly to CT from EMS [facility] after seen by ERP.      Hospital Course  74-year-old female with a past medical history of Alzheimer's dementia, GERD, DVTs, hypothyroidism, asthma, COVID-19 pneumonia on 10/17/2024.  Patient was admitted 12/9/25 with altered mental state, ground-level fall at home, as per .    Upon presentation to the ER, HR 96, hypoxic to 81%, BP lowest 97/53 from 146/61.  Labs showed leukocytosis 13.9, hemoglobin 13.8, platelets 187, sodium 135, potassium 3.8, creatinine 1.1, severely elevated lactic acid of 4.6.  UA was negative.  COVID/flu/RSV PCR negative.    CT head and cervical spine showed no intracranial hemorrhage and no fractures.     CT abdomen pelvis showing fecal impaction in rectal vault.    Patient had a lumbar puncture in the ER.  Meningitis/encephalitis PCR returned negative.  Expanded viral respiratory panel PCR negative.  She was initially started on IV acyclovir, ampicillin, ceftriaxone and vancomycin which was discontinued after CSF labs return negative for meningitis/encephalitis.     Interval Problem Update  2/13:  EEG ordered to   Robbie Duggan wanted Chantell to go home, he declined SNF  Patient has had about several bowel movements.  I did check a abdominal x-ray this morning, there still appears to be constipation on the left colon and rectal vault.    2/12:  Patient had no acute complaints today  I reviewed brain MRI  I reviewed abdominal x-ray  I reviewed patient's labs  I discussed with significant other Art  Donovan at bedside, gave updates.    2/11:  Patient has poor historian but she is complaining of chest pain.  I checked a ECG.  There is no ST elevations.  I checked troponins which was 18.    Lactic acid 1.8. Procalcitonin 0.16.  proBNP 2630 but patient is not showing signs of significant peripheral edema.    D-dimer 1.37, I am ordering a CTA chest.  Continue telemetry monitoring.  Speech therapy worked with patient, recommending purée solid and thin liquids.    I spent an extra 15 minutes on patient's case.  I called significant other Robbie Man. I gave updates.  Patient has Alzheimers, sits in the TV. Has gone blind but listens to the old TV movies and shows.   She woke up Sunday morning, but she was not able to take her meds with water.  Everyday, she has stool incontinence in her underwear.  I explained current plans with imaging and possible non-infectious explanation for her symptoms.    I have discussed this patient's plan of care and discharge plan at IDT rounds today with Case Management, Nursing, Nursing leadership, and other members of the IDT team.    Consultants/Specialty  pulmonary    Code Status  Full Code    Disposition  The patient is not medically cleared for discharge to home or a post-acute facility.  Anticipate discharge to: home with close outpatient follow-up    I have placed the appropriate orders for post-discharge needs.  Significant other Robbie Man declined for post acute placement and home health. No referrals placed.    Review of Systems  Review of Systems   Cardiovascular:  Positive for chest pain. Negative for leg swelling.   Neurological:  Positive for weakness.   All other systems reviewed and are negative.       Physical Exam  Temp:  [36.3 °C (97.4 °F)-36.9 °C (98.4 °F)] 36.7 °C (98.1 °F)  Pulse:  [56-81] 81  Resp:  [16-19] 19  BP: (114-166)/(68-83) 141/73  SpO2:  [92 %-96 %] 95 %    Physical Exam  Vitals and nursing note reviewed.   Constitutional:       General: She is not in  acute distress.     Appearance: She is not ill-appearing.      Comments: Frail appearing elderly female   HENT:      Head: Normocephalic and atraumatic.      Comments: Temporal muscle wasting positive     Mouth/Throat:      Mouth: Mucous membranes are dry.      Pharynx: No oropharyngeal exudate.   Eyes:      General: No scleral icterus.     Extraocular Movements: Extraocular movements intact.   Cardiovascular:      Rate and Rhythm: Normal rate and regular rhythm.      Pulses: Normal pulses.      Heart sounds: Normal heart sounds. No murmur heard.  Pulmonary:      Effort: Pulmonary effort is normal. No respiratory distress.      Breath sounds: Normal breath sounds. No wheezing.   Abdominal:      General: Abdomen is flat. Bowel sounds are normal. There is no distension.      Palpations: Abdomen is soft.      Tenderness: There is no abdominal tenderness.   Musculoskeletal:         General: No swelling or tenderness.      Cervical back: Normal range of motion. No rigidity.      Comments: Sarcopenic. Bilateral hand muscle atrophy noted.   Skin:     General: Skin is warm.      Capillary Refill: Capillary refill takes less than 2 seconds.      Coloration: Skin is not jaundiced.      Findings: No erythema.   Neurological:      Mental Status: She is alert and oriented to person, place, and time. Mental status is at baseline.      Motor: Weakness present.   Psychiatric:         Mood and Affect: Mood normal.         Behavior: Behavior normal.         Thought Content: Thought content normal.         Judgment: Judgment normal.         Fluids    Intake/Output Summary (Last 24 hours) at 2/13/2025 1540  Last data filed at 2/13/2025 0900  Gross per 24 hour   Intake 100 ml   Output 1100 ml   Net -1000 ml          Laboratory  Recent Labs     02/11/25  0157 02/12/25  0308   WBC 7.1 8.7   RBC 3.85* 4.28   HEMOGLOBIN 12.1 13.3   HEMATOCRIT 36.5* 40.8   MCV 94.8 95.3   MCH 31.4 31.1   MCHC 33.2 32.6   RDW 46.0 45.7   PLATELETCT 153* 152*    MPV 9.8 9.8     Recent Labs     02/11/25  0157 02/12/25  0308   SODIUM 141 138   POTASSIUM 3.9 3.8   CHLORIDE 106 103   CO2 24 23   GLUCOSE 79 90   BUN 20 15   CREATININE 0.75 0.64   CALCIUM 9.3 9.2     Recent Labs     02/10/25  1732   INR 1.04               Imaging  [id number] VIEW   Final Result      No evidence of bowel obstruction. Stable constipation.      MR-BRAIN-WITH & W/O   Final Result      1.  Moderate to severe cerebral volume loss. The volume loss is more prominent in the temporal lobes. This is concerning for degenerative pattern volume loss. There has been mild interval increase in the extent of the volume loss since the previous study    dated 4/10/2023.   2.  Mild chronic microvascular ischemic disease.         CT-CTA CHEST PULMONARY ARTERY W/ RECONS   Final Result         1.  No large central pulmonary embolus is appreciated, evaluation of the subsegmental branches is essentially nondiagnostic due to motion artifacts. Additional imaging would be required for definitive exclusion of small distal pulmonary emboli.   2.  Atherosclerosis and atherosclerotic coronary artery disease.      [id number] VIEW   Final Result      1.  Marked left-sided colonic constipation.      CT-HEAD W/O   Final Result         1.  No acute intracranial abnormality is identified, there are nonspecific white matter changes, commonly associated with small vessel ischemic disease.  Associated mild cerebral atrophy is noted.   2.  Atherosclerosis.               CT-CSPINE WITHOUT PLUS RECONS   Final Result         1.  Multilevel degenerative changes of the cervical spine limit diagnostic sensitivity of this examination, otherwise no acute traumatic bony injury of the cervical spine is apparent.   2.  Atherosclerosis      DX-CHEST-PORTABLE (1 VIEW)   Final Result         1.  No acute cardiopulmonary disease.   2.  Atherosclerosis      CT-ABDOMEN-PELVIS WITH   Final Result         1.  Large quantity of stool in the rectal  vault, appearance suggesting constipation with probable rectal fecal impaction   2.  Atherosclerosis and atherosclerotic coronary artery disease           Assessment/Plan  * Acute metabolic encephalopathy- (present on admission)  Assessment & Plan  Due to dehydration and fecal impaction worsening chronic Alzheimer's dementia  Fall precautions  Patient did receive IV Versed 2 mg and IV Haldol 5 mg back on 2/10.  This could be affecting her baseline cognitive function, may take time for her to completely metabolize medications  I ordered an EEG, patient is not returning to her baseline as per Art.    Addendum: pt less able to follow commands in the evening. MRI was showing more brain loss to temporal lobes. This could promote seizures. EEG tech could not make it to RS. I am empirically starting Keppra. At this time, there is no other explanation for patient's encephalopathy.    Fecal impaction (HCC)- (present on admission)  Assessment & Plan  As per significant other Art, patient is still not at her baseline mental state.  Typically at home she is able to make jokes and talk to him easily.  This could be from her fecal impaction.  I discussed with Art, she presented with significant dehydration.  Patient is not drinking enough water home.  She is dependent to Art to be able to get water.  I explained 1.5 L would be a good average at home.           First image, Initial CT abdomen pelvis showing fecal impaction in rectal vault.  Second image, abdominal x-ray, showing ongoing rectal Voalte fecal impaction, extending to the left colon.  Third image abdominal x-ray, yellow arrows showing ongoing fecal impaction.  Pt has had substantial fecal movements  Repeat XR in the morning    Encounter for family conference without patient present  Assessment & Plan  2/11:  I called significant other Art Donovan. I gave updates.  Patient has Alzheimers, sits in the TV. Has gone blind but listens to the old TV movies and shows.   She  woke up Sunday morning, but she was not able to take her meds with water.  Everyday, she has stool incontinence in her underwear.  I explained current plans with imaging and possible non-infectious explanation for her symptoms.    2/12:  I had extensive discussion with significant other Art.  I explained the current findings and negative findings.  I explained we currently do not have any infection that we are suspecting.  I explained fecal impaction and dehydration at home  Art is adamant to take patient home and would prefer not to go to any skilled nursing facility.  He himself has new medical conditions including significant swelling to his lower extremity, ongoing abdominal pain.  I explained he should go to the ER.  It may be beneficial to try postacute placement for Ms. Albert temporarily while he seeks medical treatment for his needs.    2/13:  Robbie Duggan wanted Chantell to go home, he declined SNF    Other chest pain- (present on admission)  Assessment & Plan  Patient has poor historian but she is complaining of chest pain.  I checked a ECG, there is no ST elevations.  I checked troponins which was 18.    I reviewed patient's CTA chest.  Radiologist was not able to comment on any distal findings to confirm PE.  Unclear if the yellow arrows are truly pulmonary embolisms.  Of note troponins did not elevate  Patient was not complaining of chest pain today at this time we will defer any anticoagulation as there is no concrete evidence of any pulmonary embolism.  These are likely artifact on CT scan.    Septic shock (HCC)  Assessment & Plan  Ruled out sepsis  This is Sepsis Not present on admission    Patient had a lumbar puncture in the ER.  Meningitis/encephalitis PCR returned negative.  Expanded viral respiratory panel PCR negative.  She was initially started on IV acyclovir, ampicillin, ceftriaxone and vancomycin which was discontinued after CSF labs return negative for meningitis/encephalitis  I reviewed labs,  WBC 7.1, procalcitonin 0.16.  Blood cultures negative.  Ucx negative.    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  There does appear to be more cerebral atrophy.  I explained these results to Art.  There is no acute stroke.    Hypothyroidism- (present on admission)  Assessment & Plan  Continue [place] thyroid    GERD (gastroesophageal reflux disease)- (present on admission)  Assessment & Plan  Continue omeprazole         VTE prophylaxis:    heparin ppx      I have performed a physical exam and reviewed and updated ROS and Plan today (2/13/2025). In review of yesterday's note (2/12/2025), there are no changes except as documented above.      Total time spent 38 minutes. I spent greater than 50% of the time for patient care, including unit/floor time, multiple face-to-face encounters with the patient, counseling on treatment plan and discussion with bedside RN.  Further, I spent time on my own review of patient's overnight events, RN notes, imaging and lab analysis, and developing my assessment and plan above.  In addition, working with , social workers, and Charge RN on case coordination on this date.    This note was generated using voice recognition software which has a chance of producing errors of grammar and content.  I have made every reasonable attempt to find and correct any errors, but it should be expected that some may not be found prior to finalization of this note.

## 2025-02-14 PROBLEM — E87.3 METABOLIC ALKALOSIS: Status: ACTIVE | Noted: 2025-02-14

## 2025-02-14 LAB
ALBUMIN SERPL BCP-MCNC: 3.3 G/DL (ref 3.2–4.9)
BUN SERPL-MCNC: 14 MG/DL (ref 8–22)
CALCIUM ALBUM COR SERPL-MCNC: 9.6 MG/DL (ref 8.5–10.5)
CALCIUM SERPL-MCNC: 9 MG/DL (ref 8.4–10.2)
CHLORIDE SERPL-SCNC: 103 MMOL/L (ref 96–112)
CO2 SERPL-SCNC: 26 MMOL/L (ref 20–33)
CREAT SERPL-MCNC: 0.71 MG/DL (ref 0.5–1.4)
ERYTHROCYTE [DISTWIDTH] IN BLOOD BY AUTOMATED COUNT: 45.8 FL (ref 35.9–50)
GFR SERPLBLD CREATININE-BSD FMLA CKD-EPI: 89 ML/MIN/1.73 M 2
GLUCOSE BLD STRIP.AUTO-MCNC: 93 MG/DL (ref 65–99)
GLUCOSE SERPL-MCNC: 97 MG/DL (ref 65–99)
HCT VFR BLD AUTO: 37.6 % (ref 37–47)
HGB BLD-MCNC: 11.9 G/DL (ref 12–16)
MAGNESIUM SERPL-MCNC: 2 MG/DL (ref 1.5–2.5)
MCH RBC QN AUTO: 30.7 PG (ref 27–33)
MCHC RBC AUTO-ENTMCNC: 31.6 G/DL (ref 32.2–35.5)
MCV RBC AUTO: 97.2 FL (ref 81.4–97.8)
PHOSPHATE SERPL-MCNC: 3.5 MG/DL (ref 2.5–4.5)
PLATELET # BLD AUTO: 152 K/UL (ref 164–446)
PMV BLD AUTO: 9.6 FL (ref 9–12.9)
POTASSIUM SERPL-SCNC: 3.4 MMOL/L (ref 3.6–5.5)
RBC # BLD AUTO: 3.87 M/UL (ref 4.2–5.4)
SODIUM SERPL-SCNC: 139 MMOL/L (ref 135–145)
WBC # BLD AUTO: 8.6 K/UL (ref 4.8–10.8)

## 2025-02-14 PROCEDURE — 99232 SBSQ HOSP IP/OBS MODERATE 35: CPT | Performed by: INTERNAL MEDICINE

## 2025-02-14 PROCEDURE — 82962 GLUCOSE BLOOD TEST: CPT

## 2025-02-14 PROCEDURE — 4A10X4Z MONITORING OF CENTRAL NERVOUS ELECTRICAL ACTIVITY, EXTERNAL APPROACH: ICD-10-PCS | Performed by: STUDENT IN AN ORGANIZED HEALTH CARE EDUCATION/TRAINING PROGRAM

## 2025-02-14 PROCEDURE — A9270 NON-COVERED ITEM OR SERVICE: HCPCS | Performed by: INTERNAL MEDICINE

## 2025-02-14 PROCEDURE — 83735 ASSAY OF MAGNESIUM: CPT

## 2025-02-14 PROCEDURE — 770001 HCHG ROOM/CARE - MED/SURG/GYN PRIV*

## 2025-02-14 PROCEDURE — 36415 COLL VENOUS BLD VENIPUNCTURE: CPT

## 2025-02-14 PROCEDURE — 95819 EEG AWAKE AND ASLEEP: CPT | Mod: 26 | Performed by: STUDENT IN AN ORGANIZED HEALTH CARE EDUCATION/TRAINING PROGRAM

## 2025-02-14 PROCEDURE — 94760 N-INVAS EAR/PLS OXIMETRY 1: CPT

## 2025-02-14 PROCEDURE — 95819 EEG AWAKE AND ASLEEP: CPT | Performed by: STUDENT IN AN ORGANIZED HEALTH CARE EDUCATION/TRAINING PROGRAM

## 2025-02-14 PROCEDURE — 700102 HCHG RX REV CODE 250 W/ 637 OVERRIDE(OP): Mod: JZ | Performed by: INTERNAL MEDICINE

## 2025-02-14 PROCEDURE — 700102 HCHG RX REV CODE 250 W/ 637 OVERRIDE(OP): Performed by: INTERNAL MEDICINE

## 2025-02-14 PROCEDURE — 80069 RENAL FUNCTION PANEL: CPT

## 2025-02-14 PROCEDURE — 700105 HCHG RX REV CODE 258: Performed by: INTERNAL MEDICINE

## 2025-02-14 PROCEDURE — A9270 NON-COVERED ITEM OR SERVICE: HCPCS | Mod: JZ | Performed by: INTERNAL MEDICINE

## 2025-02-14 PROCEDURE — 85027 COMPLETE CBC AUTOMATED: CPT

## 2025-02-14 PROCEDURE — 700111 HCHG RX REV CODE 636 W/ 250 OVERRIDE (IP): Mod: JZ | Performed by: INTERNAL MEDICINE

## 2025-02-14 PROCEDURE — 92526 ORAL FUNCTION THERAPY: CPT

## 2025-02-14 RX ORDER — ACETAZOLAMIDE 500 MG/5ML
500 INJECTION, POWDER, LYOPHILIZED, FOR SOLUTION INTRAVENOUS ONCE
Status: COMPLETED | OUTPATIENT
Start: 2025-02-14 | End: 2025-02-14

## 2025-02-14 RX ORDER — POTASSIUM CHLORIDE 1500 MG/1
40 TABLET, EXTENDED RELEASE ORAL ONCE
Status: COMPLETED | OUTPATIENT
Start: 2025-02-14 | End: 2025-02-14

## 2025-02-14 RX ADMIN — CEFAZOLIN 2 G: 2 INJECTION, POWDER, FOR SOLUTION INTRAMUSCULAR; INTRAVENOUS at 21:30

## 2025-02-14 RX ADMIN — LEVETIRACETAM 500 MG: 100 INJECTION, SOLUTION INTRAVENOUS at 06:11

## 2025-02-14 RX ADMIN — DONEPEZIL HYDROCHLORIDE 10 MG: 5 TABLET, FILM COATED ORAL at 21:25

## 2025-02-14 RX ADMIN — CEFAZOLIN 2 G: 2 INJECTION, POWDER, FOR SOLUTION INTRAMUSCULAR; INTRAVENOUS at 06:14

## 2025-02-14 RX ADMIN — POTASSIUM CHLORIDE 40 MEQ: 1500 TABLET, EXTENDED RELEASE ORAL at 17:40

## 2025-02-14 RX ADMIN — ACETAZOLAMIDE 500 MG: 500 INJECTION, POWDER, LYOPHILIZED, FOR SOLUTION INTRAVENOUS at 09:22

## 2025-02-14 RX ADMIN — CEFAZOLIN 2 G: 2 INJECTION, POWDER, FOR SOLUTION INTRAMUSCULAR; INTRAVENOUS at 14:30

## 2025-02-14 RX ADMIN — MEMANTINE 10 MG: 10 TABLET ORAL at 17:57

## 2025-02-14 ASSESSMENT — ENCOUNTER SYMPTOMS: WEAKNESS: 1

## 2025-02-14 ASSESSMENT — PAIN DESCRIPTION - PAIN TYPE
TYPE: ACUTE PAIN
TYPE: ACUTE PAIN

## 2025-02-14 ASSESSMENT — FIBROSIS 4 INDEX: FIB4 SCORE: 3.22

## 2025-02-14 NOTE — PROGRESS NOTES
LATE ENTRY    1715 Patient was assessed and she demonstrated difficulty with following commands. She was unable to close her lips to drink water. /81. Pupils were equal and reactive to light. Patient was able to squeeze fists equally bilaterally. MD notified.     1720 MD at bedside.     1820 ABG was completed, results were sent to MD. 2L via NC applied per MD's orders. Patient NPO at this time due to altered mental status and inability to swallow safely.     1840 FSBG 97. MD notified.

## 2025-02-14 NOTE — RESPIRATORY CARE
ABG drawn per order pt spontaneously breathing room air    1817  PH 7.505  PCO2 36.6  PAO2 62  BE 6  HCO3 28.9  Lac 0.39

## 2025-02-14 NOTE — CARE PLAN
The patient is Watcher - Medium risk of patient condition declining or worsening    Shift Goals  Clinical Goals: monitor mentation, EEG  Patient Goals: SPRING  Family Goals: SPRING    Progress made toward(s) clinical / shift goals:      EEG was escalated to the [facility].     Problem: Hemodynamics  Goal: Patient's hemodynamics, fluid balance and neurologic status will be stable or improve  Outcome: Progressing  Note: Patient was not symptomatic with bradycardia. Tele monitor in place.      Problem: Urinary - Renal Perfusion  Goal: Ability to achieve and maintain adequate renal perfusion and functioning will improve  Outcome: Progressing  Note: Bladder scan completed showed 57mL. Patient produced incontinent urine.     Problem: Respiratory  Goal: Patient will achieve/maintain optimum respiratory ventilation and gas exchange  Outcome: Progressing  Note: 2L NC in place per MD's orders. Patient satting high 90s.        Patient is not progressing towards the following goals:

## 2025-02-14 NOTE — CARE PLAN
The patient is Stable - Low risk of patient condition declining or worsening    Shift Goals  Clinical Goals: Q2 turns, monitor skin intergrity, monitor oxygenation  Patient Goals: UTD  Family Goals: SPRING    Progress made toward(s) clinical / shift goals:    Problem: Respiratory  Goal: Patient will achieve/maintain optimum respiratory ventilation and gas exchange  Outcome: Progressing  Note: Patient getting 2ltrs of O2 via NC, SPO2 >90%     Problem: Skin Integrity  Goal: Skin integrity is maintained or improved  Outcome: Progressing  Note: Skin integrity maintained by use of low air loss mattress, patient getting Q2 turns to help offload her sacrum, pure wick in place to help keep patient dry, barrier paste in use to help prevent skin excoriation       Patient is not progressing towards the following goals:

## 2025-02-14 NOTE — THERAPY
Speech Language Pathology   Daily Treatment     Patient Name: Thuy Albert  AGE:  74 y.o., SEX:  female  Medical Record #: [id number]  Date of Service: 2/14/2025      Precautions:  Precautions: Fall Risk, Swallow Precautions     Subjective  RN asked this SLP to see patient for dysphagia tx session. Per RN, patient had change in status late yesterday and was made NPO d/t same. Per RN, EEG has been ordered to r/o seizures, but has not yet been completed.     Assessment  Patient sleeping, but roused easily to verbal and tactile cues. Patient unable to track this SLP or make eye contact, but RN reports patient is blind. Patient consumed PO trials of thins via straw and purees. Patient required verbal and tactile cues to open mouth for bolus acceptance and close mouth around straw to secure sips. Patient presented with mild oral holding, evidenced by delayed initiation of pharyngeal swallow to palpation. Cough x1 noted out of 7 straw sips of thins. No s/sx of aspiration noted on pudding.     Clinical Impressions  Patient appears appropriate to resume modified diet of PU4/TN0 diet with 1:1 feeding when awake and alert. Crush meds. Small straw sips okay. SLP is following to ensure diet tolerance. Please notify SLP of any further difficulty or changes in status, as EEG is pending.     Recommendations  Treatment Completed: Dysphagia Treatment     Dysphagia Treatment  Diet Consistency: PU4/TN0 (pureed solids w/ thin liquids)  Instrumentation: None indicated at this time  Medication: Crush with applesauce, as appropriate, Crush with pudding/puree, as appropriate  Supervision: 1:1 feeding with constant supervision  Positioning: Fully upright and midline during oral intake, Meals sitting upright in a chair, as tolerated  Risk Management : Small bites/sips, Alternate bites and sips, Slow rate of intake, Reduce environmental distractions, Physical mobility, as tolerated  Oral Care: BID    SLP Treatment Plan  Treatment Plan:  "Dysphagia Treatment, Patient/Family/Caregiver Training  SLP Frequency: 3x Per Week  Estimated Duration: Until Therapy Goals Met    Anticipated Discharge Needs  Discharge Recommendations: Recommend home health for continued speech therapy services  Therapy Recommendations Upon DC: Dysphagia Training, Community Re-Integration, Patient / Family / Caregiver Education    Patient / Family Goals  Patient / Family Goal #1: \"That was good\"  Goal #1 Outcome: Progressing as expected  Short Term Goals  Short Term Goal # 1: Pt will consume PU4/TN0 without signs/sequelae of aspiration given maxA.  Goal Outcome # 1: Progressing as expected    Minnie Chandler, SLP  "

## 2025-02-14 NOTE — DISCHARGE PLANNING
Case Management Discharge Planning    Admission Date: 2/9/2025  GMLOS: 4.9  ALOS: 4    6-Clicks ADL Score: 11  6-Clicks Mobility Score: 16  PT and/or OT Eval ordered: Yes  Post-acute Referrals Ordered: Yes  Post-acute Choice Obtained: No  Has referral(s) been sent to post-acute provider:  Yes      Anticipated Discharge Dispo: Discharge Disposition: Discharged to home/self care (01)    DME Needed: No    Action(s) Taken:     Per morning rounds, MD confirmed with pt's  DPOA/S.O. Art, he is still declining SNF placement.    RNCM called Art to offer HH services. No answer, left VM.    Escalations Completed: None    Medically Clear: No    Next Steps: Follow-up with medical team to discuss DC needs and barriers.    Barriers to Discharge: Medical clearance

## 2025-02-14 NOTE — PROGRESS NOTES
Monitor tech reported patient sustaining HR 35-40. Patient assessed and was sleeping during bradycardia. Patient was woke up and denied chest pain or difficulty breathing. HR was 50-60s when awake. /52. MD notified. Per MD, notify if patient has symptomatic bradycardia.

## 2025-02-15 ENCOUNTER — APPOINTMENT (OUTPATIENT)
Dept: RADIOLOGY | Facility: MEDICAL CENTER | Age: 75
DRG: 071 | End: 2025-02-15
Attending: INTERNAL MEDICINE
Payer: MEDICARE

## 2025-02-15 ENCOUNTER — APPOINTMENT (OUTPATIENT)
Dept: CARDIOLOGY | Facility: MEDICAL CENTER | Age: 75
DRG: 071 | End: 2025-02-15
Attending: INTERNAL MEDICINE
Payer: MEDICARE

## 2025-02-15 PROBLEM — Z71.89 ADVANCED CARE PLANNING/COUNSELING DISCUSSION: Status: ACTIVE | Noted: 2025-02-15

## 2025-02-15 LAB
ALBUMIN SERPL BCP-MCNC: 3.5 G/DL (ref 3.2–4.9)
BACTERIA BLD CULT: NORMAL
BUN SERPL-MCNC: 14 MG/DL (ref 8–22)
CALCIUM ALBUM COR SERPL-MCNC: 9.7 MG/DL (ref 8.5–10.5)
CALCIUM SERPL-MCNC: 9.3 MG/DL (ref 8.4–10.2)
CHLORIDE SERPL-SCNC: 104 MMOL/L (ref 96–112)
CO2 SERPL-SCNC: 22 MMOL/L (ref 20–33)
CORTIS SERPL-MCNC: 9.4 UG/DL (ref 0–23)
CREAT SERPL-MCNC: 0.81 MG/DL (ref 0.5–1.4)
GFR SERPLBLD CREATININE-BSD FMLA CKD-EPI: 76 ML/MIN/1.73 M 2
GLUCOSE SERPL-MCNC: 86 MG/DL (ref 65–99)
LV EJECT FRACT  99904: 63
LV EJECT FRACT MOD 2C 99903: 56.36
LV EJECT FRACT MOD 4C 99902: 69.02
LV EJECT FRACT MOD BP 99901: 62.74
MAGNESIUM SERPL-MCNC: 2.2 MG/DL (ref 1.5–2.5)
PHOSPHATE SERPL-MCNC: 2.9 MG/DL (ref 2.5–4.5)
POTASSIUM SERPL-SCNC: 3.9 MMOL/L (ref 3.6–5.5)
PREALB SERPL-MCNC: 12.9 MG/DL (ref 18–38)
PROLACTIN SERPL-MCNC: 20.5 NG/ML (ref 2.8–26)
SIGNIFICANT IND 70042: NORMAL
SITE SITE: NORMAL
SODIUM SERPL-SCNC: 138 MMOL/L (ref 135–145)
SOURCE SOURCE: NORMAL

## 2025-02-15 PROCEDURE — 82626 DEHYDROEPIANDROSTERONE: CPT

## 2025-02-15 PROCEDURE — 80069 RENAL FUNCTION PANEL: CPT

## 2025-02-15 PROCEDURE — 700102 HCHG RX REV CODE 250 W/ 637 OVERRIDE(OP): Performed by: HOSPITALIST

## 2025-02-15 PROCEDURE — 700111 HCHG RX REV CODE 636 W/ 250 OVERRIDE (IP): Performed by: INTERNAL MEDICINE

## 2025-02-15 PROCEDURE — 700102 HCHG RX REV CODE 250 W/ 637 OVERRIDE(OP): Performed by: INTERNAL MEDICINE

## 2025-02-15 PROCEDURE — 94760 N-INVAS EAR/PLS OXIMETRY 1: CPT

## 2025-02-15 PROCEDURE — 93306 TTE W/DOPPLER COMPLETE: CPT

## 2025-02-15 PROCEDURE — 700105 HCHG RX REV CODE 258: Performed by: INTERNAL MEDICINE

## 2025-02-15 PROCEDURE — 84134 ASSAY OF PREALBUMIN: CPT

## 2025-02-15 PROCEDURE — 93306 TTE W/DOPPLER COMPLETE: CPT | Mod: 26 | Performed by: INTERNAL MEDICINE

## 2025-02-15 PROCEDURE — 71045 X-RAY EXAM CHEST 1 VIEW: CPT

## 2025-02-15 PROCEDURE — A9270 NON-COVERED ITEM OR SERVICE: HCPCS | Performed by: INTERNAL MEDICINE

## 2025-02-15 PROCEDURE — 84146 ASSAY OF PROLACTIN: CPT

## 2025-02-15 PROCEDURE — 99233 SBSQ HOSP IP/OBS HIGH 50: CPT | Mod: 25 | Performed by: INTERNAL MEDICINE

## 2025-02-15 PROCEDURE — 700101 HCHG RX REV CODE 250: Performed by: INTERNAL MEDICINE

## 2025-02-15 PROCEDURE — 83735 ASSAY OF MAGNESIUM: CPT

## 2025-02-15 PROCEDURE — 82533 TOTAL CORTISOL: CPT

## 2025-02-15 PROCEDURE — 770001 HCHG ROOM/CARE - MED/SURG/GYN PRIV*

## 2025-02-15 PROCEDURE — 99497 ADVNCD CARE PLAN 30 MIN: CPT | Performed by: INTERNAL MEDICINE

## 2025-02-15 PROCEDURE — 74018 RADEX ABDOMEN 1 VIEW: CPT

## 2025-02-15 PROCEDURE — A9270 NON-COVERED ITEM OR SERVICE: HCPCS | Performed by: HOSPITALIST

## 2025-02-15 PROCEDURE — 36415 COLL VENOUS BLD VENIPUNCTURE: CPT

## 2025-02-15 RX ORDER — SODIUM CHLORIDE 9 MG/ML
INJECTION, SOLUTION INTRAVENOUS CONTINUOUS
Status: ACTIVE | OUTPATIENT
Start: 2025-02-15 | End: 2025-02-15

## 2025-02-15 RX ORDER — LEVETIRACETAM 500 MG/5ML
500 INJECTION, SOLUTION, CONCENTRATE INTRAVENOUS EVERY 12 HOURS
Status: DISCONTINUED | OUTPATIENT
Start: 2025-02-15 | End: 2025-02-17 | Stop reason: HOSPADM

## 2025-02-15 RX ORDER — SODIUM PHOSPHATE,MONO-DIBASIC 19G-7G/118
1 ENEMA (ML) RECTAL ONCE
Status: COMPLETED | OUTPATIENT
Start: 2025-02-15 | End: 2025-02-15

## 2025-02-15 RX ADMIN — SODIUM PHOSPHATE 1 ENEMA: 7; 19 ENEMA RECTAL at 16:32

## 2025-02-15 RX ADMIN — MEMANTINE 10 MG: 10 TABLET ORAL at 16:31

## 2025-02-15 RX ADMIN — CEFAZOLIN 2 G: 2 INJECTION, POWDER, FOR SOLUTION INTRAMUSCULAR; INTRAVENOUS at 21:23

## 2025-02-15 RX ADMIN — SENNOSIDES AND DOCUSATE SODIUM 2 TABLET: 50; 8.6 TABLET ORAL at 16:31

## 2025-02-15 RX ADMIN — CEFAZOLIN 2 G: 2 INJECTION, POWDER, FOR SOLUTION INTRAMUSCULAR; INTRAVENOUS at 14:12

## 2025-02-15 RX ADMIN — THYROID 45 MG: 30 TABLET ORAL at 06:40

## 2025-02-15 RX ADMIN — HEPARIN SODIUM 5000 UNITS: 5000 INJECTION, SOLUTION INTRAVENOUS; SUBCUTANEOUS at 21:23

## 2025-02-15 RX ADMIN — FOLIC ACID 1 MG: 1 TABLET ORAL at 06:40

## 2025-02-15 RX ADMIN — DONEPEZIL HYDROCHLORIDE 10 MG: 5 TABLET, FILM COATED ORAL at 21:23

## 2025-02-15 RX ADMIN — LEVETIRACETAM 500 MG: 500 INJECTION, SOLUTION INTRAVENOUS at 12:36

## 2025-02-15 RX ADMIN — MEMANTINE 10 MG: 10 TABLET ORAL at 06:40

## 2025-02-15 RX ADMIN — CEFAZOLIN 2 G: 2 INJECTION, POWDER, FOR SOLUTION INTRAMUSCULAR; INTRAVENOUS at 06:38

## 2025-02-15 RX ADMIN — OMEPRAZOLE 20 MG: 20 CAPSULE, DELAYED RELEASE ORAL at 06:40

## 2025-02-15 RX ADMIN — SODIUM CHLORIDE: 9 INJECTION, SOLUTION INTRAVENOUS at 15:54

## 2025-02-15 ASSESSMENT — FIBROSIS 4 INDEX: FIB4 SCORE: 3.22

## 2025-02-15 ASSESSMENT — PAIN DESCRIPTION - PAIN TYPE
TYPE: ACUTE PAIN
TYPE: ACUTE PAIN

## 2025-02-15 ASSESSMENT — ENCOUNTER SYMPTOMS: WEAKNESS: 1

## 2025-02-15 NOTE — CARE PLAN
Problem: Respiratory  Goal: Patient will achieve/maintain optimum respiratory ventilation and gas exchange  Outcome: Progressing  Note: Patients oxygenation at 100% on2lts of O2, patient with shallow unlabored breathing     Problem: Knowledge Deficit - Standard  Goal: Patient and family/care givers will demonstrate understanding of plan of care, disease process/condition, diagnostic tests and medications  2/15/2025 0236 by Lorena Matthews R.N.  Outcome: Progressing  2/15/2025 0220 by Lorena Matthews RMoiraN.  Outcome: Not Progressing  Note: Patients partner declines SNF placement for patient and wants the patient discharging home,and he is the care giver.     Problem: Skin Integrity  Goal: Skin integrity is maintained or improved  2/15/2025 0236 by Lorena Matthews, R.N.  Outcome: Progressing  2/15/2025 0220 by Lorena Matthews R.N.  Outcome: Not Progressing  Note: Patient on a low air loss mattress, patient getting Q2 turns, patient with red excoriated bottoms barrier paste in use, not much improvement on skin  integrity as patient has multiple bowels and she's incontinent *2, use of pure wick isn't effectual     Problem: Fall Risk  Goal: Patient will remain free from falls  2/15/2025 0236 by Lorena Matthews, R.N.  Outcome: Progressing  2/15/2025 0220 by Lorena Matthews R.N.  Outcome: Progressing     Problem: Pain - Standard  Goal: Alleviation of pain or a reduction in pain to the patient’s comfort goal  Outcome: Progressing   The patient is Stable - Low risk of patient condition declining or worsening    Shift Goals  Clinical Goals: monitor mentation, Q2 turns, monitor vitals  Patient Goals: UTD  Family Goals: SPRING    Progress made toward(s) clinical / shift goals:    Problem: Respiratory  Goal: Patient will achieve/maintain optimum respiratory ventilation and gas exchange  Outcome: Progressing  Note: Patients oxygenation at 100% on2lts of O2, patient with shallow unlabored breathing     Problem: Knowledge Deficit -  Standard  Goal: Patient and family/care givers will demonstrate understanding of plan of care, disease process/condition, diagnostic tests and medications  2/15/2025 0236 by Lorena Matthews RMoiraN.  Outcome: Progressing  2/15/2025 0220 by Lorena Matthews RMoiraN.  Outcome: Not Progressing  Note: Patients partner declines SNF placement for patient and wants the patient discharging home,and he is the care giver.     Problem: Skin Integrity  Goal: Skin integrity is maintained or improved  2/15/2025 0236 by Lorena Matthews R.N.  Outcome: Progressing  2/15/2025 0220 by JOHN RaymundoNMoira  Outcome: Not Progressing  Note: Patient on a low air loss mattress, patient getting Q2 turns, patient with red excoriated bottoms barrier paste in use, not much improvement on skin  integrity as patient has multiple bowels and she's incontinent *2, use of pure wick isn't effectual     Problem: Fall Risk  Goal: Patient will remain free from falls  2/15/2025 0236 by Lorena Matthews, R.N.  Outcome: Progressing  2/15/2025 0220 by JOHN RaymundoN.  Outcome: Progressing     Problem: Pain - Standard  Goal: Alleviation of pain or a reduction in pain to the patient’s comfort goal  Outcome: Progressing       Patient is not progressing towards the following goals:      Problem: Knowledge Deficit - Standard  Goal: Patient and family/care givers will demonstrate understanding of plan of care, disease process/condition, diagnostic tests and medications  Outcome: Not Progressing  Note: Patients partner declines SNF placement for patient and wants the patient discharging home,and he is the care giver.     Problem: Skin Integrity  Goal: Skin integrity is maintained or improved  Outcome: Not Progressing  Note: Patient on a low air loss mattress, patient getting Q2 turns, patient with red excoriated bottoms barrier paste in use, not much improvement on skin  integrity as patient has multiple bowels and she's incontinent *2, use of pure wick isn't  effectual

## 2025-02-15 NOTE — PROGRESS NOTES
Assumed care of patient at bedside report from NOC RN. Updated on POC. Patient currently A & O x 1 oriented to self; on 2 L O2 100; without complaints of acute pain. Assessment completed. Call light within reach. Whiteboard updated. Fall precautions in place. Bed locked and in lowest position. All questions answered. No other needs indicated at this time.

## 2025-02-15 NOTE — PROCEDURES
INPATIENT ROUTINE VIDEO ELECTROENCEPHALOGRAM REPORT    REFERRING PROVIDER: Donato Ramirez M.d.  DOS: 2/14/2025  STUDY DURATION: 0 hours and 31 minutes of total recording time.     INDICATION:  Thuy Albert 74 y.o. female presenting with altered mental status    RELEVANT MEDICATIONS/TREATMENTS:   Scheduled Medications   Medication Dose Frequency    [Held by provider] levETIRAcetam (Keppra) IV  500 mg Q12HRS    ceFAZolin  2 g Q8HRS    omeprazole  20 mg DAILY    [Held by provider] heparin  5,000 Units Q8HRS    donepezil  10 mg Nightly    folic acid  1 mg DAILY    memantine  10 mg BID    thyroid  45 mg QDAY    senna-docusate  2 Tablet Q EVENING       TECHNIQUE:   Routine VEEG was set up by a Neurodiagnostic technologist who performed education to the patient and staff. A minimum of 23 electrodes and 23 channel recording was setup and performed by Neurodiagnostic technologist, in accordance with the international 10-20 system. The study was reviewed in bipolar and referential montages. The recording examined the patient in the  awake, drowsy, and sleep state(s).     DESCRIPTION OF THE RECORD:  The background exhibited continuous generalized theta slowing. A sustained posterior dominant rhythm was not clearly present. There was reactivity to eye closure/opening.  An anterior-posterior gradient was noted with faster beta frequencies seen anteriorly.  During drowsiness, theta/delta frequencies were seen.    EEG Sleep: Occasional N2 sleep transients in the form of rudimentary and/or ill-defined sleep spindles fragments and vertex waves were seen in the leads over the central regions.     ICTAL AND INTERICTAL FINDINGS:   Occasional bursts of frontal intermittent delta activity (FIRDA)  No epileptiform discharges  No seizures    ACTIVATION PROCEDURES:   Intermittent Photic stimulation was performed in a stepwise fashion from 1 to 30 Hz and did not elicited any abnormalities on EEG.     EKG: Single lead EKG  regular.    EVENTS:  None    INTERPRETATION:   Abnormal video EEG recording in the awake, drowsy, and sleep state(s):  Occasional bursts of frontal intermittent delta activity (FIRDA), a finding that can be present in a broad number of clinical situations including toxic/metabolic disturbance, nonspecific cerebral dysfunction, midline cortical lesions, among other possibilities. Clinical correlation recommended  Continuous generalized theta slowing of the background suggestive of moderate diffuse cerebral dysfunction of a nonspecific etiology.  No epileptiform discharges  No seizures      Note: This EEG does not rule out the possibility of seizures or exclude a diagnosis of epilepsy.  If the clinical suspicion remains high for seizures, a prolonged video EEG recording to capture clinical or subclinical events may be helpful.    Samia Jung MD  Department of Neurology at [facility]  General Neurologist and Epileptologist   of Clinical Neurology, [facility].

## 2025-02-15 NOTE — CARE PLAN
The patient is Stable - Low risk of patient condition declining or worsening    Shift Goals  Clinical Goals: monitor mentation, monitor vitals, safety  Patient Goals: UTD  Family Goals: SPRING    Progress made toward(s) clinical / shift goals:    Problem: Fall Risk  Goal: Patient will remain free from falls  Description: Target End Date:  Prior to discharge or change in level of care    Document interventions on the Annel Giordano Fall Risk Assessment    1.  Assess for fall risk factors  2.  Implement fall precautions  Outcome: Progressing    Patient remained free from falls during shift. Bed locked in lowest position. Bed alarm on.     Patient is not progressing towards the following goals:      Problem: Knowledge Deficit - Standard  Goal: Patient and family/care givers will demonstrate understanding of plan of care, disease process/condition, diagnostic tests and medications  Description: Target End Date:  1-3 days or as soon as patient condition allows    Document in Patient Education    1.  Patient and family/caregiver oriented to unit, equipment, visitation policy and means for communicating concern  2.  Complete/review Learning Assessment  3.  Assess knowledge level of disease process/condition, treatment plan, diagnostic tests and medications  4.  Explain disease process/condition, treatment plan, diagnostic tests and medications  Outcome: Not Progressing    Patient unable to verbalize understanding of plan of care. Patient verbalizes name and answers yes or no when asked questions.

## 2025-02-15 NOTE — PROGRESS NOTES
[facility] Medicine Daily Progress Note    Date of Service  2/15/2025    Chief Complaint  Thuy Albert is a 74 y.o. female admitted 2/9/2025 with   Chief Complaint   Patient presents with    GLF     Pt reports two GLFs, pt cannot state time of falls. Pt reports hitting her head during fall. Denies blood thinners.     Mental Status Change     BIBA from home, pts  reports pt had increased confusion this AM. Pt is A+O 1, GCS 10 with EMS. Pt brought directly to CT from EMS [facility] after seen by ERP.      Hospital Course  74-year-old female with a past medical history of Alzheimer's dementia, GERD, DVTs, hypothyroidism, asthma, COVID-19 pneumonia on 10/17/2024.  Patient was admitted 12/9/25 with altered mental state, ground-level fall at home, as per .    Upon presentation to the ER, HR 96, hypoxic to 81%, BP lowest 97/53 from 146/61.  Labs showed leukocytosis 13.9, hemoglobin 13.8, platelets 187, sodium 135, potassium 3.8, creatinine 1.1, severely elevated lactic acid of 4.6.  UA was negative.  COVID/flu/RSV PCR negative.    CT head and cervical spine showed no intracranial hemorrhage and no fractures.     CT abdomen pelvis showing fecal impaction in rectal vault.    Patient had a lumbar puncture in the ER.  Meningitis/encephalitis PCR returned negative.  Expanded viral respiratory panel PCR negative.  She was initially started on IV acyclovir, ampicillin, ceftriaxone and vancomycin which was discontinued after CSF labs return negative for meningitis/encephalitis.     Interval Problem Update  2/15:  Patient is still encephalopathic.  I reviewed EEG results, findings are nonspecific.  EEG is not showing nonconvulsive status epilepticus.  We will restart Keppra, patient has tremors that could be representing a seizure.   patient remains on 2 L nasal cannula  I ordered a chest x-ray and abdominal x-ray.    2/14:  Patient a little bit more verbal today.  We are pending EEG, we have asked NAMs to help  with tech  K 3.4, replacing via PO  SLP evaluated patient, restarted pureed diet  I checked an ABG yesterday, pH 7.505, PaCO2 36.6, PaO2 62  I discussed case with Sig other Robbie Man. I explained today's situation and diagnostic EEG. Pending results on EEG. Patient is still encephalopathic for him.  I ordered labs for 6AM, checking cortisol. I added an Echocardiogram given sinus bradycardia.    2/13:  EEG ordered  Robbie Duggan wanted Chantell to go home, he declined SNF  Patient has had about several bowel movements.  I did check a abdominal x-ray this morning, there still appears to be constipation on the left colon and rectal vault.    2/12:  Patient had no acute complaints today  I reviewed brain MRI  I reviewed abdominal x-ray  I reviewed patient's labs  I discussed with significant other Robbie Man at bedside, gave updates.    2/11:  Patient has poor historian but she is complaining of chest pain.  I checked a ECG.  There is no ST elevations.  I checked troponins which was 18.    Lactic acid 1.8. Procalcitonin 0.16.  proBNP 2630 but patient is not showing signs of significant peripheral edema.    D-dimer 1.37, I am ordering a CTA chest.  Continue telemetry monitoring.  Speech therapy worked with patient, recommending purée solid and thin liquids.    I spent an extra 15 minutes on patient's case.  I called significant other Robbie Man. I gave updates.  Patient has Alzheimers, sits in the TV. Has gone blind but listens to the old TV movies and shows.   She woke up Sunday morning, but she was not able to take her meds with water.  Everyday, she has stool incontinence in her underwear.  I explained current plans with imaging and possible non-infectious explanation for her symptoms.    I have discussed this patient's plan of care and discharge plan at IDT rounds today with Case Management, Nursing, Nursing leadership, and other members of the IDT team.    Consultants/Specialty  pulmonary    Code Status  Full  Code    Disposition  The patient is not medically cleared for discharge to home or a post-acute facility.  Anticipate discharge to: skilled nursing facility    I have placed the appropriate orders for post-discharge needs.  Significant other Art Donovan declined for post acute placement and home health. No referrals placed.    Review of Systems  Review of Systems   Cardiovascular:  Positive for chest pain. Negative for leg swelling.   Neurological:  Positive for weakness.   All other systems reviewed and are negative.       Physical Exam  Temp:  [36.2 °C (97.1 °F)-36.7 °C (98.1 °F)] 36.3 °C (97.4 °F)  Pulse:  [41-51] 51  Resp:  [16-18] 16  BP: (117-143)/(49-64) 126/58  SpO2:  [98 %-100 %] 99 %    Physical Exam  Vitals and nursing note reviewed.   Constitutional:       General: She is not in acute distress.     Appearance: She is not ill-appearing.      Comments: Frail appearing elderly female   HENT:      Head: Normocephalic and atraumatic.      Comments: Temporal muscle wasting positive     Mouth/Throat:      Mouth: Mucous membranes are dry.      Pharynx: No oropharyngeal exudate.   Eyes:      Comments: Legally blind   Cardiovascular:      Rate and Rhythm: Normal rate and regular rhythm.      Pulses: Normal pulses.      Heart sounds: Normal heart sounds. No murmur heard.  Pulmonary:      Effort: Pulmonary effort is normal. No respiratory distress.      Breath sounds: Normal breath sounds. No wheezing.   Abdominal:      General: Abdomen is flat. Bowel sounds are normal. There is no distension.      Palpations: Abdomen is soft.      Tenderness: There is no abdominal tenderness.   Musculoskeletal:         General: No swelling or tenderness.      Cervical back: No rigidity.      Comments: Sarcopenic. Bilateral hand muscle atrophy noted.   Skin:     General: Skin is warm.      Capillary Refill: Capillary refill takes less than 2 seconds.      Coloration: Skin is not jaundiced.      Findings: No erythema.   Neurological:       Mental Status: She is alert. Mental status is at baseline. She is disoriented.      Motor: Weakness present.   Psychiatric:         Mood and Affect: Mood normal.         Behavior: Behavior normal.         Fluids    Intake/Output Summary (Last 24 hours) at 2/15/2025 1525  Last data filed at 2/15/2025 1000  Gross per 24 hour   Intake 100 ml   Output --   Net 100 ml          Laboratory  Recent Labs     02/14/25  0108   WBC 8.6   RBC 3.87*   HEMOGLOBIN 11.9*   HEMATOCRIT 37.6   MCV 97.2   MCH 30.7   MCHC 31.6*   RDW 45.8   PLATELETCT 152*   MPV 9.6     Recent Labs     02/14/25  0108 02/15/25  0624   SODIUM 139 138   POTASSIUM 3.4* 3.9   CHLORIDE 103 104   CO2 26 22   GLUCOSE 97 86   BUN 14 14   CREATININE 0.71 0.81   CALCIUM 9.0 9.3                     Imaging  [id number] VIEW   Final Result      1.  Decreased amount of colonic stool from prior exam.   2.  Residual stool again seen in the rectum, improved from prior.   3.  No small bowel obstruction.      DX-CHEST-PORTABLE (1 VIEW)   Final Result      1.  Mildly prominent interstitium, edema versus interstitial lung disease.   2.  No pneumonia or pneumothorax.      EC-ECHOCARDIOGRAM COMPLETE W/O CONT   Final Result      [id number] VIEW   Final Result      No evidence of bowel obstruction. Stable constipation.      MR-BRAIN-WITH & W/O   Final Result      1.  Moderate to severe cerebral volume loss. The volume loss is more prominent in the temporal lobes. This is concerning for degenerative pattern volume loss. There has been mild interval increase in the extent of the volume loss since the previous study    dated 4/10/2023.   2.  Mild chronic microvascular ischemic disease.         CT-CTA CHEST PULMONARY ARTERY W/ RECONS   Final Result         1.  No large central pulmonary embolus is appreciated, evaluation of the subsegmental branches is essentially nondiagnostic due to motion artifacts. Additional imaging would be required for definitive exclusion of small  distal pulmonary emboli.   2.  Atherosclerosis and atherosclerotic coronary artery disease.      [id number] VIEW   Final Result      1.  Marked left-sided colonic constipation.      CT-HEAD W/O   Final Result         1.  No acute intracranial abnormality is identified, there are nonspecific white matter changes, commonly associated with small vessel ischemic disease.  Associated mild cerebral atrophy is noted.   2.  Atherosclerosis.               CT-CSPINE WITHOUT PLUS RECONS   Final Result         1.  Multilevel degenerative changes of the cervical spine limit diagnostic sensitivity of this examination, otherwise no acute traumatic bony injury of the cervical spine is apparent.   2.  Atherosclerosis      DX-CHEST-PORTABLE (1 VIEW)   Final Result         1.  No acute cardiopulmonary disease.   2.  Atherosclerosis      CT-ABDOMEN-PELVIS WITH   Final Result         1.  Large quantity of stool in the rectal vault, appearance suggesting constipation with probable rectal fecal impaction   2.  Atherosclerosis and atherosclerotic coronary artery disease           Assessment/Plan  * Acute metabolic encephalopathy- (present on admission)  Assessment & Plan  Due to dehydration and fecal impaction worsening chronic Alzheimer's dementia  Fall precautions  Patient did receive IV Versed 2 mg and IV Haldol 5 mg back on 2/10.  This could be affecting her baseline cognitive function, may take time for her to completely metabolize medications  I ordered an EEG, patient is not returning to her baseline as per Art.    Addendum: pt less able to follow commands in the evening. MRI was showing more brain loss to temporal lobes. This could promote seizures. EEG tech could not make it to [facility]. I am empirically starting Keppra. At this time, there is no other explanation for patient's encephalopathy.    2/14:  I am holding CNS acting medications including memantine, donepezil.  These fortunately have been held since 2/13.  Patient is  speaking more today after starting Keppra.  We will rehold Keppra and see if patient reverts back.  I am awaiting for an EEG but no techs have arrived from [facility].    2/15:  Patient is still encephalopathic.  I reviewed EEG results, findings are nonspecific.  EEG is not showing nonconvulsive status epilepticus.  We will restart Keppra, patient has tremors that could be representing a seizure.     Fecal impaction (HCC)- (present on admission)  Assessment & Plan  As per significant other Art, patient is still not at her baseline mental state.  Typically at home she is able to make jokes and talk to him easily.  This could be from her fecal impaction.  I discussed with Art, she presented with significant dehydration.  Patient is not drinking enough water home.  She is dependent to Art to be able to get water.  I explained 1.5 L would be a good average at home.            First image, Initial CT abdomen pelvis showing fecal impaction in rectal vault.  Second image, abdominal x-ray, showing ongoing rectal Voalte fecal impaction, extending to the left colon.  Third image abdominal x-ray, yellow arrows showing ongoing fecal impaction.  I obtained a fourth image, x-ray showing ongoing fecal impaction in the rectal vault.  Giving an Fleet Enema today    Advanced care planning/counseling discussion  Assessment & Plan  I had an extensive discussion with patient's significant other Art Donovan. We have discussed patient's condition daily during her hospitalization. She has remained encephalopathic. I have explained to Art, all laboratory evaluations have so far been negative and does not explain patient's condition. We have examined brain MRI, EEG and CT scans that do not explain her changes. We discussed his goals for Chantell. He wishes to take her home. He does not want any post-acute placements. He has agreed for Home health today.  He asked what to do if things do not work at home. We again discussed about hospice  services for terminal Dementia patients, for increased services at home.  He has expressed difficult with caring for her at home. I explained this may be a last resort if he is not ready right now.  At this time, patient remains a full code, she is not going home on hospice.  I placed orders for home health. Potential discharge Monday 2/17.  Total advanced care planning time spent 23 minutes    Metabolic alkalosis- (present on admission)  Assessment & Plan  I checked an ABG yesterday, pH 7.505, PaCO2 36.6, PaO2 62  Serum bicarb 22 after IV acetazolamide.  Monitor.    Encounter for family conference without patient present  Assessment & Plan  2/11:  I called significant other Robbie Man. I gave updates.  Patient has Alzheimers, sits in the TV. Has gone blind but listens to the old TV movies and shows.   She woke up Sunday morning, but she was not able to take her meds with water.  Everyday, she has stool incontinence in her underwear.  I explained current plans with imaging and possible non-infectious explanation for her symptoms.    2/12:  I had extensive discussion with significant other Art.  I explained the current findings and negative findings.  I explained we currently do not have any infection that we are suspecting.  I explained fecal impaction and dehydration at home  Art is adamant to take patient home and would prefer not to go to any skilled nursing facility.  He himself has new medical conditions including significant swelling to his lower extremity, ongoing abdominal pain.  I explained he should go to the ER.  It may be beneficial to try postacute placement for Ms. Albert temporarily while he seeks medical treatment for his needs.    2/13:  Robbie Olga Lidia wanted Chantell to go home, he declined SNF    2/15:  I discussed with significant other at bedside and gave updates.    Other chest pain- (present on admission)  Assessment & Plan  Patient has poor historian but she is complaining of chest pain.  I checked  a ECG, there is no ST elevations.  I checked troponins which was 18.    I reviewed patient's CTA chest.  Radiologist was not able to comment on any distal findings to confirm PE.  Unclear if the yellow arrows are truly pulmonary embolisms.  Of note troponins did not elevate  Patient was not complaining of chest pain today at this time we will defer any anticoagulation as there is no concrete evidence of any pulmonary embolism.  These are likely artifact on CT scan.  Chest x-ray today showing more interstitial prominence    Severe early onset Alzheimer's dementia with other behavioral disturbance (HCC)- (present on admission)  Assessment & Plan  We obtained a brain MRI, I reviewed results and compared to previous brain MRIs up to 2016.  There does appear to be more cerebral atrophy.  I explained these results to Art.  There is no acute stroke.  Restart amantadine and donepezil    Hypothyroidism- (present on admission)  Assessment & Plan  Continue [place] Thyroid    GERD (gastroesophageal reflux disease)- (present on admission)  Assessment & Plan  Continue omeprazole         VTE prophylaxis:    heparin ppx      I have performed a physical exam and reviewed and updated ROS and Plan today (2/15/2025). In review of yesterday's note (2/14/2025), there are no changes except as documented above.      Total time spent 51 minutes. I spent greater than 50% of the time for patient care, including unit/floor time, multiple face-to-face encounters with the patient, counseling on treatment plan and discussion with bedside RN.  Further, I spent time on my own review of patient's overnight events, RN notes, imaging and lab analysis, and developing my assessment and plan above.  In addition, working with , social workers, and Charge RN on case coordination on this date.    This note was generated using voice recognition software which has a chance of producing errors of grammar and content.  I have made every reasonable  attempt to find and correct any errors, but it should be expected that some may not be found prior to finalization of this note.

## 2025-02-15 NOTE — PROGRESS NOTES
Telemetry Shift Summary     Rhythm: SB  HR Range:38-41  Ectopy: -  Measurements: 0.18/0.08/0.50    Normal Values  Measurements: 0.12- 0.20 / 0.08-0.10 / 0.30-0.52

## 2025-02-15 NOTE — PROGRESS NOTES
Telemetry Shift Summary    Rhythm SB  HR Range 32-43  Ectopy R-O PAC               R PVC, trigem  Measurements  0.18/0.08/0.42    Per monitor tech     Normal Values  Rhythm SR  HR Range   Measurements 0.12-0.20 / 0.06-0.10 / 0.30-0.52

## 2025-02-15 NOTE — CARE PLAN
The patient is Watcher - Medium risk of patient condition declining or worsening    Shift Goals  Clinical Goals: monitor mentation, Q2 turns, monitor vitals  Patient Goals: UTD  Family Goals: SPRING    Progress made toward(s) clinical / shift goals:    Problem: Knowledge Deficit - Standard  Goal: Patient and family/care givers will demonstrate understanding of plan of care, disease process/condition, diagnostic tests and medications  Outcome: Not Progressing  Note: Patients partner declines SNF placement for patient and wants the patient discharging home,and he is the care giver.     Problem: Skin Integrity  Goal: Skin integrity is maintained or improved  Outcome: Not Progressing  Note: Patient on a low air loss mattress, patient getting Q2 turns, patient with red excoriated bottoms barrier paste in use, not much improvement on skin  integrity as patient has multiple bowels and she's incontinent *2, use of pure wick isn't effectual       Patient is not progressing towards the following goals:      Problem: Knowledge Deficit - Standard  Goal: Patient and family/care givers will demonstrate understanding of plan of care, disease process/condition, diagnostic tests and medications  Outcome: Not Progressing  Note: Patients partner declines SNF placement for patient and wants the patient discharging home,and he is the care giver.     Problem: Skin Integrity  Goal: Skin integrity is maintained or improved  Outcome: Not Progressing  Note: Patient on a low air loss mattress, patient getting Q2 turns, patient with red excoriated bottoms barrier paste in use, not much improvement on skin  integrity as patient has multiple bowels and she's incontinent *2, use of pure wick isn't effectual

## 2025-02-15 NOTE — PROGRESS NOTES
[facility] Medicine Daily Progress Note    Date of Service  2/14/2025    Chief Complaint  Thuy Albert is a 74 y.o. female admitted 2/9/2025 with   Chief Complaint   Patient presents with    GLF     Pt reports two GLFs, pt cannot state time of falls. Pt reports hitting her head during fall. Denies blood thinners.     Mental Status Change     BIBA from home, pts  reports pt had increased confusion this AM. Pt is A+O 1, GCS 10 with EMS. Pt brought directly to CT from EMS [facility] after seen by ERP.      Hospital Course  74-year-old female with a past medical history of Alzheimer's dementia, GERD, DVTs, hypothyroidism, asthma, COVID-19 pneumonia on 10/17/2024.  Patient was admitted 12/9/25 with altered mental state, ground-level fall at home, as per .    Upon presentation to the ER, HR 96, hypoxic to 81%, BP lowest 97/53 from 146/61.  Labs showed leukocytosis 13.9, hemoglobin 13.8, platelets 187, sodium 135, potassium 3.8, creatinine 1.1, severely elevated lactic acid of 4.6.  UA was negative.  COVID/flu/RSV PCR negative.    CT head and cervical spine showed no intracranial hemorrhage and no fractures.     CT abdomen pelvis showing fecal impaction in rectal vault.    Patient had a lumbar puncture in the ER.  Meningitis/encephalitis PCR returned negative.  Expanded viral respiratory panel PCR negative.  She was initially started on IV acyclovir, ampicillin, ceftriaxone and vancomycin which was discontinued after CSF labs return negative for meningitis/encephalitis.     Interval Problem Update  2/14:  Patient a little bit more verbal today.  We are pending EEG, we have asked [facility] to help with tech  K 3.4, replacing via PO  SLP evaluated patient, restarted pureed diet  I checked an ABG yesterday, pH 7.505, PaCO2 36.6, PaO2 62  I discussed case with Sig other Art Donovan. I explained today's situation and diagnostic EEG. Pending results on EEG. Patient is still encephalopathic for him.  I ordered labs  for 6AM, checking cortisol. I added an Echocardiogram given sinus bradycardia.    2/13:  EEG ordered  Robbie Duggan wanted Chantell to go home, he declined SNF  Patient has had about several bowel movements.  I did check a abdominal x-ray this morning, there still appears to be constipation on the left colon and rectal vault.    2/12:  Patient had no acute complaints today  I reviewed brain MRI  I reviewed abdominal x-ray  I reviewed patient's labs  I discussed with significant other Robbie Cardozayle at bedside, gave updates.    2/11:  Patient has poor historian but she is complaining of chest pain.  I checked a ECG.  There is no ST elevations.  I checked troponins which was 18.    Lactic acid 1.8. Procalcitonin 0.16.  proBNP 2630 but patient is not showing signs of significant peripheral edema.    D-dimer 1.37, I am ordering a CTA chest.  Continue telemetry monitoring.  Speech therapy worked with patient, recommending purée solid and thin liquids.    I spent an extra 15 minutes on patient's case.  I called significant other Robbie Cardozayle. I gave updates.  Patient has Alzheimers, sits in the TV. Has gone blind but listens to the old TV movies and shows.   She woke up Sunday morning, but she was not able to take her meds with water.  Everyday, she has stool incontinence in her underwear.  I explained current plans with imaging and possible non-infectious explanation for her symptoms.    I have discussed this patient's plan of care and discharge plan at IDT rounds today with Case Management, Nursing, Nursing leadership, and other members of the IDT team.    Consultants/Specialty  pulmonary    Code Status  Full Code    Disposition  The patient is not medically cleared for discharge to home or a post-acute facility.  Anticipate discharge to: home with close outpatient follow-up    I have placed the appropriate orders for post-discharge needs.  Significant other Robbie Man declined for post acute placement and home health. No  referrals placed.    Review of Systems  Review of Systems   Cardiovascular:  Positive for chest pain. Negative for leg swelling.   Neurological:  Positive for weakness.   All other systems reviewed and are negative.       Physical Exam  Temp:  [36.4 °C (97.5 °F)-36.7 °C (98.1 °F)] 36.4 °C (97.5 °F)  Pulse:  [36-73] 36  Resp:  [18-19] 18  BP: (112-157)/(52-81) 112/52  SpO2:  [92 %-100 %] 100 %    Physical Exam  Vitals and nursing note reviewed.   Constitutional:       General: She is not in acute distress.     Appearance: She is not ill-appearing.      Comments: Frail appearing elderly female   HENT:      Head: Normocephalic and atraumatic.      Comments: Temporal muscle wasting positive     Mouth/Throat:      Mouth: Mucous membranes are dry.      Pharynx: No oropharyngeal exudate.   Eyes:      Comments: Legally blind   Cardiovascular:      Rate and Rhythm: Normal rate and regular rhythm.      Pulses: Normal pulses.      Heart sounds: Normal heart sounds. No murmur heard.  Pulmonary:      Effort: Pulmonary effort is normal. No respiratory distress.      Breath sounds: Normal breath sounds. No wheezing.   Abdominal:      General: Abdomen is flat. Bowel sounds are normal. There is no distension.      Palpations: Abdomen is soft.      Tenderness: There is no abdominal tenderness.   Musculoskeletal:         General: No swelling or tenderness.      Cervical back: No rigidity.      Comments: Sarcopenic. Bilateral hand muscle atrophy noted.   Skin:     General: Skin is warm.      Capillary Refill: Capillary refill takes less than 2 seconds.      Coloration: Skin is not jaundiced.      Findings: No erythema.   Neurological:      Mental Status: She is alert. Mental status is at baseline. She is disoriented.      Motor: Weakness present.   Psychiatric:         Mood and Affect: Mood normal.         Behavior: Behavior normal.         Fluids    Intake/Output Summary (Last 24 hours) at 2/14/2025 1640  Last data filed at 2/14/2025  0600  Gross per 24 hour   Intake --   Output 100 ml   Net -100 ml          Laboratory  Recent Labs     02/12/25  0308 02/14/25  0108   WBC 8.7 8.6   RBC 4.28 3.87*   HEMOGLOBIN 13.3 11.9*   HEMATOCRIT 40.8 37.6   MCV 95.3 97.2   MCH 31.1 30.7   MCHC 32.6 31.6*   RDW 45.7 45.8   PLATELETCT 152* 152*   MPV 9.8 9.6     Recent Labs     02/12/25  0308 02/14/25  0108   SODIUM 138 139   POTASSIUM 3.8 3.4*   CHLORIDE 103 103   CO2 23 26   GLUCOSE 90 97   BUN 15 14   CREATININE 0.64 0.71   CALCIUM 9.2 9.0                     Imaging  [id number] VIEW   Final Result      No evidence of bowel obstruction. Stable constipation.      MR-BRAIN-WITH & W/O   Final Result      1.  Moderate to severe cerebral volume loss. The volume loss is more prominent in the temporal lobes. This is concerning for degenerative pattern volume loss. There has been mild interval increase in the extent of the volume loss since the previous study    dated 4/10/2023.   2.  Mild chronic microvascular ischemic disease.         CT-CTA CHEST PULMONARY ARTERY W/ RECONS   Final Result         1.  No large central pulmonary embolus is appreciated, evaluation of the subsegmental branches is essentially nondiagnostic due to motion artifacts. Additional imaging would be required for definitive exclusion of small distal pulmonary emboli.   2.  Atherosclerosis and atherosclerotic coronary artery disease.      [id number] VIEW   Final Result      1.  Marked left-sided colonic constipation.      CT-HEAD W/O   Final Result         1.  No acute intracranial abnormality is identified, there are nonspecific white matter changes, commonly associated with small vessel ischemic disease.  Associated mild cerebral atrophy is noted.   2.  Atherosclerosis.               CT-CSPINE WITHOUT PLUS RECONS   Final Result         1.  Multilevel degenerative changes of the cervical spine limit diagnostic sensitivity of this examination, otherwise no acute traumatic bony injury of the  cervical spine is apparent.   2.  Atherosclerosis      DX-CHEST-PORTABLE (1 VIEW)   Final Result         1.  No acute cardiopulmonary disease.   2.  Atherosclerosis      CT-ABDOMEN-PELVIS WITH   Final Result         1.  Large quantity of stool in the rectal vault, appearance suggesting constipation with probable rectal fecal impaction   2.  Atherosclerosis and atherosclerotic coronary artery disease           Assessment/Plan  * Acute metabolic encephalopathy- (present on admission)  Assessment & Plan  Due to dehydration and fecal impaction worsening chronic Alzheimer's dementia  Fall precautions  Patient did receive IV Versed 2 mg and IV Haldol 5 mg back on 2/10.  This could be affecting her baseline cognitive function, may take time for her to completely metabolize medications  I ordered an EEG, patient is not returning to her baseline as per Art.    Addendum: pt less able to follow commands in the evening. MRI was showing more brain loss to temporal lobes. This could promote seizures. EEG tech could not make it to [facility]. I am empirically starting Keppra. At this time, there is no other explanation for patient's encephalopathy.    2/14:  I am holding CNS acting medications including memantine, donepezil.  These fortunately have been held since 2/13.  Patient is speaking more today after starting Keppra.  We will rehold Keppra and see if patient reverts back.  I am awaiting for an EEG but no techs have arrived from [facility].    Fecal impaction (HCC)- (present on admission)  Assessment & Plan  As per significant other Art, patient is still not at her baseline mental state.  Typically at home she is able to make jokes and talk to him easily.  This could be from her fecal impaction.  I discussed with Art, she presented with significant dehydration.  Patient is not drinking enough water home.  She is dependent to Art to be able to get water.  I explained 1.5 L would be a good average at home.           First image,  Initial CT abdomen pelvis showing fecal impaction in rectal vault.  Second image, abdominal x-ray, showing ongoing rectal Voalte fecal impaction, extending to the left colon.  Third image abdominal x-ray, yellow arrows showing ongoing fecal impaction.  Pt has had substantial fecal movements    2/14:  Will repeat a x-ray in the morning.  We will hold further bowel meds as this can cause metabolic alkalosis.  Patient has had 2 bowel movements    Metabolic alkalosis- (present on admission)  Assessment & Plan  I checked an ABG yesterday, pH 7.505, PaCO2 36.6, PaO2 62  Serum bicarb 26 elevated  I ordered 1 dose of IV acetazolamide    Encounter for family conference without patient present  Assessment & Plan  2/11:  I called significant other Art Donovan. I gave updates.  Patient has Alzheimers, sits in the TV. Has gone blind but listens to the old TV movies and shows.   She woke up Sunday morning, but she was not able to take her meds with water.  Everyday, she has stool incontinence in her underwear.  I explained current plans with imaging and possible non-infectious explanation for her symptoms.    2/12:  I had extensive discussion with significant other Art.  I explained the current findings and negative findings.  I explained we currently do not have any infection that we are suspecting.  I explained fecal impaction and dehydration at home  Art is adamant to take patient home and would prefer not to go to any skilled nursing facility.  He himself has new medical conditions including significant swelling to his lower extremity, ongoing abdominal pain.  I explained he should go to the ER.  It may be beneficial to try postacute placement for Ms. Albert temporarily while he seeks medical treatment for his needs.    2/13:  Robbie Olga Lidia wanted Chantell to go home, he declined SNF    Other chest pain- (present on admission)  Assessment & Plan  Patient has poor historian but she is complaining of chest pain.  I checked a ECG,  there is no ST elevations.  I checked troponins which was 18.    I reviewed patient's CTA chest.  Radiologist was not able to comment on any distal findings to confirm PE.  Unclear if the yellow arrows are truly pulmonary embolisms.  Of note troponins did not elevate  Patient was not complaining of chest pain today at this time we will defer any anticoagulation as there is no concrete evidence of any pulmonary embolism.  These are likely artifact on CT scan.    Severe early onset Alzheimer's dementia with other behavioral disturbance (HCC)- (present on admission)  Assessment & Plan  I will hold amantadine and donepezil at this time  We obtained a brain MRI, I reviewed results and compared to previous brain MRIs up to 2016.  There does appear to be more cerebral atrophy.  I explained these results to Art.  There is no acute stroke.    Hypothyroidism- (present on admission)  Assessment & Plan  Continue [place]ph[place] Thyroid    GERD (gastroesophageal reflux disease)- (present on admission)  Assessment & Plan  Continue omeprazole         VTE prophylaxis:   SCDs/TEDs      I have performed a physical exam and reviewed and updated ROS and Plan today (2/14/2025). In review of yesterday's note (2/13/2025), there are no changes except as documented above.      Total time spent 39 minutes. I spent greater than 50% of the time for patient care, including unit/floor time, multiple face-to-face encounters with the patient, counseling on treatment plan and discussion with bedside RN.  Further, I spent time on my own review of patient's overnight events, RN notes, imaging and lab analysis, and developing my assessment and plan above.  In addition, working with , social workers, and Charge RN on case coordination on this date.    This note was generated using voice recognition software which has a chance of producing errors of grammar and content.  I have made every reasonable attempt to find and correct any errors, but it  should be expected that some may not be found prior to finalization of this note.

## 2025-02-15 NOTE — ASSESSMENT & PLAN NOTE
I checked an ABG yesterday, pH 7.505, PaCO2 36.6, PaO2 62  Serum bicarb 22 after IV acetazolamide.  Monitor.

## 2025-02-16 ENCOUNTER — APPOINTMENT (OUTPATIENT)
Dept: RADIOLOGY | Facility: MEDICAL CENTER | Age: 75
DRG: 071 | End: 2025-02-16
Attending: INTERNAL MEDICINE
Payer: MEDICARE

## 2025-02-16 PROCEDURE — 94760 N-INVAS EAR/PLS OXIMETRY 1: CPT

## 2025-02-16 PROCEDURE — 700102 HCHG RX REV CODE 250 W/ 637 OVERRIDE(OP): Performed by: INTERNAL MEDICINE

## 2025-02-16 PROCEDURE — A9270 NON-COVERED ITEM OR SERVICE: HCPCS | Performed by: HOSPITALIST

## 2025-02-16 PROCEDURE — 97530 THERAPEUTIC ACTIVITIES: CPT

## 2025-02-16 PROCEDURE — A9270 NON-COVERED ITEM OR SERVICE: HCPCS | Performed by: INTERNAL MEDICINE

## 2025-02-16 PROCEDURE — 97110 THERAPEUTIC EXERCISES: CPT

## 2025-02-16 PROCEDURE — 99232 SBSQ HOSP IP/OBS MODERATE 35: CPT | Performed by: INTERNAL MEDICINE

## 2025-02-16 PROCEDURE — 700105 HCHG RX REV CODE 258: Performed by: INTERNAL MEDICINE

## 2025-02-16 PROCEDURE — 770001 HCHG ROOM/CARE - MED/SURG/GYN PRIV*

## 2025-02-16 PROCEDURE — 700111 HCHG RX REV CODE 636 W/ 250 OVERRIDE (IP): Mod: JZ | Performed by: INTERNAL MEDICINE

## 2025-02-16 PROCEDURE — 700102 HCHG RX REV CODE 250 W/ 637 OVERRIDE(OP): Performed by: HOSPITALIST

## 2025-02-16 PROCEDURE — 74018 RADEX ABDOMEN 1 VIEW: CPT

## 2025-02-16 RX ADMIN — MEMANTINE 10 MG: 10 TABLET ORAL at 17:53

## 2025-02-16 RX ADMIN — THYROID 45 MG: 30 TABLET ORAL at 04:55

## 2025-02-16 RX ADMIN — CEFAZOLIN 2 G: 2 INJECTION, POWDER, FOR SOLUTION INTRAMUSCULAR; INTRAVENOUS at 14:07

## 2025-02-16 RX ADMIN — HEPARIN SODIUM 5000 UNITS: 5000 INJECTION, SOLUTION INTRAVENOUS; SUBCUTANEOUS at 04:55

## 2025-02-16 RX ADMIN — DONEPEZIL HYDROCHLORIDE 10 MG: 5 TABLET, FILM COATED ORAL at 21:59

## 2025-02-16 RX ADMIN — SENNOSIDES AND DOCUSATE SODIUM 2 TABLET: 50; 8.6 TABLET ORAL at 17:53

## 2025-02-16 RX ADMIN — FOLIC ACID 1 MG: 1 TABLET ORAL at 04:55

## 2025-02-16 RX ADMIN — HEPARIN SODIUM 5000 UNITS: 5000 INJECTION, SOLUTION INTRAVENOUS; SUBCUTANEOUS at 14:07

## 2025-02-16 RX ADMIN — MEMANTINE 10 MG: 10 TABLET ORAL at 04:55

## 2025-02-16 RX ADMIN — LEVETIRACETAM 500 MG: 500 INJECTION, SOLUTION INTRAVENOUS at 17:53

## 2025-02-16 RX ADMIN — OMEPRAZOLE 20 MG: 20 CAPSULE, DELAYED RELEASE ORAL at 04:55

## 2025-02-16 RX ADMIN — CEFAZOLIN 2 G: 2 INJECTION, POWDER, FOR SOLUTION INTRAMUSCULAR; INTRAVENOUS at 04:55

## 2025-02-16 RX ADMIN — LEVETIRACETAM 500 MG: 500 INJECTION, SOLUTION INTRAVENOUS at 04:56

## 2025-02-16 ASSESSMENT — FIBROSIS 4 INDEX: FIB4 SCORE: 3.22

## 2025-02-16 ASSESSMENT — PAIN DESCRIPTION - PAIN TYPE: TYPE: ACUTE PAIN

## 2025-02-16 ASSESSMENT — ENCOUNTER SYMPTOMS: WEAKNESS: 1

## 2025-02-16 NOTE — THERAPY
"Physical Therapy   Daily Treatment     Patient Name: Thuy Albert  Age:  74 y.o., Sex:  female  Medical Record #: [id number]  Today's Date: 2/16/2025         Assessment  Pt is unable to ambulate due to pain c/o and fear. Pt is not able to attend to task and needs several VC's to stay on track. Pt is very weak and we worked on trunk control in sitting as well as stability using B UE's for support. Pt leaning to the L and required assistance to maintain positioning.  Pt is able to std x 2 with min a but with fatigue sits back following 10-15 seconds. Pt unable to ambulate today with PT. Pt will remain with PT while in house.      Plan    Treatment Plan Status: Continue Current Treatment Plan  Type of Treatment: Bed Mobility, Gait Training, Therapeutic Activities, Therapeutic Exercise  Treatment Frequency: 3 Times per Week  Treatment Duration: Until Therapy Goals Met    DC Equipment Recommendations: Unable to determine at this time  Discharge Recommendations: Recommend post-acute placement for additional physical therapy services prior to discharge home      Subjective  Pt is difficult to arouse today and seems tired and  \" drowsy \" during PT visit.       Objective     02/16/25 1400   Gait Analysis   Comments pt unable to ambulate today. pt prefers to sit to std and requested to lie down   Functional Mobility   Sit to Stand Moderate Assist   Bed, Chair, Wheelchair Transfer Refused   Transfer Method Stand Step   Mobility supine to sit< Sit to std<x 2 pt's souse arrives  to visit. pt has pain with  mild pressure along the R shoulder  while sitting up bedside.   Skilled Intervention Facilitation;Postural Facilitation;Tactile Cuing;Verbal Cuing   Comments pt more fearful today and  when offered to moisten her mouth with sponge in water  she states \" your trying to kill me \".   Activity Tolerance   Sitting in Chair N/T   Sitting Edge of Bed 10 mins   Standing 30 seconds   Comments pt having difficulty today with pain " c/o in R shoulder and seems very weak in trunk and with sitting into L lean when attempting to use her UE's to stabilize.   Short Term Goals    Goal Outcome # 1 Progressing slower than expected   Goal Outcome # 2 Progressing as expected   Goal Outcome # 3 Progressing slower than expected   Anticipated Discharge Equipment and Recommendations   DC Equipment Recommendations Unable to determine at this time   Discharge Recommendations Recommend post-acute placement for additional physical therapy services prior to discharge home   Session Information   Date / Session Number  2/16-3(3/3, 2/18)

## 2025-02-16 NOTE — PROGRESS NOTES
Telemetry Shift Summary     Rhythm: SB-SR  HR: 47-76  Ectopy: none    Measurements: 0.14/0.10/0.50    Normal Values  Rhythm: SR  HR:   Measurements: 0.12-0.20/0.08-0.10/0.30-0.52

## 2025-02-16 NOTE — PROGRESS NOTES
Received report from NOC RN.  Assumed patient care. Patient is oriented to self. Repositioned with taps systems. Assisted with breakfast. Fall precautions in place.     1600: attempted to ambulate patient. Patient was able to stand and march in place but not able to follow commands to walk forward.

## 2025-02-16 NOTE — CARE PLAN
The patient is Stable - Low risk of patient condition declining or worsening    Shift Goals  Clinical Goals: monitor labs and vitals  Patient Goals: meagan  Family Goals: MEAGAN    Progress made toward(s) clinical / shift goals:    Problem: Hemodynamics  Goal: Patient's hemodynamics, fluid balance and neurologic status will be stable or improve  Outcome: Progressing     Problem: Respiratory  Goal: Patient will achieve/maintain optimum respiratory ventilation and gas exchange  Outcome: Progressing       Patient is not progressing towards the following goals:      Problem: Knowledge Deficit - Standard  Goal: Patient and family/care givers will demonstrate understanding of plan of care, disease process/condition, diagnostic tests and medications  Outcome: Not Progressing

## 2025-02-16 NOTE — PROGRESS NOTES
Telemetry Shift Summary     Rhythm: SR/SB  HR: 42-65  Ectopy: rPAC    Measurements: .18/.08/.46    Normal Values  Rhythm: SR  HR:   Measurements: 0.12-0.20/0.08-0.10/0.30-0.52

## 2025-02-16 NOTE — CARE PLAN
The patient is Stable - Low risk of patient condition declining or worsening    Shift Goals  Clinical Goals: patient will remain free from falls during shift  Patient Goals: meagan  Family Goals: MEAGAN    Progress made toward(s) clinical / shift goals:  bed alarm on, bed in low and locked position, call light within reach     Patient is not progressing towards the following goals: NA

## 2025-02-16 NOTE — ASSESSMENT & PLAN NOTE
I had an extensive discussion with patient's significant other Robbie Man. We have discussed patient's condition daily during her hospitalization. She has remained encephalopathic. I have explained to Art, all laboratory evaluations have so far been negative and does not explain patient's condition. We have examined brain MRI, EEG and CT scans that do not explain her changes. We discussed his goals for Chantell. He wishes to take her home. He does not want any post-acute placements. He has agreed for Home health today.  He asked what to do if things do not work at home. We again discussed about hospice services for terminal Dementia patients, for increased services at home.  He has expressed difficult with caring for her at home. I explained this may be a last resort if he is not ready right now.  At this time, patient remains a full code, she is not going home on hospice.  I placed orders for home health. Potential discharge Monday 2/17.  Total advanced care planning time spent 23 minutes

## 2025-02-16 NOTE — DISCHARGE PLANNING
Case Management Discharge Planning    Admission Date: 2/9/2025  GMLOS: 4.9  ALOS: 6    6-Clicks ADL Score: 11  6-Clicks Mobility Score: 16  PT and/or OT Eval ordered: Yes  Post-acute Referrals Ordered: Yes  Post-acute Choice Obtained: No  Has referral(s) been sent to post-acute provider:  Yes      Anticipated Discharge Dispo: Discharge Disposition: Discharged to home/self care (01)    DME Needed: No    Action(s) Taken: Updated Provider/Nurse on Discharge Plan    LSW was notified pt's  is now agreeable to HH. Orders have been received.     Call placed to pt's , Art. Art confirmed he is agreeable to HH now. Art then stated someone was at the door and requested for LSW to call back in a few minutes.     1212-  Previously PCP had ordered HH, reported not having a good experience.     Pt goes to sleep at 0300,  sleeps 0500. Wakes up at 0930 for first round of pills, then again 1030 for second round of pills. Pt doesn't typically get out of bed until around [id number]. Pt does not have a preference on HH companies, however would like one that is able to do late afternoon/evening appointments. LSW reported she would call to see which companies are able to accommodate.     Would need medical transport due to having 15 steps up to their apartment, needs someone to get pt up the stairs.     1240-  Spoke with Cindy from [facility] . Per Cindy, they are able to accommodate late afternoon/evening appointments.   Choice form faxed to [facility].     Escalations Completed: None    Medically Clear: No    Next Steps: LSW to follow    Barriers to Discharge: Medical clearance    Is the patient up for discharge tomorrow: Yes    Is transport arranged for discharge disposition: No

## 2025-02-17 VITALS
SYSTOLIC BLOOD PRESSURE: 114 MMHG | WEIGHT: 113.54 LBS | OXYGEN SATURATION: 95 % | RESPIRATION RATE: 17 BRPM | HEIGHT: 61 IN | DIASTOLIC BLOOD PRESSURE: 59 MMHG | TEMPERATURE: 98 F | HEART RATE: 45 BPM | BODY MASS INDEX: 21.44 KG/M2

## 2025-02-17 PROCEDURE — A9270 NON-COVERED ITEM OR SERVICE: HCPCS | Performed by: INTERNAL MEDICINE

## 2025-02-17 PROCEDURE — 700111 HCHG RX REV CODE 636 W/ 250 OVERRIDE (IP): Mod: JZ | Performed by: INTERNAL MEDICINE

## 2025-02-17 PROCEDURE — 99239 HOSP IP/OBS DSCHRG MGMT >30: CPT | Performed by: INTERNAL MEDICINE

## 2025-02-17 PROCEDURE — A9270 NON-COVERED ITEM OR SERVICE: HCPCS | Performed by: HOSPITALIST

## 2025-02-17 PROCEDURE — 700102 HCHG RX REV CODE 250 W/ 637 OVERRIDE(OP): Performed by: INTERNAL MEDICINE

## 2025-02-17 PROCEDURE — 700102 HCHG RX REV CODE 250 W/ 637 OVERRIDE(OP): Performed by: HOSPITALIST

## 2025-02-17 RX ADMIN — LEVETIRACETAM 500 MG: 500 INJECTION, SOLUTION INTRAVENOUS at 06:31

## 2025-02-17 RX ADMIN — MEMANTINE 10 MG: 10 TABLET ORAL at 06:41

## 2025-02-17 RX ADMIN — FOLIC ACID 1 MG: 1 TABLET ORAL at 06:41

## 2025-02-17 RX ADMIN — OMEPRAZOLE 20 MG: 20 CAPSULE, DELAYED RELEASE ORAL at 06:41

## 2025-02-17 RX ADMIN — THYROID 45 MG: 30 TABLET ORAL at 06:41

## 2025-02-17 ASSESSMENT — PAIN SCALES - PAIN ASSESSMENT IN ADVANCED DEMENTIA (PAINAD)
FACIALEXPRESSION: SMILING OR INEXPRESSIVE
BODYLANGUAGE: RELAXED
CONSOLABILITY: NO NEED TO CONSOLE
TOTALSCORE: 0
BREATHING: NORMAL

## 2025-02-17 ASSESSMENT — SOCIAL DETERMINANTS OF HEALTH (SDOH)
WITHIN THE LAST YEAR, HAVE YOU BEEN KICKED, HIT, SLAPPED, OR OTHERWISE PHYSICALLY HURT BY YOUR PARTNER OR EX-PARTNER?: NO
IN THE PAST 12 MONTHS, HAS THE ELECTRIC, GAS, OIL, OR WATER COMPANY THREATENED TO SHUT OFF SERVICE IN YOUR HOME?: NO
WITHIN THE LAST YEAR, HAVE YOU BEEN HUMILIATED OR EMOTIONALLY ABUSED IN OTHER WAYS BY YOUR PARTNER OR EX-PARTNER?: NO
WITHIN THE PAST 12 MONTHS, YOU WORRIED THAT YOUR FOOD WOULD RUN OUT BEFORE YOU GOT THE MONEY TO BUY MORE: NEVER TRUE
WITHIN THE LAST YEAR, HAVE YOU BEEN AFRAID OF YOUR PARTNER OR EX-PARTNER?: NO
WITHIN THE LAST YEAR, HAVE TO BEEN RAPED OR FORCED TO HAVE ANY KIND OF SEXUAL ACTIVITY BY YOUR PARTNER OR EX-PARTNER?: NO
WITHIN THE PAST 12 MONTHS, THE FOOD YOU BOUGHT JUST DIDN'T LAST AND YOU DIDN'T HAVE MONEY TO GET MORE: NEVER TRUE

## 2025-02-17 ASSESSMENT — FIBROSIS 4 INDEX: FIB4 SCORE: 3.22

## 2025-02-17 NOTE — PROGRESS NOTES
IV site and telemetry discontinued. DC instructions reviewed with patient and  Art (phone call). [facility] at bedside for transfer home.

## 2025-02-17 NOTE — PROGRESS NOTES
12-hour chart check complete.     Monitor Summary  Rhythm: SR/SB  Rate: 49-63  Ectopy: rPAC  Measurements: 16/08/44

## 2025-02-17 NOTE — PROGRESS NOTES
Telemetry shift summary    Rhythm: SR/SB  HR: 42-98  Ectopy: R-PAC, R-PVC    Measurements: .18 / .08 / .44    Normal values  Rhythm SR  HR   Measurements: 0.12-0.20/0.08-0.10/0.30-0.52

## 2025-02-17 NOTE — PROGRESS NOTES
1900 Received report from ANDREW Chew. Pt is laying in bed, call light within reach, and no needs at this time. Bed is in lowest position and bed alarm is armed. Pt has no complaints of pain at this time.    2200 ANDREW Morfin and ANDREW Jose set up for an enema. Pt refused. RN educated pt about constipation and how beneficial a BM would be. Pt still refused. Complete linen change was completed.

## 2025-02-17 NOTE — CARE PLAN
The patient is Stable - Low risk of patient condition declining or worsening    Shift Goals  Clinical Goals: 1 to 1 feed, Q2 turns, and pericare  Patient Goals: SPRING  Family Goals: SPRING    Progress made toward(s) clinical / shift goals: Pt had 1 to 1 feed with medication administered per the MAR. Pt received Q2 turns and would remove the pillows. Pericare was completed during complete linen change. Pt had no complaints of pain and remained free from falls during this shift.      Problem: Pain - Standard  Goal: Alleviation of pain or a reduction in pain to the patient’s comfort goal  Outcome: Progressing     Problem: Fall Risk  Goal: Patient will remain free from falls  Outcome: Progressing       Patient is not progressing towards the following goals:

## 2025-02-17 NOTE — CARE PLAN
The patient is Stable - Low risk of patient condition declining or worsening    Shift Goals  Clinical Goals: DC planning  Patient Goals: SPRING  Family Goals: SPRING    Progress made toward(s) clinical / shift goals:    Problem: Communication  Goal: The ability to communicate needs accurately and effectively will improve  Outcome: Progressing  Flowsheets (Taken 2/17/2025 1008)  Communication:   Assessed patient's ability to understand and communicate   Oriented patient to call light   Oriented family/support system to call light   Reoriented patient to environment     Problem: Discharge Barriers/Planning  Goal: Patient's continuum of care needs are met  Outcome: Progressing  Flowsheets (Taken 2/17/2025 1008)  Continuum of Care Needs:   Assessed for discharge barriers   Communicated discharge barriers to interdisciplinary tream   Involved patient/family/support system in discharge process   Collaborated with Case Management/   Provided and explained discharge instructions  Note: Pending HH acceptance and arranging of transport home.       Patient is not progressing towards the following goals:

## 2025-02-17 NOTE — DISCHARGE SUMMARY
Discharge Summary    CHIEF COMPLAINT ON ADMISSION  Chief Complaint   Patient presents with    GLF     Pt reports two GLFs, pt cannot state time of falls. Pt reports hitting her head during fall. Denies blood thinners.     Mental Status Change     BIBA from home, pts  reports pt had increased confusion this AM. Pt is A+O 1, GCS 10 with EMS. Pt brought directly to CT from EMS [facility] after seen by ERP.        Reason for Admission  EMS     Admission Date  2/9/2025    CODE STATUS  Full Code    HPI & HOSPITAL COURSE  This is Chantell Albert, a 74-year-old female with a past medical history of Alzheimer's dementia, GERD, DVTs, hypothyroidism, asthma, COVID-19 pneumonia on 10/17/2024.  Patient was admitted 12/9/25 with altered mental state, ground-level fall at home, as per .    Upon presentation to the ER, HR 96, hypoxic to 81%, BP lowest 97/53 from 146/61.  Labs showed leukocytosis 13.9, hemoglobin 13.8, platelets 187, sodium 135, potassium 3.8, creatinine 1.1, severely elevated lactic acid of 4.6.  UA was negative.  COVID/flu/RSV PCR negative.    CT head and cervical spine showed no intracranial hemorrhage and no fractures.     CT abdomen pelvis showing fecal impaction in rectal vault.    Patient had a lumbar puncture in the ER.  Meningitis/encephalitis PCR returned negative.  Expanded viral respiratory panel PCR negative.  She was initially started on IV acyclovir, ampicillin, ceftriaxone and vancomycin which was discontinued after CSF labs return negative for meningitis/encephalitis.     Patient's acute metabolic encephalopathy at this time was indeterminate, perhaps from severe dehydration and severe fecal impaction.  She had been slow to progress, and had been difficult to help evacuate all of the fecal impaction.    We did obtain a EEG which was nonspecific and was negative for any signs of's nonconvulsive status epilepticus or true seizures.  Based on the patient's brain MRI, she does have severe atrophy.   We trialed Keppra but I do not feel that this has shown any improvement.  She will not be discharged with any oral Keppra.    Patient did have mild acne to the face, chin, nose and cheeks.  It appears she may be spreading cellulitis but remain localized.  We treated with 4 days of IV cefazolin.  In addition she had a dose of IV vancomycin, IV ceftriaxone and IV Unasyn on upon admission.  Her facial cellulitis cleared up upon discharge.    I had ongoing discussions with patient's significant other Robbie Man.  He was hesitant to do any postacute placement as he was worried for any negligence that might happen to Mrs. Albert at a skilled nursing facility.  He was only opting for home health.  I tried to explain to him her condition with dementia is progressing and perhaps quickly now.  She has been more reliant on others for mobility, feeding and obtaining any ADL S and IADL's.  He feels that he can still continue at home as this would be her preference.  I did have extensive discussions with ART about choices for hospice and potential extended services for patients with dementia.  I explained he can obtain this with the patient's PCP should he feel that it may be time if she does not improve at home.    Therefore, she is discharged in fair and stable condition to home with organized home healthcare and close outpatient follow-up.    The patient met 2-midnight criteria for an inpatient stay at the time of discharge.    Discharge Date  02/17/2025    FOLLOW UP ITEMS POST DISCHARGE  With PCP    DISCHARGE DIAGNOSES  Principal Problem:    Acute metabolic encephalopathy (POA: Yes)  Active Problems:    Fecal impaction (HCC) (POA: Yes)    GERD (gastroesophageal reflux disease) (POA: Yes)    Hypothyroidism (Chronic) (POA: Yes)      Overview: Unable to tolerate Levothyroxine.  She is currently on [employer] Thyroid       which has been able to control her symptoms and her thyroid function has       been in normal range.  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 There is disproportionate       severe temporal lobe volume loss indicating the possibility of Alzheimer's       disease.            Seeing neurology.  Lives at home with her  who takes care of her.        On donepezil 10 mg daily and Namenda 10 mg 2 times daily    Other chest pain (POA: Yes)    Encounter for family conference without patient present (POA: Clinically Undetermined)    Metabolic alkalosis (POA: Yes)    Advanced care planning/counseling discussion (POA: Clinically Undetermined)  Resolved Problems:    * No resolved hospital problems. *      FOLLOW UP  Future Appointments   Date Time Provider Department Center   2/21/2025  3:00 PM [facility]   5/20/2025  4:40 PM Pramod Lu M.D. [facility] Mik Lu M.D.  [address]  [address]  [facility] [address]  [phone]            MEDICATIONS ON DISCHARGE     Medication List        CONTINUE taking these medications        Instructions   [place] Thyroid 15 MG Tabs  Generic drug: thyroid   TAKE 3 TABLETS BY MOUTH EVERY DAY  Dose: 45 mg     B COMPLEX PO   Take 1 Tablet by mouth every day. Indications: supplement  Dose: 1 Tablet     donepezil 10 MG tablet  Commonly known as: Aricept   Take 1 Tablet by mouth every day.  Dose: 10 mg     FOLIC ACID PO   Take 1 Tablet by mouth every day.  Dose: 1 Tablet     memantine 10 MG Tabs  Commonly known as: Namenda   Doctor's comments: DX Code Needed  REFILL.  Take 1 Tablet by mouth 2 times a day.  Dose: 10 mg     PEPTO-BISMOL PO   Take 30 mL by mouth 2 times a day as needed (For upset stomach). Indications: upset stomach  Dose: 30 mL     triamcinolone acetonide 0.1 % Crea  Commonly known as: Kenalog   Apply 1 Application topically 2 times a day.  Dose: 1  Application     VITAMIN B-12 PO   Take 1 Tablet by mouth every day. 1,000mcg  Indications: supplement  Dose: 1 Tablet     VITAMIN C PO   Take 1 Tablet by mouth every day. Indications: suppelemt  Dose: 1 Tablet     VITAMIN D3 PO   Take 1 Tablet by mouth every day. Indications: supplement  Dose: 1 Tablet     ZINC PO   Take 1 Tablet by mouth every day. Indications: supplement  Dose: 1 Tablet              Allergies  Allergies   Allergen Reactions    Nabumetone      rash    Synthroid [Levothroid]      Nausea      Tetracycline Swelling    Cephalexin Rash     Rash, tolerated ancef test dose followed by full dose       DIET  Orders Placed This Encounter   Procedures    Diet Order Diet: Level 4 - Pureed (Crush meds in puree; 1:1 feeding); Liquid level: Level 0 - Thin; Tray Modifications (optional): SLP - 1:1 Supervision by Nursing, SLP - Deliver to Nursing Station     Standing Status:   Standing     Number of Occurrences:   1     Diet::   Level 4 - Pureed [25]              Crush meds in puree; 1:1 feeding     Liquid level:   Level 0 - Thin     Tray Modifications (optional):   SLP - 1:1 Supervision by Nursing     Tray Modifications (optional):   SLP - Deliver to Nursing Station       ACTIVITY  As tolerated.  Weight bearing as tolerated    CONSULTATIONS  None    PROCEDURES  EEG    LABORATORY  Lab Results   Component Value Date    SODIUM 138 02/15/2025    POTASSIUM 3.9 02/15/2025    CHLORIDE 104 02/15/2025    CO2 22 02/15/2025    GLUCOSE 86 02/15/2025    BUN 14 02/15/2025    CREATININE 0.81 02/15/2025        Lab Results   Component Value Date    WBC 8.6 02/14/2025    HEMOGLOBIN 11.9 (L) 02/14/2025    HEMATOCRIT 37.6 02/14/2025    PLATELETCT 152 (L) 02/14/2025        RF-[id number]-3 VIEW   Final Result      No significant change from prior exam.      SF-[id number]-8 VIEW   Final Result      1.  Decreased amount of colonic stool from prior exam.   2.  Residual stool again seen in the rectum, improved from prior.   3.  No small bowel  obstruction.      DX-CHEST-PORTABLE (1 VIEW)   Final Result      1.  Mildly prominent interstitium, edema versus interstitial lung disease.   2.  No pneumonia or pneumothorax.      EC-ECHOCARDIOGRAM COMPLETE W/O CONT   Final Result      LE-EEDQHCL-5 VIEW   Final Result      No evidence of bowel obstruction. Stable constipation.      MR-BRAIN-WITH & W/O   Final Result      1.  Moderate to severe cerebral volume loss. The volume loss is more prominent in the temporal lobes. This is concerning for degenerative pattern volume loss. There has been mild interval increase in the extent of the volume loss since the previous study    dated 4/10/2023.   2.  Mild chronic microvascular ischemic disease.         CT-CTA CHEST PULMONARY ARTERY W/ RECONS   Final Result         1.  No large central pulmonary embolus is appreciated, evaluation of the subsegmental branches is essentially nondiagnostic due to motion artifacts. Additional imaging would be required for definitive exclusion of small distal pulmonary emboli.   2.  Atherosclerosis and atherosclerotic coronary artery disease.      VD-PCJRNMT-1 VIEW   Final Result      1.  Marked left-sided colonic constipation.      CT-HEAD W/O   Final Result         1.  No acute intracranial abnormality is identified, there are nonspecific white matter changes, commonly associated with small vessel ischemic disease.  Associated mild cerebral atrophy is noted.   2.  Atherosclerosis.               CT-CSPINE WITHOUT PLUS RECONS   Final Result         1.  Multilevel degenerative changes of the cervical spine limit diagnostic sensitivity of this examination, otherwise no acute traumatic bony injury of the cervical spine is apparent.   2.  Atherosclerosis      DX-CHEST-PORTABLE (1 VIEW)   Final Result         1.  No acute cardiopulmonary disease.   2.  Atherosclerosis      CT-ABDOMEN-PELVIS WITH   Final Result         1.  Large quantity of stool in the rectal vault, appearance suggesting  constipation with probable rectal fecal impaction   2.  Atherosclerosis and atherosclerotic coronary artery disease          Cultures:  No results found for the last 90 days.      Total time of the discharge process exceeds 39 minutes.

## 2025-02-17 NOTE — DISCHARGE PLANNING
Attempted to talk to pt but she is confused. She did give consent for CM to call her S.O. Art.    CM called Art. We discussed discharge planning. He does not want pt to go to SNF instead he wants pt to return to home with HH. [facility]. Pt usually uses a walker but Art said pt is very weak. He requested for [facility] transport to take pt home.   Discussed IMM .    Art said he provides assistance with ADLs and IADLs.     PCP is Dr. Pramod Lu.   Pharmacy is [facility].   Art is the 24/7 caregiver.     Care Transition Team Assessment    Information Source  Orientation Level: Unable to assess  Information Given By: Significant Other  Informant's Name: Robbie Man  Who is responsible for making decisions for patient? : POA  Name(s) of Primary Decision Maker: Robbie Man    Readmission Evaluation  Is this a readmission?: No    Elopement Risk  Legal Hold: No  Ambulatory or Self Mobile in Wheelchair: No-Not an Elopement Risk  Elopement Risk: Not at Risk for Elopement    Interdisciplinary Discharge Planning  Lives with - Patient's Self Care Capacity: Significant Other  Housing / Facility: 2 Story Apartment / Condo  Prior Services: Continuous (24 Hour) Care Giving Family    Discharge Preparedness  What is your plan after discharge?: Uncertain - pending medical team collaboration  What are your discharge supports?: Partner (Pt is not  to Art he is a partner of 29 years has POA)  Prior Functional Level: Total Assist  Difficulity with ADLs: Bathing, Brushing teeth, Dressing, Eating, Toileting, Walking  Difficulity with IADLs: Using the telephone or computer, Managing medication, Laundry, Shopping, Keeping track of finances, Driving, Cooking    Functional Assesment  Prior Functional Level: Total Assist    Finances  Financial Barriers to Discharge: No  Prescription Coverage: Yes    Vision / Hearing Impairment  Right Eye Vision: Blind  Left Eye Vision: Blind         Advance Directive  Advance  Directive?: DPOA for Health Care         Psychological Assessment  History of Substance Abuse: None  History of Psychiatric Problems: No    Discharge Risks or Barriers  Discharge risks or barriers?: Complex medical needs  Patient risk factors: Cognitive / sensory / physical deficit, Complex medical needs, Lack of outside supports, Vulnerable adult    Anticipated Discharge Information  Discharge Disposition: Discharged to home/self care (01)

## 2025-02-17 NOTE — PROGRESS NOTES
[facility] Medicine Daily Progress Note    Date of Service  2/16/2025    Chief Complaint  Thuy Albert is a 74 y.o. female admitted 2/9/2025 with   Chief Complaint   Patient presents with    GLF     Pt reports two GLFs, pt cannot state time of falls. Pt reports hitting her head during fall. Denies blood thinners.     Mental Status Change     BIBA from home, pts  reports pt had increased confusion this AM. Pt is A+O 1, GCS 10 with EMS. Pt brought directly to CT from EMS [facility] after seen by ERP.      Hospital Course  74-year-old female with a past medical history of Alzheimer's dementia, GERD, DVTs, hypothyroidism, asthma, COVID-19 pneumonia on 10/17/2024.  Patient was admitted 12/9/25 with altered mental state, ground-level fall at home, as per .    Upon presentation to the ER, HR 96, hypoxic to 81%, BP lowest 97/53 from 146/61.  Labs showed leukocytosis 13.9, hemoglobin 13.8, platelets 187, sodium 135, potassium 3.8, creatinine 1.1, severely elevated lactic acid of 4.6.  UA was negative.  COVID/flu/RSV PCR negative.    CT head and cervical spine showed no intracranial hemorrhage and no fractures.     CT abdomen pelvis showing fecal impaction in rectal vault.    Patient had a lumbar puncture in the ER.  Meningitis/encephalitis PCR returned negative.  Expanded viral respiratory panel PCR negative.  She was initially started on IV acyclovir, ampicillin, ceftriaxone and vancomycin which was discontinued after CSF labs return negative for meningitis/encephalitis.     Interval Problem Update  2/16:  Patient answered questions more better today.   She had more bowel movements overnight. XR still shows rectal fecal impaction. Will give another fleet enema.  I discussed with Art, he seems to be concerned patient is not getting up easy. PT evaluated and recommended post acute placement. Art is still against going to rehab, as he fears they will neglect her. He is complaining of lower extremity swelling  for him still. He states he is still strong enough to pick her of the floor if that happens.  We are pending Home Health order  We are needing transport home to get up 15 stairs to their apartment.    2/15:  Patient is still encephalopathic.  I reviewed EEG results, findings are nonspecific.  EEG is not showing nonconvulsive status epilepticus.  We will restart Keppra, patient has tremors that could be representing a seizure.   patient remains on 2 L nasal cannula  I ordered a chest x-ray and abdominal x-ray.    2/14:  Patient a little bit more verbal today.  We are pending EEG, we have asked NAMs to help with tech  K 3.4, replacing via PO  SLP evaluated patient, restarted pureed diet  I checked an ABG yesterday, pH 7.505, PaCO2 36.6, PaO2 62  I discussed case with Sig other Robbie Man. I explained today's situation and diagnostic EEG. Pending results on EEG. Patient is still encephalopathic for him.  I ordered labs for 6AM, checking cortisol. I added an Echocardiogram given sinus bradycardia.    2/13:  EEG ordered  Robbie Duggan wanted Chantell to go home, he declined SNF  Patient has had about several bowel movements.  I did check a abdominal x-ray this morning, there still appears to be constipation on the left colon and rectal vault.    2/12:  Patient had no acute complaints today  I reviewed brain MRI  I reviewed abdominal x-ray  I reviewed patient's labs  I discussed with significant other Robbie Man at bedside, gave updates.    2/11:  Patient has poor historian but she is complaining of chest pain.  I checked a ECG.  There is no ST elevations.  I checked troponins which was 18.    Lactic acid 1.8. Procalcitonin 0.16.  proBNP 2630 but patient is not showing signs of significant peripheral edema.    D-dimer 1.37, I am ordering a CTA chest.  Continue telemetry monitoring.  Speech therapy worked with patient, recommending purée solid and thin liquids.    I spent an extra 15 minutes on patient's case.  I called  significant other Art Donovan. I gave updates.  Patient has Alzheimers, sits in the TV. Has gone blind but listens to the old TV movies and shows.   She woke up Sunday morning, but she was not able to take her meds with water.  Everyday, she has stool incontinence in her underwear.  I explained current plans with imaging and possible non-infectious explanation for her symptoms.    I have discussed this patient's plan of care and discharge plan at IDT rounds today with Case Management, Nursing, Nursing leadership, and other members of the IDT team.    Consultants/Specialty  pulmonary    Code Status  Full Code    Disposition  The patient is not medically cleared for discharge to home or a post-acute facility.  Anticipate discharge to: home with organized home healthcare and close outpatient follow-up    I have placed the appropriate orders for post-discharge needs.  Significant other Robbie Man declined for post acute placement and home health. No referrals placed.    Review of Systems  Review of Systems   Cardiovascular:  Positive for chest pain. Negative for leg swelling.   Neurological:  Positive for weakness.   All other systems reviewed and are negative.       Physical Exam  Temp:  [36.6 °C (97.9 °F)-37.1 °C (98.8 °F)] 36.7 °C (98 °F)  Pulse:  [49-61] 49  Resp:  [16] 16  BP: (119-149)/(46-68) 149/64  SpO2:  [95 %-100 %] 98 %    Physical Exam  Vitals and nursing note reviewed.   Constitutional:       General: She is not in acute distress.     Appearance: She is not ill-appearing.      Comments: Frail appearing elderly female   HENT:      Head: Normocephalic and atraumatic.      Comments: Temporal muscle wasting positive     Mouth/Throat:      Mouth: Mucous membranes are dry.      Pharynx: No oropharyngeal exudate.   Eyes:      Comments: Legally blind   Cardiovascular:      Rate and Rhythm: Normal rate and regular rhythm.      Pulses: Normal pulses.      Heart sounds: Normal heart sounds. No murmur  heard.  Pulmonary:      Effort: Pulmonary effort is normal. No respiratory distress.      Breath sounds: Normal breath sounds. No wheezing.   Abdominal:      General: Abdomen is flat. Bowel sounds are normal. There is no distension.      Palpations: Abdomen is soft.      Tenderness: There is no abdominal tenderness.   Musculoskeletal:         General: No swelling or tenderness.      Cervical back: No rigidity.      Comments: Sarcopenic. Bilateral hand muscle atrophy noted.   Skin:     General: Skin is warm.      Capillary Refill: Capillary refill takes less than 2 seconds.      Coloration: Skin is not jaundiced.      Findings: No erythema.   Neurological:      Mental Status: She is alert. Mental status is at baseline. She is disoriented.      Motor: Weakness present.   Psychiatric:         Mood and Affect: Mood normal.         Behavior: Behavior normal.         Fluids    Intake/Output Summary (Last 24 hours) at 2/16/2025 1748  Last data filed at 2/16/2025 1300  Gross per 24 hour   Intake 250 ml   Output --   Net 250 ml          Laboratory  Recent Labs     02/14/25  0108   WBC 8.6   RBC 3.87*   HEMOGLOBIN 11.9*   HEMATOCRIT 37.6   MCV 97.2   MCH 30.7   MCHC 31.6*   RDW 45.8   PLATELETCT 152*   MPV 9.6     Recent Labs     02/14/25  0108 02/15/25  0624   SODIUM 139 138   POTASSIUM 3.4* 3.9   CHLORIDE 103 104   CO2 26 22   GLUCOSE 97 86   BUN 14 14   CREATININE 0.71 0.81   CALCIUM 9.0 9.3                     Imaging  QM-BENNNFX-8 VIEW   Final Result      No significant change from prior exam.      SO-GPTJFAV-6 VIEW   Final Result      1.  Decreased amount of colonic stool from prior exam.   2.  Residual stool again seen in the rectum, improved from prior.   3.  No small bowel obstruction.      DX-CHEST-PORTABLE (1 VIEW)   Final Result      1.  Mildly prominent interstitium, edema versus interstitial lung disease.   2.  No pneumonia or pneumothorax.      EC-ECHOCARDIOGRAM COMPLETE W/O CONT   Final Result       XK-TXEXXRV-8 VIEW   Final Result      No evidence of bowel obstruction. Stable constipation.      MR-BRAIN-WITH & W/O   Final Result      1.  Moderate to severe cerebral volume loss. The volume loss is more prominent in the temporal lobes. This is concerning for degenerative pattern volume loss. There has been mild interval increase in the extent of the volume loss since the previous study    dated 4/10/2023.   2.  Mild chronic microvascular ischemic disease.         CT-CTA CHEST PULMONARY ARTERY W/ RECONS   Final Result         1.  No large central pulmonary embolus is appreciated, evaluation of the subsegmental branches is essentially nondiagnostic due to motion artifacts. Additional imaging would be required for definitive exclusion of small distal pulmonary emboli.   2.  Atherosclerosis and atherosclerotic coronary artery disease.      CS-QHNGXHG-5 VIEW   Final Result      1.  Marked left-sided colonic constipation.      CT-HEAD W/O   Final Result         1.  No acute intracranial abnormality is identified, there are nonspecific white matter changes, commonly associated with small vessel ischemic disease.  Associated mild cerebral atrophy is noted.   2.  Atherosclerosis.               CT-CSPINE WITHOUT PLUS RECONS   Final Result         1.  Multilevel degenerative changes of the cervical spine limit diagnostic sensitivity of this examination, otherwise no acute traumatic bony injury of the cervical spine is apparent.   2.  Atherosclerosis      DX-CHEST-PORTABLE (1 VIEW)   Final Result         1.  No acute cardiopulmonary disease.   2.  Atherosclerosis      CT-ABDOMEN-PELVIS WITH   Final Result         1.  Large quantity of stool in the rectal vault, appearance suggesting constipation with probable rectal fecal impaction   2.  Atherosclerosis and atherosclerotic coronary artery disease           Assessment/Plan  * Acute metabolic encephalopathy- (present on admission)  Assessment & Plan  Due to dehydration and  fecal impaction worsening chronic Alzheimer's dementia  Fall precautions  Patient did receive IV Versed 2 mg and IV Haldol 5 mg back on 2/10.  This could be affecting her baseline cognitive function, may take time for her to completely metabolize medications  I ordered an EEG, patient is not returning to her baseline as per Art.    Addendum: pt less able to follow commands in the evening. MRI was showing more brain loss to temporal lobes. This could promote seizures. EEG tech could not make it to [facility]. I am empirically starting Keppra. At this time, there is no other explanation for patient's encephalopathy.    2/14:  I am holding CNS acting medications including memantine, donepezil.  These fortunately have been held since 2/13.  Patient is speaking more today after starting Keppra.  We will rehold Keppra and see if patient reverts back.  I am awaiting for an EEG but no techs have arrived from [facility].    2/15:  Patient is still encephalopathic.  I reviewed EEG results, findings are nonspecific.  EEG is not showing nonconvulsive status epilepticus.  We will restart Keppra, patient has tremors that could be representing a seizure.     2/16:  Patient answered questions more better today.   She had more bowel movements overnight. XR still shows rectal fecal impaction. Will give another fleet enema.    Fecal impaction (HCC)- (present on admission)  Assessment & Plan  As per significant other Art, patient is still not at her baseline mental state.  Typically at home she is able to make jokes and talk to him easily.  This could be from her fecal impaction.  I discussed with Art, she presented with significant dehydration.  Patient is not drinking enough water home.  She is dependent to Art to be able to get water.  I explained 1.5 L would be a good average at home.    She had more bowel movements overnight.  XR still shows rectal fecal impaction.   Will give another fleet enema.    Advanced care  planning/counseling discussion  Assessment & Plan  I had an extensive discussion with patient's significant other Art Donovan. We have discussed patient's condition daily during her hospitalization. She has remained encephalopathic. I have explained to Art, all laboratory evaluations have so far been negative and does not explain patient's condition. We have examined brain MRI, EEG and CT scans that do not explain her changes. We discussed his goals for Chantell. He wishes to take her home. He does not want any post-acute placements. He has agreed for Home health today.  He asked what to do if things do not work at home. We again discussed about hospice services for terminal Dementia patients, for increased services at home.  He has expressed difficult with caring for her at home. I explained this may be a last resort if he is not ready right now.  At this time, patient remains a full code, she is not going home on hospice.  I placed orders for home health. Potential discharge Monday 2/17.  Total advanced care planning time spent 23 minutes    Metabolic alkalosis- (present on admission)  Assessment & Plan  I checked an ABG yesterday, pH 7.505, PaCO2 36.6, PaO2 62  Serum bicarb 22 after IV acetazolamide.  Monitor.    Encounter for family conference without patient present  Assessment & Plan  2/11:  I called significant other Art Donovan. I gave updates.  Patient has Alzheimers, sits in the TV. Has gone blind but listens to the old TV movies and shows.   She woke up Sunday morning, but she was not able to take her meds with water.  Everyday, she has stool incontinence in her underwear.  I explained current plans with imaging and possible non-infectious explanation for her symptoms.    2/12:  I had extensive discussion with significant other Art.  I explained the current findings and negative findings.  I explained we currently do not have any infection that we are suspecting.  I explained fecal impaction and dehydration  at home  Robbie is adamant to take patient home and would prefer not to go to any skilled nursing facility.  He himself has new medical conditions including significant swelling to his lower extremity, ongoing abdominal pain.  I explained he should go to the ER.  It may be beneficial to try postacute placement for Ms. Albert temporarily while he seeks medical treatment for his needs.    2/13:  Robbie Duggan wanted Chantell to go home, he declined SNF    2/15:  I discussed with significant other at bedside and gave updates.    2/16:  I discussed with Art, he seems to be concerned patient is not getting up easy. PT evaluated and recommended post acute placement. Robbie is still against going to rehab, as he fears they will neglect her. He is complaining of lower extremity swelling for him still. He states he is still strong enough to pick her of the floor if that happens.  We are pending Home Health order  We are needing transport home to get up 15 stairs to their apartment.    Other chest pain- (present on admission)  Assessment & Plan  Patient has poor historian but she is complaining of chest pain.  I checked a ECG, there is no ST elevations.  I checked troponins which was 18.    I reviewed patient's CTA chest.  Radiologist was not able to comment on any distal findings to confirm PE.  Unclear if the yellow arrows are truly pulmonary embolisms.  Of note troponins did not elevate  Patient was not complaining of chest pain today at this time we will defer any anticoagulation as there is no concrete evidence of any pulmonary embolism.  These are likely artifact on CT scan.  Chest x-ray today showing more interstitial prominence    Severe early onset Alzheimer's dementia with other behavioral disturbance (HCC)- (present on admission)  Assessment & Plan  We obtained a brain MRI, I reviewed results and compared to previous brain MRIs up to 2016.  There does appear to be more cerebral atrophy.  I explained these results to Art.  There  is no acute stroke.  Restart amantadine and donepezil    Hypothyroidism- (present on admission)  Assessment & Plan  Continue [place] Thyroid    GERD (gastroesophageal reflux disease)- (present on admission)  Assessment & Plan  Continue omeprazole         VTE prophylaxis:    heparin ppx      I have performed a physical exam and reviewed and updated ROS and Plan today (2/16/2025). In review of yesterday's note (2/15/2025), there are no changes except as documented above.    Total time spent 37 minutes. I spent greater than 50% of the time for patient care, including unit/floor time, multiple face-to-face encounters with the patient, counseling on treatment plan and discussion with bedside RN.  Further, I spent time on my own review of patient's overnight events, RN notes, imaging and lab analysis, and developing my assessment and plan above.  In addition, working with , social workers, and Charge RN on case coordination on this date.    This note was generated using voice recognition software which has a chance of producing errors of grammar and content.  I have made every reasonable attempt to find and correct any errors, but it should be expected that some may not be found prior to finalization of this note.

## 2025-02-17 NOTE — DIETARY
"Nutrition Services: Initial Assessment     Day 7 of admit. Thuy Albert is 74 y.o., female with admitting DX of Septic shock.    Consult Received for: poor oral intake noted on chart review.    Current Hospital Problems List:    Principal Problem:    Acute metabolic encephalopathy (POA: Yes)  Active Problems:    GERD (gastroesophageal reflux disease) (POA: Yes)    Hypothyroidism (Chronic) (POA: Yes)    Severe early onset Alzheimer's dementia with other behavioral disturbance (HCC) (POA: Yes)    Fecal impaction (HCC) (POA: Yes)    Other chest pain (POA: Yes)    Encounter for family conference without patient present (POA: Clinically Undetermined)    Metabolic alkalosis (POA: Yes)    Advanced care planning/counseling discussion (POA: Clinically Undetermined)  Resolved Problems:    * No resolved hospital problems. *    Nutrition Assessment:      Height: 154.9 cm (5' 0.98\")  Weight: 51.5 kg (113 lb 8.6 oz)  Weight taken via: Bed Scale  BMI Calculated: 21.46  BMI Classification: WNL       Weight Readings from Last 5 encounters:   Wt Readings from Last 5 Encounters:   02/17/25 51.5 kg (113 lb 8.6 oz)   12/19/24 49.9 kg (110 lb)   12/19/24 49.9 kg (110 lb)   11/26/24 48.1 kg (106 lb)   11/14/24 47.6 kg (105 lb)         Objective:   Pertinent Labs: Reviewed. 2/15: Pre-Alb 12.9 (L). No CRP available  Pertinent Meds: Folic acid, Senna  Skin/Wounds:  {Options:1128392}  Food Allergies: ***  Last BM:  Type 7: Watery, no solid pieces-entirely liquid  02/16/25       Current Diet Order/Intake:   {Options:1034649}      Subjective:   Patient reported UBW: ***  Dietary Recall/Energy Intake: *** This indicates {Options:3646507} energy intake.       Nutrition Focused Physical Exam (NFPE)  Weight Loss: ***. RD Suspects this change related to ***.  Muscle Mass: {Options:1307416}  Subcutaneous Fat: {Options:8858380}  Fluid Accumulation: ***  Reduced  Strength: N/A in acute care setting.    Nutrition Diagnosis:  "     {PESProblem:2555966} related to *** as evidenced by ***.    {ModSevereMal:0731964} in context of {MalContext:6528434} related to *** as evidenced by ***.      RD notified provider {Select:1364448}.     *** based on RD assessment at this time, {...:6066845}    Nutrition Interventions:      ***  Recommend {Supplement Options:20957239} {Frequency:4308822}.  Patient aware of active plan of care as appropriate.       Nutrition Monitoring and Evaluation:      Monitor nutrition POC, goal for ***% intake from meals and supplements.  Additional fluids per MD/DO  Monitor vital signs pertinent to nutrition.    RD following and will provide updated recommendations as indicated.      Almita Wong R.D.                                         [facility] MALNUTRITION

## 2025-02-17 NOTE — DISCHARGE PLANNING
DC Transport Scheduled    Transport Company Scheduled:  [employer]  Spoke with SHASTA at [facility] to schedule transport.    Scheduled Date: 2/17/2025  Scheduled Time: 1400    Transport Type: [place]  Destination: Home   Destination Name: Jasmine Soria [facility] [address]    Notified care team of scheduled transport via Vo[employer].     If there are any changes needed to the DC transportation scheduled, please contact [facility] at ext. [phone] between the hours of 1698-6641. If outside those hours, contact the ED Case Manager at ext. [phone].

## 2025-02-18 ENCOUNTER — PATIENT OUTREACH (OUTPATIENT)
Dept: MEDICAL GROUP | Facility: MEDICAL CENTER | Age: 75
End: 2025-02-18
Payer: MEDICARE

## 2025-02-18 NOTE — PROGRESS NOTES
Transitional Care Management  TCM Outreach Date and Time: Filed (2/18/2025  1:07 PM)    Discharge Questions  Actual Discharge Date: 02/17/25  Now that you are home, how are you feeling?: Fair (SO Robbie Man states she's baseline with dementia. She has no fever; facial cellulitis is improving. She is able to eat and drink with help. She is not able to walk without assist. Awaiting [facility] PT to assist in gait training and strengthening)  Did you receive any new prescriptions?: No  Do you have any questions about your current medications or new medications (Review Med Rec)?: No (Med Rec completed.  No new meds; no questions or concerns about returning to medication list.)  Did you have any durable medical equipment ordered?: No  Do you have a follow up appointment scheduled with your PCP?: Yes  Was an appointment scheduled for the patient?: Yes  Appointment Date: 02/21/25  Appointment Time: 1500  Any issues or paperwork you wish to discuss with your PCP?: No (No specific issues.)  Are you (patient) able to get to the appointment?: Yes (Spouse)  If Home Health was ordered, have they contacted you (Patient): Yes ([facility] :  #[phone])  Did you have enough support after your last discharge?: Yes (Spouse)  Does this patient qualify for the CCM program?: No    Transitional Care  Number of attempts made to contact patient: 1  Current or previous attempts completed within two business days of discharge? : Yes  Provided education regarding treatment plan, medications, self-management, ADLs?: Yes (Discussed S/S to return to ER. SO concerned about who will care for pt if he has to go in the hospital. Directed to meet with [facility] SW to discuss options. Encouraged SNF placement for temporary care should this need arise. He will discuss further with SW)  Has patient completed an Advanced Directive?: Yes  Is the patient's advanced directive on file?: Yes  Has the Care Manager's phone number provided?: No  Is there anything else I  "can help you with?: No (All questions answered.  Denies any issues, concerns or questions at this time.)    Discharge Summary  Chief Complaint: GLF X2; Head strike; Increased confusion  Admitting Diagnosis: Altered mental status; Hypoxic RA 81%; HX:  Alzheimer's; GERD\" DVTs  Discharge Diagnosis: Acute meabolic encephalopathy; Facial cellutlitis; Fecal impaction        "

## 2025-02-19 LAB — DHEA SERPL-MCNC: 0.94 NG/ML (ref 0.63–4.7)

## 2025-02-21 ENCOUNTER — APPOINTMENT (OUTPATIENT)
Dept: MEDICAL GROUP | Facility: MEDICAL CENTER | Age: 75
End: 2025-02-21
Payer: MEDICARE

## 2025-02-27 ENCOUNTER — TELEPHONE (OUTPATIENT)
Dept: MEDICAL GROUP | Facility: MEDICAL CENTER | Age: 75
End: 2025-02-27
Payer: MEDICARE

## 2025-02-27 NOTE — TELEPHONE ENCOUNTER
Caller Name: Robbie  Call Back Number: [phone] (home)       Message: Pts  twisted his ankle and cannot bring pt in.  cancelled appt on 02/28/2024. Rescheduled for 04/30/2025.

## 2025-02-28 ENCOUNTER — APPOINTMENT (OUTPATIENT)
Dept: MEDICAL GROUP | Facility: MEDICAL CENTER | Age: 75
End: 2025-02-28
Payer: MEDICARE

## 2025-04-01 DIAGNOSIS — E03.9 ACQUIRED HYPOTHYROIDISM: Chronic | ICD-10-CM

## 2025-04-02 RX ORDER — THYROID,PORK 15 MG
45 TABLET ORAL
Qty: 270 TABLET | Refills: 0 | Status: SHIPPED | OUTPATIENT
Start: 2025-04-02

## 2025-04-02 NOTE — TELEPHONE ENCOUNTER
Received request via: Pharmacy    Was the patient seen in the last year in this department? Yes    Does the patient have an active prescription (recently filled or refills available) for medication(s) requested? No    Pharmacy Name: [facility]    Does the patient have custodial Plus and need 100-day supply? (This applies to ALL medications) Patient does not have SCP

## 2025-04-30 ENCOUNTER — APPOINTMENT (OUTPATIENT)
Dept: MEDICAL GROUP | Facility: MEDICAL CENTER | Age: 75
End: 2025-04-30
Payer: MEDICARE

## 2025-05-20 ENCOUNTER — APPOINTMENT (OUTPATIENT)
Dept: MEDICAL GROUP | Facility: MEDICAL CENTER | Age: 75
End: 2025-05-20
Payer: MEDICARE

## 2025-05-22 ENCOUNTER — TELEPHONE (OUTPATIENT)
Dept: SCHEDULING | Facility: IMAGING CENTER | Age: 75
End: 2025-05-22
Payer: MEDICARE

## 2025-07-02 ENCOUNTER — APPOINTMENT (OUTPATIENT)
Dept: MEDICAL GROUP | Facility: MEDICAL CENTER | Age: 75
End: 2025-07-02
Payer: MEDICARE

## 2025-07-10 DIAGNOSIS — E03.9 ACQUIRED HYPOTHYROIDISM: Chronic | ICD-10-CM

## 2025-07-11 RX ORDER — THYROID 15 MG/1
45 TABLET ORAL
Qty: 270 TABLET | Refills: 3 | Status: SHIPPED | OUTPATIENT
Start: 2025-07-11

## 2025-07-11 NOTE — TELEPHONE ENCOUNTER
Received request via: Patient    Was the patient seen in the last year in this department? Yes    Does the patient have an active prescription (recently filled or refills available) for medication(s) requested? No    Pharm[facility]    Does the patient have care home Plus and need 100-day supply? (This applies to ALL medications) Patient does not have SCP

## 2025-07-22 ENCOUNTER — APPOINTMENT (OUTPATIENT)
Dept: RADIOLOGY | Facility: MEDICAL CENTER | Age: 75
End: 2025-07-22
Attending: STUDENT IN AN ORGANIZED HEALTH CARE EDUCATION/TRAINING PROGRAM
Payer: MEDICARE

## 2025-07-22 ENCOUNTER — HOSPITAL ENCOUNTER (EMERGENCY)
Facility: MEDICAL CENTER | Age: 75
End: 2025-07-23
Attending: STUDENT IN AN ORGANIZED HEALTH CARE EDUCATION/TRAINING PROGRAM
Payer: MEDICARE

## 2025-07-22 DIAGNOSIS — F02.C18 SEVERE EARLY ONSET ALZHEIMER'S DEMENTIA WITH OTHER BEHAVIORAL DISTURBANCE (HCC): ICD-10-CM

## 2025-07-22 DIAGNOSIS — Z78.9 UNABLE TO CARE FOR SELF: ICD-10-CM

## 2025-07-22 DIAGNOSIS — R63.0 APPETITE LOSS: Primary | ICD-10-CM

## 2025-07-22 DIAGNOSIS — G30.0 SEVERE EARLY ONSET ALZHEIMER'S DEMENTIA WITH OTHER BEHAVIORAL DISTURBANCE (HCC): ICD-10-CM

## 2025-07-22 DIAGNOSIS — R26.9 ABNORMAL GAIT: ICD-10-CM

## 2025-07-22 LAB
ALBUMIN SERPL BCP-MCNC: 3.7 G/DL (ref 3.2–4.9)
ALBUMIN/GLOB SERPL: 1.3 G/DL
ALP SERPL-CCNC: 70 U/L (ref 30–99)
ALT SERPL-CCNC: 35 U/L (ref 2–50)
AMMONIA PLAS-SCNC: 15 UMOL/L (ref 11–45)
AMPHET UR QL SCN: NEGATIVE
ANION GAP SERPL CALC-SCNC: 12 MMOL/L (ref 7–16)
APPEARANCE UR: CLEAR
AST SERPL-CCNC: 37 U/L (ref 12–45)
BACTERIA #/AREA URNS HPF: NORMAL /HPF
BARBITURATES UR QL SCN: NEGATIVE
BASOPHILS # BLD AUTO: 0.3 % (ref 0–1.8)
BASOPHILS # BLD: 0.03 K/UL (ref 0–0.12)
BENZODIAZ UR QL SCN: NEGATIVE
BILIRUB SERPL-MCNC: 0.7 MG/DL (ref 0.1–1.5)
BILIRUB UR QL STRIP.AUTO: NEGATIVE
BUN SERPL-MCNC: 20 MG/DL (ref 8–22)
BZE UR QL SCN: NEGATIVE
CALCIUM ALBUM COR SERPL-MCNC: 9.2 MG/DL (ref 8.5–10.5)
CALCIUM SERPL-MCNC: 9 MG/DL (ref 8.5–10.5)
CANNABINOIDS UR QL SCN: NEGATIVE
CASTS URNS QL MICRO: NORMAL /LPF (ref 0–2)
CHLORIDE SERPL-SCNC: 102 MMOL/L (ref 96–112)
CK SERPL-CCNC: 23 U/L (ref 0–154)
CO2 SERPL-SCNC: 21 MMOL/L (ref 20–33)
COLOR UR: YELLOW
CREAT SERPL-MCNC: 0.74 MG/DL (ref 0.5–1.4)
EOSINOPHIL # BLD AUTO: 0 K/UL (ref 0–0.51)
EOSINOPHIL NFR BLD: 0 % (ref 0–6.9)
EPITHELIAL CELLS 1715: NORMAL /HPF (ref 0–5)
ERYTHROCYTE [DISTWIDTH] IN BLOOD BY AUTOMATED COUNT: 44.1 FL (ref 35.9–50)
ETHANOL BLD-MCNC: <10.1 MG/DL
FENTANYL UR QL: NEGATIVE
GFR SERPLBLD CREATININE-BSD FMLA CKD-EPI: 84 ML/MIN/1.73 M 2
GLOBULIN SER CALC-MCNC: 2.8 G/DL (ref 1.9–3.5)
GLUCOSE SERPL-MCNC: 101 MG/DL (ref 65–99)
GLUCOSE UR STRIP.AUTO-MCNC: NEGATIVE MG/DL
HCT VFR BLD AUTO: 37.9 % (ref 37–47)
HGB BLD-MCNC: 12.8 G/DL (ref 12–16)
IMM GRANULOCYTES # BLD AUTO: 0.04 K/UL (ref 0–0.11)
IMM GRANULOCYTES NFR BLD AUTO: 0.4 % (ref 0–0.9)
KETONES UR STRIP.AUTO-MCNC: 40 MG/DL
LACTATE SERPL-SCNC: 1.1 MMOL/L (ref 0.5–2)
LEUKOCYTE ESTERASE UR QL STRIP.AUTO: NEGATIVE
LYMPHOCYTES # BLD AUTO: 2.16 K/UL (ref 1–4.8)
LYMPHOCYTES NFR BLD: 20.5 % (ref 22–41)
MCH RBC QN AUTO: 32 PG (ref 27–33)
MCHC RBC AUTO-ENTMCNC: 33.8 G/DL (ref 32.2–35.5)
MCV RBC AUTO: 94.8 FL (ref 81.4–97.8)
METHADONE UR QL SCN: NEGATIVE
MICRO URNS: ABNORMAL
MONOCYTES # BLD AUTO: 1.05 K/UL (ref 0–0.85)
MONOCYTES NFR BLD AUTO: 10 % (ref 0–13.4)
NEUTROPHILS # BLD AUTO: 7.24 K/UL (ref 1.82–7.42)
NEUTROPHILS NFR BLD: 68.8 % (ref 44–72)
NITRITE UR QL STRIP.AUTO: NEGATIVE
NRBC # BLD AUTO: 0 K/UL
NRBC BLD-RTO: 0 /100 WBC (ref 0–0.2)
OPIATES UR QL SCN: NEGATIVE
OXYCODONE UR QL SCN: NEGATIVE
PCP UR QL SCN: NEGATIVE
PH UR STRIP.AUTO: 5 [PH] (ref 5–8)
PLATELET # BLD AUTO: 146 K/UL (ref 164–446)
PMV BLD AUTO: 9.5 FL (ref 9–12.9)
POTASSIUM SERPL-SCNC: 4.1 MMOL/L (ref 3.6–5.5)
PROPOXYPH UR QL SCN: NEGATIVE
PROT SERPL-MCNC: 6.5 G/DL (ref 6–8.2)
PROT UR QL STRIP: 30 MG/DL
RBC # BLD AUTO: 4 M/UL (ref 4.2–5.4)
RBC # URNS HPF: NORMAL /HPF
RBC UR QL AUTO: NEGATIVE
SODIUM SERPL-SCNC: 135 MMOL/L (ref 135–145)
SP GR UR STRIP.AUTO: 1.02
UROBILINOGEN UR STRIP.AUTO-MCNC: 1 EU/DL
WBC # BLD AUTO: 10.5 K/UL (ref 4.8–10.8)
WBC #/AREA URNS HPF: NORMAL /HPF

## 2025-07-22 PROCEDURE — 85025 COMPLETE CBC W/AUTO DIFF WBC: CPT

## 2025-07-22 PROCEDURE — 71045 X-RAY EXAM CHEST 1 VIEW: CPT

## 2025-07-22 PROCEDURE — 70450 CT HEAD/BRAIN W/O DYE: CPT

## 2025-07-22 PROCEDURE — 82550 ASSAY OF CK (CPK): CPT

## 2025-07-22 PROCEDURE — 83605 ASSAY OF LACTIC ACID: CPT

## 2025-07-22 PROCEDURE — 80307 DRUG TEST PRSMV CHEM ANLYZR: CPT

## 2025-07-22 PROCEDURE — 700111 HCHG RX REV CODE 636 W/ 250 OVERRIDE (IP): Mod: JZ | Performed by: STUDENT IN AN ORGANIZED HEALTH CARE EDUCATION/TRAINING PROGRAM

## 2025-07-22 PROCEDURE — 87150 DNA/RNA AMPLIFIED PROBE: CPT | Mod: 91

## 2025-07-22 PROCEDURE — 700102 HCHG RX REV CODE 250 W/ 637 OVERRIDE(OP): Performed by: STUDENT IN AN ORGANIZED HEALTH CARE EDUCATION/TRAINING PROGRAM

## 2025-07-22 PROCEDURE — 99285 EMERGENCY DEPT VISIT HI MDM: CPT

## 2025-07-22 PROCEDURE — 82140 ASSAY OF AMMONIA: CPT

## 2025-07-22 PROCEDURE — 81001 URINALYSIS AUTO W/SCOPE: CPT

## 2025-07-22 PROCEDURE — 82077 ASSAY SPEC XCP UR&BREATH IA: CPT

## 2025-07-22 PROCEDURE — 80053 COMPREHEN METABOLIC PANEL: CPT

## 2025-07-22 PROCEDURE — 96374 THER/PROPH/DIAG INJ IV PUSH: CPT

## 2025-07-22 PROCEDURE — 87040 BLOOD CULTURE FOR BACTERIA: CPT

## 2025-07-22 PROCEDURE — 87086 URINE CULTURE/COLONY COUNT: CPT

## 2025-07-22 PROCEDURE — 94760 N-INVAS EAR/PLS OXIMETRY 1: CPT

## 2025-07-22 PROCEDURE — 87077 CULTURE AEROBIC IDENTIFY: CPT

## 2025-07-22 PROCEDURE — 36415 COLL VENOUS BLD VENIPUNCTURE: CPT

## 2025-07-22 PROCEDURE — 700105 HCHG RX REV CODE 258: Performed by: STUDENT IN AN ORGANIZED HEALTH CARE EDUCATION/TRAINING PROGRAM

## 2025-07-22 PROCEDURE — A9270 NON-COVERED ITEM OR SERVICE: HCPCS | Performed by: STUDENT IN AN ORGANIZED HEALTH CARE EDUCATION/TRAINING PROGRAM

## 2025-07-22 RX ORDER — ONDANSETRON 2 MG/ML
4 INJECTION INTRAMUSCULAR; INTRAVENOUS ONCE
Status: COMPLETED | OUTPATIENT
Start: 2025-07-22 | End: 2025-07-22

## 2025-07-22 RX ORDER — SODIUM CHLORIDE 9 MG/ML
INJECTION, SOLUTION INTRAVENOUS ONCE
Status: COMPLETED | OUTPATIENT
Start: 2025-07-22 | End: 2025-07-22

## 2025-07-22 RX ORDER — MEMANTINE HYDROCHLORIDE 10 MG/1
10 TABLET ORAL ONCE
Status: COMPLETED | OUTPATIENT
Start: 2025-07-22 | End: 2025-07-22

## 2025-07-22 RX ORDER — DONEPEZIL HYDROCHLORIDE 5 MG/1
10 TABLET, FILM COATED ORAL ONCE
Status: COMPLETED | OUTPATIENT
Start: 2025-07-22 | End: 2025-07-22

## 2025-07-22 RX ADMIN — MEMANTINE 10 MG: 10 TABLET ORAL at 20:55

## 2025-07-22 RX ADMIN — DONEPEZIL HYDROCHLORIDE 10 MG: 5 TABLET, FILM COATED ORAL at 20:55

## 2025-07-22 RX ADMIN — SODIUM CHLORIDE: 9 INJECTION, SOLUTION INTRAVENOUS at 21:35

## 2025-07-22 RX ADMIN — ONDANSETRON 4 MG: 2 INJECTION INTRAMUSCULAR; INTRAVENOUS at 21:35

## 2025-07-22 ASSESSMENT — FIBROSIS 4 INDEX: FIB4 SCORE: 3.22

## 2025-07-22 NOTE — LETTER
7/24/2025               Thuy Albert  [address]   [address]  [facility] [address]        Dear Thuy Wilson (MR#[id number])    This letter is sent in regards to your recent visit to the [facility] Emergency Department on 7/22/2025. During the visit, tests were performed to assist the physician in your medical diagnosis. A review of your tests requires that we notify you of the following:    Your {SITE:61274} culture was POSITIVE for a bacteria called {BACTERIA:67095}. The antibiotic prescribed for you ({ANTIBIOTICS:66790}) should be active to treat this bacteria. It is important that you continue taking your antibiotic until it is finished.     Please feel free to contact me at the number below if you have any questions or concerns. Thank you for your cooperation in the matter.    Sincerely,  ED Culture Follow-Up Staff  Flavia Garcia, PharmD    [facility], Emergency Department  [address] [place] [id number] [phone] (ED Culture Line)

## 2025-07-23 VITALS
RESPIRATION RATE: 18 BRPM | OXYGEN SATURATION: 91 % | DIASTOLIC BLOOD PRESSURE: 60 MMHG | HEIGHT: 60 IN | TEMPERATURE: 98.5 F | SYSTOLIC BLOOD PRESSURE: 112 MMHG | BODY MASS INDEX: 17.28 KG/M2 | HEART RATE: 59 BPM | WEIGHT: 88 LBS

## 2025-07-23 PROBLEM — Z71.89 ACP (ADVANCE CARE PLANNING): Status: RESOLVED | Noted: 2024-06-21 | Resolved: 2025-07-23

## 2025-07-23 PROCEDURE — 99284 EMERGENCY DEPT VISIT MOD MDM: CPT | Performed by: HOSPITALIST

## 2025-07-23 PROCEDURE — 86480 TB TEST CELL IMMUN MEASURE: CPT

## 2025-07-23 PROCEDURE — 97163 PT EVAL HIGH COMPLEX 45 MIN: CPT

## 2025-07-23 PROCEDURE — 97166 OT EVAL MOD COMPLEX 45 MIN: CPT

## 2025-07-23 RX ORDER — THYROID 30 MG/1
45 TABLET ORAL
Status: DISCONTINUED | OUTPATIENT
Start: 2025-07-24 | End: 2025-07-23 | Stop reason: HOSPADM

## 2025-07-23 RX ORDER — MEMANTINE HYDROCHLORIDE 10 MG/1
10 TABLET ORAL 2 TIMES DAILY
Status: DISCONTINUED | OUTPATIENT
Start: 2025-07-23 | End: 2025-07-23 | Stop reason: HOSPADM

## 2025-07-23 RX ORDER — DONEPEZIL HYDROCHLORIDE 5 MG/1
10 TABLET, FILM COATED ORAL DAILY
Status: DISCONTINUED | OUTPATIENT
Start: 2025-07-23 | End: 2025-07-23 | Stop reason: HOSPADM

## 2025-07-23 ASSESSMENT — COGNITIVE AND FUNCTIONAL STATUS - GENERAL
HELP NEEDED FOR BATHING: TOTAL
EATING MEALS: A LOT
MOVING TO AND FROM BED TO CHAIR: A LITTLE
DAILY ACTIVITIY SCORE: 10
WALKING IN HOSPITAL ROOM: A LITTLE
STANDING UP FROM CHAIR USING ARMS: A LITTLE
MOVING FROM LYING ON BACK TO SITTING ON SIDE OF FLAT BED: A LITTLE
CLIMB 3 TO 5 STEPS WITH RAILING: TOTAL
MOBILITY SCORE: 16
TOILETING: TOTAL
TURNING FROM BACK TO SIDE WHILE IN FLAT BAD: A LITTLE
SUGGESTED CMS G CODE MODIFIER DAILY ACTIVITY: CL
DRESSING REGULAR UPPER BODY CLOTHING: A LOT
SUGGESTED CMS G CODE MODIFIER MOBILITY: CK
DRESSING REGULAR LOWER BODY CLOTHING: A LOT
PERSONAL GROOMING: A LOT

## 2025-07-23 ASSESSMENT — GAIT ASSESSMENTS
DISTANCE (FEET): 20
GAIT LEVEL OF ASSIST: MINIMAL ASSIST
DEVIATION: SHUFFLED GAIT;BRADYKINETIC;STEP TO
DISTANCE (FEET): 20
ASSISTIVE DEVICE: HAND HELD ASSIST

## 2025-07-23 ASSESSMENT — VISUAL ACUITY: OU: 1

## 2025-07-23 ASSESSMENT — FIBROSIS 4 INDEX: FIB4 SCORE: 3.17

## 2025-07-23 ASSESSMENT — ACTIVITIES OF DAILY LIVING (ADL): TOILETING: UNABLE TO DETERMINE AT THIS TIME

## 2025-07-23 NOTE — DISCHARGE PLANNING
Anticipated Discharge Disposition: Home w HH vs SNF STR.    Action: Still need MD notes. PT OT eval. Medicaid added is [facility] Q[facility]. No three Midnight stay at this time. Cont to follow for DC planning. Hoping spouse can be here for PT OT eval. Decub image noted. Will ask MD to do  Wound team eval for recommendations likely Duoderm. Income 980.00 approx combined they are 2000.00 per past charting. Can have PFA reeval for medicaid.    Barriers to Discharge: Spouse. Placement options. GH    Plan: Will cont to follow

## 2025-07-23 NOTE — ED NOTES
Pt resting comfortably, equal chest rise and fall. No signs of distress noted. No pt needs at this time. No changes.

## 2025-07-23 NOTE — DISCHARGE SUMMARY
ED Observation Discharge Summary    Patient:Thuy Albert  Patient : 1950  Patient MRN: [id number]  Patient PCP: Pramod Lu M.D.    Admit Date: 2025  Discharge Date and Time: 25 4:47 PM  Discharge Diagnosis:   1. Appetite loss        2. Severe early onset Alzheimer's dementia with other behavioral disturbance (HCC)  Referral to Skilled Nursing Facility      3. Abnormal gait  Referral to Skilled Nursing Facility      4. Unable to care for self           Discharge Attending: Justice Francisco M.D.  Discharge Service: ED Observation    ED Course  Thuy Wilson is a 74 y.o. female who was evaluated at [facility] emergency department yesterday with the concerns of her not being able to care for herself.  Has dementia.  Cared for at home by .  Apparently the house was in a state of disarray.  Here in the emergency department, initially had a medical workup which was unrevealing.  Has been in ED observation awaiting further evaluation.  Social work has been in touch with EPS.  Ultimately a plan has been developed for the patient to be discharged with her  back home.    Discharge Exam:  /55   Pulse 64   Temp 36.9 °C (98.5 °F) (Temporal)   Ht 1.524 m (5')   Wt 39.9 kg (88 lb)   SpO2 91%   BMI 17.19 kg/m² .    Constitutional: Awake and alert. Nontoxic  HENT:  Grossly normal  Eyes: Grossly normal    Labs  Results for orders placed or performed during the hospital encounter of 25   Urinalysis, Culture if Indicated    Collection Time: 25  7:13 PM    Specimen: Urine, Clean Catch   Result Value Ref Range    Color Yellow     Character Clear     Specific Gravity 1.025 <1.035    Ph 5.0 5.0 - 8.0    Glucose Negative Negative mg/dL    Ketones 40 (A) Negative mg/dL    Protein 30 (A) Negative mg/dL    Bilirubin Negative Negative    Urobilinogen, Urine 1.0 <=1.0 EU/dL    Nitrite Negative Negative    Leukocyte Esterase Negative Negative    Occult Blood  Negative Negative    Micro Urine Req Microscopic    URINE DRUG SCREEN    Collection Time: 07/22/25  7:13 PM   Result Value Ref Range    Amphetamines Urine Negative Negative    Barbiturates Negative Negative    Benzodiazepines Negative Negative    Cocaine Metabolite Negative Negative    Fentanyl, Urine Negative Negative    Methadone Negative Negative    Opiates Negative Negative    Oxycodone Negative Negative    Phencyclidine -Pcp Negative Negative    Propoxyphene Negative Negative    Cannabinoid Metab Negative Negative   URINE MICROSCOPIC (W/UA)    Collection Time: 07/22/25  7:13 PM   Result Value Ref Range    WBC 0-2 /hpf    RBC 0-2 /hpf    Bacteria None Seen None /hpf    Epithelial Cells 0-2 0 - 5 /hpf    Urine Casts 0-2 0 - 2 /lpf   CREATINE KINASE    Collection Time: 07/22/25  7:18 PM   Result Value Ref Range    CPK Total 23 0 - 154 U/L   Lactic Acid    Collection Time: 07/22/25  7:18 PM   Result Value Ref Range    Lactic Acid 1.1 0.5 - 2.0 mmol/L   CBC with Differential    Collection Time: 07/22/25  7:18 PM   Result Value Ref Range    WBC 10.5 4.8 - 10.8 K/uL    RBC 4.00 (L) 4.20 - 5.40 M/uL    Hemoglobin 12.8 12.0 - 16.0 g/dL    Hematocrit 37.9 37.0 - 47.0 %    MCV 94.8 81.4 - 97.8 fL    MCH 32.0 27.0 - 33.0 pg    MCHC 33.8 32.2 - 35.5 g/dL    RDW 44.1 35.9 - 50.0 fL    Platelet Count 146 (L) 164 - 446 K/uL    MPV 9.5 9.0 - 12.9 fL    Neutrophils-Polys 68.80 44.00 - 72.00 %    Lymphocytes 20.50 (L) 22.00 - 41.00 %    Monocytes 10.00 0.00 - 13.40 %    Eosinophils 0.00 0.00 - 6.90 %    Basophils 0.30 0.00 - 1.80 %    Immature Granulocytes 0.40 0.00 - 0.90 %    Nucleated RBC 0.00 0.00 - 0.20 /100 WBC    Neutrophils (Absolute) 7.24 1.82 - 7.42 K/uL    Lymphs (Absolute) 2.16 1.00 - 4.80 K/uL    Monos (Absolute) 1.05 (H) 0.00 - 0.85 K/uL    Eos (Absolute) 0.00 0.00 - 0.51 K/uL    Baso (Absolute) 0.03 0.00 - 0.12 K/uL    Immature Granulocytes (abs) 0.04 0.00 - 0.11 K/uL    NRBC (Absolute) 0.00 K/uL   Complete  Metabolic Panel    Collection Time: 07/22/25  7:18 PM   Result Value Ref Range    Sodium 135 135 - 145 mmol/L    Potassium 4.1 3.6 - 5.5 mmol/L    Chloride 102 96 - 112 mmol/L    Co2 21 20 - 33 mmol/L    Anion Gap 12.0 7.0 - 16.0    Glucose 101 (H) 65 - 99 mg/dL    Bun 20 8 - 22 mg/dL    Creatinine 0.74 0.50 - 1.40 mg/dL    Calcium 9.0 8.5 - 10.5 mg/dL    Correct Calcium 9.2 8.5 - 10.5 mg/dL    AST(SGOT) 37 12 - 45 U/L    ALT(SGPT) 35 2 - 50 U/L    Alkaline Phosphatase 70 30 - 99 U/L    Total Bilirubin 0.7 0.1 - 1.5 mg/dL    Albumin 3.7 3.2 - 4.9 g/dL    Total Protein 6.5 6.0 - 8.2 g/dL    Globulin 2.8 1.9 - 3.5 g/dL    A-G Ratio 1.3 g/dL   ETHYL ALCOHOL (BLOOD)    Collection Time: 07/22/25  7:18 PM   Result Value Ref Range    Diagnostic Alcohol <10.1 <10.1 mg/dL   ESTIMATED GFR    Collection Time: 07/22/25  7:18 PM   Result Value Ref Range    GFR (CKD-EPI) 84 >60 mL/min/1.73 m 2   Blood Culture - Draw one from central line and one from peripheral site    Collection Time: 07/22/25  9:51 PM    Specimen: Peripheral; Blood   Result Value Ref Range    Significant Indicator NEG     Source BLD     Site PERIPHERAL     Culture Result       No Growth  Note: Blood cultures are incubated for 5 days and  are monitored continuously.Positive blood cultures  are called to the RN and reported as soon as  they are identified.  Blood culture testing and Gram stain, if indicated, are  performed at [facility],  [address] [place] [address].  Positive blood  cultures are sent to [facility],  [address] [place] [address], for organism identification  and susceptibility testing.     AMMONIA    Collection Time: 07/22/25 10:25 PM   Result Value Ref Range    Ammonia 15 11 - 45 umol/L   Blood Culture - Draw one from central line and one from peripheral site    Collection Time: 07/22/25 10:25 PM    Specimen: Line; Blood   Result Value Ref Range    Significant Indicator NEG     Source BLD      Site Peripheral     Culture Result       No Growth  Note: Blood cultures are incubated for 5 days and  are monitored continuously.Positive blood cultures  are called to the RN and reported as soon as  they are identified.  Blood culture testing and Gram stain, if indicated, are  performed at [facility],  [address] [place] [address].  Positive blood  cultures are sent to [facility],  [address] [place] [address], for organism identification  and susceptibility testing.       *Note: Due to a large number of results and/or encounters for the requested time period, some results have not been displayed. A complete set of results can be found in Results Review.       Radiology  CT-HEAD W/O   Final Result         1.  No acute intracranial abnormality is identified, there are nonspecific white matter changes, commonly associated with small vessel ischemic disease.  Associated mild cerebral atrophy is noted.   2.  Atherosclerosis.         DX-CHEST-PORTABLE (1 VIEW)   Final Result         No acute cardiac or pulmonary abnormality is identified.          Medications:   New Prescriptions    No medications on file       My final assessment includes   1. Appetite loss        2. Severe early onset Alzheimer's dementia with other behavioral disturbance (HCC)  Referral to Skilled Nursing Facility      3. Abnormal gait  Referral to Skilled Nursing Facility      4. Unable to care for self           Upon Reevaluation, the patient's condition has: Improved; and will be discharged.    Patient discharged from ED Observation status at 4:45 PM (Time) 7/23/2025 (Date).     Total time spent on this ED Observation discharge encounter is > 30 Minutes    Electronically signed by: Justice Francisco M.D., 7/23/2025 4:47 PM

## 2025-07-23 NOTE — DISCHARGE PLANNING
Anticipated Discharge Disposition: SNf STR [facility]    Action: Will send SNF SW protocol referrals once PAR able to add Medicaid  says she has. SNF sent. Last PASRR LOC PASRR [id number]. LOC submitted. #[id number] 6/2024    Barriers to Discharge:  willingness to accept    Plan: Will cont to follow

## 2025-07-23 NOTE — THERAPY
Physical Therapy   Initial Evaluation     Patient Name:  Thuy Albert  Age:  74 y.o., Sex:  female  Medical Record #:  [id number]  Today's Date: 7/23/2025     Precautions  Medical: (P) Fall Risk  Comments: (P) Oriented to self only.    Assessment  Patient is 74 y.o. female brought in by EMS for ALOC. Per EMR, pt resides with her spouse who called 911. Pt was found down on the ground and transferred to ED.   Additional factors influencing patient status / progress multiple hospital admissions, H/o  Alzheimer's dementia, GERD, DVTs, hypothyroidism, asthma, COVID-19 pneumonia .    Pt seen in setting or ER RM 8A. Pt oriented to self only, pt able to follow single step commands for basic mobility tasks.   Pt required HHA to ambulate in room for 20 ft, demonstrates shuffled, guarded gait pattern. Pt blind and required guidance around room.   Recommend staff ambulate patient with HHA as needed. No formal skilled inpatient PT indicated.    Patient will not be actively followed for physical therapy services at this time, however may be seen if requested by physician for 1 more visit within 30 days to address any discharge or equipment needs.     Plan    Physical Therapy Initial Treatment Plan   Duration: (P) Discharge Needs Only    DC Equipment Recommendations: (P) Unable to determine at this time  Discharge Recommendations: (P) Anticipate that the patient will have no further physical therapy needs after discharge from the hospital. Recommend 24 supervised environment.          Initial Contact Note    Initial Contact Note Order Received and Verified, Physical Therapy Evaluation in Progress with Full Report to Follow.   Precautions   Medical Fall Risk   Comments Oriented to self only.   Vitals   O2 Delivery Device None - Room Air   Prior Living Situation   Prior Services None   Housing / Facility 2 Story Apartment / Condo   Equipment Owned Unable to Determine At This Time   Lives with - Patient's Self Care Capacity  Spouse   Prior Level of Functional Mobility   Bed Mobility Unable To Determine At This Time   Transfer Status Unable To Determine At This Time   Ambulation Unable To Determine At This Time   Ambulation Distance unable to determine .   Assistive Devices Used Unable to Determine At This Time   Comments blind, needs assistance.   History of Falls   History of Falls Yes   Cognition    Orientation Level   (self)   Level of Consciousness Alert   Ability To Follow Commands 1 Step   Safety Awareness Impaired   New Learning Impaired   Attention Impaired   Sequencing Impaired   Active ROM Lower Body    Active ROM Lower Body  WDL   Strength Lower Body   Lower Body Strength  X   Gross Strength Generalized Weakness, Equal Bilaterally   Sensation Lower Body   Lower Extremity Sensation   Not Tested   Balance Assessment   Sitting Balance (Static) Fair +   Sitting Balance (Dynamic) Fair   Standing Balance (Static) Fair   Standing Balance (Dynamic) Fair   Weight Shift Sitting Fair   Weight Shift Standing Fair   Comments HHA   Bed Mobility    Supine to Sit Moderate Assist   Sit to Supine Moderate Assist   Comments cues to initiate and sustain task   Gait Analysis   Gait Level Of Assist Minimal Assist   Assistive Device Hand Held Assist   Distance (Feet) 20   # of Times Distance was Traveled 1   Deviation Shuffled Gait;Bradykinetic;Step To  (limited by visual impairment.)   Vision Deficits Impacting Mobility yes   Functional Mobility   Sit to Stand Minimal Assist   Bed, Chair, Wheelchair Transfer Minimal Assist   6 Clicks Assessment - How much HELP from another person do you currently need... (If the patient hasn't done an activity recently, how much help from another person do you think he/she would need if he/she tried?)   Turning from your back to your side while in a flat bed without using bedrails? 3   Moving from lying on your back to sitting on the side of a flat bed without using bedrails? 3   Moving to and from a bed to a  chair (including a wheelchair)? 3   Standing up from a chair using your arms (e.g., wheelchair, or bedside chair)? 3   Walking in hospital room? 3   Climbing 3-5 steps with a railing? 1   6 clicks Mobility Score 16   Education Group   Education Provided Role of Physical Therapist   Role of Physical Therapist Patient Response Patient;Nonacceptance;Explanation;No Learning Evidence   Physical Therapy Initial Treatment Plan    Duration Discharge Needs Only   Problem List    Problems Safety Awareness Deficits / Cognition   Anticipated Discharge Equipment and Recommendations   DC Equipment Recommendations Unable to determine at this time   Discharge Recommendations Anticipate that the patient will have no further physical therapy needs after discharge from the hospital   Interdisciplinary Plan of Care Collaboration   IDT Collaboration with  Nursing;Occupational Therapist   Patient Position at End of Therapy   (On gurney in ED rm 8 A)   Collaboration Comments RN updated. Pt's  brief soaked in urine   Session Information   Date / Session Number  7/23-DC needs only.

## 2025-07-23 NOTE — DISCHARGE PLANNING
Anticipated Discharge Disposition: Home    Action: Referral APS done earlier at request of MD based on EMS concern for hoarding in home. ER CM has spoken with spouse and met with him in ER. Exceptionally pleasant and trying his best with care. She is blind total care. She is ambulatory  for  PT OT Home health offered and declined due to poor past experience. ER offered Hospice declined at present but resources sent with pt.    Barriers to Discharge: Transport    Plan: Willcont to follow

## 2025-07-23 NOTE — PROGRESS NOTES
Signed out to me from Dr. Whitten. Patient unable to care for self and has inhospitable living conditions. No acute changes overnight on my shift. Signed out to coming on ERP for evaluation for placement and case management involvement. Consider APS.

## 2025-07-23 NOTE — ED NOTES
Pt resting comfortably, equal chest rise and fall. No signs of distress noted. Bed linen changed, diaper changed, pt repositioned, applesauce fed to pt.

## 2025-07-23 NOTE — DISCHARGE PLANNING
Anticipated Discharge Disposition: SNF vs HH    Action: Chart reviewed. Spoke with Art. He notes that she slipped off the edge to  the bed. Apt is a two story but once inside it is really 1 story. No home health at this time He notes that  caring is getting harder and harder but he does it. She has Wheelchair. He is still resistant to placement. He would be agreeable to home health, he does not want placement at SNF or group home still. He would like her held for a few days at the hospital [facility]. Reviewed admission criteria. He is afraid  if they used Medicaid for Short term strengthening at SNF that they will keep her and he won't be able to bring her home. He is advised PT OT eval pending. He says they had a system worked out. He fed her, got her up for the day in chair, and routine reversed in evening.  Encouraged to be here for OT PT eval. He feels she had similar cognitive episode about a year ago and improved and came home and they were good for a year. He says that they have medicaid. ER CM will have PAR add medicaid. He is agreeable for ER CM to do [place] referral for SNF for bed availability to discuss later today. He will be in, he does drive. He did feel Home health PT OT was helpful. Pcp DR Lu [facility]. Recommend Hospitalist to follow    Barriers to Discharge: Mobility PT OT pending    Plan: Will cont to follow  Care Transition Team Assessment    Information Source  Orientation Level: Disoriented to place, Disoriented to time, Disoriented to situation, Oriented to person  Information Given By: Spouse  Informant's Name: art  Who is responsible for making decisions for patient? : Spouse  Name(s) of Primary Decision Maker: art         Elopement Risk  Legal Hold: No  Ambulatory or Self Mobile in Wheelchair: No-Not an Elopement Risk    Interdisciplinary Discharge Planning  Primary Care Physician: Tabitha  Lives with - Patient's Self Care Capacity: Spouse  Support Systems: Spouse / Significant  Other  Housing / Facility: 2 Story Apartment / Condo  Do You Take your Prescribed Medications Regularly: Yes  Able to Return to Previous ADL's: Future Time w/Therapy  Mobility Issues: Yes  Prior Services: Skilled Home Health Services (last HH in 2024 December)    Discharge Preparedness  What is your plan after discharge?: Uncertain - pending medical team collaboration  What are your discharge supports?: Spouse  Prior Functional Level: Needs Assist with Activities of Daily Living, Needs Assist with Medication Management, Uses Wheelchair, Wheelchair Dependent    Functional Assesment  Prior Functional Level: Needs Assist with Activities of Daily Living, Needs Assist with Medication Management, Uses Wheelchair, Wheelchair Dependent    Finances  Prescription Coverage: Yes              Advance Directive  Advance Directive?: DPOA for Health Care    Domestic Abuse  Have you ever been the victim of abuse or violence?: No

## 2025-07-23 NOTE — DISCHARGE PLANNING
Anticipated Discharge Disposition: Home with [facility].    Action: ER CM has spoken with spouse and met with him in ER. Exceptionally pleasant and trying his best with care.  He walks with canes.She is blind total care. She is ambulatory for PT OT Home health offered and declined due to poor past experience. ER offered Hospice declined at present but resources sent with pt. ER CM sent Group home resources reviewing again she maybe able to access medicaid waiver resources for cost assistance discussed earlier with him, Attendant care resources. Sent [facility] home with him. Discussed Hospice services in depth and encouraged he declined at present but may consider calling and setting up services later. He notes that she is asleep during day and up at night.  This reverse circaidian allows him to shop, do dr appt and is her norm. Discussed progression of Dementia Alzhiemer . Encouraged strongly to reach out to Alzheimer Dementia society for support this information sent with him. She is drinking. [facility] will be arranged for transport home with MTM clearance when he contacts CM that he is home. Case reviewed with Leader on call Rosa for insights.    Barriers to Discharge: Transport. ETA pending.     Plan: Will cont to follow.

## 2025-07-23 NOTE — THERAPY
"Occupational Therapy   Initial Evaluation     Patient Name:  Thuy Albert  Age:  74 y.o., Sex:  female  Medical Record #:  [id number]  Today's Date:  7/23/2025     Precautions  Medical: Fall Risk  Comments: dementia, pt is Blind    Assessment  Patient is 74 y.o. female with a diagnosis of found down, spouse called EMS.  Per Cm notes, spouse states she slipped off the bed.  Pt with blindness, h/o Arthritis, Asthma, Dementia.  Per past notes, spouse has been pt's primary caregiver, unclear if he is able to continue caring for her and spouse not present at time of eval.  Pt required max/total assist for ADL's.  She is able to walk with Russell HHA.  Unclear if pt can make progress with her ADl's given the extent of her dementia.   At this time pt needs to be in a supportive environment with 24 hour supervision.  Patient will not be actively followed for occupational therapy services at this time, however may be seen if requested by physician for 1 more visit within 30 days to address any discharge or equipment needs.       Plan    Occupational Therapy Initial Treatment Plan   Duration: Discharge Needs Only    DC Equipment Recommendations: Unable to determine at this time  Discharge Recommendations: Anticipate that the patient will have no further occupational therapy needs after discharge from the hospital (Rec 24 hour supervision and assist with ADl's)     Subjective    \"I was playing outside and I had an oopsy.\"     Objective       07/23/25 1412   Prior Living Situation   Prior Services Skilled Home Health Services   Housing / Facility 2 Story Apartment / Condo   Steps Into Home   (1 FOS to enter per CM notes)   Lives with - Patient's Self Care Capacity Spouse   Comments Per EMS admit notes, Spouse is disabled and cannot provide the assist pt needs, per CM notes he has been her primary caregiver but it is getting harder.  Pt unable to provide any info. spouse not present for eval   Prior Level of ADL Function " "  Self Feeding Unable To Determine At This Time   Grooming / Hygiene Unable To Determine At This Time   Bathing Unable To Determine At This Time   Dressing Unable To Determine At This Time   Toileting Unable To Determine At This Time   Comments Pt unreliable historian   Prior Level of IADL Function   Medication Management Requires Assist   Laundry Requires Assist   Kitchen Mobility Requires Assist   Finances Requires Assist   Home Management Requires Assist   Shopping Requires Assist   Prior Level Of Mobility   (unable to determine if she walks much or uses a device)   Driving / Transportation Relatives / Others Provide Transportation   History of Falls   History of Falls Yes   Date of Last Fall 07/22/25   Precautions   Medical Fall Risk   Comments dementia, pt is Blind   Vitals   O2 Delivery Device None - Room Air   Cognition    Level of Consciousness Responds to voice   Comments Confused, fearful, inconsistently following 1 step commands.  Oriented to self only.  Reports, \"I was playing outside and I had an oopsy\"   Active ROM Upper Body   Active ROM Upper Body  WDL   Strength Upper Body   Upper Body Strength  X   Gross Strength Generalized Weakness, Equal Bilaterally.    Upper Body Muscle Tone   Upper Body Muscle Tone  WDL   Balance Assessment   Sitting Balance (Static) Fair +   Sitting Balance (Dynamic) Fair   Standing Balance (Static) Poor -   Standing Balance (Dynamic) Trace +   Weight Shift Sitting Poor   Weight Shift Standing Poor   Comments Karson HHA to walk   Bed Mobility    Supine to Sit Maximal Assist   Sit to Supine Moderate Assist   ADL Assessment   Grooming Maximal Assist   Lower Body Dressing Maximal Assist  (socks)   Toileting Total Assist  (pt in briefs)   How much help from another person does the patient currently need...   Putting on and taking off regular lower body clothing? 2   Bathing (including washing, rinsing, and drying)? 1   Toileting, which includes using a toilet, bedpan, or urinal? 1 "   Putting on and taking off regular upper body clothing? 2   Taking care of personal grooming such as brushing teeth? 2   Eating meals? 2   6 Clicks Daily Activity Score 10   Functional Mobility   Sit to Stand Minimal Assist   Bed, Chair, Wheelchair Transfer Minimal Assist   Mobility Karson HHA in room   Distance (Feet) 20   # of Times Distance was Traveled 1   Visual Perception   Visual Perception  X   Visual Acuity Blind Bilaterally   Edema / Skin Assessment   Edema / Skin  Not Assessed   Activity Tolerance   Sitting Edge of Bed 2 min, 4 min   Standing 3-4 min   Comments limited by weakness, fatigue   Patient / Family Goals   Patient / Family Goal #1 u/a to state   Education Group   Education Provided Role of Occupational Therapist   Role of Occupational Therapist Patient Response Patient;Acceptance;Explanation;No Learning Evidence

## 2025-07-23 NOTE — ED TRIAGE NOTES
Chief Complaint   Patient presents with    ALOC     PT BIB EMS for ALOC. Pt partner called 911 as pt was found on the ground. Pt is c/o of head pain but no thinners. Pt arrived in a c-collar. Per EMS the house is a hoarder house and partner is disabled and can't take care of her. Pt arrived soaked in urine.      Ht 1.524 m (5')   Wt 51.3 kg (113 lb)   BMI 22.07 kg/m²

## 2025-07-23 NOTE — ED NOTES
Patient's home medications have been reviewed by the pharmacy team.     Past Medical History[1]    Patient's Medications   New Prescriptions    No medications on file   Previous Medications    DONEPEZIL (ARICEPT) 10 MG TABLET    Take 1 Tablet by mouth every day.    MEMANTINE (NAMENDA) 10 MG TAB    Take 1 Tablet by mouth 2 times a day.    THYROID (ARMOUR THYROID) 15 MG TAB    Take 3 Tablets by mouth every day.    TRIAMCINOLONE ACETONIDE (KENALOG) 0.1 % CREAM    Apply 1 Application topically 2 times a day.   Modified Medications    No medications on file   Discontinued Medications    ASCORBIC ACID (VITAMIN C PO)    Take 1 Tablet by mouth every day. Indications: suppelemt    B COMPLEX VITAMINS (B COMPLEX PO)    Take 1 Tablet by mouth every day. Indications: supplement    BISMUTH SUBSALICYLATE (PEPTO-BISMOL PO)    Take 30 mL by mouth 2 times a day as needed (For upset stomach). Indications: upset stomach    CHOLECALCIFEROL (VITAMIN D3 PO)    Take 1 Tablet by mouth every day. Indications: supplement    CYANOCOBALAMIN (VITAMIN B-12 PO)    Take 1 Tablet by mouth every day. 1,000mcg  Indications: supplement    FOLIC ACID PO    Take 1 Tablet by mouth every day.    MULTIPLE VITAMINS-MINERALS (ZINC PO)    Take 1 Tablet by mouth every day. Indications: supplement          A:  Medications do not appear to be contributing to current complaints.       P:    No recommendations at this time. Home medications have been reordered as appropriate.      TY Baca, PharmD               [1]   Past Medical History:  Diagnosis Date    Arthritis     Asthma     Dementia (HCC)     DVT (deep venous thrombosis) ([facility]) 2003    DVT LEFT LEG-- STATES SHE HAD TAKEN COUMADIN FOR 8 WEEKS IN 2003.    GERD (gastroesophageal reflux disease)     Thyroid disease     hypothyroidism

## 2025-07-24 LAB
BACTERIA BLD CULT: ABNORMAL
BACTERIA BLD CULT: ABNORMAL
GAMMA INTERFERON BACKGROUND BLD IA-ACNC: 0.03 IU/ML
M TB IFN-G BLD-IMP: NEGATIVE
M TB IFN-G CD4+ BCKGRND COR BLD-ACNC: 0 IU/ML
MITOGEN IGNF BCKGRD COR BLD-ACNC: >10 IU/ML
QFT TB2 - NIL TBQ2: 0 IU/ML
SIGNIFICANT IND 70042: ABNORMAL
SITE SITE: ABNORMAL
SOURCE SOURCE: ABNORMAL

## 2025-07-24 NOTE — ASSESSMENT & PLAN NOTE
Patient has severely advanced Alzheimer's dementia.  The patient's  sent her into the hospital.  Patient was evaluated no acute medical pathology found for admission.  Patient's dementia at this point seems to be advancing and the patient at this point is becoming less and less verbal and more more difficult for the  to take care of.  I have spoken with the  at bedside with the  present and discussed options as well as her advanced situation.  We have offered him hospice as well as home health as well as home aides coming to the house.  For now he did not want to put her in hospice and he does not want home health coming out.  He does not want occasionally a person to come and help him give her a shower.  He does not have the funds to pay for a lot of assistance.  He has scheduled his life around her and wants the best for her  Patient was evaluated by PT OT she was able to ambulate independently so at this point case management is setting up a discharge plan for the patient to go home.  Currently on Aricept and Namenda which will be continued.

## 2025-07-24 NOTE — ED NOTES
ED Positive Culture Follow-up/Notification Note:    Date: 7/24/2025  Patient with PMH of Alzheimer's seen in the ED on 7/22/2025 for altered mental status and ground level fall. Not on thinners.  Patient complains of some head pain. Vital signs, physical exam, and labwork were not remarkable. No obvious s/s of infection. Blood cultures were obtained. Positive in 1/2 for staphylococcus hominis.    1. Appetite loss    2. Severe early onset Alzheimer's dementia with other behavioral disturbance (HCC)    3. Abnormal gait    4. Unable to care for self       Discharge Medication List as of 7/23/2025  7:19 PM          Allergies: Nabumetone, Synthroid [levothroid], Tetracycline, and Cephalexin     Vitals:    07/23/25 0636 07/23/25 0649 07/23/25 1412 07/23/25 1826   BP:   110/62 112/60   Pulse: 64  65 (!) 59   Resp:   18 18   Temp:       TempSrc:       SpO2: 91%  91% 91%   Weight:  39.9 kg (88 lb)     Height:           Final cultures:   Results       Procedure Component Value Units Date/Time    Urine Culture (New) [[id number]] Collected: 07/22/25 1913    Order Status: Completed Specimen: Urine Updated: 07/24/25 1442     Significant Indicator NEG     Source UR     Site -     Culture Result Culture in progress.    Blood Culture - Draw one from central line and one from peripheral site [[id number]]  (Abnormal) Collected: 07/22/25 2225    Order Status: Completed Specimen: Blood from Line Updated: 07/24/25 1352     Significant Indicator POS     Source BLD     Site Peripheral     Culture Result Growth detected by automated blood culture system. 07/23/2025  18:58  Negative for Staphylococcus aureus and MRSA by PCR. Correlate  ongoing need for antibiotics with clinical condition.        Staphylococcus hominis  POSSIBLE CONTAMINANT: Isolated from one set only, please  correlate with clinical condition. Contact the Microbiology  department within 48 hr if susceptibility testing is needed.      Blood Culture - Draw one from central line  and one from peripheral site [[id number]] Collected: 07/22/25 2151    Order Status: Completed Specimen: Blood from Peripheral Updated: 07/23/25 0219     Significant Indicator NEG     Source BLD     Site PERIPHERAL     Culture Result No Growth  Note: Blood cultures are incubated for 5 days and  are monitored continuously.Positive blood cultures  are called to the RN and reported as soon as  they are identified.  Blood culture testing and Gram stain, if indicated, are  performed at [facility],  [address] [place] [address].  Positive blood  cultures are sent to [facility],  [address] [place] [address], for organism identification  and susceptibility testing.      Urinalysis, Culture if Indicated [[id number]]  (Abnormal) Collected: 07/22/25 1913    Order Status: Completed Specimen: Urine, Clean Catch Updated: 07/22/25 2016     Color Yellow     Character Clear     Specific Gravity 1.025     Ph 5.0     Glucose Negative mg/dL      Ketones 40 mg/dL      Protein 30 mg/dL      Bilirubin Negative     Urobilinogen, Urine 1.0 EU/dL      Nitrite Negative     Leukocyte Esterase Negative     Occult Blood Negative     Micro Urine Req Microscopic    Urinalysis [[id number]]     Order Status: Canceled Specimen: Urine             Plan:   Discussed with patient's spouse (caregiver, POA) over the phone, who reports patient is doing well and reports no fevers, chills, or unusual symptoms.   In the absence of infectious symptoms or hardware coagulase negative staphylococcus in 1 of 2 sets likely represents a contaminant rather than true pathogen. Advised return precautions for any fevers, chills, or unusual symptoms. No treatment indicated at this time.   Flavia Garcia, PharmD

## 2025-07-24 NOTE — CONSULTS
Hospital Medicine Consultation    Date of Service  7/23/2025    Referring Physician  Justice Francisco M.D.    Consulting Physician  Sarah Pena M.D.    Reason for Consultation  Medical evaluation    History of Presenting Illness  74 y.o. female who presented 7/22/2025 with confusion.  The patient was brought in by ambulance from home because of worsening confusion and lack of responsiveness.  The  says that a year ago her dementia acted up which landed her in the hospital for about 5 days and then she got better.  This time her dementia acted up and so I asked them to bring her in.  Here the patient was evaluated labs and vitals are normal.  Imaging studies are normal.  Patient is able to ambulate independently.  The patient at this point does not have a medical reason for admission.  The patient's mental status seems to be stable and she is not hyperactive or aggressive.  The  at this point does not want to bring her home and thus  is working with the  to arrange a discharge that is safe for the patient at home.  Currently patient does not need to be admitted to the hospital.    Review of Systems  Review of Systems   Unable to perform ROS: Dementia       Past Medical History   has a past medical history of Arthritis, Asthma, Dementia ([facility]), DVT (deep venous thrombosis) ([facility]) (2003), GERD (gastroesophageal reflux disease), and Thyroid disease.    Surgical History   has a past surgical history that includes other abdominal surgery; appendectomy laparoscopic (8/1/2011); cholecystectomy; appendectomy; tonsillectomy; pr cystoscopy,insert ureteral stent (Left, 6/10/2024); pr cysto/uretero/pyeloscopy, dx (Left, 6/28/2024); and cystoscopy with ureteral stent insertion or removal (Left, 6/28/2024).    Family History  Family History   Problem Relation Age of Onset    Hypertension Sister     Diabetes Sister     Heart Disease Sister     Other Sister         loss vision complete    Heart  Disease Brother 55        MI    Genetic Disorder Mother     Stroke Father     No Known Problems Maternal Grandmother     No Known Problems Maternal Grandfather     No Known Problems Paternal Grandmother     No Known Problems Paternal Grandfather        Social History   reports that she quit smoking about 19 years ago. Her smoking use included cigarettes. She started smoking about 59 years ago. She has a 60 pack-year smoking history. She has never used smokeless tobacco. She reports that she does not drink alcohol and does not use drugs.    Medications  Prior to Admission Medications   Prescriptions Last Dose Informant Patient Reported? Taking?   donepezil (ARICEPT) 10 MG tablet Unknown Patient's Home Pharmacy No No   Sig: Take 1 Tablet by mouth every day.   memantine (NAMENDA) 10 MG Tab Unknown Patient's Home Pharmacy No No   Sig: Take 1 Tablet by mouth 2 times a day.   thyroid (ARMOUR THYROID) 15 MG Tab Unknown Patient's Home Pharmacy No No   Sig: Take 3 Tablets by mouth every day.   triamcinolone acetonide (KENALOG) 0.1 % Cream Not Taking Historical No No   Sig: Apply 1 Application topically 2 times a day.   Patient not taking: Reported on 7/23/2025      Facility-Administered Medications: None       Allergies  Allergies[1]    Physical Exam  Temp:  [36.9 °C (98.5 °F)] 36.9 °C (98.5 °F)  Pulse:  [48-72] 64  BP: (103-149)/(50-65) 113/55  SpO2:  [91 %-97 %] 91 %    Physical Exam  Vitals and nursing note reviewed. Exam conducted with a chaperone present.   Constitutional:       General: She is awake.      Appearance: Normal appearance. She is well-developed, well-groomed and normal weight.   HENT:      Head: Normocephalic and atraumatic.      Jaw: There is normal jaw occlusion. No trismus.      Salivary Glands: Right salivary gland is not tender. Left salivary gland is not tender.      Right Ear: External ear normal.      Left Ear: External ear normal.      Nose: Nose normal.      Mouth/Throat:      Mouth: Mucous  membranes are dry.      Pharynx: Oropharynx is clear.   Eyes:      General: Lids are normal. Vision grossly intact.      Extraocular Movements: Extraocular movements intact.      Conjunctiva/sclera: Conjunctivae normal.      Right eye: Right conjunctiva is not injected. No exudate.     Left eye: Left conjunctiva is not injected. No exudate.     Pupils: Pupils are equal, round, and reactive to light.      Visual Fields:      Right eye: DL in the upper temporal quadrant. DL in the upper nasal quadrant. DL in the lower temporal quadrant. DL in the lower nasal quadrant.      Left eye: DL in the upper nasal quadrant. DL in the upper temporal quadrant. DL in the lower nasal quadrant. DL in the lower temporal quadrant.      Comments: Patient has no visual acuity.  She is legally blind and was not able to even recognize fingers   Neck:      Thyroid: No thyroid mass.      Vascular: No carotid bruit, hepatojugular reflux or JVD.      Trachea: No abnormal tracheal secretions or tracheal deviation.   Cardiovascular:      Rate and Rhythm: Normal rate and regular rhythm. Occasional Extrasystoles are present.     Pulses: Normal pulses.      Heart sounds: Normal heart sounds. No murmur heard.     No friction rub.   Pulmonary:      Effort: Pulmonary effort is normal.      Breath sounds: Normal breath sounds. No wheezing or rhonchi.   Abdominal:      General: Abdomen is flat. Bowel sounds are normal.      Palpations: Abdomen is soft.      Tenderness: There is no abdominal tenderness. There is no right CVA tenderness or left CVA tenderness.      Hernia: No hernia is present.   Musculoskeletal:         General: Normal range of motion.      Cervical back: Full passive range of motion without pain, normal range of motion and neck supple. No rigidity. No muscular tenderness.      Right lower leg: No edema.      Left lower leg: No edema.   Lymphadenopathy:      Head:      Right side of head: No submental adenopathy.      Left side of head:  No submental adenopathy.      Cervical:      Right cervical: No superficial cervical adenopathy.     Left cervical: No superficial cervical adenopathy.      Upper Body:      Right upper body: No supraclavicular adenopathy.      Left upper body: No supraclavicular adenopathy.   Skin:     General: Skin is warm and dry.      Capillary Refill: Capillary refill takes 2 to 3 seconds.      Coloration: Skin is not cyanotic or pale.      Findings: No abrasion or bruising.   Neurological:      General: No focal deficit present.      Mental Status: She is alert. Mental status is at baseline. She is disoriented and confused.      GCS: GCS eye subscore is 4. GCS verbal subscore is 4. GCS motor subscore is 6.      Cranial Nerves: No cranial nerve deficit.      Sensory: No sensory deficit.      Motor: Weakness present.      Deep Tendon Reflexes:      Reflex Scores:       Tricep reflexes are 2+ on the right side and 2+ on the left side.       Bicep reflexes are 2+ on the right side and 2+ on the left side.       Brachioradialis reflexes are 2+ on the right side and 2+ on the left side.       Patellar reflexes are 2+ on the right side and 2+ on the left side.       Achilles reflexes are 2+ on the right side and 2+ on the left side.  Psychiatric:         Attention and Perception: She is inattentive.         Mood and Affect: Affect is flat.         Speech: Speech is delayed and slurred.         Behavior: Behavior is uncooperative and withdrawn.         Cognition and Memory: Cognition is impaired. Memory is impaired. She exhibits impaired recent memory and impaired remote memory.         Fluids      Laboratory  Recent Labs     07/22/25 1918   WBC 10.5   RBC 4.00*   HEMOGLOBIN 12.8   HEMATOCRIT 37.9   MCV 94.8   MCH 32.0   MCHC 33.8   RDW 44.1   PLATELETCT 146*   MPV 9.5     Recent Labs     07/22/25 1918   SODIUM 135   POTASSIUM 4.1   CHLORIDE 102   CO2 21   GLUCOSE 101*   BUN 20   CREATININE 0.74   CALCIUM 9.0                      Imaging  CT-HEAD W/O   Final Result         1.  No acute intracranial abnormality is identified, there are nonspecific white matter changes, commonly associated with small vessel ischemic disease.  Associated mild cerebral atrophy is noted.   2.  Atherosclerosis.         DX-CHEST-PORTABLE (1 VIEW)   Final Result         No acute cardiac or pulmonary abnormality is identified.          Assessment/Plan  Severe late onset Alzheimer's dementia (HCC)- (present on admission)  Assessment & Plan  Patient has severely advanced Alzheimer's dementia.  The patient's  sent her into the hospital.  Patient was evaluated no acute medical pathology found for admission.  Patient's dementia at this point seems to be advancing and the patient at this point is becoming less and less verbal and more more difficult for the  to take care of.  I have spoken with the  at bedside with the  present and discussed options as well as her advanced situation.  We have offered him hospice as well as home health as well as home aides coming to the house.  For now he did not want to put her in hospice and he does not want home health coming out.  He does not want occasionally a person to come and help him give her a shower.  He does not have the funds to pay for a lot of assistance.  He has scheduled his life around her and wants the best for her  Patient was evaluated by PT OT she was able to ambulate independently so at this point case management is setting up a discharge plan for the patient to go home.  Currently on Aricept and Namenda which will be continued.    Mixed hyperlipidemia - (present on admission)  Assessment & Plan  Patient currently not on medications for lipid management  In the past patient was on Crestor        Hypothyroidism- (present on admission)  Assessment & Plan  She continues on [place] Thyroid 45 mg daily  Most recent TSH 1.320 which is within normal limits no changes recommended    GERD  (gastroesophageal reflux disease)- (present on admission)  Assessment & Plan  History of GERD currently not on medications for      Consultation lasted 60 minutes.  45 minutes were utilized in evaluating the patient talking to the patient and family at bedside.  10 minutes were used to review the chart and 5 minutes were used to plan out a plan of care         [1]   Allergies  Allergen Reactions    Nabumetone      rash    Synthroid [Levothroid]      Nausea      Tetracycline Swelling    Cephalexin Rash     Rash, tolerated ancef test dose followed by full dose

## 2025-07-24 NOTE — DISCHARGE PLANNING
Anticipated Discharge Disposition: Home with EMS    Action: Spoke with DANGELO KATE medicaid not found for transport benefit. DAMIR Dudley aware ETA For Transport 7:30 to home.    Barriers to Discharge: Transport home with EMS 7:30    Plan: No further ER CM needs at this time
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
## 2025-07-24 NOTE — ED NOTES
Patient is stable for d/c at this time, anticipatory guidance provided, close follow-up encouraged, and ED return instructions have been detailed. Patient agreeable to the disposition, and plan and discharged home via [facility] and good condition.    Bed change performed, Pt sent home w/ extra supplies

## 2025-07-24 NOTE — ASSESSMENT & PLAN NOTE
She continues on [place] Thyroid 45 mg daily  Most recent TSH 1.320 which is within normal limits no changes recommended

## 2025-07-25 LAB
BACTERIA UR CULT: NORMAL
SIGNIFICANT IND 70042: NORMAL
SITE SITE: NORMAL
SOURCE SOURCE: NORMAL

## 2025-07-28 LAB
BACTERIA BLD CULT: NORMAL
SIGNIFICANT IND 70042: NORMAL
SITE SITE: NORMAL
SOURCE SOURCE: NORMAL

## 2025-08-05 ENCOUNTER — APPOINTMENT (OUTPATIENT)
Dept: MEDICAL GROUP | Facility: MEDICAL CENTER | Age: 75
End: 2025-08-05
Payer: MEDICARE

## 2025-08-14 RX ORDER — MUPIROCIN 2 %
1 OINTMENT (GRAM) TOPICAL 2 TIMES DAILY
COMMUNITY
End: 2025-08-14 | Stop reason: SDUPTHER

## 2025-08-14 RX ORDER — MUPIROCIN 2 %
1 OINTMENT (GRAM) TOPICAL 2 TIMES DAILY
Qty: 22 G | Refills: 1 | Status: SHIPPED | OUTPATIENT
Start: 2025-08-14

## (undated) DEVICE — SENSOR OXIMETER ADULT SPO2 RD SET (20EA/BX)

## (undated) DEVICE — WATER IRRIG. STER 3000 ML - (4/CA)

## (undated) DEVICE — BASKET ZERO 1.9FR X 120CM

## (undated) DEVICE — SET LEADWIRE 5 LEAD BEDSIDE DISPOSABLE ECG (1SET OF 5/EA)

## (undated) DEVICE — KIT  I.V. START (100EA/CA)

## (undated) DEVICE — CANISTER SUCTION RIGID RED 1500CC (40EA/CA)

## (undated) DEVICE — CANISTER SUCTION 3000ML MECHANICAL FILTER AUTO SHUTOFF MEDI-VAC NONSTERILE LF DISP  (40EA/CA)

## (undated) DEVICE — SCOPE DIGITAL URETEROSCOPE DISPOSABLE (10EA/PK)

## (undated) DEVICE — PACK CYSTOSCOPY III - (8/CA)

## (undated) DEVICE — BAG SPONGE COUNT 10.25 X 32 - BLUE (250/CA)

## (undated) DEVICE — ZIPWIRE STIFF .035 ANGLED TIP (5EA/BX)

## (undated) DEVICE — BAG URODRAIN WITH TUBING - (20/CA)

## (undated) DEVICE — CANNULA O2 COMFORT SOFT EAR ADULT 7 FT TUBING (50/CA)

## (undated) DEVICE — JELLY SURGILUBE STERILE FOIL 5 GM (150EA/BX)

## (undated) DEVICE — ELECTRODE DUAL RETURN W/ CORD - (50/PK)

## (undated) DEVICE — SODIUM CHL. IRRIGATION 0.9% 3000ML (4EA/CA 65CA/PF)

## (undated) DEVICE — TUBE CONNECTING SUCTION - CLEAR PLASTIC STERILE 72 IN (50EA/CA)

## (undated) DEVICE — GOWN SURGICAL X-LARGE ULTRA - FILM-REINFORCED (20/CA)

## (undated) DEVICE — HUMID-VENT HEAT AND MOISTURE EXCHANGE- (50/BX)

## (undated) DEVICE — SUCTION INSTRUMENT YANKAUER BULBOUS TIP W/O VENT (50EA/CA)

## (undated) DEVICE — WATER IRRIGATION STERILE 1000ML (12EA/CA)

## (undated) DEVICE — COVER LIGHT HANDLE FLEXIBLE - SOFT (2EA/PK 80PK/CA)

## (undated) DEVICE — CONTAINER SPECIMEN BAG OR - STERILE 4 OZ W/LID (100EA/CA)

## (undated) DEVICE — SPONGE GAUZESTER 4 X 4 4PLY - (128PK/CA)

## (undated) DEVICE — SET IRRIGATION CYSTOSCOPY TUBE L80 IN (20EA/CA)

## (undated) DEVICE — WIRE GUIDE SENSOR DUAL FLEX - 5/BX

## (undated) DEVICE — MASK OXYGEN VNYL ADLT MED CONC WITH 7 FOOT TUBING  - (50EA/CA)

## (undated) DEVICE — GOWN WARMING STANDARD FLEX - (30/CA)

## (undated) DEVICE — SET IRRIGATION CYSTOSCOPY Y-TYPE L81 IN (20EA/CA)

## (undated) DEVICE — SLEEVE VASO CALF MED - (10PR/CA)

## (undated) DEVICE — SUTURE GENERAL

## (undated) DEVICE — TRAY FOLEY CATHETER STATLOCK 16FR SURESTEP  (10EA/CA)

## (undated) DEVICE — Device

## (undated) DEVICE — GLOVE BIOGEL SZ 8 SURGICAL PF LTX - (50PR/BX 4BX/CA)

## (undated) DEVICE — SODIUM CHL IRRIGATION 0.9% 1000ML (12EA/CA)

## (undated) DEVICE — TOWEL STOP TIMEOUT SAFETY FLAG (40EA/CA)

## (undated) DEVICE — GOWN SURGEONS X-LARGE - DISP. (30/CA)

## (undated) DEVICE — LACTATED RINGERS INJ 1000 ML - (14EA/CA 60CA/PF)

## (undated) DEVICE — TUBING CLEARLINK DUO-VENT - C-FLO (48EA/CA)